# Patient Record
Sex: FEMALE | Race: WHITE | Employment: UNEMPLOYED | URBAN - METROPOLITAN AREA
[De-identification: names, ages, dates, MRNs, and addresses within clinical notes are randomized per-mention and may not be internally consistent; named-entity substitution may affect disease eponyms.]

---

## 2017-01-17 ENCOUNTER — TELEPHONE (OUTPATIENT)
Dept: INTERNAL MEDICINE CLINIC | Age: 57
End: 2017-01-17

## 2017-01-17 DIAGNOSIS — R73.01 IMPAIRED FASTING GLUCOSE: ICD-10-CM

## 2017-01-17 DIAGNOSIS — I10 ESSENTIAL HYPERTENSION: Primary | ICD-10-CM

## 2017-01-17 DIAGNOSIS — Z11.59 NEED FOR HEPATITIS C SCREENING TEST: ICD-10-CM

## 2017-01-17 DIAGNOSIS — E78.00 PURE HYPERCHOLESTEROLEMIA: ICD-10-CM

## 2017-01-17 DIAGNOSIS — E55.9 VITAMIN D DEFICIENCY: ICD-10-CM

## 2017-02-10 RX ORDER — CITALOPRAM 40 MG/1
TABLET, FILM COATED ORAL
Qty: 90 TAB | Refills: 3 | Status: SHIPPED | OUTPATIENT
Start: 2017-02-10 | End: 2017-02-23 | Stop reason: SDUPTHER

## 2017-02-10 RX ORDER — ATORVASTATIN CALCIUM 10 MG/1
TABLET, FILM COATED ORAL
Qty: 30 TAB | Refills: 11 | Status: SHIPPED | OUTPATIENT
Start: 2017-02-10 | End: 2017-02-23 | Stop reason: SDUPTHER

## 2017-02-23 ENCOUNTER — OFFICE VISIT (OUTPATIENT)
Dept: INTERNAL MEDICINE CLINIC | Age: 57
End: 2017-02-23

## 2017-02-23 VITALS
BODY MASS INDEX: 28.63 KG/M2 | SYSTOLIC BLOOD PRESSURE: 114 MMHG | RESPIRATION RATE: 17 BRPM | OXYGEN SATURATION: 96 % | TEMPERATURE: 98.4 F | WEIGHT: 182.4 LBS | HEART RATE: 64 BPM | DIASTOLIC BLOOD PRESSURE: 77 MMHG | HEIGHT: 67 IN

## 2017-02-23 DIAGNOSIS — J41.0 SIMPLE CHRONIC BRONCHITIS (HCC): ICD-10-CM

## 2017-02-23 DIAGNOSIS — Z23 ENCOUNTER FOR IMMUNIZATION: ICD-10-CM

## 2017-02-23 DIAGNOSIS — E78.00 PURE HYPERCHOLESTEROLEMIA: ICD-10-CM

## 2017-02-23 DIAGNOSIS — H00.015 HORDEOLUM EXTERNUM OF LEFT LOWER EYELID: ICD-10-CM

## 2017-02-23 DIAGNOSIS — J45.40 MODERATE PERSISTENT ASTHMA WITHOUT COMPLICATION: ICD-10-CM

## 2017-02-23 DIAGNOSIS — Z12.39 SCREENING FOR BREAST CANCER: ICD-10-CM

## 2017-02-23 DIAGNOSIS — E55.9 VITAMIN D DEFICIENCY: ICD-10-CM

## 2017-02-23 DIAGNOSIS — R73.01 IMPAIRED FASTING GLUCOSE: ICD-10-CM

## 2017-02-23 DIAGNOSIS — I10 ESSENTIAL HYPERTENSION: Primary | ICD-10-CM

## 2017-02-23 RX ORDER — ATENOLOL 100 MG/1
TABLET ORAL
Qty: 135 TAB | Refills: 3 | Status: SHIPPED | OUTPATIENT
Start: 2017-02-23 | End: 2017-06-15 | Stop reason: SDUPTHER

## 2017-02-23 RX ORDER — ATORVASTATIN CALCIUM 10 MG/1
TABLET, FILM COATED ORAL
Qty: 90 TAB | Refills: 3 | Status: SHIPPED | OUTPATIENT
Start: 2017-02-23 | End: 2018-03-23 | Stop reason: SDUPTHER

## 2017-02-23 RX ORDER — VARENICLINE TARTRATE 1 MG/1
TABLET, FILM COATED ORAL
Qty: 60 TAB | Refills: 1 | Status: SHIPPED | OUTPATIENT
Start: 2017-02-23 | End: 2017-05-24

## 2017-02-23 RX ORDER — VARENICLINE TARTRATE 25 MG
KIT ORAL
Qty: 1 DOSE PACK | Refills: 0 | Status: SHIPPED | OUTPATIENT
Start: 2017-02-23 | End: 2018-04-02 | Stop reason: SDUPTHER

## 2017-02-23 RX ORDER — BUDESONIDE AND FORMOTEROL FUMARATE DIHYDRATE 80; 4.5 UG/1; UG/1
AEROSOL RESPIRATORY (INHALATION)
Qty: 1 INHALER | Refills: 11 | Status: SHIPPED | OUTPATIENT
Start: 2017-02-23 | End: 2018-03-09 | Stop reason: SDUPTHER

## 2017-02-23 RX ORDER — LISINOPRIL AND HYDROCHLOROTHIAZIDE 12.5; 2 MG/1; MG/1
TABLET ORAL
Qty: 90 TAB | Refills: 3 | Status: SHIPPED | OUTPATIENT
Start: 2017-02-23 | End: 2017-07-14 | Stop reason: SDUPTHER

## 2017-02-23 RX ORDER — CITALOPRAM 40 MG/1
TABLET, FILM COATED ORAL
Qty: 90 TAB | Refills: 3 | Status: SHIPPED | OUTPATIENT
Start: 2017-02-23 | End: 2017-10-13 | Stop reason: SDUPTHER

## 2017-02-23 RX ORDER — ALBUTEROL SULFATE 90 UG/1
AEROSOL, METERED RESPIRATORY (INHALATION)
Qty: 1 INHALER | Refills: 11 | Status: SHIPPED | OUTPATIENT
Start: 2017-02-23 | End: 2018-03-09 | Stop reason: SDUPTHER

## 2017-02-23 NOTE — MR AVS SNAPSHOT
Visit Information Date & Time Provider Department Dept. Phone Encounter #  
 2/23/2017 10:00 AM Félix Jimenes MD [facility] [phone] [id number] Upcoming Health Maintenance Date Due Hepatitis C Screening 1960 DTaP/Tdap/Td series (1 - Tdap) 3/12/1980 BREAST CANCER SCRN MAMMOGRAM 2/18/2016 PAP AKA CERVICAL CYTOLOGY 6/11/2017 COLONOSCOPY 3/20/2018 Allergies as of 2/23/2017  Review Complete On: 2/23/2017 By: Félix Jimenes MD  
  
 Severity Noted Reaction Type Reactions Sporanox [Itraconazole]  04/18/2012    Hives Current Immunizations  Reviewed on 2/8/2016 Name Date Influenza Vaccine 10/15/2016, 10/15/2015, 11/1/2014, 10/21/2013 Influenza Vaccine Split 10/19/2012 Pneumococcal Polysaccharide (PPSV-23) 11/6/2014 Pneumococcal Vaccine (Unspecified Type) 10/29/2009 Tdap  Incomplete Not reviewed this visit You Were Diagnosed With   
  
 Codes Comments Essential hypertension    -  Primary ICD-10-CM: I10 
ICD-9-CM: 401.9 Encounter for immunization     ICD-10-CM: P29 ICD-9-CM: V03.89 Screening for breast cancer     ICD-10-CM: Z12.39 
ICD-9-CM: V76.10 Simple chronic bronchitis (HCC)     ICD-10-CM: J41.0 ICD-9-CM: 491.0 Pure hypercholesterolemia     ICD-10-CM: E78.00 ICD-9-CM: 272.0 Moderate persistent asthma without complication     E-32-FK: J45.40 ICD-9-CM: 493.90 Impaired fasting glucose     ICD-10-CM: R73.01 
ICD-9-CM: 790.21 Vitamin D deficiency     ICD-10-CM: E55.9 ICD-9-CM: 268.9 Hordeolum externum of left lower eyelid     ICD-10-CM: H00.015 
ICD-9-CM: 373.11 Vitals BP  
  
  
  
  
  
 114/77 (BP 1 Location: Right arm, BP Patient Position: Sitting) BMI and BSA Data Body Mass Index Body Surface Area 28.57 kg/m 2 1.98 m 2 Preferred Pharmacy Pharmacy Name Phone [address] [place] [address] [phone] Your Updated Medication List  
  
   
This list is accurate as of: 2/23/17 11:02 AM.  Always use your most recent med list.  
  
  
  
  
 albuterol 90 mcg/actuation inhaler Commonly known as:  PROAIR HFA INHALE 2 PUFFS BY MOUTH EVERY 4 HOURS AS NEEDED FOR WHEEZING  
  
 atenolol 100 mg tablet Commonly known as:  TENORMIN  
TAKE ONE AND ONE-HALF TABLET BY MOUTH EVERY DAY  
  
 atorvastatin 10 mg tablet Commonly known as:  LIPITOR  
TAKE ONE TABLET BY MOUTH EVERY DAY  
  
 budesonide-formoterol 80-4.5 mcg/actuation Hfaa inhaler Commonly known as:  SYMBICORT  
INHALE 2 PUFFS BY MOUTH TWO TIMES A DAY  
  
 citalopram 40 mg tablet Commonly known as:  CELEXA  
TAKE ONE TABLET BY MOUTH EVERY DAY  
  
 lisinopril-hydroCHLOROthiazide 20-12.5 mg per tablet Commonly known as:  PRINZIDE, ZESTORETIC  
TAKE ONE TABLET BY MOUTH EVERY DAY  
  
 loperamide 2 mg capsule Commonly known as:  IMODIUM  
  
 PROBIOTIC 4X 10-15 mg Tbec Generic drug:  B.infantis-B.ani-B.long-B.bifi Take  by mouth. * varenicline 1 mg tablet Commonly known as:  [address] Take as directed * varenicline 0.5 mg (11)- 1 mg (42) Dspk Commonly known as:  [address] Take as directed  
  
 zinc 50 mg Tab tablet Take  by mouth. * Notice: This list has 2 medication(s) that are the same as other medications prescribed for you. Read the directions carefully, and ask your doctor or other care provider to review them with you. Prescriptions Sent to Pharmacy Refills  
 atorvastatin (LIPITOR) 10 mg tablet 3 Sig: TAKE ONE TABLET BY MOUTH EVERY DAY Class: Normal  
 Pharmacy: [address] [place] [address] #: [phone]  
 citalopram (CELEXA) 40 mg tablet 3 Sig: TAKE ONE TABLET BY MOUTH EVERY DAY  Class: Normal  
 Pharmacy: [address] [place] [address] Ph #: [phone]  
 albuterol [facility] HFA) 90 mcg/actuation inhaler 11 Sig: INHALE 2 PUFFS BY MOUTH EVERY 4 HOURS AS NEEDED FOR WHEEZING Class: Normal  
 Pharmacy: [address] [place] [address] Ph #: [phone]  
 budesonide-formoterol (SYMBICORT) 80-4.5 mcg/actuation HFAA inhaler 11 Sig: INHALE 2 PUFFS BY MOUTH TWO TIMES A DAY Class: Normal  
 Pharmacy: [address] [place] [address] Ph #: [phone]  
 atenolol (TENORMIN) 100 mg tablet 3 Sig: TAKE ONE AND ONE-HALF TABLET BY MOUTH EVERY DAY Class: Normal  
 Pharmacy: [address] [place] [address] Ph #: [phone]  
 lisinopril-hydroCHLOROthiazide (PRINZIDE, ZESTORETIC) 20-12.5 mg per tablet 3 Sig: TAKE ONE TABLET BY MOUTH EVERY DAY Class: Normal  
 Pharmacy: [address] [place] [address] Ph #: [phone]  
 varenicline (CHANTIX CONTINUING MONTH BOX) 1 mg tablet 1 Sig: Take as directed Class: Normal  
 Pharmacy: [address] [place] [address] Ph #: [phone]  
 varenicline (CHANTIX STARTING MONTH BOX) 0.5 mg (11)- 1 mg (42) DsPk 0 Sig: Take as directed Class: Normal  
 Pharmacy: [address] [place] [address] Ph #: [phone] We Performed the Following [place] IMMUNIZ ADMIN,1 SINGLE/COMB VAC/TOXOID [id number] CPT(R)] REFERRAL TO OPHTHALMOLOGY [REF57 Custom] TETANUS, DIPHTHERIA TOXOIDS AND ACELLULAR PERTUSSIS VACCINE (TDAP), IN INDIVIDS. >=7, IM [id number] CPT(R)] To-Do List   
 03/02/2017 Imaging:  [facility]ACIA MAMMO BI SCREENING INCL CAD Referral Information Referral ID Referred By Referred To  
  
 [id number] ZHEN Peck MD   
   [address] [place], [address] Phone: [phone] Fax: [phone] Visits Status Start Date End Date 1 New Request 2/23/17 2/23/18 If your referral has a status of pending review or denied, additional information will be sent to support the outcome of this decision. Introducing [facility]! Dear Jignesh Lane: 
Thank you for requesting a Epiclist account. Our records indicate that you already have an active Epiclist account. You can access your account anytime at [email or web address] Did you know that you can access your hospital and ER discharge instructions at any time in Epiclist? You can also review all of your test results from your hospital stay or ER visit. Additional Information If you have questions, please visit the Frequently Asked Questions section of the Epiclist website at [email or web address] Remember, Epiclist is NOT to be used for urgent needs. For medical emergencies, dial 911. Now available from your iPhone and Android! Please provide this summary of care documentation to your next provider. Your primary care clinician is listed as ZHEN WAGGONER. If you have any questions after today's visit, please call [phone].

## 2017-02-23 NOTE — PROGRESS NOTES
Subjective:     Anny Arreola is a 61 y.o. female who presents for follow up of hypertension and hyperlipidemia and elevated blood sugar. Diet and Lifestyle: generally follows a low fat low cholesterol diet, generally follows a low sodium diet, sedentary  Home BP Monitoring: is not measured at home    Cardiovascular ROS: taking medications as instructed, no medication side effects noted, no TIA's, no chest pain on exertion, no dyspnea on exertion, no swelling of ankles, no orthostatic dizziness or lightheadedness, no palpitations. New concerns:   Increased bloating, nausea and gas x 4 days. No diarrhea, no fevers or chills. Tried Rolaids which helped Monday and Tuesday but not yesterday. Continues to eat regular diet - has been eating a lot of leafy vegetables. Normal appetite. No pain or cramping. Left lower lid with sty x 4 weeks. Mild discharge. Tried warm compresses without improvement. Had lacrimal gland abscess 25 years ago which needed to be removed. Dali Shane COPD/asthma - ready to attempt quitting smoking. Has tried Nicotine patch. Tried Chantix. Chronic cough is unchanged - productive of clear mucous. No wheezing. Some sob. Elevated bs - denies increased thirst or urination, numbness in feet or vision changes. Current Outpatient Prescriptions   Medication Sig Dispense Refill    B.infantis-B.ani-B.long-B.bifi (PROBIOTIC 4X) 10-15 mg TbEC Take  by mouth.       atorvastatin (LIPITOR) 10 mg tablet TAKE ONE TABLET BY MOUTH EVERY DAY 90 Tab 3    citalopram (CELEXA) 40 mg tablet TAKE ONE TABLET BY MOUTH EVERY DAY 90 Tab 3    albuterol (PROAIR HFA) 90 mcg/actuation inhaler INHALE 2 PUFFS BY MOUTH EVERY 4 HOURS AS NEEDED FOR WHEEZING 1 Inhaler 11    budesonide-formoterol (SYMBICORT) 80-4.5 mcg/actuation HFAA inhaler INHALE 2 PUFFS BY MOUTH TWO TIMES A DAY 1 Inhaler 11    atenolol (TENORMIN) 100 mg tablet TAKE ONE AND ONE-HALF TABLET BY MOUTH EVERY  Tab 3    lisinopril-hydroCHLOROthiazide (PRINZIDE, ZESTORETIC) 20-12.5 mg per tablet TAKE ONE TABLET BY MOUTH EVERY DAY 90 Tab 3    varenicline (CHANTIX CONTINUING MONTH BOX) 1 mg tablet Take as directed 60 Tab 1    varenicline (CHANTIX STARTING MONTH BOX) 0.5 mg (11)- 1 mg (42) DsPk Take as directed 1 Dose Pack 0    loperamide (IMODIUM) 2 mg capsule       zinc 50 mg tab Take  by mouth. Lab Results   Component Value Date/Time    Hemoglobin A1c 6.9 06/11/2014 11:26 AM    Hemoglobin A1c 6.3 01/28/2010 10:35 AM    Hemoglobin A1c 6.4 04/30/2009 11:50 AM    Glucose 145 06/11/2014 11:26 AM    LDL, calculated 155 06/11/2014 11:26 AM    Creatinine 0.83 06/11/2014 11:26 AM      Lab Results   Component Value Date/Time    Cholesterol, total 260 06/11/2014 11:26 AM    HDL Cholesterol 69 06/11/2014 11:26 AM    LDL, calculated 155 06/11/2014 11:26 AM    Triglyceride 178 06/11/2014 11:26 AM    CHOL/HDL Ratio 3.6 01/28/2010 10:35 AM       Lab Results   Component Value Date/Time    ALT (SGPT) 21 06/11/2014 11:26 AM    AST (SGOT) 19 06/11/2014 11:26 AM    Alk. phosphatase 79 06/11/2014 11:26 AM    Bilirubin, total 0.6 06/11/2014 11:26 AM       Lab Results   Component Value Date/Time    GFR est AA 92 06/11/2014 11:26 AM    GFR est non-AA 80 06/11/2014 11:26 AM    Creatinine 0.83 06/11/2014 11:26 AM    BUN 24 06/11/2014 11:26 AM    Sodium 136 06/11/2014 11:26 AM    Potassium 5.2 06/11/2014 11:26 AM    Chloride 95 06/11/2014 11:26 AM    CO2 25 06/11/2014 11:26 AM         Review of Systems, additional:  Pertinent items are noted in HPI. Objective:     Visit Vitals    /77 (BP 1 Location: Right arm, BP Patient Position: Sitting)    Pulse 64    Temp 98.4 °F (36.9 °C) (Oral)    Resp 17    Ht 5' 7\" (1.702 m)    Wt 182 lb 6.4 oz (82.7 kg)    SpO2 96%    BMI 28.57 kg/m2     Appearance: alert, well appearing, and in no distress and oriented to person, place, and time.   General exam:   Left lateral lower lid with sty and swelling, 1.5cm  NECK: supple, no lad, no bruit, no tm  LUNGS: cta bilat  CV rrr, no m/g/r  ABD: soft, nt, nd, nabs  EXT: no c/c/e. Assessment/Plan:     hypertension well controlled. current treatment plan is effective, no change in therapy. Hyperlipidemia- controlled in past  Continue same medications    Elevated blood sugar - A1C had been creeping upward  Check labs, cont same    COPD/asthma - stable, cont same  Encouraged to stop smoking. Will retry chantix. Sty left lower eyelid - referred to [facility]     - mammogram, tdap given today    Orders Placed This Encounter    ACACIA MAMMO BI SCREENING INCL CAD    TETANUS, DIPHTHERIA TOXOIDS AND ACELLULAR PERTUSSIS VACCINE (TDAP), IN INDIVIDS. >=7, IM    REFERRAL TO OPHTHALMOLOGY    B.infantis-B.ani-B.long-B.bifi (PROBIOTIC 4X) 10-15 mg TbEC    atorvastatin (LIPITOR) 10 mg tablet    citalopram (CELEXA) 40 mg tablet    albuterol (PROAIR HFA) 90 mcg/actuation inhaler    budesonide-formoterol (SYMBICORT) 80-4.5 mcg/actuation HFAA inhaler    atenolol (TENORMIN) 100 mg tablet    lisinopril-hydroCHLOROthiazide (PRINZIDE, ZESTORETIC) 20-12.5 mg per tablet    varenicline (CHANTIX CONTINUING MONTH BOX) 1 mg tablet    varenicline (CHANTIX STARTING MONTH BOX) 0.5 mg (11)- 1 mg (42) DsPk     Follow-up Disposition:  Return in about 6 months (around 8/23/2017) for hyperlipidemia, htn, elevated blood sugar.

## 2017-02-24 LAB
25(OH)D3+25(OH)D2 SERPL-MCNC: 9.8 NG/ML (ref 30–100)
ALBUMIN SERPL-MCNC: 4.3 G/DL (ref 3.5–5.5)
ALBUMIN/GLOB SERPL: 1.7 {RATIO} (ref 1.1–2.5)
ALP SERPL-CCNC: 94 IU/L (ref 39–117)
ALT SERPL-CCNC: 14 IU/L (ref 0–32)
AST SERPL-CCNC: 17 IU/L (ref 0–40)
BILIRUB SERPL-MCNC: 0.6 MG/DL (ref 0–1.2)
BUN SERPL-MCNC: 32 MG/DL (ref 6–24)
BUN/CREAT SERPL: 37 (ref 9–23)
CALCIUM SERPL-MCNC: 9.4 MG/DL (ref 8.7–10.2)
CHLORIDE SERPL-SCNC: 100 MMOL/L (ref 96–106)
CHOLEST SERPL-MCNC: 173 MG/DL (ref 100–199)
CO2 SERPL-SCNC: 23 MMOL/L (ref 18–29)
CREAT SERPL-MCNC: 0.86 MG/DL (ref 0.57–1)
EST. AVERAGE GLUCOSE BLD GHB EST-MCNC: 151 MG/DL
GLOBULIN SER CALC-MCNC: 2.5 G/DL (ref 1.5–4.5)
GLUCOSE SERPL-MCNC: 149 MG/DL (ref 65–99)
HBA1C MFR BLD: 6.9 % (ref 4.8–5.6)
HCV AB S/CO SERPL IA: <0.1 S/CO RATIO (ref 0–0.9)
HDLC SERPL-MCNC: 65 MG/DL
INTERPRETATION, 910389: NORMAL
LDLC SERPL CALC-MCNC: 42 MG/DL (ref 0–99)
POTASSIUM SERPL-SCNC: 4.9 MMOL/L (ref 3.5–5.2)
PROT SERPL-MCNC: 6.8 G/DL (ref 6–8.5)
SODIUM SERPL-SCNC: 140 MMOL/L (ref 134–144)
TRIGL SERPL-MCNC: 328 MG/DL (ref 0–149)
VLDLC SERPL CALC-MCNC: 66 MG/DL (ref 5–40)

## 2017-02-27 ENCOUNTER — TELEPHONE (OUTPATIENT)
Dept: INTERNAL MEDICINE CLINIC | Age: 57
End: 2017-02-27

## 2017-03-08 ENCOUNTER — TELEPHONE (OUTPATIENT)
Dept: INTERNAL MEDICINE CLINIC | Age: 57
End: 2017-03-08

## 2017-03-08 RX ORDER — ERGOCALCIFEROL 1.25 MG/1
50000 CAPSULE ORAL 2 TIMES WEEKLY
Qty: 8 CAP | Refills: 2 | Status: SHIPPED | OUTPATIENT
Start: 2017-03-10 | End: 2018-05-10 | Stop reason: ALTCHOICE

## 2017-06-15 RX ORDER — ATENOLOL 100 MG/1
TABLET ORAL
Qty: 135 TAB | Refills: 3 | Status: SHIPPED | OUTPATIENT
Start: 2017-06-15 | End: 2017-07-14 | Stop reason: SDUPTHER

## 2017-07-14 RX ORDER — LISINOPRIL AND HYDROCHLOROTHIAZIDE 12.5; 2 MG/1; MG/1
TABLET ORAL
Qty: 90 TAB | Refills: 3 | Status: SHIPPED | OUTPATIENT
Start: 2017-07-14 | End: 2018-05-29 | Stop reason: SDUPTHER

## 2017-07-14 RX ORDER — ATENOLOL 100 MG/1
TABLET ORAL
Qty: 135 TAB | Refills: 3 | Status: SHIPPED | OUTPATIENT
Start: 2017-07-14 | End: 2017-10-12 | Stop reason: SDUPTHER

## 2017-07-26 NOTE — TELEPHONE ENCOUNTER
Done on 2/23. Put in [address] [place] [address] but is at [facility]. Will that do?
Information placed up front for referral to be obtained.
Patient states that she needs a referral to to eye doctor.  Seeing doctor 3/1/17 at [facility](forgot name of doctor) at 9:20 am. Fax referral to [phone]
IV

## 2017-10-12 RX ORDER — ATENOLOL 100 MG/1
TABLET ORAL
Qty: 135 TAB | Refills: 3 | Status: SHIPPED | OUTPATIENT
Start: 2017-10-12 | End: 2019-02-15

## 2018-04-02 ENCOUNTER — TELEPHONE (OUTPATIENT)
Dept: INTERNAL MEDICINE CLINIC | Age: 58
End: 2018-04-02

## 2018-04-02 RX ORDER — VARENICLINE TARTRATE 1 MG/1
1 TABLET, FILM COATED ORAL 2 TIMES DAILY
Qty: 60 TAB | Refills: 1 | Status: SHIPPED | OUTPATIENT
Start: 2018-04-02 | End: 2018-05-10 | Stop reason: ALTCHOICE

## 2018-04-02 RX ORDER — VARENICLINE TARTRATE 25 MG
KIT ORAL
Qty: 1 DOSE PACK | Refills: 0 | Status: SHIPPED | OUTPATIENT
Start: 2018-04-02 | End: 2018-05-10 | Stop reason: ALTCHOICE

## 2018-04-02 NOTE — TELEPHONE ENCOUNTER
----- Message from Falguni Fonseca LPN sent at 4/5/8533  8:20 AM EDT -----  Regarding: FW: Prescription Question  Contact: [phone]      ----- Message -----     From: Florentino Conrad     Sent: 4/1/2018   4:15 PM       To: [facility]  Subject: Prescription Question                            My appointment isn't scheduled until May 10th but I REALLY want to quit smoking now! Is there any way to get a prescription for chantix now? Thank you.

## 2018-04-23 ENCOUNTER — TELEPHONE (OUTPATIENT)
Dept: INTERNAL MEDICINE CLINIC | Age: 58
End: 2018-04-23

## 2018-04-23 RX ORDER — ATORVASTATIN CALCIUM 10 MG/1
TABLET, FILM COATED ORAL
Qty: 30 TAB | Refills: 0 | Status: SHIPPED | OUTPATIENT
Start: 2018-04-23 | End: 2018-05-25 | Stop reason: SDUPTHER

## 2018-05-10 ENCOUNTER — OFFICE VISIT (OUTPATIENT)
Dept: INTERNAL MEDICINE CLINIC | Age: 58
End: 2018-05-10

## 2018-05-10 VITALS
TEMPERATURE: 98.5 F | HEIGHT: 67 IN | WEIGHT: 185.4 LBS | HEART RATE: 66 BPM | DIASTOLIC BLOOD PRESSURE: 72 MMHG | OXYGEN SATURATION: 94 % | RESPIRATION RATE: 16 BRPM | BODY MASS INDEX: 29.1 KG/M2 | SYSTOLIC BLOOD PRESSURE: 115 MMHG

## 2018-05-10 DIAGNOSIS — K63.5 POLYP OF COLON, UNSPECIFIED PART OF COLON, UNSPECIFIED TYPE: ICD-10-CM

## 2018-05-10 DIAGNOSIS — R73.01 IMPAIRED FASTING GLUCOSE: ICD-10-CM

## 2018-05-10 DIAGNOSIS — Z12.11 SCREEN FOR COLON CANCER: ICD-10-CM

## 2018-05-10 DIAGNOSIS — J41.0 SIMPLE CHRONIC BRONCHITIS (HCC): ICD-10-CM

## 2018-05-10 DIAGNOSIS — Z12.39 SCREENING FOR BREAST CANCER: ICD-10-CM

## 2018-05-10 DIAGNOSIS — E78.00 PURE HYPERCHOLESTEROLEMIA: ICD-10-CM

## 2018-05-10 DIAGNOSIS — I10 ESSENTIAL HYPERTENSION: Primary | ICD-10-CM

## 2018-05-10 NOTE — MR AVS SNAPSHOT
[address] [place] [address] [address] [place] [address] 
[phone] Patient: Dolores Wilson MRN: [id number] IOT:[id number] Visit Information Date & Time Provider Department Dept. Phone Encounter #  
 5/10/2018 12:20 PM [address], MD [facility] [phone] [id number] Follow-up Instructions Return in about 4 weeks (around 6/7/2018) for copd and PAP. Upcoming Health Maintenance Date Due  
 BREAST CANCER SCRN MAMMOGRAM 2/18/2016 PAP AKA CERVICAL CYTOLOGY 6/11/2017 COLONOSCOPY 3/20/2018 Influenza Age 5 to Adult 8/1/2018 DTaP/Tdap/Td series (2 - Td) 2/23/2027 Allergies as of 5/10/2018  Review Complete On: 5/10/2018 By: [address], [place]  
  
 Severity Noted Reaction Type Reactions Sporanox [Itraconazole]  04/18/2012    Hives Current Immunizations  Reviewed on 2/23/2017 Name Date Influenza Vaccine 10/15/2016, 10/15/2015, 11/1/2014, 10/21/2013 Influenza Vaccine Split 10/19/2012 Pneumococcal Polysaccharide (PPSV-23) 11/6/2014 Tdap 2/23/2017 ZZZ-RETIRED (DO NOT USE) Pneumococcal Vaccine (Unspecified Type) 10/29/2009 Not reviewed this visit You Were Diagnosed With   
  
 Codes Comments Essential hypertension    -  Primary ICD-10-CM: I10 
ICD-9-CM: 401.9 Screen for colon cancer     ICD-10-CM: Z12.11 ICD-9-CM: V76.51 Polyp of colon, unspecified part of colon, unspecified type     ICD-10-CM: K63.5 ICD-9-CM: 211.3 Screening for breast cancer     ICD-10-CM: Z12.31 
ICD-9-CM: V76.10 Simple chronic bronchitis (HCC)     ICD-10-CM: J41.0 ICD-9-CM: 491.0 Pure hypercholesterolemia     ICD-10-CM: E78.00 ICD-9-CM: 272.0 Impaired fasting glucose     ICD-10-CM: R73.01 
ICD-9-CM: 790.21 Vitals BP Pulse Temp Resp Height(growth percentile) Weight(growth percentile)  115/72 (BP 1 Location: Left arm, BP Patient Position: Sitting) 66 98.5 °F (36.9 °C) (Oral) 16 5' 7\" (1.702 m) 185 lb 6.4 oz (84.1 kg) SpO2 BMI OB Status Smoking Status 94% 29.04 kg/m2 Postmenopausal Current Every Day Smoker Vitals History BMI and BSA Data Body Mass Index Body Surface Area 29.04 kg/m 2 1.99 m 2 Preferred Pharmacy Pharmacy Name Phone [facility] [facility], [address] [place] [address] [phone] Your Updated Medication List  
  
   
This list is accurate as of 5/10/18 12:57 PM.  Always use your most recent med list.  
  
  
  
  
 atenolol 100 mg tablet Commonly known as:  TENORMIN  
TAKE ONE AND ONE-HALF TABLETS BY MOUTH EVERY DAY  
  
 atorvastatin 10 mg tablet Commonly known as:  LIPITOR  
TAKE ONE TABLET BY MOUTH EVERY DAY  
  
 lisinopril-hydroCHLOROthiazide 20-12.5 mg per tablet Commonly known as:  PRINZIDE, ZESTORETIC  
TAKE ONE TABLET BY MOUTH EVERY DAY  
  
 PROAIR HFA 90 mcg/actuation inhaler Generic drug:  albuterol INHALE 2 PUFFS BY MOUTH EVERY 4 HOURS AS NEEDED FOR WHEEZING  
  
 PROBIOTIC 4X 10-15 mg Tbec Generic drug:  B.infantis-B.ani-B.long-B.bifi Take  by mouth. SYMBICORT 80-4.5 mcg/actuation Hfaa Generic drug:  budesonide-formoterol INHALE 2 PUFFS BY MOUTH TWO TIMES A DAY  
  
 zinc 50 mg Tab tablet Take  by mouth. Only in the winter We Performed the Following HEMOGLOBIN A1C WITH EAG [37654 CPT(R)] LIPID PANEL [76518 CPT(R)] METABOLIC PANEL, COMPREHENSIVE [33146 CPT(R)] MICROALBUMIN, UR, RAND W/ MICROALB/CREAT RATIO [id number] CPT(R)] REFERRAL TO GASTROENTEROLOGY [YSB56 Custom] URINALYSIS W/ RFLX MICROSCOPIC [76834 CPT(R)] Follow-up Instructions Return in about 4 weeks (around 6/7/2018) for copd and PAP. To-Do List   
 05/17/2018 Imaging:  [facility] MAMMO BI SCREENING INCL CAD Referral Information  Referral ID Referred By Referred To  
  
 [id number] INGRID NAIRZHEN SAPP [facility]   
 [address] [facility] [address] [place], [address] Visits Status Start Date End Date 1 New Request 5/10/18 5/10/19 If your referral has a status of pending review or denied, additional information will be sent to support the outcome of this decision. Introducing [facility]! Dear Charbel Jean: 
Thank you for requesting a [employer] account. Our records indicate that you already have an active [employer] account. You can access your account anytime at [email or web address] Did you know that you can access your hospital and ER discharge instructions at any time in [employer]? You can also review all of your test results from your hospital stay or ER visit. Additional Information If you have questions, please visit the Frequently Asked Questions section of the [employer] website at [email or web address] Remember, [employer] is NOT to be used for urgent needs. For medical emergencies, dial 911. Now available from your iPhone and Android! Please provide this summary of care documentation to your next provider. Your primary care clinician is listed as ZHEN WAGGONER. If you have any questions after today's visit, please call [phone].

## 2018-05-10 NOTE — PROGRESS NOTES
Subjective:     Rocio Mcbride is a 60 y.o. female who presents for follow up of hypertension, hyperlipidemia and elevated blood sugars. .    Diet and Lifestyle: generally follows a low sodium diet, sedentary secondary to COPD  Home BP Monitoring: is not measured at home    Cardiovascular ROS: taking medications as instructed, no medication side effects noted, no TIA's, no chest pain on exertion, notes stable dyspnea on exertion, no change, no swelling of ankles, no orthostatic dizziness or lightheadedness, no palpitations. New concerns:   Increasing sob. No wheezing or coughing. Continues to smoke 1.5-2 ppd. Tried chantix which worked - cut back but only took 1 pack. Can't afford to restart. .  Using her Symbicort once a day and Albuterol once a day. Had PFT's completed 2012 showing severe obstructive dz at that time. Current Outpatient Prescriptions   Medication Sig Dispense Refill    atorvastatin (LIPITOR) 10 mg tablet TAKE ONE TABLET BY MOUTH EVERY DAY 30 Tab 0    SYMBICORT 80-4.5 mcg/actuation HFAA INHALE 2 PUFFS BY MOUTH TWO TIMES A DAY 1 Inhaler 0    PROAIR HFA 90 mcg/actuation inhaler INHALE 2 PUFFS BY MOUTH EVERY 4 HOURS AS NEEDED FOR WHEEZING 1 Inhaler 0    atenolol (TENORMIN) 100 mg tablet TAKE ONE AND ONE-HALF TABLETS BY MOUTH EVERY  Tab 3    lisinopril-hydroCHLOROthiazide (PRINZIDE, ZESTORETIC) 20-12.5 mg per tablet TAKE ONE TABLET BY MOUTH EVERY DAY 90 Tab 3    B.infantis-B.ani-B.long-B.bifi (PROBIOTIC 4X) 10-15 mg TbEC Take  by mouth.  zinc 50 mg tab Take  by mouth.  Only in the winter          Lab Results  Component Value Date/Time   Hemoglobin A1c 6.9 (H) 02/23/2017 11:14 AM   Hemoglobin A1c 6.9 (H) 06/11/2014 11:26 AM   Hemoglobin A1c 6.3 (H) 01/28/2010 10:35 AM   Glucose 149 (H) 02/23/2017 11:14 AM   LDL, calculated 42 02/23/2017 11:14 AM   Creatinine 0.86 02/23/2017 11:14 AM      Lab Results  Component Value Date/Time   Cholesterol, total 173 02/23/2017 11:14 AM   HDL Cholesterol 65 02/23/2017 11:14 AM   LDL, calculated 42 02/23/2017 11:14 AM   Triglyceride 328 (H) 02/23/2017 11:14 AM   CHOL/HDL Ratio 3.6 01/28/2010 10:35 AM     Lab Results  Component Value Date/Time   ALT (SGPT) 14 02/23/2017 11:14 AM   AST (SGOT) 17 02/23/2017 11:14 AM   Alk. phosphatase 94 02/23/2017 11:14 AM   Bilirubin, total 0.6 02/23/2017 11:14 AM   Albumin 4.3 02/23/2017 11:14 AM   Protein, total 6.8 02/23/2017 11:14 AM       Lab Results  Component Value Date/Time   GFR est non-AA 75 02/23/2017 11:14 AM   GFR est AA 87 02/23/2017 11:14 AM   Creatinine 0.86 02/23/2017 11:14 AM   BUN 32 (H) 02/23/2017 11:14 AM   Sodium 140 02/23/2017 11:14 AM   Potassium 4.9 02/23/2017 11:14 AM   Chloride 100 02/23/2017 11:14 AM   CO2 23 02/23/2017 11:14 AM        Review of Systems, additional:  Pertinent items are noted in HPI. Objective:     Visit Vitals    /72 (BP 1 Location: Left arm, BP Patient Position: Sitting)    Pulse 66    Temp 98.5 °F (36.9 °C) (Oral)    Resp 16    Ht 5' 7\" (1.702 m)    Wt 185 lb 6.4 oz (84.1 kg)    SpO2 94%    BMI 29.04 kg/m2     Appearance: alert, well appearing, and in no distress and oriented to person, place, and time. General exam:   . NECK: supple, no lad, no bruit, no tm  LUNGS: mild wheezing throughout all lung fields. CV rrr, no m/g/r  ABD: soft, nt, nd, nabs  EXT: no c/c/e    Assessment/Plan:     diabetes well controlled. Last A1C 1 year ago, 6.9  current treatment plan is effective, no change in therapy. Check labs  Continue diet and exericse  Foot exam with next visit    htn - controlled, cont same  Check labs    Hyperlipidemia -controlled, cont same  Check labs    COPD - not controlled  Will have her start taking her Symbicort 2 puffs bid not 1 puff every day as she has been   Continue albuterol prn  Encouraged to stop smoking    HM - mammogram, colonoscopy.   rtc for pap    Orders Placed This Encounter    ACACIA MAMMO BI SCREENING INCL CAD    METABOLIC PANEL, COMPREHENSIVE    LIPID PANEL    HEMOGLOBIN A1C WITH EAG    URINALYSIS W/ RFLX MICROSCOPIC    MICROALBUMIN, UR, RAND W/ MICROALB/CREAT RATIO    [facility] [place]     Follow-up Disposition:  Return in about 4 weeks (around 6/7/2018) for copd and PAP.

## 2018-05-10 NOTE — PROGRESS NOTES
1. Have you been to the ER, urgent care clinic since your last visit? Hospitalized since your last visit? No    2. Have you seen or consulted any other health care providers outside of the [address] [place] [address] since your last visit? Include any pap smears or colon screening. No     Chief Complaint   Patient presents with    Cholesterol Problem     follow up    Hypertension     follow up    Blood sugar problem     follow up     Not fasting    Mammogram needs to be scheduled. Pap smear needs to be scheduled. Colonoscopy needs to be clarified with GI doctor.

## 2018-05-29 RX ORDER — LISINOPRIL AND HYDROCHLOROTHIAZIDE 12.5; 2 MG/1; MG/1
TABLET ORAL
Qty: 90 TAB | Refills: 3 | Status: SHIPPED | OUTPATIENT
Start: 2018-05-29 | End: 2019-08-01 | Stop reason: SDUPTHER

## 2018-06-01 LAB
ALBUMIN SERPL-MCNC: 4.4 G/DL (ref 3.5–5.5)
ALBUMIN/CREAT UR: 37.9 MG/G CREAT (ref 0–30)
ALBUMIN/GLOB SERPL: 2 {RATIO} (ref 1.2–2.2)
ALP SERPL-CCNC: 67 IU/L (ref 39–117)
ALT SERPL-CCNC: 12 IU/L (ref 0–32)
APPEARANCE UR: CLEAR
AST SERPL-CCNC: 14 IU/L (ref 0–40)
BACTERIA #/AREA URNS HPF: NORMAL /[HPF]
BILIRUB SERPL-MCNC: 0.4 MG/DL (ref 0–1.2)
BILIRUB UR QL STRIP: NEGATIVE
BUN SERPL-MCNC: 23 MG/DL (ref 6–24)
BUN/CREAT SERPL: 32 (ref 9–23)
CALCIUM SERPL-MCNC: 9.7 MG/DL (ref 8.7–10.2)
CASTS URNS QL MICRO: NORMAL /LPF
CHLORIDE SERPL-SCNC: 104 MMOL/L (ref 96–106)
CHOLEST SERPL-MCNC: 158 MG/DL (ref 100–199)
CO2 SERPL-SCNC: 17 MMOL/L (ref 18–29)
COLOR UR: YELLOW
CREAT SERPL-MCNC: 0.71 MG/DL (ref 0.57–1)
CREAT UR-MCNC: 48.3 MG/DL
EPI CELLS #/AREA URNS HPF: NORMAL /HPF
EST. AVERAGE GLUCOSE BLD GHB EST-MCNC: 160 MG/DL
GFR SERPLBLD CREATININE-BSD FMLA CKD-EPI: 108 ML/MIN/1.73
GFR SERPLBLD CREATININE-BSD FMLA CKD-EPI: 94 ML/MIN/1.73
GLOBULIN SER CALC-MCNC: 2.2 G/DL (ref 1.5–4.5)
GLUCOSE SERPL-MCNC: 206 MG/DL (ref 65–99)
GLUCOSE UR QL: NEGATIVE
HBA1C MFR BLD: 7.2 % (ref 4.8–5.6)
HDLC SERPL-MCNC: 69 MG/DL
HGB UR QL STRIP: NEGATIVE
INTERPRETATION, 910389: NORMAL
KETONES UR QL STRIP: NEGATIVE
LDLC SERPL CALC-MCNC: 47 MG/DL (ref 0–99)
LEUKOCYTE ESTERASE UR QL STRIP: NEGATIVE
Lab: NORMAL
MICRO URNS: ABNORMAL
MICROALBUMIN UR-MCNC: 18.3 UG/ML
MUCOUS THREADS URNS QL MICRO: PRESENT
NITRITE UR QL STRIP: POSITIVE
PH UR STRIP: 7 [PH] (ref 5–7.5)
POTASSIUM SERPL-SCNC: 4.2 MMOL/L (ref 3.5–5.2)
PROT SERPL-MCNC: 6.6 G/DL (ref 6–8.5)
PROT UR QL STRIP: ABNORMAL
RBC #/AREA URNS HPF: NORMAL /HPF
SODIUM SERPL-SCNC: 137 MMOL/L (ref 134–144)
SP GR UR: 1.01 (ref 1–1.03)
TRIGL SERPL-MCNC: 211 MG/DL (ref 0–149)
UROBILINOGEN UR STRIP-MCNC: 0.2 MG/DL (ref 0.2–1)
VLDLC SERPL CALC-MCNC: 42 MG/DL (ref 5–40)
WBC #/AREA URNS HPF: NORMAL /HPF

## 2018-06-07 ENCOUNTER — HOSPITAL ENCOUNTER (OUTPATIENT)
Dept: MAMMOGRAPHY | Age: 58
Discharge: HOME | End: 2018-06-07
Attending: INTERNAL MEDICINE
Payer: COMMERCIAL

## 2018-06-07 DIAGNOSIS — Z12.31 VISIT FOR SCREENING MAMMOGRAM: ICD-10-CM

## 2018-06-07 PROCEDURE — 77063 BREAST TOMOSYNTHESIS BI: CPT

## 2018-06-08 ENCOUNTER — TELEPHONE (OUTPATIENT)
Dept: INTERNAL MEDICINE CLINIC | Age: 58
End: 2018-06-08

## 2018-06-11 ENCOUNTER — TELEPHONE (OUTPATIENT)
Dept: INTERNAL MEDICINE CLINIC | Age: 58
End: 2018-06-11

## 2018-06-11 DIAGNOSIS — E11.9 CONTROLLED TYPE 2 DIABETES MELLITUS WITHOUT COMPLICATION, WITHOUT LONG-TERM CURRENT USE OF INSULIN (HCC): Primary | ICD-10-CM

## 2018-06-11 NOTE — TELEPHONE ENCOUNTER
Pt is returning call following up on results of A1C testing.      Best contact # [phone]      From Nancy Lagunas

## 2018-06-12 PROBLEM — E11.9 CONTROLLED TYPE 2 DIABETES MELLITUS WITHOUT COMPLICATION, WITHOUT LONG-TERM CURRENT USE OF INSULIN (HCC): Status: ACTIVE | Noted: 2018-06-12

## 2018-06-12 NOTE — TELEPHONE ENCOUNTER
Discussed increased in A1C. Has never been on medication. Would like to work on diet and exercise first.  Discussed DM diet. Repeat labs in 3 months prior to next visit.

## 2018-06-14 ENCOUNTER — OFFICE VISIT (OUTPATIENT)
Dept: INTERNAL MEDICINE CLINIC | Age: 58
End: 2018-06-14

## 2018-06-14 VITALS
RESPIRATION RATE: 18 BRPM | SYSTOLIC BLOOD PRESSURE: 102 MMHG | BODY MASS INDEX: 29.26 KG/M2 | DIASTOLIC BLOOD PRESSURE: 87 MMHG | HEIGHT: 67 IN | TEMPERATURE: 98.7 F | WEIGHT: 186.4 LBS | OXYGEN SATURATION: 90 % | HEART RATE: 65 BPM

## 2018-06-14 DIAGNOSIS — Z12.4 SCREENING FOR CERVICAL CANCER: Primary | ICD-10-CM

## 2018-06-14 NOTE — PROGRESS NOTES
Subjective:   60 y.o. female for Well Woman Check. No LMP recorded. Patient is postmenopausal.      Pertinent past medical hstory: hypertension, diabetes    Current Outpatient Prescriptions   Medication Sig Dispense Refill    lisinopril-hydroCHLOROthiazide (PRINZIDE, ZESTORETIC) 20-12.5 mg per tablet TAKE 1 TABLET BY MOUTH  EVERY DAY 90 Tab 3    atorvastatin (LIPITOR) 10 mg tablet TAKE ONE TABLET BY MOUTH EVERY DAY 30 Tab 11    SYMBICORT 80-4.5 mcg/actuation HFAA INHALE 2 PUFFS BY MOUTH TWO TIMES A DAY 1 Inhaler 11    VENTOLIN HFA 90 mcg/actuation inhaler INHALE 2 PUFFS BY MOUTH EVERY 4 HOURS AS NEEDED FOR WHEEZING 1 Inhaler 11    atenolol (TENORMIN) 100 mg tablet TAKE ONE AND ONE-HALF TABLETS BY MOUTH EVERY  Tab 3    B.infantis-B.ani-B.long-B.bifi (PROBIOTIC 4X) 10-15 mg TbEC Take  by mouth.  zinc 50 mg tab Take  by mouth. Only in the winter          ROS: At night feeling urgency to urinate. Gets up and only a dribble. Up 6-7 times a months. Patrica Dodge GYN ROS: no abnormal bleeding, pelvic pain or discharge, no breast pain or new or enlarging lumps on self exam.     COPD - has been using Symbicort correctly and breathing has improved. Hasn't needed to use Albuterol since. Objective:     Visit Vitals    /87 (BP 1 Location: Right arm, BP Patient Position: Sitting)    Pulse 65    Temp 98.7 °F (37.1 °C) (Oral)    Resp 18    Ht 5' 7\" (1.702 m)    Wt 186 lb 6.4 oz (84.6 kg)    SpO2 90%    BMI 29.19 kg/m2     The patient appears well, alert, oriented x 3, in no distress. ENT normal.  Neck supple. No adenopathy or thyromegaly. QUEENIE. Lungs are clear, good air entry, no wheezes, rhonchi or rales. S1 and S2 normal, no murmurs, regular rate and rhythm. Abdomen soft without tenderness, guarding, mass or organomegaly. Extremities show no edema, normal peripheral pulses. Neurological is normal, no focal findings.     BREAST EXAM: breasts appear normal, no suspicious masses, no skin or nipple changes or axillary nodes    PELVIC EXAM:   VULVA: normal appearing vulva with no masses, tenderness or lesions,   VAGINA: normal appearing vagina with normal color and discharge, no lesions,   CERVIX: normal appearing cervix without discharge or lesions,   UTERUS: uterus is normal size, shape, consistency and nontender,   ADNEXA: normal adnexa in size, nontender and no masses,    Assessment/Plan:   well woman  pap smear  Colonoscopy    Copd  - improved. Continue Symbicort. Orders Placed This Encounter    PAP IG, RFX APTIMA HPV ASCUS ([id number]))     Follow-up Disposition:  Return in about 3 months (around 9/14/2018) for diabetes. Arleen Conner

## 2018-06-14 NOTE — PROGRESS NOTES
1. Have you been to the ER, urgent care clinic since your last visit? Hospitalized since your last visit? No    2. Have you seen or consulted any other health care providers outside of the [facility] since your last visit? Include any pap smears or colon screening. No       Chief Complaint   Patient presents with    Gyn Exam    COPD     Fasting    Eye exam done about 1 year ago with Milagros Chawla.    Colonoscopy- needs to have scheduled.

## 2018-06-15 LAB
CYTOLOGIST CVX/VAG CYTO: NORMAL
CYTOLOGY CVX/VAG DOC THIN PREP: NORMAL
DX ICD CODE: NORMAL
LABCORP, 190119: NORMAL
Lab: NORMAL
OTHER STN SPEC: NORMAL
PATH REPORT.FINAL DX SPEC: NORMAL
STAT OF ADQ CVX/VAG CYTO-IMP: NORMAL

## 2018-08-27 RX ORDER — ATENOLOL 100 MG/1
TABLET ORAL
Qty: 135 TAB | Refills: 3 | Status: SHIPPED | OUTPATIENT
Start: 2018-08-27 | End: 2018-10-25 | Stop reason: SDUPTHER

## 2018-10-17 LAB
ALBUMIN SERPL-MCNC: 4.2 G/DL (ref 3.5–5.5)
ALBUMIN/GLOB SERPL: 1.9 {RATIO} (ref 1.2–2.2)
ALP SERPL-CCNC: 78 IU/L (ref 39–117)
ALT SERPL-CCNC: 15 IU/L (ref 0–32)
AST SERPL-CCNC: 14 IU/L (ref 0–40)
BILIRUB SERPL-MCNC: 0.2 MG/DL (ref 0–1.2)
BUN SERPL-MCNC: 31 MG/DL (ref 6–24)
BUN/CREAT SERPL: 40 (ref 9–23)
CALCIUM SERPL-MCNC: 9.2 MG/DL (ref 8.7–10.2)
CHLORIDE SERPL-SCNC: 103 MMOL/L (ref 96–106)
CO2 SERPL-SCNC: 21 MMOL/L (ref 20–29)
CREAT SERPL-MCNC: 0.77 MG/DL (ref 0.57–1)
EST. AVERAGE GLUCOSE BLD GHB EST-MCNC: 154 MG/DL
GLOBULIN SER CALC-MCNC: 2.2 G/DL (ref 1.5–4.5)
GLUCOSE SERPL-MCNC: 196 MG/DL (ref 65–99)
HBA1C MFR BLD: 7 % (ref 4.8–5.6)
POTASSIUM SERPL-SCNC: 4.3 MMOL/L (ref 3.5–5.2)
PROT SERPL-MCNC: 6.4 G/DL (ref 6–8.5)
SODIUM SERPL-SCNC: 138 MMOL/L (ref 134–144)

## 2018-10-17 NOTE — TELEPHONE ENCOUNTER
Please call her and schedule an appointment to discuss her lab work. Overdue for her diabetes followup.

## 2018-10-25 ENCOUNTER — OFFICE VISIT (OUTPATIENT)
Dept: INTERNAL MEDICINE CLINIC | Age: 58
End: 2018-10-25

## 2018-10-25 VITALS
SYSTOLIC BLOOD PRESSURE: 114 MMHG | DIASTOLIC BLOOD PRESSURE: 73 MMHG | HEIGHT: 68 IN | WEIGHT: 181 LBS | BODY MASS INDEX: 27.43 KG/M2 | OXYGEN SATURATION: 92 % | HEART RATE: 64 BPM | RESPIRATION RATE: 18 BRPM | TEMPERATURE: 99.2 F

## 2018-10-25 DIAGNOSIS — J41.0 SIMPLE CHRONIC BRONCHITIS (HCC): ICD-10-CM

## 2018-10-25 DIAGNOSIS — E66.3 OVERWEIGHT: ICD-10-CM

## 2018-10-25 DIAGNOSIS — E11.9 CONTROLLED TYPE 2 DIABETES MELLITUS WITHOUT COMPLICATION, WITHOUT LONG-TERM CURRENT USE OF INSULIN (HCC): Primary | ICD-10-CM

## 2018-10-25 DIAGNOSIS — E78.00 PURE HYPERCHOLESTEROLEMIA: ICD-10-CM

## 2018-10-25 DIAGNOSIS — I10 ESSENTIAL HYPERTENSION: ICD-10-CM

## 2018-10-25 DIAGNOSIS — F17.200 CURRENT SMOKER: ICD-10-CM

## 2018-10-25 NOTE — PROGRESS NOTES
All health maintenance and other pertinent information has been reviewed in preparation for today's office visit. Patient presents in the office today for: Chief Complaint Patient presents with  Results DMII 1. Have you been to the ER, urgent care clinic since your last visit? Hospitalized since your last visit? No 
 
2. Have you seen or consulted any other health care providers outside of the [address] [place] [address] since your last visit? Include any pap smears or colon screening.  No

## 2018-10-25 NOTE — PROGRESS NOTES
Subjective:  
 
Todd Cullen is a 60 y.o. female seen for follow up of diabetes. She also has hypertension, hyperlipidemia and COPD. Diabetic Review of Systems - medication compliance: compliant all of the time, diabetic diet compliance: compliant most of the time, home glucose monitoring: is not performed, further diabetic ROS: no polyuria or polydipsia, no chest pain, dyspnea or TIA's, no numbness, tingling or pain in extremities, no unusual visual symptoms, last eye exam approximately 1 year ago. She has lost 5 lbs since last visit. Other symptoms and concerns:  
Increased stress. 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.  Kavita Sherie to mild depression but does NOT want to restart antidepressants. COPD is doing well. Taking Symbicort as prescribed. Rare use of Ventolin. Went to [place] after last visit. Very relaxed. Had to stop both of her blood pressure meds seconddary to low BP. Came back home, BP went up and restarted meds. Current Outpatient Medications Medication Sig Dispense Refill  varicella-zoster recombinant, PF, (SHINGRIX, PF,) 50 mcg/0.5 mL susr injection 0.5 mL by IntraMUSCular route once for 1 dose. Repeat x 1 in 2-6 months 0.5 mL 1  
 lisinopril-hydroCHLOROthiazide (PRINZIDE, ZESTORETIC) 20-12.5 mg per tablet TAKE 1 TABLET BY MOUTH  EVERY DAY 90 Tab 3  
 atorvastatin (LIPITOR) 10 mg tablet TAKE ONE TABLET BY MOUTH EVERY DAY 30 Tab 11  
 SYMBICORT 80-4.5 mcg/actuation HFAA INHALE 2 PUFFS BY MOUTH TWO TIMES A DAY 1 Inhaler 11  
 VENTOLIN HFA 90 mcg/actuation inhaler INHALE 2 PUFFS BY MOUTH EVERY 4 HOURS AS NEEDED FOR WHEEZING 1 Inhaler 11  
 atenolol (TENORMIN) 100 mg tablet TAKE ONE AND ONE-HALF TABLETS BY MOUTH EVERY  Tab 3  
 B.infantis-B.ani-B.long-B.bifi (PROBIOTIC 4X) 10-15 mg TbEC Take  by mouth.  zinc 50 mg tab Take  by mouth. Only in the winter Lab Results Component Value Date/Time Hemoglobin A1c 7.0 (H) 10/16/2018 09:07 AM  
 Hemoglobin A1c 7.2 (H) 05/31/2018 08:22 AM  
 Hemoglobin A1c 6.9 (H) 02/23/2017 11:14 AM  
 Glucose 196 (H) 10/16/2018 09:07 AM  
 Microalb/Creat ratio (ug/mg creat.) 37.9 (H) 05/31/2018 08:22 AM  
 LDL, calculated 47 05/31/2018 08:22 AM  
 Creatinine 0.77 10/16/2018 09:07 AM  
  
Lab Results Component Value Date/Time Cholesterol, total 158 05/31/2018 08:22 AM  
 HDL Cholesterol 69 05/31/2018 08:22 AM  
 LDL, calculated 47 05/31/2018 08:22 AM  
 Triglyceride 211 (H) 05/31/2018 08:22 AM  
 CHOL/HDL Ratio 3.6 01/28/2010 10:35 AM  
 
Lab Results Component Value Date/Time ALT (SGPT) 15 10/16/2018 09:07 AM  
 AST (SGOT) 14 10/16/2018 09:07 AM  
 Alk. phosphatase 78 10/16/2018 09:07 AM  
 Bilirubin, total 0.2 10/16/2018 09:07 AM  
 Albumin 4.2 10/16/2018 09:07 AM  
 Protein, total 6.4 10/16/2018 09:07 AM  
 
Lab Results Component Value Date/Time GFR est non-AA 85 10/16/2018 09:07 AM  
 GFR est AA 98 10/16/2018 09:07 AM  
 Creatinine 0.77 10/16/2018 09:07 AM  
 BUN 31 (H) 10/16/2018 09:07 AM  
 Sodium 138 10/16/2018 09:07 AM  
 Potassium 4.3 10/16/2018 09:07 AM  
 Chloride 103 10/16/2018 09:07 AM  
 CO2 21 10/16/2018 09:07 AM  
  
 
Review of Systems Pertinent items are noted in HPI. Objective:  
 
Visit Vitals /73 (BP 1 Location: Left arm, BP Patient Position: Sitting) Pulse 64 Temp 99.2 °F (37.3 °C) (Oral) Resp 18 Ht 5' 7.75\" (1.721 m) Wt 181 lb (82.1 kg) SpO2 92% BMI 27.72 kg/m² Appearance: alert, well appearing, and in no distress and oriented to person, place, and time. Exam: NECK: supple, no lad, no bruit, no tm LUNGS: cta bilat CV rrr, no m/g/r ABD: soft, nt, nd, nabs EXT: no c/c/e Feet - 2+ dp and pt pulses. nml sensation to light touch or filament testing. nml appearance - no callusing, ulcerations or lesions. Assessment/Plan:  
 
diabetes well controlled. Diabetic issues reviewed with her: continue off meds for now. Continue to work on diet and exercise Foot exam today 
 
htn - controlled, cont same Hyperlipidemia- controlled, cont same COPD - controlled - baseline cough and sob. Continue same meds Discussed stopping smoking. Pt to try nicotene replacement patches. Depression - triggered by pending loss of job. Discussed walking/exercise Overweight - has lost 5 lbs through diet and exercise. Continue same Sherlean Narrow Orders Placed This Encounter  HEMOGLOBIN A1C WITH EAG  
 METABOLIC PANEL, COMPREHENSIVE  LIPID PANEL  
 MICROALBUMIN, UR, RAND W/ MICROALB/CREAT RATIO  URINALYSIS W/ RFLX MICROSCOPIC  varicella-zoster recombinant, PF, (SHINGRIX, PF,) 50 mcg/0.5 mL susr injection Follow-up Disposition: 
Return in about 6 months (around 4/25/2019) for diabetes, hyperlipidemia, htn.

## 2019-02-10 ENCOUNTER — APPOINTMENT (OUTPATIENT)
Dept: GENERAL RADIOLOGY | Age: 59
DRG: 193 | End: 2019-02-10
Attending: EMERGENCY MEDICINE
Payer: COMMERCIAL

## 2019-02-10 ENCOUNTER — APPOINTMENT (OUTPATIENT)
Dept: CT IMAGING | Age: 59
DRG: 193 | End: 2019-02-10
Attending: INTERNAL MEDICINE
Payer: COMMERCIAL

## 2019-02-10 ENCOUNTER — HOSPITAL ENCOUNTER (INPATIENT)
Age: 59
LOS: 5 days | Discharge: HOME HEALTH | DRG: 193 | End: 2019-02-15
Attending: EMERGENCY MEDICINE | Admitting: INTERNAL MEDICINE
Payer: COMMERCIAL

## 2019-02-10 DIAGNOSIS — J96.01 ACUTE RESPIRATORY FAILURE WITH HYPOXIA (HCC): ICD-10-CM

## 2019-02-10 DIAGNOSIS — J18.9 PNEUMONIA DUE TO INFECTIOUS ORGANISM, UNSPECIFIED LATERALITY, UNSPECIFIED PART OF LUNG: Primary | ICD-10-CM

## 2019-02-10 PROBLEM — J44.1 COPD WITH ACUTE EXACERBATION (HCC): Status: ACTIVE | Noted: 2019-02-10

## 2019-02-10 LAB
ALBUMIN SERPL-MCNC: 3.5 G/DL (ref 3.5–5)
ALBUMIN/GLOB SERPL: 0.9 {RATIO} (ref 1.1–2.2)
ALP SERPL-CCNC: 94 U/L (ref 45–117)
ALT SERPL-CCNC: 19 U/L (ref 12–78)
ANION GAP SERPL CALC-SCNC: 10 MMOL/L (ref 5–15)
AST SERPL-CCNC: 22 U/L (ref 15–37)
ATRIAL RATE: 84 BPM
BASOPHILS # BLD: 0 K/UL (ref 0–0.1)
BASOPHILS NFR BLD: 0 % (ref 0–1)
BILIRUB SERPL-MCNC: 0.4 MG/DL (ref 0.2–1)
BNP SERPL-MCNC: 2759 PG/ML (ref 0–125)
BUN SERPL-MCNC: 18 MG/DL (ref 6–20)
BUN/CREAT SERPL: 24 (ref 12–20)
CALCIUM SERPL-MCNC: 8.5 MG/DL (ref 8.5–10.1)
CALCULATED P AXIS, ECG09: 17 DEGREES
CALCULATED R AXIS, ECG10: 89 DEGREES
CALCULATED T AXIS, ECG11: 72 DEGREES
CHLORIDE SERPL-SCNC: 97 MMOL/L (ref 97–108)
CK MB CFR SERPL CALC: 1.7 % (ref 0–2.5)
CK MB CFR SERPL CALC: NORMAL % (ref 0–2.5)
CK MB SERPL-MCNC: 1.3 NG/ML (ref 5–25)
CK MB SERPL-MCNC: <1 NG/ML (ref 5–25)
CK SERPL-CCNC: 60 U/L (ref 26–192)
CK SERPL-CCNC: 78 U/L (ref 26–192)
CO2 SERPL-SCNC: 23 MMOL/L (ref 21–32)
COMMENT, HOLDF: NORMAL
CREAT SERPL-MCNC: 0.76 MG/DL (ref 0.55–1.02)
DIAGNOSIS, 93000: NORMAL
DIFFERENTIAL METHOD BLD: ABNORMAL
EOSINOPHIL # BLD: 0 K/UL (ref 0–0.4)
EOSINOPHIL NFR BLD: 0 % (ref 0–7)
ERYTHROCYTE [DISTWIDTH] IN BLOOD BY AUTOMATED COUNT: 14 % (ref 11.5–14.5)
FLUAV AG NPH QL IA: NEGATIVE
FLUBV AG NOSE QL IA: NEGATIVE
GLOBULIN SER CALC-MCNC: 4 G/DL (ref 2–4)
GLUCOSE BLD STRIP.AUTO-MCNC: 167 MG/DL (ref 65–100)
GLUCOSE BLD STRIP.AUTO-MCNC: 190 MG/DL (ref 65–100)
GLUCOSE BLD STRIP.AUTO-MCNC: 274 MG/DL (ref 65–100)
GLUCOSE BLD STRIP.AUTO-MCNC: 368 MG/DL (ref 65–100)
GLUCOSE BLD STRIP.AUTO-MCNC: 369 MG/DL (ref 65–100)
GLUCOSE SERPL-MCNC: 199 MG/DL (ref 65–100)
HCT VFR BLD AUTO: 45.2 % (ref 35–47)
HGB BLD-MCNC: 14.7 G/DL (ref 11.5–16)
IMM GRANULOCYTES # BLD AUTO: 0.1 K/UL (ref 0–0.04)
IMM GRANULOCYTES NFR BLD AUTO: 1 % (ref 0–0.5)
LACTATE BLD-SCNC: 1.26 MMOL/L (ref 0.4–2)
LACTATE SERPL-SCNC: 1.6 MMOL/L (ref 0.4–2)
LYMPHOCYTES # BLD: 0.8 K/UL (ref 0.8–3.5)
LYMPHOCYTES NFR BLD: 8 % (ref 12–49)
MAGNESIUM SERPL-MCNC: 1.7 MG/DL (ref 1.6–2.4)
MCH RBC QN AUTO: 32.2 PG (ref 26–34)
MCHC RBC AUTO-ENTMCNC: 32.5 G/DL (ref 30–36.5)
MCV RBC AUTO: 99.1 FL (ref 80–99)
MONOCYTES # BLD: 0.9 K/UL (ref 0–1)
MONOCYTES NFR BLD: 8 % (ref 5–13)
NEUTS SEG # BLD: 8.6 K/UL (ref 1.8–8)
NEUTS SEG NFR BLD: 83 % (ref 32–75)
NRBC # BLD: 0 K/UL (ref 0–0.01)
NRBC BLD-RTO: 0 PER 100 WBC
P-R INTERVAL, ECG05: 184 MS
PHOSPHATE SERPL-MCNC: 2.7 MG/DL (ref 2.6–4.7)
PLATELET # BLD AUTO: 125 K/UL (ref 150–400)
PMV BLD AUTO: 10.7 FL (ref 8.9–12.9)
POTASSIUM SERPL-SCNC: 3.2 MMOL/L (ref 3.5–5.1)
PROT SERPL-MCNC: 7.5 G/DL (ref 6.4–8.2)
Q-T INTERVAL, ECG07: 364 MS
QRS DURATION, ECG06: 94 MS
QTC CALCULATION (BEZET), ECG08: 430 MS
RBC # BLD AUTO: 4.56 M/UL (ref 3.8–5.2)
SAMPLES BEING HELD,HOLD: NORMAL
SERVICE CMNT-IMP: ABNORMAL
SODIUM SERPL-SCNC: 130 MMOL/L (ref 136–145)
TROPONIN I SERPL-MCNC: <0.05 NG/ML
TSH SERPL DL<=0.05 MIU/L-ACNC: 0.98 UIU/ML (ref 0.36–3.74)
UR CULT HOLD, URHOLD: NORMAL
VENTRICULAR RATE, ECG03: 84 BPM
WBC # BLD AUTO: 10.4 K/UL (ref 3.6–11)

## 2019-02-10 PROCEDURE — 65660000000 HC RM CCU STEPDOWN

## 2019-02-10 PROCEDURE — 74011250636 HC RX REV CODE- 250/636: Performed by: EMERGENCY MEDICINE

## 2019-02-10 PROCEDURE — 71046 X-RAY EXAM CHEST 2 VIEWS: CPT

## 2019-02-10 PROCEDURE — 74011000250 HC RX REV CODE- 250: Performed by: INTERNAL MEDICINE

## 2019-02-10 PROCEDURE — 87040 BLOOD CULTURE FOR BACTERIA: CPT

## 2019-02-10 PROCEDURE — 99285 EMERGENCY DEPT VISIT HI MDM: CPT

## 2019-02-10 PROCEDURE — 77030029684 HC NEB SM VOL KT MONA -A

## 2019-02-10 PROCEDURE — 82550 ASSAY OF CK (CPK): CPT

## 2019-02-10 PROCEDURE — 74011250637 HC RX REV CODE- 250/637: Performed by: INTERNAL MEDICINE

## 2019-02-10 PROCEDURE — 74011000258 HC RX REV CODE- 258: Performed by: RADIOLOGY

## 2019-02-10 PROCEDURE — 94640 AIRWAY INHALATION TREATMENT: CPT

## 2019-02-10 PROCEDURE — 87449 NOS EACH ORGANISM AG IA: CPT

## 2019-02-10 PROCEDURE — 86738 MYCOPLASMA ANTIBODY: CPT

## 2019-02-10 PROCEDURE — 74011000250 HC RX REV CODE- 250: Performed by: EMERGENCY MEDICINE

## 2019-02-10 PROCEDURE — 74011250636 HC RX REV CODE- 250/636: Performed by: INTERNAL MEDICINE

## 2019-02-10 PROCEDURE — 77010033678 HC OXYGEN DAILY

## 2019-02-10 PROCEDURE — 74011636320 HC RX REV CODE- 636/320: Performed by: RADIOLOGY

## 2019-02-10 PROCEDURE — 85025 COMPLETE CBC W/AUTO DIFF WBC: CPT

## 2019-02-10 PROCEDURE — 74011000258 HC RX REV CODE- 258: Performed by: EMERGENCY MEDICINE

## 2019-02-10 PROCEDURE — 94760 N-INVAS EAR/PLS OXIMETRY 1: CPT

## 2019-02-10 PROCEDURE — 96365 THER/PROPH/DIAG IV INF INIT: CPT

## 2019-02-10 PROCEDURE — 74011636637 HC RX REV CODE- 636/637: Performed by: INTERNAL MEDICINE

## 2019-02-10 PROCEDURE — 94761 N-INVAS EAR/PLS OXIMETRY MLT: CPT

## 2019-02-10 PROCEDURE — 36415 COLL VENOUS BLD VENIPUNCTURE: CPT

## 2019-02-10 PROCEDURE — 77030038269 HC DRN EXT URIN PURWCK BARD -A

## 2019-02-10 PROCEDURE — 87804 INFLUENZA ASSAY W/OPTIC: CPT

## 2019-02-10 PROCEDURE — 83880 ASSAY OF NATRIURETIC PEPTIDE: CPT

## 2019-02-10 PROCEDURE — 80053 COMPREHEN METABOLIC PANEL: CPT

## 2019-02-10 PROCEDURE — 83605 ASSAY OF LACTIC ACID: CPT

## 2019-02-10 PROCEDURE — 83735 ASSAY OF MAGNESIUM: CPT

## 2019-02-10 PROCEDURE — 93005 ELECTROCARDIOGRAM TRACING: CPT

## 2019-02-10 PROCEDURE — 71275 CT ANGIOGRAPHY CHEST: CPT

## 2019-02-10 PROCEDURE — 84484 ASSAY OF TROPONIN QUANT: CPT

## 2019-02-10 PROCEDURE — 82962 GLUCOSE BLOOD TEST: CPT

## 2019-02-10 PROCEDURE — 87899 AGENT NOS ASSAY W/OPTIC: CPT

## 2019-02-10 PROCEDURE — 84443 ASSAY THYROID STIM HORMONE: CPT

## 2019-02-10 PROCEDURE — 84100 ASSAY OF PHOSPHORUS: CPT

## 2019-02-10 RX ORDER — LEVOFLOXACIN 5 MG/ML
750 INJECTION, SOLUTION INTRAVENOUS EVERY 24 HOURS
Status: DISCONTINUED | OUTPATIENT
Start: 2019-02-11 | End: 2019-02-14

## 2019-02-10 RX ORDER — SODIUM CHLORIDE 0.9 % (FLUSH) 0.9 %
10 SYRINGE (ML) INJECTION
Status: COMPLETED | OUTPATIENT
Start: 2019-02-10 | End: 2019-02-10

## 2019-02-10 RX ORDER — BUDESONIDE 0.5 MG/2ML
500 INHALANT ORAL
Status: DISCONTINUED | OUTPATIENT
Start: 2019-02-10 | End: 2019-02-15 | Stop reason: HOSPADM

## 2019-02-10 RX ORDER — IPRATROPIUM BROMIDE AND ALBUTEROL SULFATE 2.5; .5 MG/3ML; MG/3ML
3 SOLUTION RESPIRATORY (INHALATION)
Status: DISCONTINUED | OUTPATIENT
Start: 2019-02-10 | End: 2019-02-11

## 2019-02-10 RX ORDER — IPRATROPIUM BROMIDE AND ALBUTEROL SULFATE 2.5; .5 MG/3ML; MG/3ML
3 SOLUTION RESPIRATORY (INHALATION)
Status: DISCONTINUED | OUTPATIENT
Start: 2019-02-10 | End: 2019-02-15 | Stop reason: HOSPADM

## 2019-02-10 RX ORDER — PREDNISONE 20 MG/1
40 TABLET ORAL
Status: DISCONTINUED | OUTPATIENT
Start: 2019-02-10 | End: 2019-02-10

## 2019-02-10 RX ORDER — LEVOFLOXACIN 5 MG/ML
750 INJECTION, SOLUTION INTRAVENOUS ONCE
Status: COMPLETED | OUTPATIENT
Start: 2019-02-10 | End: 2019-02-10

## 2019-02-10 RX ORDER — IBUPROFEN 200 MG
1 TABLET ORAL EVERY 24 HOURS
Status: DISCONTINUED | OUTPATIENT
Start: 2019-02-10 | End: 2019-02-15 | Stop reason: HOSPADM

## 2019-02-10 RX ORDER — POTASSIUM CHLORIDE 750 MG/1
40 TABLET, FILM COATED, EXTENDED RELEASE ORAL
Status: COMPLETED | OUTPATIENT
Start: 2019-02-10 | End: 2019-02-10

## 2019-02-10 RX ORDER — ATORVASTATIN CALCIUM 10 MG/1
10 TABLET, FILM COATED ORAL
Status: DISCONTINUED | OUTPATIENT
Start: 2019-02-10 | End: 2019-02-15 | Stop reason: HOSPADM

## 2019-02-10 RX ORDER — SODIUM CHLORIDE 0.9 % (FLUSH) 0.9 %
5-40 SYRINGE (ML) INJECTION EVERY 8 HOURS
Status: DISCONTINUED | OUTPATIENT
Start: 2019-02-10 | End: 2019-02-15 | Stop reason: HOSPADM

## 2019-02-10 RX ORDER — ATENOLOL 25 MG/1
100 TABLET ORAL DAILY
Status: DISCONTINUED | OUTPATIENT
Start: 2019-02-10 | End: 2019-02-10

## 2019-02-10 RX ORDER — BISACODYL 5 MG
5 TABLET, DELAYED RELEASE (ENTERIC COATED) ORAL DAILY PRN
Status: DISCONTINUED | OUTPATIENT
Start: 2019-02-10 | End: 2019-02-15 | Stop reason: HOSPADM

## 2019-02-10 RX ORDER — LISINOPRIL 10 MG/1
20 TABLET ORAL DAILY
Status: DISCONTINUED | OUTPATIENT
Start: 2019-02-10 | End: 2019-02-10

## 2019-02-10 RX ORDER — INSULIN LISPRO 100 [IU]/ML
15 INJECTION, SOLUTION INTRAVENOUS; SUBCUTANEOUS ONCE
Status: COMPLETED | OUTPATIENT
Start: 2019-02-10 | End: 2019-02-10

## 2019-02-10 RX ORDER — ONDANSETRON 2 MG/ML
4 INJECTION INTRAMUSCULAR; INTRAVENOUS
Status: DISCONTINUED | OUTPATIENT
Start: 2019-02-10 | End: 2019-02-15 | Stop reason: HOSPADM

## 2019-02-10 RX ORDER — ARFORMOTEROL TARTRATE 15 UG/2ML
15 SOLUTION RESPIRATORY (INHALATION)
Status: DISCONTINUED | OUTPATIENT
Start: 2019-02-10 | End: 2019-02-15 | Stop reason: HOSPADM

## 2019-02-10 RX ORDER — HEPARIN SODIUM 5000 [USP'U]/ML
5000 INJECTION, SOLUTION INTRAVENOUS; SUBCUTANEOUS EVERY 8 HOURS
Status: DISCONTINUED | OUTPATIENT
Start: 2019-02-10 | End: 2019-02-13

## 2019-02-10 RX ORDER — ACETAMINOPHEN 325 MG/1
650 TABLET ORAL
Status: DISCONTINUED | OUTPATIENT
Start: 2019-02-10 | End: 2019-02-15 | Stop reason: HOSPADM

## 2019-02-10 RX ORDER — MAGNESIUM SULFATE 100 %
4 CRYSTALS MISCELLANEOUS AS NEEDED
Status: DISCONTINUED | OUTPATIENT
Start: 2019-02-10 | End: 2019-02-15 | Stop reason: HOSPADM

## 2019-02-10 RX ORDER — SODIUM CHLORIDE 0.9 % (FLUSH) 0.9 %
5-40 SYRINGE (ML) INJECTION AS NEEDED
Status: DISCONTINUED | OUTPATIENT
Start: 2019-02-10 | End: 2019-02-15 | Stop reason: HOSPADM

## 2019-02-10 RX ORDER — INSULIN LISPRO 100 [IU]/ML
INJECTION, SOLUTION INTRAVENOUS; SUBCUTANEOUS
Status: DISCONTINUED | OUTPATIENT
Start: 2019-02-10 | End: 2019-02-15 | Stop reason: HOSPADM

## 2019-02-10 RX ORDER — DEXTROSE 50 % IN WATER (D50W) INTRAVENOUS SYRINGE
12.5-25 AS NEEDED
Status: DISCONTINUED | OUTPATIENT
Start: 2019-02-10 | End: 2019-02-15 | Stop reason: HOSPADM

## 2019-02-10 RX ADMIN — SODIUM CHLORIDE 100 ML: 900 INJECTION, SOLUTION INTRAVENOUS at 05:13

## 2019-02-10 RX ADMIN — LEVOFLOXACIN 750 MG: 5 INJECTION, SOLUTION INTRAVENOUS at 04:48

## 2019-02-10 RX ADMIN — Medication 1 CAPSULE: at 09:17

## 2019-02-10 RX ADMIN — IPRATROPIUM BROMIDE AND ALBUTEROL SULFATE 3 ML: .5; 3 SOLUTION RESPIRATORY (INHALATION) at 08:19

## 2019-02-10 RX ADMIN — SODIUM CHLORIDE 1000 ML: 900 INJECTION, SOLUTION INTRAVENOUS at 07:25

## 2019-02-10 RX ADMIN — INSULIN LISPRO 15 UNITS: 100 INJECTION, SOLUTION INTRAVENOUS; SUBCUTANEOUS at 17:39

## 2019-02-10 RX ADMIN — HEPARIN SODIUM 5000 UNITS: 5000 INJECTION INTRAVENOUS; SUBCUTANEOUS at 14:12

## 2019-02-10 RX ADMIN — Medication 10 ML: at 06:00

## 2019-02-10 RX ADMIN — ATORVASTATIN CALCIUM 10 MG: 20 TABLET, FILM COATED ORAL at 21:19

## 2019-02-10 RX ADMIN — ACETAMINOPHEN 650 MG: 325 TABLET ORAL at 03:13

## 2019-02-10 RX ADMIN — INSULIN LISPRO 4 UNITS: 100 INJECTION, SOLUTION INTRAVENOUS; SUBCUTANEOUS at 22:51

## 2019-02-10 RX ADMIN — BUDESONIDE 500 MCG: 0.5 INHALANT RESPIRATORY (INHALATION) at 08:19

## 2019-02-10 RX ADMIN — INSULIN LISPRO 5 UNITS: 100 INJECTION, SOLUTION INTRAVENOUS; SUBCUTANEOUS at 14:09

## 2019-02-10 RX ADMIN — METHYLPREDNISOLONE SODIUM SUCCINATE 40 MG: 40 INJECTION, POWDER, FOR SOLUTION INTRAMUSCULAR; INTRAVENOUS at 09:17

## 2019-02-10 RX ADMIN — IPRATROPIUM BROMIDE AND ALBUTEROL SULFATE 3 ML: .5; 3 SOLUTION RESPIRATORY (INHALATION) at 19:31

## 2019-02-10 RX ADMIN — IPRATROPIUM BROMIDE AND ALBUTEROL SULFATE 3 ML: .5; 3 SOLUTION RESPIRATORY (INHALATION) at 14:32

## 2019-02-10 RX ADMIN — HEPARIN SODIUM 5000 UNITS: 5000 INJECTION INTRAVENOUS; SUBCUTANEOUS at 04:50

## 2019-02-10 RX ADMIN — IOPAMIDOL 75 ML: 755 INJECTION, SOLUTION INTRAVENOUS at 05:14

## 2019-02-10 RX ADMIN — Medication 10 ML: at 21:19

## 2019-02-10 RX ADMIN — Medication 10 ML: at 14:16

## 2019-02-10 RX ADMIN — ALBUTEROL SULFATE 1 DOSE: 2.5 SOLUTION RESPIRATORY (INHALATION) at 01:07

## 2019-02-10 RX ADMIN — BUDESONIDE 500 MCG: 0.5 INHALANT RESPIRATORY (INHALATION) at 19:31

## 2019-02-10 RX ADMIN — HEPARIN SODIUM 5000 UNITS: 5000 INJECTION INTRAVENOUS; SUBCUTANEOUS at 21:18

## 2019-02-10 RX ADMIN — ATORVASTATIN CALCIUM 10 MG: 20 TABLET, FILM COATED ORAL at 04:55

## 2019-02-10 RX ADMIN — POTASSIUM CHLORIDE 40 MEQ: 750 TABLET, EXTENDED RELEASE ORAL at 09:46

## 2019-02-10 RX ADMIN — METHYLPREDNISOLONE SODIUM SUCCINATE 40 MG: 40 INJECTION, POWDER, FOR SOLUTION INTRAMUSCULAR; INTRAVENOUS at 21:19

## 2019-02-10 RX ADMIN — INSULIN LISPRO 1 UNITS: 100 INJECTION, SOLUTION INTRAVENOUS; SUBCUTANEOUS at 09:15

## 2019-02-10 RX ADMIN — CEFTRIAXONE SODIUM 2 G: 2 INJECTION, POWDER, FOR SOLUTION INTRAMUSCULAR; INTRAVENOUS at 03:09

## 2019-02-10 RX ADMIN — METHYLPREDNISOLONE SODIUM SUCCINATE 40 MG: 40 INJECTION, POWDER, FOR SOLUTION INTRAMUSCULAR; INTRAVENOUS at 14:12

## 2019-02-10 RX ADMIN — POTASSIUM CHLORIDE 40 MEQ: 750 TABLET, EXTENDED RELEASE ORAL at 04:54

## 2019-02-10 RX ADMIN — Medication 10 ML: at 05:13

## 2019-02-10 RX ADMIN — AZITHROMYCIN MONOHYDRATE 500 MG: 500 INJECTION, POWDER, LYOPHILIZED, FOR SOLUTION INTRAVENOUS at 01:49

## 2019-02-10 NOTE — ED NOTES
0200 Bedside shift change report given to Trista Monk RN (oncoming nurse) by Elizabet Virk RN (offgoing nurse). Report included the following information SBAR and ED Summary. 0300 Dr. Berkley Salamanca @ bedside 0315 pt voided ~200ml clear yellow urine on bedpan; madie care performed; PRN tylenol given for mild fever, no complaints at this time 0355 purewick placed per pt request 
 
0715 BPs soft~80s/40s upon waking pt up for AM vitals; asymptomatic; spoke with Dr. Janki Miles, new orders for 1L NS bolus; purewick leaked in bed, madie care performed, pt cleaned, linens changed, pt voided ~200ml clear, yellow, urine on bedpan; no complaints at this time
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
## 2019-02-10 NOTE — ROUTINE PROCESS
TRANSFER - OUT REPORT: 
 
Verbal report given to Abdias Wolf RN (name) on Maryuri Valencia  being transferred to [facility] (unit) for routine progression of care Report consisted of patients Situation, Background, Assessment and  
Recommendations(SBAR). Information from the following report(s) SBAR, ED Summary and MAR was reviewed with the receiving nurse. Lines:  
Peripheral IV 02/10/19 Left Arm (Active) Site Assessment Clean, dry, & intact 2/10/2019  5:00 AM  
Phlebitis Assessment 0 2/10/2019  5:00 AM  
Infiltration Assessment 0 2/10/2019  5:00 AM  
Dressing Status Clean, dry, & intact 2/10/2019  5:00 AM  
   
Peripheral IV 02/10/19 Right Arm (Active) Opportunity for questions and clarification was provided. Patient transported with: 
 [id number]
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
## 2019-02-10 NOTE — PROGRESS NOTES
Pt blood sugar was 369mg/dl paged Dr Carrie Dumont order was received to give 15 unit of humalog to the pt

## 2019-02-10 NOTE — ED TRIAGE NOTES
Patient come in via EMS. States x 2 days with shortness of breath. Reports low grade fever and chills. EMS reports patient was 86% on RA.

## 2019-02-10 NOTE — PROGRESS NOTES
Hospitalist Progress Note Bernard Dacosta MD 
Answering service: [phone] OR [phone] from in house phone Date of Service:  2/10/2019 NAME:  Christiana Pabon :  1960 MRN:  [id number] PCP: Aletha Nance MD 
 
Chief Complaint:  
Chief Complaint Patient presents with  Shortness of Breath Admission Summary:  
 
Christiana Pabon is a 60 y.o. female who presented with SOB Interval history / Subjective:  
Patient seen for Follow up of COPD exacerbation First encounter Patient seen and examined by the bedside Labs, images and notes reviewed Patient is comfortable. Feels much better than presentation but still hypoxemic and has audible wheezing wo steth. Cough is improving, no sputum production Febrile with Tmax of 110.6 Was hypotensive this am, received IVF bolus. Denies any chest pain, ABD pain, dizziness. No nausea or vomiting Endorses recent sick contacts Discussed with nursing staff, no acute issues overnight, orders reviewed. Assessment & Plan:  
 
- acute Hypoxemic resp failure sec to COPD exacerbation and CAP, bacterial, POA 
O2 support Bronchodilators RTC and PRN 
IV steroids IV levaquin Flu swab neg F/U BC x 2, NGTD Sputum cx pending Check urine for legionella, strep Check viral resp PCR panel, mycoplasma CTA chest: no PE, shows PNA in RLL and lingula - Diet controlled DM2 SSI 
accu-checks Diabetic diet 
 
- HTN 
currently hypotensive 1 L fluid bolus Hold home antihypertensives - Elevated BNP Due to acute resp distress No signs of CHF 
 
-HLP Cont home meds - Thrombocytopenia Etiology unclear Cont to monitor labs  
 
 
- active smoker Cessation advised Nicotine patch 
 
- Hypokalemia Replace Repeat labs in am 
 
 
Code status: Full Code DVT prophylaxis: SCDs Care Plan discussed with: Patient/Family and Nurse Disposition: TBD Hospital Problems  Date Reviewed: 2/10/2019 Codes Class Noted POA * (Principal) [facility]ation [facility] ICD-10-CM: J44.1 ICD-9-CM: 491.21  2/10/2019 Yes Review of Systems: A comprehensive review of systems was negative except for that written in the HPI. Physical Examination:  
 
  General appearance: fatigued, no distress, appears stated age Head: Normocephalic, without obvious abnormality, atraumatic Throat: normal findings: buccal mucosa normal and palate normal 
Lungs: diffuse wheezing, use of accessory muscles of respiration, no crackles noted Heart: regular rate and rhythm Abdomen: soft, non-tender. Bowel sounds normal. No masses,  no organomegaly Extremities: extremities normal, atraumatic, no cyanosis or edema Pulses: 2+ and symmetric Neurologic: Grossly normal 
  
 
Vital Signs:  
 Last 24hrs VS reviewed since prior progress note. Most recent are: 
 
Visit Vitals /57 Pulse 77 Temp 99 °F (37.2 °C) Resp 28 SpO2 95% Intake/Output Summary (Last 24 hours) at 2/10/2019 9177 Last data filed at 2/10/2019 0500 Gross per 24 hour Intake 200 ml Output 0 ml Net 200 ml Tmax:  Temp (24hrs), Av.5 °F (37.5 °C), Min:99 °F (37.2 °C), Max:100.6 °F (38.1 °C) Data Review: Xr Chest Pa Lat Result Date: 2/10/2019 INDICATION:  sob. EXAM: 2 VIEW CHEST RADIOGRAPH. COMPARISON: 10/11/2012. FINDINGS: Frontal and lateral views of the chest show faint patchy parenchymal density in the right middle lobe, although the breast shadows project over these regions bilaterally making this determination difficult. . . The heart, mediastinum and pulmonary vasculature are stable. The bony thorax is unremarkable for age. .. IMPRESSION:  Early or mild right middle lobe infiltrate is suspected. There is no CHF pattern. .  . Cta Chest W Or W Wo Cont Result Date: 2/10/2019 EXAM: CT ANGIOGRAPHY CHEST INDICATION:  [Shortness of breath, decreased O2 saturation, concerning for PE. COMPARISON: 11/7/2012. CONTRAST: 75 mL of Isovue-370. TECHNIQUE: Precontrast  images were obtained to localize the volume for acquisition. Multislice helical CT arteriography was performed from the diaphragm to the thoracic inlet during uneventful rapid bolus intravenous contrast administration. Lung and soft tissue windows were generated. Coronal and sagittal  reformatted images were also generated. 3D post processing consisting of coronal maximum intensity projection images was performed. CT dose reduction was achieved through use of a standardized protocol tailored for this examination and automatic exposure control for dose modulation. FINDINGS: Patchy atelectasis or infiltrate in the lingula and in the right upper lobe medially. Mild emphysematous change. .. The pulmonary arteries are well enhanced and no pulmonary emboli are identified. Main pulmonary outflow tract measures 4.2 cm, compared to a similar measurement of 3.5 cm on the prior study. There is no mediastinal or hilar adenopathy or mass. The aorta enhances normally without evidence of aneurysm or dissection. The visualized portions of the upper abdominal organs are normal.  
 
IMPRESSION: 1. No CT evidence for central pulmonary embolus at this time . 2. Increased caliber of main pulmonary outflow tract, correlate for pulmonary hypertension. 3. Patchy atelectasis or infiltrate in the lingula and in the right upper lobe medially. 4. Mild emphysematous change. No results found for: SDES No results found for: CULT All Micro Results Procedure Component Value Units Date/Time CULTURE, BLOOD, PAIRED [[id number]] Collected:  02/10/19 0054 Order Status:  Completed Specimen:  Blood Updated:  02/10/19 0584 INFLUENZA A & B AG (RAPID TEST) [[id number]] Collected:  02/10/19 0141 Order Status:  Completed Specimen:  Nasopharyngeal from Nasal washing Updated:  02/10/19 0202   Influenza A Antigen NEGATIVE      
 Influenza B Antigen NEGATIVE CULTURE, BLOOD, PAIRED [[id number]] Order Status:  Canceled Specimen:  Blood Labs:  
 
Recent Labs  
  02/10/19 
0054 WBC 10.4 HGB 14.7 HCT 45.2 * Recent Labs  
  02/10/19 
0446 02/10/19 
0054 NA  --  130* K  --  3.2*  
CL  --  97  
CO2  --  23 BUN  --  18  
CREA  --  0.76 GLU  --  199* CA  --  8.5 MG 1.7  --   
PHOS 2.7  --   
 
Recent Labs  
  02/10/19 
0054 SGOT 22 ALT 19 AP 94 TBILI 0.4 TP 7.5 ALB 3.5 GLOB 4.0 No results for input(s): INR, PTP, APTT in the last 72 hours. No lab exists for component: INREXT No results for input(s): FE, TIBC, PSAT, FERR in the last 72 hours. No results found for: FOL, RBCF No results for input(s): PH, PCO2, PO2 in the last 72 hours. Recent Labs  
  02/10/19 
0446 02/10/19 
0054 CPK 60  --   
CKNDX Cannot be calculated  --   
TROIQ <0.05 <0.05 Lab Results Component Value Date/Time Cholesterol, total 158 05/31/2018 08:22 AM  
 HDL Cholesterol 69 05/31/2018 08:22 AM  
 LDL, calculated 47 05/31/2018 08:22 AM  
 Triglyceride 211 (H) 05/31/2018 08:22 AM  
 CHOL/HDL Ratio 3.6 01/28/2010 10:35 AM  
 
Lab Results Component Value Date/Time Glucose (POC) 167 (H) 02/10/2019 07:07 AM  
 
Lab Results Component Value Date/Time Color Yellow 05/31/2018 08:22 AM  
 Appearance Clear 05/31/2018 08:22 AM  
 Specific gravity 1.020 04/30/2009 11:50 AM  
 pH (UA) 7.0 05/31/2018 08:22 AM  
 Protein NEGATIVE  04/30/2009 11:50 AM  
 Glucose NEGATIVE  04/30/2009 11:50 AM  
 Ketone Negative 05/31/2018 08:22 AM  
 Bilirubin Negative 05/31/2018 08:22 AM  
 Urobilinogen 0.2 04/30/2009 11:50 AM  
 Nitrites Positive (A) 05/31/2018 08:22 AM  
 Leukocyte Esterase Negative 05/31/2018 08:22 AM  
 Bacteria Few 05/31/2018 08:22 AM  
 WBC 0-5 05/31/2018 08:22 AM  
 RBC 0-2 05/31/2018 08:22 AM  
 
 
 
Medications Reviewed:  
 
Current Facility-Administered Medications Medication Dose Route Frequency  acetaminophen (TYLENOL) tablet 650 mg  650 mg Oral Q4H PRN  
 albuterol-ipratropium (DUO-NEB) 2.5 MG-0.5 MG/3 ML  3 mL Nebulization Q4H PRN  
 atorvastatin (LIPITOR) tablet 10 mg  10 mg Oral QHS  sodium chloride (NS) flush 5-40 mL  5-40 mL IntraVENous Q8H  
 sodium chloride (NS) flush 5-40 mL  5-40 mL IntraVENous PRN  
 ondansetron (ZOFRAN) injection 4 mg  4 mg IntraVENous Q4H PRN  
 bisacodyl (DULCOLAX) tablet 5 mg  5 mg Oral DAILY PRN  
 heparin (porcine) injection 5,000 Units  5,000 Units SubCUTAneous Q8H  
 lactobac ac& pc-s.therm-b.anim (CORRIE Q/RISAQUAD)  1 Cap Oral DAILY  [START ON 2/11/2019] levoFLOXacin (LEVAQUIN) 750 mg in D5W IVPB  750 mg IntraVENous Q24H  
 insulin lispro (HUMALOG) injection   SubCUTAneous AC&HS  
 glucose chewable tablet 16 g  4 Tab Oral PRN  
 dextrose (D50W) injection syrg 12.5-25 g  12.5-25 g IntraVENous PRN  
 glucagon (GLUCAGEN) injection 1 mg  1 mg IntraMUSCular PRN  
 nicotine (NICODERM CQ) 21 mg/24 hr patch 1 Patch  1 Patch TransDERmal Q24H  
 arformoterol (BROVANA) neb solution 15 mcg  15 mcg Nebulization BID RT And  
 budesonide (PULMICORT) 500 mcg/2 ml nebulizer suspension  500 mcg Nebulization BID RT  
 methylPREDNISolone (PF) (SOLU-MEDROL) injection 40 mg  40 mg IntraVENous Q6H  
 albuterol-ipratropium (DUO-NEB) 2.5 MG-0.5 MG/3 ML  3 mL Nebulization Q4H RT  
 
Current Outpatient Medications Medication Sig  
 SYMBICORT 80-4.5 mcg/actuation HFAA INHALE TWO PUFFS BY MOUTH TWO TIMES A DAY  lisinopril-hydroCHLOROthiazide (PRINZIDE, ZESTORETIC) 20-12.5 mg per tablet TAKE 1 TABLET BY MOUTH  EVERY DAY  atorvastatin (LIPITOR) 10 mg tablet TAKE ONE TABLET BY MOUTH EVERY DAY  VENTOLIN HFA 90 mcg/actuation inhaler INHALE 2 PUFFS BY MOUTH EVERY 4 HOURS AS NEEDED FOR WHEEZING  
 atenolol (TENORMIN) 100 mg tablet TAKE ONE AND ONE-HALF TABLETS BY MOUTH EVERY DAY  
  B.infantis-B.ani-B.long-B.bifi (PROBIOTIC 4X) 10-15 mg TbEC Take  by mouth.  zinc 50 mg tab Take  by mouth. Only in the winter  
 
______________________________________________________________________ EXPECTED LENGTH OF STAY: - - - 
ACTUAL LENGTH OF STAY:          0 Lauren Lambert MD

## 2019-02-11 LAB
ALBUMIN SERPL-MCNC: 2.9 G/DL (ref 3.5–5)
ALBUMIN/GLOB SERPL: 0.7 {RATIO} (ref 1.1–2.2)
ALP SERPL-CCNC: 73 U/L (ref 45–117)
ALT SERPL-CCNC: 14 U/L (ref 12–78)
ANION GAP SERPL CALC-SCNC: 9 MMOL/L (ref 5–15)
AST SERPL-CCNC: 11 U/L (ref 15–37)
BASOPHILS # BLD: 0 K/UL (ref 0–0.1)
BASOPHILS NFR BLD: 0 % (ref 0–1)
BILIRUB SERPL-MCNC: 0.2 MG/DL (ref 0.2–1)
BUN SERPL-MCNC: 16 MG/DL (ref 6–20)
BUN/CREAT SERPL: 20 (ref 12–20)
CALCIUM SERPL-MCNC: 8.6 MG/DL (ref 8.5–10.1)
CHLORIDE SERPL-SCNC: 106 MMOL/L (ref 97–108)
CHOLEST SERPL-MCNC: 125 MG/DL
CO2 SERPL-SCNC: 20 MMOL/L (ref 21–32)
CREAT SERPL-MCNC: 0.81 MG/DL (ref 0.55–1.02)
DIFFERENTIAL METHOD BLD: ABNORMAL
EOSINOPHIL # BLD: 0 K/UL (ref 0–0.4)
EOSINOPHIL NFR BLD: 0 % (ref 0–7)
ERYTHROCYTE [DISTWIDTH] IN BLOOD BY AUTOMATED COUNT: 13.8 % (ref 11.5–14.5)
EST. AVERAGE GLUCOSE BLD GHB EST-MCNC: 166 MG/DL
GLOBULIN SER CALC-MCNC: 3.9 G/DL (ref 2–4)
GLUCOSE BLD STRIP.AUTO-MCNC: 305 MG/DL (ref 65–100)
GLUCOSE BLD STRIP.AUTO-MCNC: 324 MG/DL (ref 65–100)
GLUCOSE BLD STRIP.AUTO-MCNC: 330 MG/DL (ref 65–100)
GLUCOSE BLD STRIP.AUTO-MCNC: 330 MG/DL (ref 65–100)
GLUCOSE BLD STRIP.AUTO-MCNC: 354 MG/DL (ref 65–100)
GLUCOSE SERPL-MCNC: 281 MG/DL (ref 65–100)
HBA1C MFR BLD: 7.4 % (ref 4.2–6.3)
HCT VFR BLD AUTO: 40.2 % (ref 35–47)
HDLC SERPL-MCNC: 74 MG/DL
HDLC SERPL: 1.7 {RATIO} (ref 0–5)
HGB BLD-MCNC: 12.8 G/DL (ref 11.5–16)
IMM GRANULOCYTES # BLD AUTO: 0 K/UL
IMM GRANULOCYTES NFR BLD AUTO: 0 %
LDLC SERPL CALC-MCNC: 37.4 MG/DL (ref 0–100)
LIPID PROFILE,FLP: NORMAL
LYMPHOCYTES # BLD: 0.2 K/UL (ref 0.8–3.5)
LYMPHOCYTES NFR BLD: 2 % (ref 12–49)
MCH RBC QN AUTO: 32.4 PG (ref 26–34)
MCHC RBC AUTO-ENTMCNC: 31.8 G/DL (ref 30–36.5)
MCV RBC AUTO: 101.8 FL (ref 80–99)
MONOCYTES # BLD: 0.2 K/UL (ref 0–1)
MONOCYTES NFR BLD: 2 % (ref 5–13)
NEUTS BAND NFR BLD MANUAL: 3 % (ref 0–6)
NEUTS SEG # BLD: 9.2 K/UL (ref 1.8–8)
NEUTS SEG NFR BLD: 93 % (ref 32–75)
NRBC # BLD: 0 K/UL (ref 0–0.01)
NRBC BLD-RTO: 0 PER 100 WBC
PLATELET # BLD AUTO: 141 K/UL (ref 150–400)
PMV BLD AUTO: 11.1 FL (ref 8.9–12.9)
POTASSIUM SERPL-SCNC: 4.2 MMOL/L (ref 3.5–5.1)
PROT SERPL-MCNC: 6.8 G/DL (ref 6.4–8.2)
RBC # BLD AUTO: 3.95 M/UL (ref 3.8–5.2)
RBC MORPH BLD: ABNORMAL
RBC MORPH BLD: ABNORMAL
SERVICE CMNT-IMP: ABNORMAL
SODIUM SERPL-SCNC: 135 MMOL/L (ref 136–145)
TRIGL SERPL-MCNC: 68 MG/DL (ref ?–150)
VLDLC SERPL CALC-MCNC: 13.6 MG/DL
WBC # BLD AUTO: 9.6 K/UL (ref 3.6–11)

## 2019-02-11 PROCEDURE — 36415 COLL VENOUS BLD VENIPUNCTURE: CPT

## 2019-02-11 PROCEDURE — 65660000000 HC RM CCU STEPDOWN

## 2019-02-11 PROCEDURE — 87633 RESP VIRUS 12-25 TARGETS: CPT

## 2019-02-11 PROCEDURE — 94760 N-INVAS EAR/PLS OXIMETRY 1: CPT

## 2019-02-11 PROCEDURE — 94640 AIRWAY INHALATION TREATMENT: CPT

## 2019-02-11 PROCEDURE — 74011636637 HC RX REV CODE- 636/637: Performed by: INTERNAL MEDICINE

## 2019-02-11 PROCEDURE — 83036 HEMOGLOBIN GLYCOSYLATED A1C: CPT

## 2019-02-11 PROCEDURE — 82962 GLUCOSE BLOOD TEST: CPT

## 2019-02-11 PROCEDURE — 74011000250 HC RX REV CODE- 250: Performed by: INTERNAL MEDICINE

## 2019-02-11 PROCEDURE — 74011250637 HC RX REV CODE- 250/637: Performed by: INTERNAL MEDICINE

## 2019-02-11 PROCEDURE — 80053 COMPREHEN METABOLIC PANEL: CPT

## 2019-02-11 PROCEDURE — 74011250636 HC RX REV CODE- 250/636: Performed by: INTERNAL MEDICINE

## 2019-02-11 PROCEDURE — 85025 COMPLETE CBC W/AUTO DIFF WBC: CPT

## 2019-02-11 PROCEDURE — 80061 LIPID PANEL: CPT

## 2019-02-11 PROCEDURE — 77010033678 HC OXYGEN DAILY

## 2019-02-11 RX ORDER — IPRATROPIUM BROMIDE AND ALBUTEROL SULFATE 2.5; .5 MG/3ML; MG/3ML
3 SOLUTION RESPIRATORY (INHALATION)
Status: DISCONTINUED | OUTPATIENT
Start: 2019-02-11 | End: 2019-02-14

## 2019-02-11 RX ADMIN — BUDESONIDE 500 MCG: 0.5 INHALANT RESPIRATORY (INHALATION) at 08:16

## 2019-02-11 RX ADMIN — HEPARIN SODIUM 5000 UNITS: 5000 INJECTION INTRAVENOUS; SUBCUTANEOUS at 20:28

## 2019-02-11 RX ADMIN — METHYLPREDNISOLONE SODIUM SUCCINATE 40 MG: 40 INJECTION, POWDER, FOR SOLUTION INTRAMUSCULAR; INTRAVENOUS at 14:08

## 2019-02-11 RX ADMIN — INSULIN LISPRO 7 UNITS: 100 INJECTION, SOLUTION INTRAVENOUS; SUBCUTANEOUS at 17:32

## 2019-02-11 RX ADMIN — INSULIN LISPRO 1 UNITS: 100 INJECTION, SOLUTION INTRAVENOUS; SUBCUTANEOUS at 07:23

## 2019-02-11 RX ADMIN — LEVOFLOXACIN 750 MG: 5 INJECTION, SOLUTION INTRAVENOUS at 04:57

## 2019-02-11 RX ADMIN — METHYLPREDNISOLONE SODIUM SUCCINATE 40 MG: 40 INJECTION, POWDER, FOR SOLUTION INTRAMUSCULAR; INTRAVENOUS at 01:08

## 2019-02-11 RX ADMIN — INSULIN LISPRO 4 UNITS: 100 INJECTION, SOLUTION INTRAVENOUS; SUBCUTANEOUS at 22:46

## 2019-02-11 RX ADMIN — IPRATROPIUM BROMIDE AND ALBUTEROL SULFATE 3 ML: .5; 3 SOLUTION RESPIRATORY (INHALATION) at 13:46

## 2019-02-11 RX ADMIN — Medication 10 ML: at 05:29

## 2019-02-11 RX ADMIN — METHYLPREDNISOLONE SODIUM SUCCINATE 40 MG: 40 INJECTION, POWDER, FOR SOLUTION INTRAMUSCULAR; INTRAVENOUS at 20:27

## 2019-02-11 RX ADMIN — INSULIN LISPRO 7 UNITS: 100 INJECTION, SOLUTION INTRAVENOUS; SUBCUTANEOUS at 12:14

## 2019-02-11 RX ADMIN — BUDESONIDE 500 MCG: 0.5 INHALANT RESPIRATORY (INHALATION) at 22:00

## 2019-02-11 RX ADMIN — METHYLPREDNISOLONE SODIUM SUCCINATE 40 MG: 40 INJECTION, POWDER, FOR SOLUTION INTRAMUSCULAR; INTRAVENOUS at 07:22

## 2019-02-11 RX ADMIN — IPRATROPIUM BROMIDE AND ALBUTEROL SULFATE 3 ML: .5; 3 SOLUTION RESPIRATORY (INHALATION) at 00:54

## 2019-02-11 RX ADMIN — ATORVASTATIN CALCIUM 10 MG: 20 TABLET, FILM COATED ORAL at 22:48

## 2019-02-11 RX ADMIN — Medication 10 ML: at 14:08

## 2019-02-11 RX ADMIN — IPRATROPIUM BROMIDE AND ALBUTEROL SULFATE 3 ML: .5; 3 SOLUTION RESPIRATORY (INHALATION) at 22:00

## 2019-02-11 RX ADMIN — HEPARIN SODIUM 5000 UNITS: 5000 INJECTION INTRAVENOUS; SUBCUTANEOUS at 12:15

## 2019-02-11 RX ADMIN — IPRATROPIUM BROMIDE AND ALBUTEROL SULFATE 3 ML: .5; 3 SOLUTION RESPIRATORY (INHALATION) at 08:16

## 2019-02-11 RX ADMIN — Medication 10 ML: at 22:49

## 2019-02-11 RX ADMIN — Medication 1 CAPSULE: at 08:44

## 2019-02-11 RX ADMIN — HEPARIN SODIUM 5000 UNITS: 5000 INJECTION INTRAVENOUS; SUBCUTANEOUS at 04:56

## 2019-02-11 RX ADMIN — ARFORMOTEROL TARTRATE 15 MCG: 15 SOLUTION RESPIRATORY (INHALATION) at 22:00

## 2019-02-11 NOTE — DIABETES MGMT
DTC Progress Note Recommendations/ Comments: Chart reviewed for hyperglycemia, likely due to steroids. Currently on Methylprednisolone 40 mg every 6 hours. If appropriate, please consider: 
Addition of NPH 12 units every 12 hours, while on current dose of steroids. Current hospital DM medication: Humalog for correction, normal sensitivity scale Chart reviewed on Claudetta Pacas. Patient is a 60 y.o. female with hx Type 2 Diabetes diet controlled at home. A1c:  
Lab Results Component Value Date/Time Hemoglobin A1c 7.4 (H) 02/11/2019 05:03 AM  
 Hemoglobin A1c 7.0 (H) 10/16/2018 09:07 AM  
 
 
Recent Glucose Results:  
Lab Results Component Value Date/Time  (H) 02/11/2019 05:03 AM  
 GLUCPOC 305 (H) 02/11/2019 07:09 AM  
 GLUCPOC 368 (H) 02/10/2019 10:41 PM  
 GLUCPOC 369 (H) 02/10/2019 03:45 PM  
  
 
Lab Results Component Value Date/Time Creatinine 0.81 02/11/2019 05:03 AM  
 
Estimated Creatinine Clearance: 82 mL/min (based on SCr of 0.81 mg/dL). Active Orders Diet DIET DIABETIC CONSISTENT CARB Regular; 2 GM NA (House Low NA) PO intake:  
Patient Vitals for the past 72 hrs: 
 % Diet Eaten 02/10/19 1601 90 % 02/10/19 1239 80 % Will continue to follow as needed. Thank you Valente Lan RD, CDE [facility] Pager: [phone] Time spent: 6 min

## 2019-02-11 NOTE — PROGRESS NOTES
Bedside and Verbal shift change report given to [facility] (oncoming nurse) by Radha Salas (offgoing nurse). Report included the following information SBAR and Cardiac Rhythm Sinus Rhythm.

## 2019-02-11 NOTE — PROGRESS NOTES
Problem: Falls - Risk of 
Goal: *Absence of Falls Document [facility] Fall Risk and appropriate interventions in the flowsheet. Outcome: Progressing Towards Goal 
Fall Risk Interventions: 
  
 
  
 
Medication Interventions: Teach patient to arise slowly Elimination Interventions: Call light in reach

## 2019-02-11 NOTE — PROGRESS NOTES
Hospitalist Progress Note Bernard Dacosta MD 
Answering service: [phone] OR [phone] from in house phone Date of Service:  2019 NAME:  Christiana Pabon :  1960 MRN:  [id number] PCP: Aletha Nance MD 
 
Chief Complaint:  
Chief Complaint Patient presents with  Shortness of Breath Admission Summary:  
 
Christiana Pabon is a 60 y.o. female who presented with SOB Interval history / Subjective:  
Patient seen for Follow up of COPD exacerbation Patient seen and examined by the bedside Labs, images and notes reviewed Pt feels much better, states that she had the best sleep last night in years. SOB is improving, still has cough with thick green sputum production. Afebrile, O2 requirements are improving, wants to go home today but cont to be hypoxemic on RA Blood pressures are improving No NVD Discussed with nursing staff, no acute issues overnight, orders reviewed. Assessment & Plan:  
 
- acute Hypoxemic resp failure sec to COPD exacerbation and CAP, bacterial, POA 
O2 support Bronchodilators RTC and PRN 
IV steroids IV levaquin Flu swab neg F/U BC x 2, NGTD Sputum cx pending 
pending urine for legionella, strep 
pending viral resp PCR panel, mycoplasma CTA chest: no PE, shows PNA in RLL and lingula - Diet controlled DM2 with hyperglycemia SSI 
accu-checks Diabetic diet HbA1c 7.4; DM is likely not diet controlled any more, may need metformin on DC 
 
- HTN 
S/p IVF bolus Stable 
monitor Hold home antihypertensives - Elevated BNP Due to acute resp distress No signs of CHF 
 
-HLP Cont home meds - Thrombocytopenia Etiology unclear Cont to monitor labs  
 
 
- active smoker Cessation advised Nicotine patch 
 
- Hypokalemia Replaced Repeat labs in am 
 
 
Code status: Full Code DVT prophylaxis: SCDs Care Plan discussed with: Patient/Family and Nurse Disposition: TBD 
 
 Hospital Problems  Date Reviewed: 2/10/2019 Codes Class Noted POA * (Principal) [facility] ICD-10-CM: J44.1 ICD-9-CM: 491.21  2/10/2019 Yes Review of Systems: A comprehensive review of systems was negative except for that written in the HPI. Physical Examination:  
 
  General appearance: alert, no distress, appears stated age Head: Normocephalic, without obvious abnormality, atraumatic Throat: normal findings: buccal mucosa normal and palate normal 
Lungs: wheezing has improved significantly No crackles Heart: regular rate and rhythm Abdomen: soft, non-tender. Bowel sounds normal. No masses,  no organomegaly Extremities: extremities normal, atraumatic, no cyanosis or edema Pulses: 2+ and symmetric Neurologic: Grossly normal 
  
 
Vital Signs:  
 Last 24hrs VS reviewed since prior progress note. Most recent are: 
 
Visit Vitals /74 (BP Patient Position: At rest) Pulse 81 Temp 97.7 °F (36.5 °C) Resp 20 Ht 5' 8\" (1.727 m) Wt 77.9 kg (171 lb 12.8 oz) SpO2 92% BMI 26.12 kg/m² Intake/Output Summary (Last 24 hours) at 2019 2762 Last data filed at 2/10/2019 1601 Gross per 24 hour Intake 440 ml Output  Net 440 ml Tmax:  Temp (24hrs), Av.1 °F (36.7 °C), Min:97.7 °F (36.5 °C), Max:98.3 °F (36.8 °C) Data Review: Xr Chest Pa Lat Result Date: 2/10/2019 INDICATION:  sob. EXAM: 2 VIEW CHEST RADIOGRAPH. COMPARISON: 10/11/2012. FINDINGS: Frontal and lateral views of the chest show faint patchy parenchymal density in the right middle lobe, although the breast shadows project over these regions bilaterally making this determination difficult. . . The heart, mediastinum and pulmonary vasculature are stable. The bony thorax is unremarkable for age. .. IMPRESSION:  Early or mild right middle lobe infiltrate is suspected. There is no CHF pattern. .  . Cta Chest W Or W Wo Cont Result Date: 2/10/2019 EXAM: CT ANGIOGRAPHY CHEST INDICATION:  [Shortness of breath, decreased O2 saturation, concerning for PE. COMPARISON: 11/7/2012. CONTRAST: 75 mL of Isovue-370. TECHNIQUE: Precontrast  images were obtained to localize the volume for acquisition. Multislice helical CT arteriography was performed from the diaphragm to the thoracic inlet during uneventful rapid bolus intravenous contrast administration. Lung and soft tissue windows were generated. Coronal and sagittal  reformatted images were also generated. 3D post processing consisting of coronal maximum intensity projection images was performed. CT dose reduction was achieved through use of a standardized protocol tailored for this examination and automatic exposure control for dose modulation. FINDINGS: Patchy atelectasis or infiltrate in the lingula and in the right upper lobe medially. Mild emphysematous change. .. The pulmonary arteries are well enhanced and no pulmonary emboli are identified. Main pulmonary outflow tract measures 4.2 cm, compared to a similar measurement of 3.5 cm on the prior study. There is no mediastinal or hilar adenopathy or mass. The aorta enhances normally without evidence of aneurysm or dissection. The visualized portions of the upper abdominal organs are normal.  
 
IMPRESSION: 1. No CT evidence for central pulmonary embolus at this time . 2. Increased caliber of main pulmonary outflow tract, correlate for pulmonary hypertension. 3. Patchy atelectasis or infiltrate in the lingula and in the right upper lobe medially. 4. Mild emphysematous change. No results found for: SDES Lab Results Component Value Date/Time Culture result: NO GROWTH AFTER 22 HOURS 02/10/2019 12:54 AM  
 
All Micro Results Procedure Component Value Units Date/Time RESPIRATORY PANEL,PCR,NASOPHARYNGEAL [[id number]] Order Status:  Sent Specimen:  NASOPHARYNGEAL SWAB CULTURE, BLOOD, PAIRED [[id number]] Collected:  02/10/19 0054 Order Status:  Completed Specimen:  Blood Updated:  02/11/19 5230 Special Requests: NO SPECIAL REQUESTS Culture result: NO GROWTH AFTER 22 HOURS S. Sheritatye Giang, UR/CSF [[id number]] Collected:  02/10/19 1545 Order Status:  Completed Specimen:  Other Updated:  02/10/19 0676 URINE CULTURE HOLD SAMPLE [[id number]] Collected:  02/10/19 1545 Order Status:  Completed Specimen:  Urine Updated:  02/10/19 0434 Urine culture hold URINE ON HOLD IN MICROBIOLOGY DEPT FOR 3 DAYS. IF UNPRESERVED URINE IS SUBMITTED, IT CANNOT BE USED FOR ADDITIONAL TESTING AFTER 24 HRS, RECOLLECTION WILL BE REQUIRED. LEGIONELLA PNEUMOPHILA AG, URINE [[id number]] Collected:  02/10/19 1545 Order Status:  Completed Specimen:  Urine Updated:  02/10/19 6137 MYCOPLASMA AB, IGG/IGM [[id number]] Collected:  02/10/19 1548 Order Status:  Completed Specimen:  Serum Updated:  02/10/19 1600 RESPIRATORY PANEL,PCR,NASOPHARYNGEAL [[id number]] Order Status:  Canceled Specimen:  NASOPHARYNGEAL SWAB INFLUENZA A & B AG (RAPID TEST) [[id number]] Collected:  02/10/19 0141 Order Status:  Completed Specimen:  Nasopharyngeal from Nasal washing Updated:  02/10/19 0202 Influenza A Antigen NEGATIVE Influenza B Antigen NEGATIVE CULTURE, BLOOD, PAIRED [[id number]] Order Status:  Canceled Specimen:  Blood Labs:  
 
Recent Labs  
  02/11/19 
0503 02/10/19 
0355 WBC 9.6 10.4 HGB 12.8 14.7 HCT 40.2 45.2 * 125* Recent Labs  
  02/11/19 
0503 02/10/19 
0446 02/10/19 
7022 *  --  130*  
K 4.2  --  3.2*  
  --  97  
CO2 20*  --  23 BUN 16  --  18  
CREA 0.81  --  0.76 *  --  199* CA 8.6  --  8.5 MG  --  1.7  --   
PHOS  --  2.7  --   
 
Recent Labs  
  02/11/19 
0503 02/10/19 
0054 SGOT 11* 22 ALT 14 19 AP 73 94 TBILI 0.2 0.4 TP 6.8 7.5 ALB 2.9* 3.5 GLOB 3.9 4.0  
 
 No results for input(s): INR, PTP, APTT in the last 72 hours. No lab exists for component: INREXT, INREXT No results for input(s): FE, TIBC, PSAT, FERR in the last 72 hours. No results found for: FOL, RBCF No results for input(s): PH, PCO2, PO2 in the last 72 hours. Recent Labs  
  02/10/19 
1548 02/10/19 
0446 02/10/19 
3596 CPK 78 60  --   
CKNDX 1.7 Cannot be calculated  --   
TROIQ <0.05 <0.05 <0.05 Lab Results Component Value Date/Time Cholesterol, total 125 02/11/2019 05:03 AM  
 HDL Cholesterol 74 02/11/2019 05:03 AM  
 LDL, calculated 37.4 02/11/2019 05:03 AM  
 Triglyceride 68 02/11/2019 05:03 AM  
 CHOL/HDL Ratio 1.7 02/11/2019 05:03 AM  
 
Lab Results Component Value Date/Time Glucose (POC) 305 (H) 02/11/2019 07:09 AM  
 Glucose (POC) 368 (H) 02/10/2019 10:41 PM  
 Glucose (POC) 369 (H) 02/10/2019 03:45 PM  
 Glucose (POC) 274 (H) 02/10/2019 12:56 PM  
 Glucose (POC) 190 (H) 02/10/2019 09:10 AM  
 
Lab Results Component Value Date/Time Color Yellow 05/31/2018 08:22 AM  
 Appearance Clear 05/31/2018 08:22 AM  
 Specific gravity 1.020 04/30/2009 11:50 AM  
 pH (UA) 7.0 05/31/2018 08:22 AM  
 Protein NEGATIVE  04/30/2009 11:50 AM  
 Glucose NEGATIVE  04/30/2009 11:50 AM  
 Ketone Negative 05/31/2018 08:22 AM  
 Bilirubin Negative 05/31/2018 08:22 AM  
 Urobilinogen 0.2 04/30/2009 11:50 AM  
 Nitrites Positive (A) 05/31/2018 08:22 AM  
 Leukocyte Esterase Negative 05/31/2018 08:22 AM  
 Bacteria Few 05/31/2018 08:22 AM  
 WBC 0-5 05/31/2018 08:22 AM  
 RBC 0-2 05/31/2018 08:22 AM  
 
 
 
Medications Reviewed:  
 
Current Facility-Administered Medications Medication Dose Route Frequency  acetaminophen (TYLENOL) tablet 650 mg  650 mg Oral Q4H PRN  
 albuterol-ipratropium (DUO-NEB) 2.5 MG-0.5 MG/3 ML  3 mL Nebulization Q4H PRN  
 atorvastatin (LIPITOR) tablet 10 mg  10 mg Oral QHS  sodium chloride (NS) flush 5-40 mL  5-40 mL IntraVENous Q8H  
  sodium chloride (NS) flush 5-40 mL  5-40 mL IntraVENous PRN  
 ondansetron (ZOFRAN) injection 4 mg  4 mg IntraVENous Q4H PRN  
 bisacodyl (DULCOLAX) tablet 5 mg  5 mg Oral DAILY PRN  
 heparin (porcine) injection 5,000 Units  5,000 Units SubCUTAneous Q8H  
 lactobac ac& pc-s.therm-b.anim (CORRIE Q/RISAQUAD)  1 Cap Oral DAILY  levoFLOXacin (LEVAQUIN) 750 mg in D5W IVPB  750 mg IntraVENous Q24H  
 insulin lispro (HUMALOG) injection   SubCUTAneous AC&HS  
 glucose chewable tablet 16 g  4 Tab Oral PRN  
 dextrose (D50W) injection syrg 12.5-25 g  12.5-25 g IntraVENous PRN  
 glucagon (GLUCAGEN) injection 1 mg  1 mg IntraMUSCular PRN  
 nicotine (NICODERM CQ) 21 mg/24 hr patch 1 Patch  1 Patch TransDERmal Q24H  
 arformoterol (BROVANA) neb solution 15 mcg  15 mcg Nebulization BID RT And  
 budesonide (PULMICORT) 500 mcg/2 ml nebulizer suspension  500 mcg Nebulization BID RT  
 methylPREDNISolone (PF) (SOLU-MEDROL) injection 40 mg  40 mg IntraVENous Q6H  
 albuterol-ipratropium (DUO-NEB) 2.5 MG-0.5 MG/3 ML  3 mL Nebulization Q4H RT  
 
______________________________________________________________________ EXPECTED LENGTH OF STAY: - - - 
ACTUAL LENGTH OF STAY:          1 Ming Schroeder MD

## 2019-02-12 LAB
ANION GAP SERPL CALC-SCNC: 6 MMOL/L (ref 5–15)
B PERT DNA SPEC QL NAA+PROBE: NOT DETECTED
BASOPHILS # BLD: 0 K/UL (ref 0–0.1)
BASOPHILS NFR BLD: 0 % (ref 0–1)
BUN SERPL-MCNC: 22 MG/DL (ref 6–20)
BUN/CREAT SERPL: 24 (ref 12–20)
C PNEUM DNA SPEC QL NAA+PROBE: NOT DETECTED
CALCIUM SERPL-MCNC: 9 MG/DL (ref 8.5–10.1)
CHLORIDE SERPL-SCNC: 106 MMOL/L (ref 97–108)
CO2 SERPL-SCNC: 23 MMOL/L (ref 21–32)
CREAT SERPL-MCNC: 0.91 MG/DL (ref 0.55–1.02)
DIFFERENTIAL METHOD BLD: ABNORMAL
EOSINOPHIL # BLD: 0 K/UL (ref 0–0.4)
EOSINOPHIL NFR BLD: 0 % (ref 0–7)
ERYTHROCYTE [DISTWIDTH] IN BLOOD BY AUTOMATED COUNT: 14.2 % (ref 11.5–14.5)
FLUAV H1 2009 PAND RNA SPEC QL NAA+PROBE: NOT DETECTED
FLUAV H1 RNA SPEC QL NAA+PROBE: DETECTED
FLUAV H3 RNA SPEC QL NAA+PROBE: NOT DETECTED
FLUAV SUBTYP SPEC NAA+PROBE: NOT DETECTED
FLUBV RNA SPEC QL NAA+PROBE: NOT DETECTED
FLUID CULTURE, SPNG2: ABNORMAL
GLUCOSE BLD STRIP.AUTO-MCNC: 188 MG/DL (ref 65–100)
GLUCOSE BLD STRIP.AUTO-MCNC: 265 MG/DL (ref 65–100)
GLUCOSE BLD STRIP.AUTO-MCNC: 368 MG/DL (ref 65–100)
GLUCOSE BLD STRIP.AUTO-MCNC: 394 MG/DL (ref 65–100)
GLUCOSE SERPL-MCNC: 283 MG/DL (ref 65–100)
HADV DNA SPEC QL NAA+PROBE: NOT DETECTED
HCOV 229E RNA SPEC QL NAA+PROBE: NOT DETECTED
HCOV HKU1 RNA SPEC QL NAA+PROBE: NOT DETECTED
HCOV NL63 RNA SPEC QL NAA+PROBE: NOT DETECTED
HCOV OC43 RNA SPEC QL NAA+PROBE: NOT DETECTED
HCT VFR BLD AUTO: 42.3 % (ref 35–47)
HGB BLD-MCNC: 13.4 G/DL (ref 11.5–16)
HMPV RNA SPEC QL NAA+PROBE: NOT DETECTED
HPIV1 RNA SPEC QL NAA+PROBE: NOT DETECTED
HPIV2 RNA SPEC QL NAA+PROBE: NOT DETECTED
HPIV3 RNA SPEC QL NAA+PROBE: NOT DETECTED
HPIV4 RNA SPEC QL NAA+PROBE: NOT DETECTED
IMM GRANULOCYTES # BLD AUTO: 0 K/UL
IMM GRANULOCYTES NFR BLD AUTO: 0 %
L PNEUMO1 AG UR QL IA: NEGATIVE
LYMPHOCYTES # BLD: 0.5 K/UL (ref 0.8–3.5)
LYMPHOCYTES NFR BLD: 4 % (ref 12–49)
M PNEUMO DNA SPEC QL NAA+PROBE: NOT DETECTED
MCH RBC QN AUTO: 31.8 PG (ref 26–34)
MCHC RBC AUTO-ENTMCNC: 31.7 G/DL (ref 30–36.5)
MCV RBC AUTO: 100.5 FL (ref 80–99)
MONOCYTES # BLD: 0.9 K/UL (ref 0–1)
MONOCYTES NFR BLD: 7 % (ref 5–13)
NEUTS BAND NFR BLD MANUAL: 2 % (ref 0–6)
NEUTS SEG # BLD: 11.9 K/UL (ref 1.8–8)
NEUTS SEG NFR BLD: 87 % (ref 32–75)
NRBC # BLD: 0 K/UL (ref 0–0.01)
NRBC BLD-RTO: 0 PER 100 WBC
ORGANISM ID, SPNG3: ABNORMAL
PLATELET # BLD AUTO: 155 K/UL (ref 150–400)
PLEASE NOTE, SPNG4: ABNORMAL
PMV BLD AUTO: 11.3 FL (ref 8.9–12.9)
POTASSIUM SERPL-SCNC: 4.9 MMOL/L (ref 3.5–5.1)
RBC # BLD AUTO: 4.21 M/UL (ref 3.8–5.2)
RBC MORPH BLD: ABNORMAL
RBC MORPH BLD: ABNORMAL
RSV RNA SPEC QL NAA+PROBE: NOT DETECTED
RV+EV RNA SPEC QL NAA+PROBE: NOT DETECTED
S PNEUM AG SPEC QL LA: POSITIVE
SERVICE CMNT-IMP: ABNORMAL
SODIUM SERPL-SCNC: 135 MMOL/L (ref 136–145)
SPECIMEN SOURCE: ABNORMAL
SPECIMEN SOURCE: NORMAL
SPECIMEN, SPNG1: ABNORMAL
WBC # BLD AUTO: 13.3 K/UL (ref 3.6–11)

## 2019-02-12 PROCEDURE — 74011250636 HC RX REV CODE- 250/636: Performed by: INTERNAL MEDICINE

## 2019-02-12 PROCEDURE — 94640 AIRWAY INHALATION TREATMENT: CPT

## 2019-02-12 PROCEDURE — 74011000250 HC RX REV CODE- 250: Performed by: INTERNAL MEDICINE

## 2019-02-12 PROCEDURE — 82962 GLUCOSE BLOOD TEST: CPT

## 2019-02-12 PROCEDURE — 74011636637 HC RX REV CODE- 636/637: Performed by: INTERNAL MEDICINE

## 2019-02-12 PROCEDURE — 36415 COLL VENOUS BLD VENIPUNCTURE: CPT

## 2019-02-12 PROCEDURE — 65660000000 HC RM CCU STEPDOWN

## 2019-02-12 PROCEDURE — 74011250637 HC RX REV CODE- 250/637: Performed by: INTERNAL MEDICINE

## 2019-02-12 PROCEDURE — 74011000258 HC RX REV CODE- 258: Performed by: INTERNAL MEDICINE

## 2019-02-12 PROCEDURE — 80048 BASIC METABOLIC PNL TOTAL CA: CPT

## 2019-02-12 PROCEDURE — 85025 COMPLETE CBC W/AUTO DIFF WBC: CPT

## 2019-02-12 PROCEDURE — 93005 ELECTROCARDIOGRAM TRACING: CPT

## 2019-02-12 PROCEDURE — 94760 N-INVAS EAR/PLS OXIMETRY 1: CPT

## 2019-02-12 RX ORDER — DILTIAZEM HYDROCHLORIDE 5 MG/ML
5 INJECTION INTRAVENOUS
Status: DISCONTINUED | OUTPATIENT
Start: 2019-02-12 | End: 2019-02-15 | Stop reason: HOSPADM

## 2019-02-12 RX ORDER — INSULIN GLARGINE 100 [IU]/ML
10 INJECTION, SOLUTION SUBCUTANEOUS DAILY
Status: DISCONTINUED | OUTPATIENT
Start: 2019-02-12 | End: 2019-02-13

## 2019-02-12 RX ADMIN — INSULIN GLARGINE 10 UNITS: 100 INJECTION, SOLUTION SUBCUTANEOUS at 18:49

## 2019-02-12 RX ADMIN — INSULIN LISPRO 2 UNITS: 100 INJECTION, SOLUTION INTRAVENOUS; SUBCUTANEOUS at 21:42

## 2019-02-12 RX ADMIN — IPRATROPIUM BROMIDE AND ALBUTEROL SULFATE 3 ML: .5; 3 SOLUTION RESPIRATORY (INHALATION) at 12:54

## 2019-02-12 RX ADMIN — IPRATROPIUM BROMIDE AND ALBUTEROL SULFATE 3 ML: .5; 3 SOLUTION RESPIRATORY (INHALATION) at 19:21

## 2019-02-12 RX ADMIN — ATORVASTATIN CALCIUM 10 MG: 20 TABLET, FILM COATED ORAL at 21:42

## 2019-02-12 RX ADMIN — Medication 10 ML: at 14:02

## 2019-02-12 RX ADMIN — Medication 10 ML: at 05:33

## 2019-02-12 RX ADMIN — HEPARIN SODIUM 5000 UNITS: 5000 INJECTION INTRAVENOUS; SUBCUTANEOUS at 05:32

## 2019-02-12 RX ADMIN — INSULIN LISPRO 5 UNITS: 100 INJECTION, SOLUTION INTRAVENOUS; SUBCUTANEOUS at 07:47

## 2019-02-12 RX ADMIN — INSULIN LISPRO 10 UNITS: 100 INJECTION, SOLUTION INTRAVENOUS; SUBCUTANEOUS at 16:30

## 2019-02-12 RX ADMIN — DILTIAZEM HYDROCHLORIDE 10 MG/HR: 5 INJECTION, SOLUTION INTRAVENOUS at 21:42

## 2019-02-12 RX ADMIN — Medication 10 ML: at 21:45

## 2019-02-12 RX ADMIN — DILTIAZEM HYDROCHLORIDE 5 MG: 5 INJECTION INTRAVENOUS at 20:06

## 2019-02-12 RX ADMIN — BUDESONIDE 500 MCG: 0.5 INHALANT RESPIRATORY (INHALATION) at 19:21

## 2019-02-12 RX ADMIN — METHYLPREDNISOLONE SODIUM SUCCINATE 40 MG: 40 INJECTION, POWDER, FOR SOLUTION INTRAMUSCULAR; INTRAVENOUS at 07:50

## 2019-02-12 RX ADMIN — INSULIN LISPRO 10 UNITS: 100 INJECTION, SOLUTION INTRAVENOUS; SUBCUTANEOUS at 14:00

## 2019-02-12 RX ADMIN — METHYLPREDNISOLONE SODIUM SUCCINATE 40 MG: 40 INJECTION, POWDER, FOR SOLUTION INTRAMUSCULAR; INTRAVENOUS at 02:36

## 2019-02-12 RX ADMIN — Medication 1 CAPSULE: at 09:05

## 2019-02-12 RX ADMIN — HEPARIN SODIUM 5000 UNITS: 5000 INJECTION INTRAVENOUS; SUBCUTANEOUS at 14:04

## 2019-02-12 RX ADMIN — LEVOFLOXACIN 750 MG: 5 INJECTION, SOLUTION INTRAVENOUS at 05:33

## 2019-02-12 RX ADMIN — HEPARIN SODIUM 5000 UNITS: 5000 INJECTION INTRAVENOUS; SUBCUTANEOUS at 21:42

## 2019-02-12 NOTE — PROGRESS NOTES
Reason for Admission:   SOB COPD Exacerbation RRAT Score:     9 Plan for utilizing home health:   [place] Likelihood of Readmission:  low Transition of Care Plan:     Home with PCP follow-up Patient declined [facility] - if patient needs home O2 - will need qualifying sats (at rest on RA, at rest on O2, with ambulation off O2 and with ambulation) CM will also need home O2 orders written by MD and Medicare required documentation (CM left required documentation on patient's bedside chart for MD if needed). CM will await qualifying sats (on RA 88 or below), MD orders for home O2 and required documentaiton at this time if patient needs home O2. TODD Haskins 
 
 
2/14/19 addeundum - home O2 qualifying sats and orders/docuemntation needs to be done within 48 hours of discharge. Will follow. TODD Haskins Care Management Interventions PCP Verified by CM: Yes Transition of Care Consult (CM Consult): (declined [facility] COPD) MyChart Signup: No 
Discharge Durable Medical Equipment: No(will await sat reading and orders/docuementation for home O2 if needed) Physical Therapy Consult: No 
Occupational Therapy Consult: Yes Speech Therapy Consult: No 
Current Support Network: Own Home, Lives Alone Confirm Follow Up Transport: Friends Plan discussed with Pt/Family/Caregiver: Yes Discharge Location Discharge Placement: Home

## 2019-02-12 NOTE — PROGRESS NOTES
2000. 
Assessment of patient. Vitals within normal parameters. 2200 Blood glucose 130. Insulin given (see MAR). 23:30 After breathing treatment patient's HR is fluctuating 110 -120, Will continue to monitor. Bedside and Verbal shift change report given to 0780 Nichelle Pope (oncoming nurse) by 1013 [facility] RN (offgoing nurse). Report included the following information SBAR, Kardex, Procedure Summary, Intake/Output, MAR, Recent Results, Med Rec Status and Cardiac Rhythm sinus tach.

## 2019-02-12 NOTE — PROGRESS NOTES
0900 - Pt up to BR on room air with some noted dyspnea, O2 sat dropped to 86, O2 applied at 1L/m via NC and pt returned to 91-92.

## 2019-02-12 NOTE — PROGRESS NOTES
Hospitalist Progress Note Lux Murphy MD 
Answering service: [phone] OR [phone] from in house phone Date of Service:  2019 NAME:  Rocio Mcbride :  1960 MRN:  [id number] PCP: Rachel Jacobsen MD 
 
Chief Complaint:  
Chief Complaint Patient presents with  Shortness of Breath Admission Summary:  
 
Rocio Mcbride is a 60 y.o. female who presented with SOB Interval history / Subjective:  
Patient seen for Follow up of COPD exacerbation Patient was seen and examined this morning. She was still on 2L NC oxygen and was not possible to wean her down. Discussed with her for home oxygen evaluation. Assessment & Plan: # acute Hypoxemic resp failure sec to COPD exacerbation and CAP, bacterial, and Influenza POA 
- O2 support - Bronchodilators RTC and PRN 
- switch to oral steroid and IV levaquin - F/U BC x 2, NGTD 
- Sputum cx pending - pending urine for legionella, strep - Viral respiratory panel positive for Influenza A 
- CTA chest: no PE, shows PNA in RLL and lingula # Diet controlled DM2 with hyperglycemia 
- SSI 
- accu-checks 
- Diabetic diet - HbA1c 7.4; DM is likely not diet controlled any more, may need metformin on DC # HTN 
- S/p IVF bolus 
- Stable 
- monitor 
- Hold home antihypertensives # Elevated BNP 
- Due to acute resp distress - No signs of CHF # HLP 
- Cont home meds # Thrombocytopenia 
- Etiology unclear - Cont to monitor labs # active smoker Cessation advised Nicotine patch #  Hypokalemia Replaced Repeat labs in am 
 
 
Code status: Full Code DVT prophylaxis: SCDs Care Plan discussed with: Patient Disposition: Possible discharge tomorrow with home oxygen Hospital Problems  Date Reviewed: 2/10/2019 Codes Class Noted POA * (Principal) COPD with acute exacerbation ([facility] [address] ICD-10-CM: J44.1 ICD-9-CM: 491.21  2/10/2019 Yes Review of Systems: Pertinent positives mentioned in interval hx. Other systems reviewed and negative. Physical Examination:  
General appearance: alert, no distress, appears stated age on NC on 2L oxygen Head: Normocephalic, without obvious abnormality, atraumatic Throat: normal findings: buccal mucosa normal and palate normal 
Lungs: wheezing has improved significantly No crackles Heart: regular rate and rhythm Abdomen: soft, non-tender. Bowel sounds normal. No masses,  no organomegaly Extremities: extremities normal, atraumatic, no cyanosis or edema Pulses: 2+ and symmetric Neurologic: Grossly normal 
  
 
Vital Signs:  
 Last 24hrs VS reviewed since prior progress note. Most recent are: 
 
Visit Vitals /69 (BP 1 Location: Right arm, BP Patient Position: At rest) Pulse (!) 105 Temp 98.1 °F (36.7 °C) Resp 16 Ht 5' 8\" (1.727 m) Wt 74.8 kg (164 lb 12.8 oz) SpO2 (!) 89% BMI 25.06 kg/m² Intake/Output Summary (Last 24 hours) at 2019 1734 Last data filed at 2019 Gross per 24 hour Intake  Output 300 ml Net -300 ml Tmax:  Temp (24hrs), Av.2 °F (36.8 °C), Min:98.1 °F (36.7 °C), Max:98.5 °F (36.9 °C) Data Review: Xr Chest Pa Lat Result Date: 2/10/2019 INDICATION:  sob. EXAM: 2 VIEW CHEST RADIOGRAPH. COMPARISON: 10/11/2012. FINDINGS: Frontal and lateral views of the chest show faint patchy parenchymal density in the right middle lobe, although the breast shadows project over these regions bilaterally making this determination difficult. . . The heart, mediastinum and pulmonary vasculature are stable. The bony thorax is unremarkable for age. .. IMPRESSION:  Early or mild right middle lobe infiltrate is suspected. There is no CHF pattern. .  . Cta Chest W Or W Wo Cont Result Date: 2/10/2019 EXAM: CT ANGIOGRAPHY CHEST INDICATION:  [Shortness of breath, decreased O2 saturation, concerning for PE. COMPARISON: 2012.  CONTRAST: 75 mL of Isovue-370. TECHNIQUE: Precontrast  images were obtained to localize the volume for acquisition. Multislice helical CT arteriography was performed from the diaphragm to the thoracic inlet during uneventful rapid bolus intravenous contrast administration. Lung and soft tissue windows were generated. Coronal and sagittal  reformatted images were also generated. 3D post processing consisting of coronal maximum intensity projection images was performed. CT dose reduction was achieved through use of a standardized protocol tailored for this examination and automatic exposure control for dose modulation. FINDINGS: Patchy atelectasis or infiltrate in the lingula and in the right upper lobe medially. Mild emphysematous change. .. The pulmonary arteries are well enhanced and no pulmonary emboli are identified. Main pulmonary outflow tract measures 4.2 cm, compared to a similar measurement of 3.5 cm on the prior study. There is no mediastinal or hilar adenopathy or mass. The aorta enhances normally without evidence of aneurysm or dissection. The visualized portions of the upper abdominal organs are normal.  
 
IMPRESSION: 1. No CT evidence for central pulmonary embolus at this time . 2. Increased caliber of main pulmonary outflow tract, correlate for pulmonary hypertension. 3. Patchy atelectasis or infiltrate in the lingula and in the right upper lobe medially. 4. Mild emphysematous change. No results found for: SDES Lab Results Component Value Date/Time Culture result: NO GROWTH 2 DAYS 02/10/2019 12:54 AM  
 
All Micro Results Procedure Component Value Units Date/Time S. PNEUMO AG, UR/CSF [[id number]]  (Abnormal) Collected:  02/10/19 1542 Order Status:  Completed Specimen:  Other from Miscellaneous sample Updated:  02/12/19 1336 Source URINE Specimen Urine Streptococcus pneumoniae Ag Positive Comment: (NOTE) Reported pos strep pneum ur ag to Carmen Melissa at 1:04pm on 
 02-12-19. No fax requested. SP Performed At: [address] [id number] Rose Hernandez MD [id number] Fluid culture Not Indicated Organism ID Not indicated. Please note Comment Comment: (NOTE) [employer] standards require a culture to be 
performed on CSF specimens submitted for bacterial antigen testing. (CAP [id number]) Urine specimens will not be cultured. Performed At: [address] [id number] Rose Hernandez MD [id number] LEGIONELLA PNEUMOPHILA AG, URINE [[id number]] Collected:  02/10/19 1545 Order Status:  Completed Specimen:  Urine Updated:  02/12/19 1137 Source URINE     
  L pneumophila S1 Ag, urine NEGATIVE Comment: (NOTE) Presumptive negative for L. pneumophila serogroup 1 antigen in urine, 
suggesting no recent or current infection. Legionnaires' disease 
cannot be ruled out since other serogroups and species may also 
cause disease. Performed At: [address] [id number] Rose Hernandez MD [id number] CULTURE, BLOOD, PAIRED [[id number]] Collected:  02/10/19 0054 Order Status:  Completed Specimen:  Blood Updated:  02/12/19 9361 Special Requests: NO SPECIAL REQUESTS Culture result: NO GROWTH 2 DAYS     
 RESPIRATORY PANEL,PCR,NASOPHARYNGEAL [[id number]]  (Abnormal) Collected:  02/11/19 1739 Order Status:  Completed Specimen:  Nasopharyngeal Updated:  02/12/19 0045 Adenovirus NOT DETECTED Coronavirus 229E NOT DETECTED Coronavirus HKU1 NOT DETECTED Coronavirus CVNL63 NOT DETECTED Coronavirus OC43 NOT DETECTED Metapneumovirus NOT DETECTED Rhinovirus and Enterovirus NOT DETECTED Influenza A NOT DETECTED Influenza A, subtype H1 DETECTED Influenza A, subtype H3 NOT DETECTED   INFLUENZA A H1N1 PCR NOT DETECTED     
 Influenza B NOT DETECTED Parainfluenza 1 NOT DETECTED Parainfluenza 2 NOT DETECTED Parainfluenza 3 NOT DETECTED Parainfluenza virus 4 NOT DETECTED     
  RSV by PCR NOT DETECTED Bordetella pertussis - PCR NOT DETECTED Chlamydophila pneumoniae DNA, QL, PCR NOT DETECTED Mycoplasma pneumoniae DNA, QL, PCR NOT DETECTED     
 RESPIRATORY PANEL,PCR,NASOPHARYNGEAL [[id number]] Order Status:  Canceled Specimen:  NASOPHARYNGEAL SWAB URINE CULTURE HOLD SAMPLE [[id number]] Collected:  02/10/19 1545 Order Status:  Completed Specimen:  Urine Updated:  02/10/19 0083 Urine culture hold URINE ON HOLD IN MICROBIOLOGY DEPT FOR 3 DAYS. IF UNPRESERVED URINE IS SUBMITTED, IT CANNOT BE USED FOR ADDITIONAL TESTING AFTER 24 HRS, RECOLLECTION WILL BE REQUIRED. MYCOPLASMA AB, IGG/IGM [[id number]] Collected:  02/10/19 1548 Order Status:  Completed Specimen:  Serum Updated:  02/10/19 1600 RESPIRATORY PANEL,PCR,NASOPHARYNGEAL [[id number]] Order Status:  Canceled Specimen:  NASOPHARYNGEAL SWAB INFLUENZA A & B AG (RAPID TEST) [[id number]] Collected:  02/10/19 0141 Order Status:  Completed Specimen:  Nasopharyngeal from Nasal washing Updated:  02/10/19 0202 Influenza A Antigen NEGATIVE Influenza B Antigen NEGATIVE CULTURE, BLOOD, PAIRED [[id number]] Order Status:  Canceled Specimen:  Blood Labs:  
 
Recent Labs  
  02/12/19 
0543 02/11/19 
0503 WBC 13.3* 9.6 HGB 13.4 12.8 HCT 42.3 40.2  141* Recent Labs  
  02/12/19 
0543 02/11/19 
0503 02/10/19 
3701 02/10/19 
7164 * 135*  --  130*  
K 4.9 4.2  --  3.2*  
 106  --  97  
CO2 23 20*  --  23 BUN 22* 16  --  18  
CREA 0.91 0.81  --  0.76 * 281*  --  199* CA 9.0 8.6  --  8.5 MG  --   --  1.7  --   
PHOS  --   --  2.7  --   
 
Recent Labs  
  02/11/19 
0503 02/10/19 
0054 SGOT 11* 22 ALT 14 19 AP 73 94 TBILI 0.2 0.4 TP 6.8 7.5 ALB 2.9* 3.5 GLOB 3.9 4.0 No results for input(s): INR, PTP, APTT in the last 72 hours. No lab exists for component: INREXT, INREXT No results for input(s): FE, TIBC, PSAT, FERR in the last 72 hours. No results found for: FOL, RBCF No results for input(s): PH, PCO2, PO2 in the last 72 hours. Recent Labs  
  02/10/19 
1548 02/10/19 
0446 02/10/19 
8253 CPK 78 60  --   
CKNDX 1.7 Cannot be calculated  --   
TROIQ <0.05 <0.05 <0.05 Lab Results Component Value Date/Time Cholesterol, total 125 02/11/2019 05:03 AM  
 HDL Cholesterol 74 02/11/2019 05:03 AM  
 LDL, calculated 37.4 02/11/2019 05:03 AM  
 Triglyceride 68 02/11/2019 05:03 AM  
 CHOL/HDL Ratio 1.7 02/11/2019 05:03 AM  
 
Lab Results Component Value Date/Time Glucose (POC) 368 (H) 02/12/2019 04:47 PM  
 Glucose (POC) 394 (H) 02/12/2019 12:32 PM  
 Glucose (POC) 265 (H) 02/12/2019 07:18 AM  
 Glucose (POC) 330 (H) 02/11/2019 10:36 PM  
 Glucose (POC) 354 (H) 02/11/2019 10:28 PM  
 
Lab Results Component Value Date/Time Color Yellow 05/31/2018 08:22 AM  
 Appearance Clear 05/31/2018 08:22 AM  
 Specific gravity 1.020 04/30/2009 11:50 AM  
 pH (UA) 7.0 05/31/2018 08:22 AM  
 Protein NEGATIVE  04/30/2009 11:50 AM  
 Glucose NEGATIVE  04/30/2009 11:50 AM  
 Ketone Negative 05/31/2018 08:22 AM  
 Bilirubin Negative 05/31/2018 08:22 AM  
 Urobilinogen 0.2 04/30/2009 11:50 AM  
 Nitrites Positive (A) 05/31/2018 08:22 AM  
 Leukocyte Esterase Negative 05/31/2018 08:22 AM  
 Bacteria Few 05/31/2018 08:22 AM  
 WBC 0-5 05/31/2018 08:22 AM  
 RBC 0-2 05/31/2018 08:22 AM  
 
 
 
Medications Reviewed:  
 
Current Facility-Administered Medications Medication Dose Route Frequency  [START ON 2/13/2019] predniSONE (DELTASONE) tablet 49 mg  49 mg Oral DAILY WITH BREAKFAST  insulin glargine (LANTUS) injection 10 Units  10 Units SubCUTAneous DAILY  albuterol-ipratropium (DUO-NEB) 2.5 MG-0.5 MG/3 ML  3 mL Nebulization Q6H RT  
 acetaminophen (TYLENOL) tablet 650 mg  650 mg Oral Q4H PRN  
 albuterol-ipratropium (DUO-NEB) 2.5 MG-0.5 MG/3 ML  3 mL Nebulization Q4H PRN  
 atorvastatin (LIPITOR) tablet 10 mg  10 mg Oral QHS  sodium chloride (NS) flush 5-40 mL  5-40 mL IntraVENous Q8H  
 sodium chloride (NS) flush 5-40 mL  5-40 mL IntraVENous PRN  
 ondansetron (ZOFRAN) injection 4 mg  4 mg IntraVENous Q4H PRN  
 bisacodyl (DULCOLAX) tablet 5 mg  5 mg Oral DAILY PRN  
 heparin (porcine) injection 5,000 Units  5,000 Units SubCUTAneous Q8H  
 lactobac ac& pc-s.therm-b.anim (CORRIE Q/RISAQUAD)  1 Cap Oral DAILY  levoFLOXacin (LEVAQUIN) 750 mg in D5W IVPB  750 mg IntraVENous Q24H  
 insulin lispro (HUMALOG) injection   SubCUTAneous AC&HS  
 glucose chewable tablet 16 g  4 Tab Oral PRN  
 dextrose (D50W) injection syrg 12.5-25 g  12.5-25 g IntraVENous PRN  
 glucagon (GLUCAGEN) injection 1 mg  1 mg IntraMUSCular PRN  
 nicotine (NICODERM CQ) 21 mg/24 hr patch 1 Patch  1 Patch TransDERmal Q24H  
 arformoterol (BROVANA) neb solution 15 mcg  15 mcg Nebulization BID RT And  
 budesonide (PULMICORT) 500 mcg/2 ml nebulizer suspension  500 mcg Nebulization BID RT  
 
______________________________________________________________________ EXPECTED LENGTH OF STAY: 4d 4h 
ACTUAL LENGTH OF STAY:          2 Cristina Tse MD

## 2019-02-13 ENCOUNTER — APPOINTMENT (OUTPATIENT)
Dept: NON INVASIVE DIAGNOSTICS | Age: 59
DRG: 193 | End: 2019-02-13
Attending: INTERNAL MEDICINE
Payer: COMMERCIAL

## 2019-02-13 LAB
ANION GAP SERPL CALC-SCNC: 6 MMOL/L (ref 5–15)
BASOPHILS # BLD: 0 K/UL (ref 0–0.1)
BASOPHILS NFR BLD: 0 % (ref 0–1)
BUN SERPL-MCNC: 25 MG/DL (ref 6–20)
BUN/CREAT SERPL: 29 (ref 12–20)
CALCIUM SERPL-MCNC: 9 MG/DL (ref 8.5–10.1)
CHLORIDE SERPL-SCNC: 108 MMOL/L (ref 97–108)
CO2 SERPL-SCNC: 23 MMOL/L (ref 21–32)
CREAT SERPL-MCNC: 0.87 MG/DL (ref 0.55–1.02)
DIFFERENTIAL METHOD BLD: ABNORMAL
ECHO AO ROOT DIAM: 3.12 CM
ECHO AV CUSP MM: 1.93 CM
ECHO AV PEAK GRADIENT: 8.4 MMHG
ECHO AV PEAK VELOCITY: 145.01 CM/S
ECHO LA AREA 4C: 16.5 CM2
ECHO LA MAJOR AXIS: 4.19 CM
ECHO LA TO AORTIC ROOT RATIO: 1.35
ECHO LA VOL 2C: 31.11 ML (ref 22–52)
ECHO LA VOL 4C: 42.11 ML (ref 22–52)
ECHO LA VOL BP: 39.39 ML (ref 22–52)
ECHO LA VOL/BSA BIPLANE: 20.97 ML/M2 (ref 16–28)
ECHO LA VOLUME INDEX A2C: 16.56 ML/M2 (ref 16–28)
ECHO LA VOLUME INDEX A4C: 22.42 ML/M2 (ref 16–28)
ECHO LV E' LATERAL VELOCITY: 16.41 CM/S
ECHO LV E' SEPTAL VELOCITY: 14.25 CM/S
ECHO LV INTERNAL DIMENSION DIASTOLIC: 3.86 CM (ref 3.9–5.3)
ECHO LV INTERNAL DIMENSION SYSTOLIC: 2.59 CM
ECHO LV IVSD: 1 CM (ref 0.6–0.9)
ECHO LV MASS 2D: 131.8 G (ref 67–162)
ECHO LV MASS INDEX 2D: 70.2 G/M2 (ref 43–95)
ECHO LV POSTERIOR WALL DIASTOLIC: 0.96 CM (ref 0.6–0.9)
ECHO LVOT PEAK GRADIENT: 6.7 MMHG
ECHO LVOT PEAK VELOCITY: 129.52 CM/S
ECHO MV E VELOCITY: 0.97 CM/S
ECHO MV E/E' LATERAL: 0.06
ECHO MV E/E' RATIO (AVERAGED): 0.06
ECHO MV E/E' SEPTAL: 0.07
ECHO PV MAX VELOCITY: 178.1 CM/S
ECHO PV PEAK GRADIENT: 12.7 MMHG
ECHO RV INTERNAL DIMENSION: 4.72 CM
ECHO RV TAPSE: 1.83 CM (ref 1.5–2)
EOSINOPHIL # BLD: 0 K/UL (ref 0–0.4)
EOSINOPHIL NFR BLD: 0 % (ref 0–7)
ERYTHROCYTE [DISTWIDTH] IN BLOOD BY AUTOMATED COUNT: 13.8 % (ref 11.5–14.5)
GLUCOSE BLD STRIP.AUTO-MCNC: 142 MG/DL (ref 65–100)
GLUCOSE BLD STRIP.AUTO-MCNC: 213 MG/DL (ref 65–100)
GLUCOSE BLD STRIP.AUTO-MCNC: 314 MG/DL (ref 65–100)
GLUCOSE BLD STRIP.AUTO-MCNC: 474 MG/DL (ref 65–100)
GLUCOSE SERPL-MCNC: 149 MG/DL (ref 65–100)
HCT VFR BLD AUTO: 43.3 % (ref 35–47)
HGB BLD-MCNC: 13.8 G/DL (ref 11.5–16)
IMM GRANULOCYTES # BLD AUTO: 0.1 K/UL (ref 0–0.04)
IMM GRANULOCYTES NFR BLD AUTO: 1 % (ref 0–0.5)
LYMPHOCYTES # BLD: 1.3 K/UL (ref 0.8–3.5)
LYMPHOCYTES NFR BLD: 12 % (ref 12–49)
M PNEUMO IGG SER IA-ACNC: 924 U/ML (ref 0–99)
M PNEUMO IGM SER IA-ACNC: <770 U/ML (ref 0–769)
MCH RBC QN AUTO: 32.2 PG (ref 26–34)
MCHC RBC AUTO-ENTMCNC: 31.9 G/DL (ref 30–36.5)
MCV RBC AUTO: 100.9 FL (ref 80–99)
MONOCYTES # BLD: 1 K/UL (ref 0–1)
MONOCYTES NFR BLD: 10 % (ref 5–13)
NEUTS SEG # BLD: 8.6 K/UL (ref 1.8–8)
NEUTS SEG NFR BLD: 78 % (ref 32–75)
NRBC # BLD: 0 K/UL (ref 0–0.01)
NRBC BLD-RTO: 0 PER 100 WBC
PLATELET # BLD AUTO: 150 K/UL (ref 150–400)
PMV BLD AUTO: 10.8 FL (ref 8.9–12.9)
POTASSIUM SERPL-SCNC: 3.9 MMOL/L (ref 3.5–5.1)
RBC # BLD AUTO: 4.29 M/UL (ref 3.8–5.2)
SERVICE CMNT-IMP: ABNORMAL
SODIUM SERPL-SCNC: 137 MMOL/L (ref 136–145)
WBC # BLD AUTO: 11 K/UL (ref 3.6–11)

## 2019-02-13 PROCEDURE — 74011000250 HC RX REV CODE- 250: Performed by: INTERNAL MEDICINE

## 2019-02-13 PROCEDURE — 65660000000 HC RM CCU STEPDOWN

## 2019-02-13 PROCEDURE — 74011636637 HC RX REV CODE- 636/637: Performed by: INTERNAL MEDICINE

## 2019-02-13 PROCEDURE — 74011250637 HC RX REV CODE- 250/637: Performed by: PHYSICIAN ASSISTANT

## 2019-02-13 PROCEDURE — 74011250637 HC RX REV CODE- 250/637: Performed by: INTERNAL MEDICINE

## 2019-02-13 PROCEDURE — 85025 COMPLETE CBC W/AUTO DIFF WBC: CPT

## 2019-02-13 PROCEDURE — 97530 THERAPEUTIC ACTIVITIES: CPT

## 2019-02-13 PROCEDURE — 94640 AIRWAY INHALATION TREATMENT: CPT

## 2019-02-13 PROCEDURE — 97165 OT EVAL LOW COMPLEX 30 MIN: CPT

## 2019-02-13 PROCEDURE — 80048 BASIC METABOLIC PNL TOTAL CA: CPT

## 2019-02-13 PROCEDURE — 93306 TTE W/DOPPLER COMPLETE: CPT

## 2019-02-13 PROCEDURE — 82962 GLUCOSE BLOOD TEST: CPT

## 2019-02-13 PROCEDURE — 36415 COLL VENOUS BLD VENIPUNCTURE: CPT

## 2019-02-13 PROCEDURE — 77010033678 HC OXYGEN DAILY

## 2019-02-13 PROCEDURE — 74011000258 HC RX REV CODE- 258: Performed by: INTERNAL MEDICINE

## 2019-02-13 PROCEDURE — 74011250636 HC RX REV CODE- 250/636: Performed by: PHYSICIAN ASSISTANT

## 2019-02-13 PROCEDURE — 74011250636 HC RX REV CODE- 250/636: Performed by: INTERNAL MEDICINE

## 2019-02-13 RX ORDER — DIGOXIN 0.25 MG/ML
250 INJECTION INTRAMUSCULAR; INTRAVENOUS EVERY 6 HOURS
Status: COMPLETED | OUTPATIENT
Start: 2019-02-13 | End: 2019-02-13

## 2019-02-13 RX ORDER — DIGOXIN 0.25 MG/ML
250 INJECTION INTRAMUSCULAR; INTRAVENOUS EVERY 6 HOURS
Status: DISCONTINUED | OUTPATIENT
Start: 2019-02-13 | End: 2019-02-13

## 2019-02-13 RX ORDER — INSULIN GLARGINE 100 [IU]/ML
20 INJECTION, SOLUTION SUBCUTANEOUS DAILY
Status: DISCONTINUED | OUTPATIENT
Start: 2019-02-14 | End: 2019-02-15

## 2019-02-13 RX ADMIN — INSULIN LISPRO 10 UNITS: 100 INJECTION, SOLUTION INTRAVENOUS; SUBCUTANEOUS at 18:03

## 2019-02-13 RX ADMIN — ATORVASTATIN CALCIUM 10 MG: 20 TABLET, FILM COATED ORAL at 21:32

## 2019-02-13 RX ADMIN — APIXABAN 5 MG: 5 TABLET, FILM COATED ORAL at 18:03

## 2019-02-13 RX ADMIN — DILTIAZEM HYDROCHLORIDE 10 MG/HR: 5 INJECTION, SOLUTION INTRAVENOUS at 23:53

## 2019-02-13 RX ADMIN — LEVOFLOXACIN 750 MG: 5 INJECTION, SOLUTION INTRAVENOUS at 04:57

## 2019-02-13 RX ADMIN — APIXABAN 5 MG: 5 TABLET, FILM COATED ORAL at 12:47

## 2019-02-13 RX ADMIN — INSULIN LISPRO 3 UNITS: 100 INJECTION, SOLUTION INTRAVENOUS; SUBCUTANEOUS at 12:46

## 2019-02-13 RX ADMIN — IPRATROPIUM BROMIDE AND ALBUTEROL SULFATE 3 ML: .5; 3 SOLUTION RESPIRATORY (INHALATION) at 11:23

## 2019-02-13 RX ADMIN — BUDESONIDE 500 MCG: 0.5 INHALANT RESPIRATORY (INHALATION) at 11:22

## 2019-02-13 RX ADMIN — HEPARIN SODIUM 5000 UNITS: 5000 INJECTION INTRAVENOUS; SUBCUTANEOUS at 04:57

## 2019-02-13 RX ADMIN — INSULIN LISPRO 2 UNITS: 100 INJECTION, SOLUTION INTRAVENOUS; SUBCUTANEOUS at 07:27

## 2019-02-13 RX ADMIN — PREDNISONE 49 MG: 5 TABLET ORAL at 09:58

## 2019-02-13 RX ADMIN — Medication 1 CAPSULE: at 09:58

## 2019-02-13 RX ADMIN — INSULIN LISPRO 7 UNITS: 100 INJECTION, SOLUTION INTRAVENOUS; SUBCUTANEOUS at 21:46

## 2019-02-13 RX ADMIN — DILTIAZEM HYDROCHLORIDE 10 MG/HR: 5 INJECTION, SOLUTION INTRAVENOUS at 11:53

## 2019-02-13 RX ADMIN — Medication 10 ML: at 07:27

## 2019-02-13 RX ADMIN — DIGOXIN 250 MCG: 0.25 INJECTION INTRAMUSCULAR; INTRAVENOUS at 18:03

## 2019-02-13 RX ADMIN — DIGOXIN 250 MCG: 0.25 INJECTION INTRAMUSCULAR; INTRAVENOUS at 23:53

## 2019-02-13 RX ADMIN — IPRATROPIUM BROMIDE AND ALBUTEROL SULFATE 3 ML: .5; 3 SOLUTION RESPIRATORY (INHALATION) at 01:41

## 2019-02-13 RX ADMIN — Medication 10 ML: at 21:46

## 2019-02-13 RX ADMIN — IPRATROPIUM BROMIDE AND ALBUTEROL SULFATE 3 ML: .5; 3 SOLUTION RESPIRATORY (INHALATION) at 17:32

## 2019-02-13 RX ADMIN — INSULIN GLARGINE 10 UNITS: 100 INJECTION, SOLUTION SUBCUTANEOUS at 09:58

## 2019-02-13 NOTE — PROGRESS NOTES
Hospitalist Progress Note Remy Azevedo MD 
Answering service: [phone] OR [phone] from in house phone Date of Service:  2019 NAME:  Jo Ann Hayes :  1960 MRN:  [id number] PCP: Dilan Horton MD 
 
Chief Complaint:  
Chief Complaint Patient presents with  Shortness of Breath Admission Summary:  
 
Jo Ann Hayes is a 60 y.o. female who presented with SOB Interval history / Subjective:  
Patient is seen and examined this afternoon. Patient still on 2L oxygen and was not possible to wean her off. She also developed new onset A-flutter/fib last night that required Cardizem drip. Assessment & Plan: # New onset A-flutter: paroxysmal  
- [id number] - 3 
- on Cardizem drip, will titrate - Dig  mcg q6 Hours x 4 doses 
- on Eliquis  
- Echo pending 
- TSH normal 
- Cardiology on board appreciate input # acute Hypoxemic resp failure sec to COPD exacerbation and CAP, bacterial, and Influenza POA 
- O2 support - Bronchodilators RTC and PRN 
- switch to oral steroid and IV levaquin - F/U BC x 2, NGTD 
- Sputum cx pending - pending urine for legionella, strep - Viral respiratory panel positive for Influenza A 
- CTA chest: no PE, shows PNA in RLL and lingula # Diet controlled DM2 with hyperglycemia 
- SSI 
- accu-checks 
- Diabetic diet - HbA1c 7.4; DM is likely not diet controlled any more, may need metformin on DC # HTN 
- S/p IVF bolus 
- Stable 
- monitor 
- Hold home antihypertensives # Elevated BNP 
- 2d echo pending # HLP 
- Cont home meds # Thrombocytopenia 
- Etiology unclear - Cont to monitor labs # active smoker Cessation advised Nicotine patch #  Hypokalemia Replaced Repeat labs in am 
 
 
Code status: Full Code DVT prophylaxis: SCDs Care Plan discussed with: Patient Disposition: home once stable Hospital Problems  Date Reviewed: 2/10/2019 Codes Class Noted POA * (Principal) [facility]ation [facility] ICD-10-CM: J44.1 ICD-9-CM: 491.21  2/10/2019 Yes Review of Systems:  
Pertinent positives mentioned in interval hx. Other systems reviewed and negative. Physical Examination:  
General appearance: alert, no distress, appears stated age on NC on 2L oxygen Head: Normocephalic, without obvious abnormality, atraumatic Throat: normal findings: buccal mucosa normal and palate normal 
Lungs: wheezing has improved significantly No crackles Heart: regular rate and rhythm Abdomen: soft, non-tender. Bowel sounds normal. No masses,  no organomegaly Extremities: extremities normal, atraumatic, no cyanosis or edema Pulses: 2+ and symmetric Neurologic: Grossly normal 
  
 
Vital Signs:  
 Last 24hrs VS reviewed since prior progress note. Most recent are: 
 
Visit Vitals /65 Pulse 90 Temp 98 °F (36.7 °C) Resp 20 Ht 5' 8\" (1.727 m) Wt 74.4 kg (164 lb) SpO2 90% BMI 24.94 kg/m² Intake/Output Summary (Last 24 hours) at 2019 1622 Last data filed at 2019 5737 Gross per 24 hour Intake 506.5 ml Output  Net 506.5 ml Tmax:  Temp (24hrs), Av.9 °F (36.6 °C), Min:97.2 °F (36.2 °C), Max:98.7 °F (37.1 °C) Data Review: Xr Chest Pa Lat Result Date: 2/10/2019 INDICATION:  sob. EXAM: 2 VIEW CHEST RADIOGRAPH. COMPARISON: 10/11/2012. FINDINGS: Frontal and lateral views of the chest show faint patchy parenchymal density in the right middle lobe, although the breast shadows project over these regions bilaterally making this determination difficult. . . The heart, mediastinum and pulmonary vasculature are stable. The bony thorax is unremarkable for age. .. IMPRESSION:  Early or mild right middle lobe infiltrate is suspected. There is no CHF pattern. .  . Cta Chest W Or W Wo Cont Result Date: 2/10/2019 EXAM: CT ANGIOGRAPHY CHEST INDICATION:  [Shortness of breath, decreased O2 saturation, concerning for PE. COMPARISON: 11/7/2012. CONTRAST: 75 mL of Isovue-370. TECHNIQUE: Precontrast  images were obtained to localize the volume for acquisition. Multislice helical CT arteriography was performed from the diaphragm to the thoracic inlet during uneventful rapid bolus intravenous contrast administration. Lung and soft tissue windows were generated. Coronal and sagittal  reformatted images were also generated. 3D post processing consisting of coronal maximum intensity projection images was performed. CT dose reduction was achieved through use of a standardized protocol tailored for this examination and automatic exposure control for dose modulation. FINDINGS: Patchy atelectasis or infiltrate in the lingula and in the right upper lobe medially. Mild emphysematous change. .. The pulmonary arteries are well enhanced and no pulmonary emboli are identified. Main pulmonary outflow tract measures 4.2 cm, compared to a similar measurement of 3.5 cm on the prior study. There is no mediastinal or hilar adenopathy or mass. The aorta enhances normally without evidence of aneurysm or dissection. The visualized portions of the upper abdominal organs are normal.  
 
IMPRESSION: 1. No CT evidence for central pulmonary embolus at this time . 2. Increased caliber of main pulmonary outflow tract, correlate for pulmonary hypertension. 3. Patchy atelectasis or infiltrate in the lingula and in the right upper lobe medially. 4. Mild emphysematous change. No results found for: SDES Lab Results Component Value Date/Time Culture result: NO GROWTH 3 DAYS 02/10/2019 12:54 AM  
 
All Micro Results Procedure Component Value Units Date/Time CULTURE, BLOOD, PAIRED [[id number]] Collected:  02/10/19 0054 Order Status:  Completed Specimen:  Blood Updated:  02/13/19 0505 Special Requests: NO SPECIAL REQUESTS Culture result: NO GROWTH 3 DAYS MYCOPLASMA AB, IGG/IGM [[id number]]  (Abnormal) Collected:  02/10/19 1548 Order Status:  Completed Specimen:  Serum Updated:  02/13/19 0135 M. pneumoniae Ab, IgG 924 U/mL Comment: (NOTE) Negative:           <100 Indeterminate: 100 - 320 Positive:           >320 The reference interval established is intended as a 
baseline only. Values >100 may indicate a recent 
infection with Mycoplasma pneumoniae and need to be 
confirmed either by a positive IgM result and/or an 
additional specimen drawn 2-4 weeks later showing a 
significant increase in antibody levels. M. pneumoniae Ab, IgM <770 U/mL Comment: (NOTE) Negative            <770 Clinically significant amount of M. pneumoniae antibody 
not detected. Low Positive   770 - 950 M. pneumoniae specific IgM presumptively detected. It 
is recommended that another sample be collected 1-2 
weeks later to assure reactivity. Positive            >950 Highly significant amount of M. pneumoniae specific IgM antibody detected. Performed At: [address] [id number] Rafael Marquez MD :[id number] S. PNEUMO AG, UR/CSF [[id number]]  (Abnormal) Collected:  02/10/19 1545 Order Status:  Completed Specimen:  Other from Miscellaneous sample Updated:  02/12/19 1336 Source URINE Specimen Urine Streptococcus pneumoniae Ag Positive Comment: (NOTE) Reported pos strep pneum ur ag to Carmen Melissa at 1:04pm on 
02-12-19. No fax requested. SP Performed At: [address] [id number] Rafael Marquez MD QF:[id number] Fluid culture Not Indicated Organism ID Not indicated. Please note Comment Comment: (NOTE) College of [employer]ologists standards require a culture to be 
performed on CSF specimens submitted for bacterial antigen testing. (CAP [id number]) Urine specimens will not be cultured. Performed At: [address] [id number] Raffi Raman MD XP:[id number] LEGIONELLA PNEUMOPHILA AG, URINE [[id number]] Collected:  02/10/19 1545 Order Status:  Completed Specimen:  Urine Updated:  02/12/19 1137 Source URINE     
  L pneumophila S1 Ag, urine NEGATIVE Comment: (NOTE) Presumptive negative for L. pneumophila serogroup 1 antigen in urine, 
suggesting no recent or current infection. Legionnaires' disease 
cannot be ruled out since other serogroups and species may also 
cause disease. Performed At: [address] [id number] Raffi Raman MD PD:[id number] RESPIRATORY PANEL,PCR,NASOPHARYNGEAL [[id number]]  (Abnormal) Collected:  02/11/19 1739 Order Status:  Completed Specimen:  Nasopharyngeal Updated:  02/12/19 0045 Adenovirus NOT DETECTED Coronavirus 229E NOT DETECTED Coronavirus HKU1 NOT DETECTED Coronavirus CVNL63 NOT DETECTED Coronavirus OC43 NOT DETECTED Metapneumovirus NOT DETECTED Rhinovirus and Enterovirus NOT DETECTED Influenza A NOT DETECTED Influenza A, subtype H1 DETECTED Influenza A, subtype H3 NOT DETECTED INFLUENZA A H1N1 PCR NOT DETECTED Influenza B NOT DETECTED Parainfluenza 1 NOT DETECTED Parainfluenza 2 NOT DETECTED Parainfluenza 3 NOT DETECTED Parainfluenza virus 4 NOT DETECTED     
  RSV by PCR NOT DETECTED Bordetella pertussis - PCR NOT DETECTED Chlamydophila pneumoniae DNA, QL, PCR NOT DETECTED Mycoplasma pneumoniae DNA, QL, PCR NOT DETECTED     
 RESPIRATORY PANEL,PCR,NASOPHARYNGEAL [[id number]] Order Status:  Canceled Specimen:  NASOPHARYNGEAL SWAB URINE CULTURE HOLD SAMPLE [[id number]] Collected:  02/10/19 1545 Order Status:  Completed Specimen:  Urine Updated:  02/10/19 9740 Urine culture hold URINE ON HOLD IN MICROBIOLOGY DEPT FOR 3 DAYS. IF UNPRESERVED URINE IS SUBMITTED, IT CANNOT BE USED FOR ADDITIONAL TESTING AFTER 24 HRS, RECOLLECTION WILL BE REQUIRED. RESPIRATORY PANEL,PCR,NASOPHARYNGEAL [[id number]] Order Status:  Canceled Specimen:  NASOPHARYNGEAL SWAB INFLUENZA A & B AG (RAPID TEST) [[id number]] Collected:  02/10/19 0141 Order Status:  Completed Specimen:  Nasopharyngeal from Nasal washing Updated:  02/10/19 0202 Influenza A Antigen NEGATIVE Influenza B Antigen NEGATIVE CULTURE, BLOOD, PAIRED [[id number]] Order Status:  Canceled Specimen:  Blood Labs:  
 
Recent Labs  
  02/13/19 
0523 02/12/19 
0543 WBC 11.0 13.3* HGB 13.8 13.4 HCT 43.3 42.3  155 Recent Labs  
  02/13/19 
0523 02/12/19 
0543 02/11/19 
0503  135* 135* K 3.9 4.9 4.2  106 106 CO2 23 23 20* BUN 25* 22* 16  
CREA 0.87 0.91 0.81 * 283* 281* CA 9.0 9.0 8.6 Recent Labs  
  02/11/19 
0503 SGOT 11* ALT 14  
AP 73 TBILI 0.2 TP 6.8 ALB 2.9*  
GLOB 3.9 No results for input(s): INR, PTP, APTT in the last 72 hours. No lab exists for component: INREXT, INREXT No results for input(s): FE, TIBC, PSAT, FERR in the last 72 hours. No results found for: FOL, RBCF No results for input(s): PH, PCO2, PO2 in the last 72 hours. No results for input(s): CPK, CKNDX, TROIQ in the last 72 hours. No lab exists for component: CPKMB Lab Results Component Value Date/Time Cholesterol, total 125 02/11/2019 05:03 AM  
 HDL Cholesterol 74 02/11/2019 05:03 AM  
 LDL, calculated 37.4 02/11/2019 05:03 AM  
 Triglyceride 68 02/11/2019 05:03 AM  
 CHOL/HDL Ratio 1.7 02/11/2019 05:03 AM  
 
Lab Results Component Value Date/Time  Glucose (POC) 213 (H) 02/13/2019 11:59 AM  
 Glucose (POC) 142 (H) 02/13/2019 07:20 AM  
 Glucose (POC) 188 (H) 02/12/2019 09:14 PM  
 Glucose (POC) 368 (H) 02/12/2019 04:47 PM  
 Glucose (POC) 394 (H) 02/12/2019 12:32 PM  
 
Lab Results Component Value Date/Time Color Yellow 05/31/2018 08:22 AM  
 Appearance Clear 05/31/2018 08:22 AM  
 Specific gravity 1.020 04/30/2009 11:50 AM  
 pH (UA) 7.0 05/31/2018 08:22 AM  
 Protein NEGATIVE  04/30/2009 11:50 AM  
 Glucose NEGATIVE  04/30/2009 11:50 AM  
 Ketone Negative 05/31/2018 08:22 AM  
 Bilirubin Negative 05/31/2018 08:22 AM  
 Urobilinogen 0.2 04/30/2009 11:50 AM  
 Nitrites Positive (A) 05/31/2018 08:22 AM  
 Leukocyte Esterase Negative 05/31/2018 08:22 AM  
 Bacteria Few 05/31/2018 08:22 AM  
 WBC 0-5 05/31/2018 08:22 AM  
 RBC 0-2 05/31/2018 08:22 AM  
 
 
 
Medications Reviewed:  
 
Current Facility-Administered Medications Medication Dose Route Frequency  digoxin (LANOXIN) injection 250 mcg  250 mcg IntraVENous Q6H  
 apixaban (ELIQUIS) tablet 5 mg  5 mg Oral BID  predniSONE (DELTASONE) tablet 49 mg  49 mg Oral DAILY WITH BREAKFAST  insulin glargine (LANTUS) injection 10 Units  10 Units SubCUTAneous DAILY  dilTIAZem (CARDIZEM) injection 5 mg  5 mg IntraVENous Q6H PRN  
 dilTIAZem (CARDIZEM) 125 mg in dextrose 5% 125 mL infusion  10 mg/hr IntraVENous CONTINUOUS  
 albuterol-ipratropium (DUO-NEB) 2.5 MG-0.5 MG/3 ML  3 mL Nebulization Q6H RT  
 acetaminophen (TYLENOL) tablet 650 mg  650 mg Oral Q4H PRN  
 albuterol-ipratropium (DUO-NEB) 2.5 MG-0.5 MG/3 ML  3 mL Nebulization Q4H PRN  
 atorvastatin (LIPITOR) tablet 10 mg  10 mg Oral QHS  sodium chloride (NS) flush 5-40 mL  5-40 mL IntraVENous Q8H  
 sodium chloride (NS) flush 5-40 mL  5-40 mL IntraVENous PRN  
 ondansetron (ZOFRAN) injection 4 mg  4 mg IntraVENous Q4H PRN  
 bisacodyl (DULCOLAX) tablet 5 mg  5 mg Oral DAILY PRN  
 lactobac ac& pc-s.therm-b.anim (CORRIE Q/RISAQUAD)  1 Cap Oral DAILY  levoFLOXacin (LEVAQUIN) 750 mg in D5W IVPB  750 mg IntraVENous Q24H  
 insulin lispro (HUMALOG) injection   SubCUTAneous AC&HS  
 glucose chewable tablet 16 g  4 Tab Oral PRN  
 dextrose (D50W) injection syrg 12.5-25 g  12.5-25 g IntraVENous PRN  
 glucagon (GLUCAGEN) injection 1 mg  1 mg IntraMUSCular PRN  
 nicotine (NICODERM CQ) 21 mg/24 hr patch 1 Patch  1 Patch TransDERmal Q24H  
 arformoterol (BROVANA) neb solution 15 mcg  15 mcg Nebulization BID RT And  
 budesonide (PULMICORT) 500 mcg/2 ml nebulizer suspension  500 mcg Nebulization BID RT  
 
______________________________________________________________________ EXPECTED LENGTH OF STAY: 4d 4h 
ACTUAL LENGTH OF STAY:          3 Gus Manriquez MD

## 2019-02-13 NOTE — PROGRESS NOTES
1832: hospitalist notified of patients rhythm change. EKG done. Patient in rapid aflutter. Dr. Chyna Dietz stated she wanted to come down and look at EKG. 1930: MD has not come to floor to assess patient or look at EKG. Patient asymptomatic. Continue to monitor.    
 
2000:
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
## 2019-02-13 NOTE — PROGRESS NOTES
Patient in Aflutter 150s, gave 5mg Cardizem with day shift nurse Ping Jackman, she stated she spoke with Dr. Ila Styles. 2028: Patient still in [id number] [facility] down to 120-130s, patient in bed, she seems a little anxious worried about new rhythm. I followed up with Dr. Ila Styles, stated she will come to assess patient and look at EKG that was already done at [address] [place] [address]. Will ctm patient. 2035: Dr. Ila Styles at bedside, awaiting further orders.

## 2019-02-13 NOTE — DIABETES MGMT
DTC Progress Note Recommendations/ Comments: Chart reviewed for hyperglycemia, likely due to steroids. Currently on Prednisone 49mg at breakfast. If appropriate, please consider increasing Lantus to 15 units while pt is on steroids. Current hospital DM medication: Humalog for correction, normal sensitivity scale and Lantus 10 units Chart reviewed on Maryuri Valencia. Patient is a 60 y.o. female with hx Type 2 Diabetes diet controlled at home. A1c:  
Lab Results Component Value Date/Time Hemoglobin A1c 7.4 (H) 02/11/2019 05:03 AM  
 Hemoglobin A1c 7.0 (H) 10/16/2018 09:07 AM  
 
 
Recent Glucose Results:  
Lab Results Component Value Date/Time  (H) 02/13/2019 05:23 AM  
 GLUCPOC 142 (H) 02/13/2019 07:20 AM  
 GLUCPOC 188 (H) 02/12/2019 09:14 PM  
 GLUCPOC 368 (H) 02/12/2019 04:47 PM  
  
 
Lab Results Component Value Date/Time Creatinine 0.87 02/13/2019 05:23 AM  
 
Estimated Creatinine Clearance: 76.4 mL/min (based on SCr of 0.87 mg/dL). Active Orders Diet DIET DIABETIC CONSISTENT CARB Regular; 2 GM NA (House Low NA) PO intake:  
Patient Vitals for the past 72 hrs: 
 % Diet Eaten 02/12/19 1914 100 % 02/10/19 1601 90 % 02/10/19 1239 80 % Will continue to follow as needed. Thank you Dg Miller RD, CDE Time spent: 4 min

## 2019-02-13 NOTE — PROGRESS NOTES
Patient resting in bed, appears comfortable, Cardizem at 10 ml/hr 
  
Bedside and Verbal shift change report given to Rose (oncoming nurse) by Chasity Gutierrez (offgoing nurse). Report included the following information SBAR, Kardex, MAR, Med Rec Status and Cardiac Rhythm Aflutter

## 2019-02-13 NOTE — PROGRESS NOTES
Problem: Falls - Risk of 
Goal: *Absence of Falls Document Kenia Negron Fall Risk and appropriate interventions in the flowsheet. Outcome: Progressing Towards Goal 
Fall Risk Interventions: 
  
 
  
 
Medication Interventions: Teach patient to arise slowly Elimination Interventions: Call light in reach

## 2019-02-13 NOTE — PROGRESS NOTES
Patient resting in bed, appears comfortable, Cardizem gtt infusing current HR . Up @ kamron, but due to IV pole using commode now, cont pulse ox, 2LO2. Will ctm Bedside and Verbal shift change report given to [address] [place] [address] (oncoming nurse) by Toni Montero (offgoing nurse). Report included the following information SBAR, Kardex, MAR, Med Rec Status and Cardiac Rhythm Aflutter.

## 2019-02-13 NOTE — PROGRESS NOTES
Called to evaluate patient for elevated Heart rate of 130s -150s/min. On arrival at bedside, patient in no acute distress. Denied any chest pains or worsening SOB. 12-lead EKG reviewed with findings of Atrial flutter at 160/min. Initiated Cardizem drip, ordered 2D ECHO and Cardiology consult. D/W with patient and RN.
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
## 2019-02-14 LAB
ATRIAL RATE: 304 BPM
CALCULATED P AXIS, ECG09: 69 DEGREES
CALCULATED R AXIS, ECG10: 67 DEGREES
CALCULATED T AXIS, ECG11: 59 DEGREES
DIAGNOSIS, 93000: NORMAL
GLUCOSE BLD STRIP.AUTO-MCNC: 122 MG/DL (ref 65–100)
GLUCOSE BLD STRIP.AUTO-MCNC: 181 MG/DL (ref 65–100)
GLUCOSE BLD STRIP.AUTO-MCNC: 333 MG/DL (ref 65–100)
GLUCOSE BLD STRIP.AUTO-MCNC: 354 MG/DL (ref 65–100)
GLUCOSE BLD STRIP.AUTO-MCNC: 376 MG/DL (ref 65–100)
Q-T INTERVAL, ECG07: 422 MS
QRS DURATION, ECG06: 160 MS
QTC CALCULATION (BEZET), ECG08: 474 MS
SERVICE CMNT-IMP: ABNORMAL
VENTRICULAR RATE, ECG03: 76 BPM

## 2019-02-14 PROCEDURE — 74011636637 HC RX REV CODE- 636/637: Performed by: INTERNAL MEDICINE

## 2019-02-14 PROCEDURE — 74011250637 HC RX REV CODE- 250/637: Performed by: PHYSICIAN ASSISTANT

## 2019-02-14 PROCEDURE — 77010033678 HC OXYGEN DAILY

## 2019-02-14 PROCEDURE — 65660000000 HC RM CCU STEPDOWN

## 2019-02-14 PROCEDURE — 74011250637 HC RX REV CODE- 250/637: Performed by: INTERNAL MEDICINE

## 2019-02-14 PROCEDURE — 74011000250 HC RX REV CODE- 250: Performed by: INTERNAL MEDICINE

## 2019-02-14 PROCEDURE — 94640 AIRWAY INHALATION TREATMENT: CPT

## 2019-02-14 PROCEDURE — 74011250636 HC RX REV CODE- 250/636: Performed by: INTERNAL MEDICINE

## 2019-02-14 PROCEDURE — 82962 GLUCOSE BLOOD TEST: CPT

## 2019-02-14 PROCEDURE — 94760 N-INVAS EAR/PLS OXIMETRY 1: CPT

## 2019-02-14 RX ORDER — METOPROLOL SUCCINATE 25 MG/1
25 TABLET, EXTENDED RELEASE ORAL DAILY
Status: DISCONTINUED | OUTPATIENT
Start: 2019-02-14 | End: 2019-02-15 | Stop reason: HOSPADM

## 2019-02-14 RX ORDER — PREDNISONE 20 MG/1
40 TABLET ORAL
Status: DISCONTINUED | OUTPATIENT
Start: 2019-02-15 | End: 2019-02-15 | Stop reason: HOSPADM

## 2019-02-14 RX ORDER — LEVOFLOXACIN 750 MG/1
750 TABLET ORAL EVERY 24 HOURS
Status: DISCONTINUED | OUTPATIENT
Start: 2019-02-14 | End: 2019-02-15 | Stop reason: HOSPADM

## 2019-02-14 RX ORDER — DILTIAZEM HYDROCHLORIDE 180 MG/1
180 CAPSULE, COATED, EXTENDED RELEASE ORAL DAILY
Status: DISCONTINUED | OUTPATIENT
Start: 2019-02-14 | End: 2019-02-15 | Stop reason: HOSPADM

## 2019-02-14 RX ORDER — IPRATROPIUM BROMIDE AND ALBUTEROL SULFATE 2.5; .5 MG/3ML; MG/3ML
3 SOLUTION RESPIRATORY (INHALATION)
Status: DISCONTINUED | OUTPATIENT
Start: 2019-02-14 | End: 2019-02-15 | Stop reason: HOSPADM

## 2019-02-14 RX ADMIN — INSULIN LISPRO 4 UNITS: 100 INJECTION, SOLUTION INTRAVENOUS; SUBCUTANEOUS at 23:03

## 2019-02-14 RX ADMIN — INSULIN LISPRO 2 UNITS: 100 INJECTION, SOLUTION INTRAVENOUS; SUBCUTANEOUS at 11:36

## 2019-02-14 RX ADMIN — BUDESONIDE 500 MCG: 0.5 INHALANT RESPIRATORY (INHALATION) at 08:28

## 2019-02-14 RX ADMIN — APIXABAN 5 MG: 5 TABLET, FILM COATED ORAL at 09:00

## 2019-02-14 RX ADMIN — INSULIN LISPRO 10 UNITS: 100 INJECTION, SOLUTION INTRAVENOUS; SUBCUTANEOUS at 17:20

## 2019-02-14 RX ADMIN — IPRATROPIUM BROMIDE AND ALBUTEROL SULFATE 3 ML: .5; 3 SOLUTION RESPIRATORY (INHALATION) at 08:28

## 2019-02-14 RX ADMIN — Medication 1 CAPSULE: at 09:00

## 2019-02-14 RX ADMIN — INSULIN GLARGINE 20 UNITS: 100 INJECTION, SOLUTION SUBCUTANEOUS at 09:01

## 2019-02-14 RX ADMIN — Medication 10 ML: at 22:00

## 2019-02-14 RX ADMIN — PREDNISONE 49 MG: 5 TABLET ORAL at 11:31

## 2019-02-14 RX ADMIN — ATORVASTATIN CALCIUM 10 MG: 20 TABLET, FILM COATED ORAL at 23:03

## 2019-02-14 RX ADMIN — BUDESONIDE 500 MCG: 0.5 INHALANT RESPIRATORY (INHALATION) at 20:27

## 2019-02-14 RX ADMIN — APIXABAN 5 MG: 5 TABLET, FILM COATED ORAL at 17:20

## 2019-02-14 RX ADMIN — METOPROLOL SUCCINATE 25 MG: 25 TABLET, EXTENDED RELEASE ORAL at 12:41

## 2019-02-14 RX ADMIN — DILTIAZEM HYDROCHLORIDE 180 MG: 180 CAPSULE, EXTENDED RELEASE ORAL at 09:00

## 2019-02-14 RX ADMIN — LEVOFLOXACIN 750 MG: 750 TABLET, FILM COATED ORAL at 09:00

## 2019-02-14 RX ADMIN — LEVOFLOXACIN 750 MG: 5 INJECTION, SOLUTION INTRAVENOUS at 04:52

## 2019-02-14 RX ADMIN — IPRATROPIUM BROMIDE AND ALBUTEROL SULFATE 3 ML: .5; 3 SOLUTION RESPIRATORY (INHALATION) at 20:26

## 2019-02-14 NOTE — PROGRESS NOTES
Patient resting in bed, up@ kamron, Cardizem gtt infusing at 10cc/hr, Iv Digoxin, still in aflutter controlled rate 70-90s. 2LO2 cont pulse ox. Blood sugar 314, 7units of insulin given. Patient has no c/o pain. Will ctm

## 2019-02-14 NOTE — PROGRESS NOTES
Cardiology Progress Note 2/14/2019 8:41 AM 
 
Admit Date: 2/10/2019 Admit Diagnosis: COPD with acute exacerbation ([facility] [J44.1] Subjective:  
 
Martha Standing feeling better. Less short of breath. Intermittent atrial flutter on telemetry; HR controlled in 80-90s bpm range. Visit Vitals /79 (BP 1 Location: Left arm, BP Patient Position: At rest) Pulse 79 Temp 98 °F (36.7 °C) Resp 16 Ht 5' 8\" (1.727 m) Wt 165 lb 3.2 oz (74.9 kg) SpO2 95% BMI 25.12 kg/m² Current Facility-Administered Medications Medication Dose Route Frequency  levoFLOXacin (LEVAQUIN) tablet 750 mg  750 mg Oral Q24H  
 albuterol-ipratropium (DUO-NEB) 2.5 MG-0.5 MG/3 ML  3 mL Nebulization BID RT  
 dilTIAZem CD (CARDIZEM CD) capsule 180 mg  180 mg Oral DAILY  apixaban (ELIQUIS) tablet 5 mg  5 mg Oral BID  insulin glargine (LANTUS) injection 20 Units  20 Units SubCUTAneous DAILY  predniSONE (DELTASONE) tablet 49 mg  49 mg Oral DAILY WITH BREAKFAST  dilTIAZem (CARDIZEM) injection 5 mg  5 mg IntraVENous Q6H PRN  
 acetaminophen (TYLENOL) tablet 650 mg  650 mg Oral Q4H PRN  
 albuterol-ipratropium (DUO-NEB) 2.5 MG-0.5 MG/3 ML  3 mL Nebulization Q4H PRN  
 atorvastatin (LIPITOR) tablet 10 mg  10 mg Oral QHS  sodium chloride (NS) flush 5-40 mL  5-40 mL IntraVENous Q8H  
 sodium chloride (NS) flush 5-40 mL  5-40 mL IntraVENous PRN  
 ondansetron (ZOFRAN) injection 4 mg  4 mg IntraVENous Q4H PRN  
 bisacodyl (DULCOLAX) tablet 5 mg  5 mg Oral DAILY PRN  
 lactobac ac& pc-s.therm-b.anim (CORRIE Q/RISAQUAD)  1 Cap Oral DAILY  insulin lispro (HUMALOG) injection   SubCUTAneous AC&HS  
 glucose chewable tablet 16 g  4 Tab Oral PRN  
 dextrose (D50W) injection syrg 12.5-25 g  12.5-25 g IntraVENous PRN  
 glucagon (GLUCAGEN) injection 1 mg  1 mg IntraMUSCular PRN  
 nicotine (NICODERM CQ) 21 mg/24 hr patch 1 Patch  1 Patch TransDERmal Q24H  arformoterol (BROVANA) neb solution 15 mcg  15 mcg Nebulization BID RT And  
 budesonide (PULMICORT) 500 mcg/2 ml nebulizer suspension  500 mcg Nebulization BID RT  
   
 
Objective:  
  
Physical Exam: 
Visit Vitals /79 (BP 1 Location: Left arm, BP Patient Position: At rest) Pulse 79 Temp 98 °F (36.7 °C) Resp 16 Ht 5' 8\" (1.727 m) Wt 165 lb 3.2 oz (74.9 kg) SpO2 95% BMI 25.12 kg/m² General Appearance:  Alert, appropriate, no acute distress. Ears/Nose/Mouth/Throat:   Hearing grossly normal. 
  
    Neck: Supple. No JVD Chest:   Lungs clear to auscultation bilaterally. Cardiovascular:  Irregularly regular, S1, S2 normal, no murmur. Abdomen:   Soft, non-tender, bowel sounds are active. Extremities: No edema bilaterally. Skin: Warm and dry. Data Review:  
Labs:   
Recent Results (from the past 24 hour(s)) GLUCOSE, POC Collection Time: 02/13/19 11:59 AM  
Result Value Ref Range Glucose (POC) 213 (H) 65 - 100 mg/dL Performed by Deisi Foley ECHO ADULT COMPLETE Collection Time: 02/13/19 12:53 PM  
Result Value Ref Range LA Volume 39.39 22 - 52 mL  
 LV E' Lateral Velocity 16.41 cm/s LV E' Septal Velocity 14.25 cm/s Tapse 1.83 1.5 - 2.0 cm Ao Root D 3.12 cm  
 LA Vol Index 20.97 16 - 28 ml/m2 Aortic Valve Systolic Peak Velocity 316.30 cm/s AoV PG 8.4 mmHg LVIDd 3.86 (A) 3.9 - 5.3 cm  
 LVPWd 0.96 (A) 0.6 - 0.9 cm LVIDs 2.59 cm IVSd 1.00 (A) 0.6 - 0.9 cm  
 LVOT Peak Velocity 129.52 cm/s LVOT Peak Gradient 6.7 mmHg MV E Dexter 0.97 cm/s Left Atrium to Aortic Root Ratio 1.35   
 RVIDd 4.72 cm  
 LA Vol 4C 42.11 22 - 52 mL  
 LA Vol 2C 31.11 22 - 52 mL  
 LA Area 4C 16.5 cm2 LV Mass .8 67 - 162 g  
 LV Mass AL Index 70.2 43 - 95 g/m2 E/E' lateral 0.06   
 E/E' septal 0.07   
 E/E' ratio (averaged) 0.06 Left Atrium Major Axis 4.19 cm Pulmonic Valve Max Velocity 178.10 cm/s LA Vol Index 16.56 16 - 28 ml/m2 LA Vol Index 22.42 16 - 28 ml/m2 AV Cusp 1.93 cm  
 PV peak gradient 12.7 mmHg GLUCOSE, POC Collection Time: 02/13/19  5:26 PM  
Result Value Ref Range Glucose (POC) 474 (H) 65 - 100 mg/dL Performed by Carmelita Garcia GLUCOSE, POC Collection Time: 02/13/19  9:40 PM  
Result Value Ref Range Glucose (POC) 314 (H) 65 - 100 mg/dL Performed by Brandon Lopez GLUCOSE, POC Collection Time: 02/14/19  6:58 AM  
Result Value Ref Range Glucose (POC) 122 (H) 65 - 100 mg/dL Performed by Brandon Lopez Telemetry: normal sinus rhythm intermittent flutter Assessment:  
 
Principal Problem: COPD with acute exacerbation ([facility] (2/10/2019) Plan: 1. Paroxysmal atrial flutter. HR controlled on dilt gtt; to po today. Will add toprol. If HR ok later today on po, ok cardiac wise for d/c.  Eliquis for [address] [place] [address]. Echo with preserved LV function. Some dilation of RV likely due to COPD/cor pulmonale. No additional cardiac evaluation indicated at this time. 2. COPD with exacerbation - per Primary team 
3. HTN 4. DM 
            - SSI. DTC making recs 5. HLD 6. Tobacco abuse 
            - continues to smoke 2 ppd. Discussed the need to quit

## 2019-02-14 NOTE — PROGRESS NOTES
Hospitalist Progress Note Haseeb Tamayo MD 
Answering service: [phone] OR [phone] from in house phone Date of Service:  2019 NAME:  Nathanael Jorge :  1960 MRN:  [id number] PCP: Summer Cunningham MD 
 
Chief Complaint:  
Chief Complaint Patient presents with  Shortness of Breath Admission Summary:  
 
Nathanael Jorge is a 60 y.o. female who presented with SOB Interval history / Subjective:  
Patient is seen and examined this morning. She is off the drip and now on diltiazem and metoprolol. Her rate is fairly controlled with occasional above 100. Patient will require home oxygen. Order placed. Patient was very emotional and crying during my visit today. She stated is more stressed in the hospital and wants to go. She also cried about losing her job in 2-4 weeks time. She was already being told about that. Alternatives were discussed. Assessment & Plan: # New onset A-flutter: paroxysmal  
- [id number] - 3 
- off Cardizem drip  
- received Dig  mcg q6 Hours x 4 doses 
- on Eliquis, oral dilt and metoprolol - Echo EF 55-60% with Right ventricular cavity size is moderately dilated. Right ventricular global systolic function which is mildly reduced. - TSH normal 
- Cardiology on board appreciate input # acute Hypoxemic resp failure sec to COPD exacerbation and CAP due to Strept pneumoniae, and Influenza (POA) 
- O2 support - Bronchodilators RTC and PRN 
- switch to oral steroid and oral levaquin - F/U BC x 2, NGTD 
- pending urine for  +ve for Strept pneumoniae Ag - Viral respiratory panel positive for Influenza A 
- CTA chest: no PE, shows PNA in RLL and lingula # Diet controlled DM2 with hyperglycemia 
- SSI 
- accu-checks 
- Diabetic diet - HbA1c 7.4; DM is likely not diet controlled any more, may need metformin on DC # HTN 
- S/p IVF bolus 
- Stable 
- monitor 
- Hold home antihypertensives # Elevated BNP 
 - 2d echo as above, no sign of heart failure # HLP 
- Cont home meds # Thrombocytopenia 
- Etiology unclear - Cont to monitor labs # active smoker - Cessation advised 
- Nicotine patch #  Hypokalemia - Replaced - Repeat labs in am 
 
 
Code status: Full Code DVT prophylaxis: SCDs Care Plan discussed with: Patient Disposition: home once stable Hospital Problems  Date Reviewed: 2/10/2019 Codes Class Noted POA * (Principal) COPD with acute exacerbation ([facility] [address] ICD-10-CM: J44.1 ICD-9-CM: 491.21  2/10/2019 Yes Review of Systems:  
Pertinent positives mentioned in interval hx. Other systems reviewed and negative. Physical Examination:  
General appearance: alert, no distress, appears stated age on NC on 2L oxygen Head: Normocephalic, without obvious abnormality, atraumatic Throat: normal findings: buccal mucosa normal and palate normal 
Lungs: wheezing has improved significantly No crackles Heart: regular rate and rhythm Abdomen: soft, non-tender. Bowel sounds normal. No masses,  no organomegaly Extremities: extremities normal, atraumatic, no cyanosis or edema Pulses: 2+ and symmetric Neurologic: Grossly normal 
  
 
Vital Signs:  
 Last 24hrs VS reviewed since prior progress note. Most recent are: 
 
Visit Vitals BP 93/63 (BP 1 Location: Left arm, BP Patient Position: At rest) Pulse (!) 139 Temp 97.9 °F (36.6 °C) Resp 18 Ht 5' 8\" (1.727 m) Wt 74.9 kg (165 lb 3.2 oz) SpO2 95% BMI 25.12 kg/m² Intake/Output Summary (Last 24 hours) at 2019 1531 Last data filed at 2019 0288 Gross per 24 hour Intake 242.16 ml Output 2100 ml Net -1857.84 ml Tmax:  Temp (24hrs), Av.3 °F (36.8 °C), Min:97.9 °F (36.6 °C), Max:98.8 °F (37.1 °C) Data Review: Xr Chest Pa Lat Result Date: 2/10/2019 INDICATION:  sob. EXAM: 2 VIEW CHEST RADIOGRAPH. COMPARISON: 10/11/2012. FINDINGS: Frontal and lateral views of the chest show faint patchy parenchymal density in the right middle lobe, although the breast shadows project over these regions bilaterally making this determination difficult. . . The heart, mediastinum and pulmonary vasculature are stable. The bony thorax is unremarkable for age. .. IMPRESSION:  Early or mild right middle lobe infiltrate is suspected. There is no CHF pattern. .  . Cta Chest W Or W Wo Cont Result Date: 2/10/2019 EXAM: CT ANGIOGRAPHY CHEST INDICATION:  [Shortness of breath, decreased O2 saturation, concerning for PE. COMPARISON: 11/7/2012. CONTRAST: 75 mL of Isovue-370. TECHNIQUE: Precontrast  images were obtained to localize the volume for acquisition. Multislice helical CT arteriography was performed from the diaphragm to the thoracic inlet during uneventful rapid bolus intravenous contrast administration. Lung and soft tissue windows were generated. Coronal and sagittal  reformatted images were also generated. 3D post processing consisting of coronal maximum intensity projection images was performed. CT dose reduction was achieved through use of a standardized protocol tailored for this examination and automatic exposure control for dose modulation. FINDINGS: Patchy atelectasis or infiltrate in the lingula and in the right upper lobe medially. Mild emphysematous change. .. The pulmonary arteries are well enhanced and no pulmonary emboli are identified. Main pulmonary outflow tract measures 4.2 cm, compared to a similar measurement of 3.5 cm on the prior study. There is no mediastinal or hilar adenopathy or mass. The aorta enhances normally without evidence of aneurysm or dissection. The visualized portions of the upper abdominal organs are normal.  
 
IMPRESSION: 1. No CT evidence for central pulmonary embolus at this time . 2.  Increased caliber of main pulmonary outflow tract, correlate for pulmonary hypertension. 3. Patchy atelectasis or infiltrate in the lingula and in the right upper lobe medially. 4. Mild emphysematous change. No results found for: SDES Lab Results Component Value Date/Time Culture result: NO GROWTH 4 DAYS 02/10/2019 12:54 AM  
 
All Micro Results Procedure Component Value Units Date/Time CULTURE, BLOOD, PAIRED [[id number]] Collected:  02/10/19 0054 Order Status:  Completed Specimen:  Blood Updated:  02/14/19 4505 Special Requests: NO SPECIAL REQUESTS Culture result: NO GROWTH 4 DAYS     
 MYCOPLASMA AB, IGG/IGM [[id number]]  (Abnormal) Collected:  02/10/19 1548 Order Status:  Completed Specimen:  Serum Updated:  02/13/19 0135 M. pneumoniae Ab, IgG 924 U/mL Comment: (NOTE) Negative:           <100 Indeterminate: 100 - 320 Positive:           >320 The reference interval established is intended as a 
baseline only. Values >100 may indicate a recent 
infection with Mycoplasma pneumoniae and need to be 
confirmed either by a positive IgM result and/or an 
additional specimen drawn 2-4 weeks later showing a 
significant increase in antibody levels. M. pneumoniae Ab, IgM <770 U/mL Comment: (NOTE) Negative            <770 Clinically significant amount of M. pneumoniae antibody 
not detected. Low Positive   770 - 950 M. pneumoniae specific IgM presumptively detected. It 
is recommended that another sample be collected 1-2 
weeks later to assure reactivity. Positive            >950 Highly significant amount of M. pneumoniae specific IgM antibody detected. Performed At: [address] [id number] Ronald Antonio MD UA:[id number] S. PNEUMO AG, UR/CSF [[id number]]  (Abnormal) Collected:  02/10/19 1549 Order Status:  Completed Specimen:  Other from Miscellaneous sample Updated:  02/12/19 1336 Source URINE Specimen Urine Streptococcus pneumoniae Ag Positive Comment: (NOTE) Reported pos strep pneum ur ag to Jorge Gutierrez at 1:04pm on 
02-12-19. No fax requested. SP Performed At: [address] [id number] Clary Boeck MD [id number] Fluid culture Not Indicated Organism ID Not indicated. Please note Comment Comment: (NOTE) College [employer] standards require a culture to be 
performed on CSF specimens submitted for bacterial antigen testing. (CAP [id number]) Urine specimens will not be cultured. Performed At: [address] [id number] Clary Boeck MD [id number] LEGIONELLA PNEUMOPHILA AG, URINE [[id number]] Collected:  02/10/19 1545 Order Status:  Completed Specimen:  Urine Updated:  02/12/19 1137 Source URINE     
  L pneumophila S1 Ag, urine NEGATIVE Comment: (NOTE) Presumptive negative for L. pneumophila serogroup 1 antigen in urine, 
suggesting no recent or current infection. Legionnaires' disease 
cannot be ruled out since other serogroups and species may also 
cause disease. Performed At: [address] [id number] Clary Boeck MD ZK:[id number] RESPIRATORY PANEL,PCR,NASOPHARYNGEAL [[id number]]  (Abnormal) Collected:  02/11/19 1739 Order Status:  Completed Specimen:  Nasopharyngeal Updated:  02/12/19 0045 Adenovirus NOT DETECTED Coronavirus 229E NOT DETECTED Coronavirus HKU1 NOT DETECTED Coronavirus CVNL63 NOT DETECTED Coronavirus OC43 NOT DETECTED Metapneumovirus NOT DETECTED Rhinovirus and Enterovirus NOT DETECTED Influenza A NOT DETECTED Influenza A, subtype H1 DETECTED   Influenza A, subtype H3 NOT DETECTED     
 INFLUENZA A H1N1 PCR NOT DETECTED Influenza B NOT DETECTED Parainfluenza 1 NOT DETECTED Parainfluenza 2 NOT DETECTED Parainfluenza 3 NOT DETECTED Parainfluenza virus 4 NOT DETECTED     
  RSV by PCR NOT DETECTED Bordetella pertussis - PCR NOT DETECTED Chlamydophila pneumoniae DNA, QL, PCR NOT DETECTED Mycoplasma pneumoniae DNA, QL, PCR NOT DETECTED     
 RESPIRATORY PANEL,PCR,NASOPHARYNGEAL [[id number]] Order Status:  Canceled Specimen:  NASOPHARYNGEAL SWAB URINE CULTURE HOLD SAMPLE [[id number]] Collected:  02/10/19 1545 Order Status:  Completed Specimen:  Urine Updated:  02/10/19 5147 Urine culture hold URINE ON HOLD IN MICROBIOLOGY DEPT FOR 3 DAYS. IF UNPRESERVED URINE IS SUBMITTED, IT CANNOT BE USED FOR ADDITIONAL TESTING AFTER 24 HRS, RECOLLECTION WILL BE REQUIRED. RESPIRATORY PANEL,PCR,NASOPHARYNGEAL [[id number]] Order Status:  Canceled Specimen:  NASOPHARYNGEAL SWAB INFLUENZA A & B AG (RAPID TEST) [[id number]] Collected:  02/10/19 0141 Order Status:  Completed Specimen:  Nasopharyngeal from Nasal washing Updated:  02/10/19 0202 Influenza A Antigen NEGATIVE Influenza B Antigen NEGATIVE CULTURE, BLOOD, PAIRED [[id number]] Order Status:  Canceled Specimen:  Blood Labs:  
 
Recent Labs  
  02/13/19 
0523 02/12/19 
0543 WBC 11.0 13.3* HGB 13.8 13.4 HCT 43.3 42.3  155 Recent Labs  
  02/13/19 
0523 02/12/19 
0543  135* K 3.9 4.9  
 106 CO2 23 23 BUN 25* 22* CREA 0.87 0.91  
* 283* CA 9.0 9.0 No results for input(s): SGOT, GPT, ALT, AP, TBIL, TBILI, TP, ALB, GLOB, GGT, AML, LPSE in the last 72 hours. No lab exists for component: AMYP, HLPSE No results for input(s): INR, PTP, APTT in the last 72 hours. No lab exists for component: INREXT, INREXT No results for input(s): FE, TIBC, PSAT, FERR in the last 72 hours. No results found for: FOL, RBCF No results for input(s): PH, PCO2, PO2 in the last 72 hours. No results for input(s): CPK, CKNDX, TROIQ in the last 72 hours. No lab exists for component: CPKMB Lab Results Component Value Date/Time Cholesterol, total 125 02/11/2019 05:03 AM  
 HDL Cholesterol 74 02/11/2019 05:03 AM  
 LDL, calculated 37.4 02/11/2019 05:03 AM  
 Triglyceride 68 02/11/2019 05:03 AM  
 CHOL/HDL Ratio 1.7 02/11/2019 05:03 AM  
 
Lab Results Component Value Date/Time Glucose (POC) 181 (H) 02/14/2019 11:29 AM  
 Glucose (POC) 122 (H) 02/14/2019 06:58 AM  
 Glucose (POC) 314 (H) 02/13/2019 09:40 PM  
 Glucose (POC) 474 (H) 02/13/2019 05:26 PM  
 Glucose (POC) 213 (H) 02/13/2019 11:59 AM  
 
Lab Results Component Value Date/Time Color Yellow 05/31/2018 08:22 AM  
 Appearance Clear 05/31/2018 08:22 AM  
 Specific gravity 1.020 04/30/2009 11:50 AM  
 pH (UA) 7.0 05/31/2018 08:22 AM  
 Protein NEGATIVE  04/30/2009 11:50 AM  
 Glucose NEGATIVE  04/30/2009 11:50 AM  
 Ketone Negative 05/31/2018 08:22 AM  
 Bilirubin Negative 05/31/2018 08:22 AM  
 Urobilinogen 0.2 04/30/2009 11:50 AM  
 Nitrites Positive (A) 05/31/2018 08:22 AM  
 Leukocyte Esterase Negative 05/31/2018 08:22 AM  
 Bacteria Few 05/31/2018 08:22 AM  
 WBC 0-5 05/31/2018 08:22 AM  
 RBC 0-2 05/31/2018 08:22 AM  
 
 
 
Medications Reviewed:  
 
Current Facility-Administered Medications Medication Dose Route Frequency  levoFLOXacin (LEVAQUIN) tablet 750 mg  750 mg Oral Q24H  
 albuterol-ipratropium (DUO-NEB) 2.5 MG-0.5 MG/3 ML  3 mL Nebulization BID RT  
 dilTIAZem CD (CARDIZEM CD) capsule 180 mg  180 mg Oral DAILY  metoprolol succinate (TOPROL-XL) XL tablet 25 mg  25 mg Oral DAILY  [START ON 2/15/2019] predniSONE (DELTASONE) tablet 40 mg  40 mg Oral DAILY WITH BREAKFAST  apixaban (ELIQUIS) tablet 5 mg  5 mg Oral BID  insulin glargine (LANTUS) injection 20 Units  20 Units SubCUTAneous DAILY  dilTIAZem (CARDIZEM) injection 5 mg  5 mg IntraVENous Q6H PRN  
 acetaminophen (TYLENOL) tablet 650 mg  650 mg Oral Q4H PRN  
 albuterol-ipratropium (DUO-NEB) 2.5 MG-0.5 MG/3 ML  3 mL Nebulization Q4H PRN  
 atorvastatin (LIPITOR) tablet 10 mg  10 mg Oral QHS  sodium chloride (NS) flush 5-40 mL  5-40 mL IntraVENous Q8H  
 sodium chloride (NS) flush 5-40 mL  5-40 mL IntraVENous PRN  
 ondansetron (ZOFRAN) injection 4 mg  4 mg IntraVENous Q4H PRN  
 bisacodyl (DULCOLAX) tablet 5 mg  5 mg Oral DAILY PRN  
 lactobac ac& pc-s.therm-b.anim (CORRIE Q/RISAQUAD)  1 Cap Oral DAILY  insulin lispro (HUMALOG) injection   SubCUTAneous AC&HS  
 glucose chewable tablet 16 g  4 Tab Oral PRN  
 dextrose (D50W) injection syrg 12.5-25 g  12.5-25 g IntraVENous PRN  
 glucagon (GLUCAGEN) injection 1 mg  1 mg IntraMUSCular PRN  
 nicotine (NICODERM CQ) 21 mg/24 hr patch 1 Patch  1 Patch TransDERmal Q24H  
 arformoterol (BROVANA) neb solution 15 mcg  15 mcg Nebulization BID RT And  
 budesonide (PULMICORT) 500 mcg/2 ml nebulizer suspension  500 mcg Nebulization BID RT  
 
______________________________________________________________________ EXPECTED LENGTH OF STAY: 4d 4h 
ACTUAL LENGTH OF STAY:          4 Lux Murphy MD

## 2019-02-14 NOTE — PROGRESS NOTES
Problem: Falls - Risk of 
Goal: *Absence of Falls Document Orvel Buffy Fall Risk and appropriate interventions in the flowsheet. Outcome: Progressing Towards Goal 
Fall Risk Interventions: 
  
 
  
 
Medication Interventions: Teach patient to arise slowly Elimination Interventions: Toilet paper/wipes in reach

## 2019-02-14 NOTE — PROGRESS NOTES
Problem: Falls - Risk of 
Goal: *Absence of Falls Document Jocy Macias Fall Risk and appropriate interventions in the flowsheet. Outcome: Progressing Towards Goal 
Fall Risk Interventions: 
  
 
  
 
Medication Interventions: Teach patient to arise slowly Elimination Interventions: Toilet paper/wipes in reach

## 2019-02-14 NOTE — PROGRESS NOTES
2/14/19; 15:45 -  
CM met with patient regarding Home O2 recommendation. Patient is in agreement to the plan. CM submitted referral to [facility] via [employer]. CM received a call from [facility] that they will deliver equipment to bedside tomorrow. Patient expressed questions regarding disability. CM submitted referral request to [facility] via email. CRM: Teena Luz, MPH, [address] [place] [address]; Z: [phone]

## 2019-02-14 NOTE — PROGRESS NOTES
Bedside and Verbal shift change report given to Nunu Simmons (oncoming nurse) by Farhad Tse (offgoing nurse). Report included the following information SBAR, Kardex, MAR, Med Rec Status and Cardiac Rhythm Aflutter 80-90s.

## 2019-02-14 NOTE — PROGRESS NOTES
Attempted to wean pt off oxygen. o2 on RA down to 85% with pt siting at edge of bed. Placed pt back on 2L of o2, o2 sat increased to 95 %.

## 2019-02-15 ENCOUNTER — HOME HEALTH ADMISSION (OUTPATIENT)
Dept: HOME HEALTH SERVICES | Facility: HOME HEALTH | Age: 59
End: 2019-02-15

## 2019-02-15 VITALS
DIASTOLIC BLOOD PRESSURE: 65 MMHG | WEIGHT: 169.4 LBS | SYSTOLIC BLOOD PRESSURE: 104 MMHG | TEMPERATURE: 97.9 F | OXYGEN SATURATION: 92 % | HEART RATE: 87 BPM | BODY MASS INDEX: 25.67 KG/M2 | RESPIRATION RATE: 18 BRPM | HEIGHT: 68 IN

## 2019-02-15 LAB
BACTERIA SPEC CULT: NORMAL
GLUCOSE BLD STRIP.AUTO-MCNC: 115 MG/DL (ref 65–100)
GLUCOSE BLD STRIP.AUTO-MCNC: 198 MG/DL (ref 65–100)
SERVICE CMNT-IMP: ABNORMAL
SERVICE CMNT-IMP: ABNORMAL
SERVICE CMNT-IMP: NORMAL

## 2019-02-15 PROCEDURE — 74011250637 HC RX REV CODE- 250/637: Performed by: INTERNAL MEDICINE

## 2019-02-15 PROCEDURE — 74011636637 HC RX REV CODE- 636/637: Performed by: INTERNAL MEDICINE

## 2019-02-15 PROCEDURE — 82962 GLUCOSE BLOOD TEST: CPT

## 2019-02-15 PROCEDURE — 74011000250 HC RX REV CODE- 250: Performed by: INTERNAL MEDICINE

## 2019-02-15 PROCEDURE — 97116 GAIT TRAINING THERAPY: CPT

## 2019-02-15 PROCEDURE — 74011250637 HC RX REV CODE- 250/637: Performed by: PHYSICIAN ASSISTANT

## 2019-02-15 PROCEDURE — 94640 AIRWAY INHALATION TREATMENT: CPT

## 2019-02-15 PROCEDURE — 97161 PT EVAL LOW COMPLEX 20 MIN: CPT

## 2019-02-15 PROCEDURE — 77010033678 HC OXYGEN DAILY

## 2019-02-15 PROCEDURE — 94760 N-INVAS EAR/PLS OXIMETRY 1: CPT

## 2019-02-15 RX ORDER — BUDESONIDE AND FORMOTEROL FUMARATE DIHYDRATE 80; 4.5 UG/1; UG/1
2 AEROSOL RESPIRATORY (INHALATION) 2 TIMES DAILY
Qty: 3 INHALER | Refills: 3 | Status: SHIPPED | OUTPATIENT
Start: 2019-02-15 | End: 2019-06-16 | Stop reason: SDUPTHER

## 2019-02-15 RX ORDER — METOPROLOL SUCCINATE 25 MG/1
25 TABLET, EXTENDED RELEASE ORAL DAILY
Qty: 30 TAB | Refills: 2 | Status: SHIPPED | OUTPATIENT
Start: 2019-02-16 | End: 2019-05-16 | Stop reason: SDUPTHER

## 2019-02-15 RX ORDER — INSULIN GLARGINE 100 [IU]/ML
15 INJECTION, SOLUTION SUBCUTANEOUS DAILY
Status: DISCONTINUED | OUTPATIENT
Start: 2019-02-16 | End: 2019-02-15 | Stop reason: HOSPADM

## 2019-02-15 RX ORDER — METFORMIN HYDROCHLORIDE 500 MG/1
500 TABLET ORAL 2 TIMES DAILY WITH MEALS
Qty: 30 TAB | Refills: 1 | Status: SHIPPED | OUTPATIENT
Start: 2019-02-15 | End: 2019-02-15

## 2019-02-15 RX ORDER — METFORMIN HYDROCHLORIDE 500 MG/1
500 TABLET, FILM COATED, EXTENDED RELEASE ORAL DAILY
Qty: 60 TAB | Refills: 1 | Status: SHIPPED | OUTPATIENT
Start: 2019-02-15 | End: 2019-02-18 | Stop reason: ALTCHOICE

## 2019-02-15 RX ORDER — PREDNISONE 20 MG/1
40 TABLET ORAL
Qty: 2 TAB | Refills: 0 | Status: SHIPPED | OUTPATIENT
Start: 2019-02-16 | End: 2019-02-17

## 2019-02-15 RX ORDER — LEVOFLOXACIN 750 MG/1
750 TABLET ORAL EVERY 24 HOURS
Qty: 5 TAB | Refills: 0 | Status: SHIPPED | OUTPATIENT
Start: 2019-02-16 | End: 2019-02-26 | Stop reason: ALTCHOICE

## 2019-02-15 RX ORDER — DILTIAZEM HYDROCHLORIDE 180 MG/1
180 CAPSULE, COATED, EXTENDED RELEASE ORAL DAILY
Qty: 30 CAP | Refills: 1 | Status: SHIPPED | OUTPATIENT
Start: 2019-02-16 | End: 2019-04-18 | Stop reason: SDUPTHER

## 2019-02-15 RX ORDER — LANCETS
EACH MISCELLANEOUS
Qty: 100 EACH | Refills: 1 | Status: SHIPPED | OUTPATIENT
Start: 2019-02-15 | End: 2019-05-15 | Stop reason: ALTCHOICE

## 2019-02-15 RX ADMIN — DILTIAZEM HYDROCHLORIDE 180 MG: 180 CAPSULE, EXTENDED RELEASE ORAL at 08:16

## 2019-02-15 RX ADMIN — METOPROLOL SUCCINATE 25 MG: 25 TABLET, EXTENDED RELEASE ORAL at 08:17

## 2019-02-15 RX ADMIN — IPRATROPIUM BROMIDE AND ALBUTEROL SULFATE 3 ML: .5; 3 SOLUTION RESPIRATORY (INHALATION) at 08:25

## 2019-02-15 RX ADMIN — INSULIN GLARGINE 20 UNITS: 100 INJECTION, SOLUTION SUBCUTANEOUS at 08:17

## 2019-02-15 RX ADMIN — INSULIN LISPRO 2 UNITS: 100 INJECTION, SOLUTION INTRAVENOUS; SUBCUTANEOUS at 11:59

## 2019-02-15 RX ADMIN — Medication 10 ML: at 07:10

## 2019-02-15 RX ADMIN — LEVOFLOXACIN 750 MG: 750 TABLET, FILM COATED ORAL at 08:17

## 2019-02-15 RX ADMIN — APIXABAN 5 MG: 5 TABLET, FILM COATED ORAL at 08:17

## 2019-02-15 RX ADMIN — BUDESONIDE 500 MCG: 0.5 INHALANT RESPIRATORY (INHALATION) at 08:25

## 2019-02-15 RX ADMIN — PREDNISONE 40 MG: 20 TABLET ORAL at 08:17

## 2019-02-15 RX ADMIN — Medication 1 CAPSULE: at 08:17

## 2019-02-15 NOTE — DISCHARGE INSTRUCTIONS
Patient Education     DISCHARGE SUMMARY from Nurse    PATIENT INSTRUCTIONS:    After general anesthesia or intravenous sedation, for 24 hours or while taking prescription Narcotics:  · Limit your activities  · Do not drive and operate hazardous machinery  · Do not make important personal or business decisions  · Do  not drink alcoholic beverages  · If you have not urinated within 8 hours after discharge, please contact your surgeon on call. Report the following to your surgeon:  · Excessive pain, swelling, redness or odor of or around the surgical area  · Temperature over 100.5  · Nausea and vomiting lasting longer than 4 hours or if unable to take medications  · Any signs of decreased circulation or nerve impairment to extremity: change in color, persistent  numbness, tingling, coldness or increase pain  · Any questions    What to do at Home:  Recommended activity: Activity as tolerated and Bedrest,     If you experience any of the following symptoms , please follow up with . *  Please give a list of your current medications to your Primary Care Provider. *  Please update this list whenever your medications are discontinued, doses are      changed, or new medications (including over-the-counter products) are added. *  Please carry medication information at all times in case of emergency situations. These are general instructions for a healthy lifestyle:    No smoking/ No tobacco products/ Avoid exposure to second hand smoke  Surgeon General's Warning:  Quitting smoking now greatly reduces serious risk to your health.     Obesity, smoking, and sedentary lifestyle greatly increases your risk for illness    A healthy diet, regular physical exercise & weight monitoring are important for maintaining a healthy lifestyle    You may be retaining fluid if you have a history of heart failure or if you experience any of the following symptoms:  Weight gain of 3 pounds or more overnight or 5 pounds in a week, increased swelling in our hands or feet or shortness of breath while lying flat in bed. Please call your doctor as soon as you notice any of these symptoms; do not wait until your next office visit. Recognize signs and symptoms of STROKE:    F-face looks uneven    A-arms unable to move or move unevenly    S-speech slurred or non-existent    T-time-call 911 as soon as signs and symptoms begin-DO NOT go       Back to bed or wait to see if you get better-TIME IS BRAIN. Warning Signs of HEART ATTACK     Call 911 if you have these symptoms:   Chest discomfort. Most heart attacks involve discomfort in the center of the chest that lasts more than a few minutes, or that goes away and comes back. It can feel like uncomfortable pressure, squeezing, fullness, or pain.  Discomfort in other areas of the upper body. Symptoms can include pain or discomfort in one or both arms, the back, neck, jaw, or stomach.  Shortness of breath with or without chest discomfort.  Other signs may include breaking out in a cold sweat, nausea, or lightheadedness. Don't wait more than five minutes to call 911 - MINUTES MATTER! Fast action can save your life. Calling 911 is almost always the fastest way to get lifesaving treatment. Emergency Medical Services staff can begin treatment when they arrive -- up to an hour sooner than if someone gets to the hospital by car. The discharge information has been reviewed with the patient. The patient verbalized understanding. Discharge medications reviewed with the patient and appropriate educational materials and side effects teaching were provided.   ___________________________________________________________________________________________________________________________________     Chronic Obstructive Pulmonary Disease (COPD): Care Instructions  Your Care Instructions    Chronic obstructive pulmonary disease (COPD) is a general term for a group of lung diseases, including emphysema and chronic bronchitis. People with COPD have decreased airflow in and out of the lungs, which makes it hard to breathe. The airways also can get clogged with thick mucus. Cigarette smoking is a major cause of COPD. Although there is no cure for COPD, you can slow its progress. Following your treatment plan and taking care of yourself can help you feel better and live longer. Follow-up care is a key part of your treatment and safety. Be sure to make and go to all appointments, and call your doctor if you are having problems. It's also a good idea to know your test results and keep a list of the medicines you take. How can you care for yourself at home?   Staying healthy    · Do not smoke. This is the most important step you can take to prevent more damage to your lungs. If you need help quitting, talk to your doctor about stop-smoking programs and medicines. These can increase your chances of quitting for good.     · Avoid colds and flu. Get a pneumococcal vaccine shot. If you have had one before, ask your doctor whether you need a second dose. Get the flu vaccine every fall. If you must be around people with colds or the flu, wash your hands often.     · Avoid secondhand smoke, air pollution, and high altitudes. Also avoid cold, dry air and hot, humid air. Stay at home with your windows closed when air pollution is bad.    Medicines and oxygen therapy    · Take your medicines exactly as prescribed. Call your doctor if you think you are having a problem with your medicine.     · You may be taking medicines such as:  ? Bronchodilators. These help open your airways and make breathing easier. Bronchodilators are either short-acting (work for 6 to 9 hours) or long-acting (work for 24 hours). You inhale most bronchodilators, so they start to act quickly. Always carry your quick-relief inhaler with you in case you need it while you are away from home. ? Corticosteroids (prednisone, budesonide).  These reduce airway inflammation. They come in pill or inhaled form. You must take these medicines every day for them to work well.     · A spacer may help you get more inhaled medicine to your lungs. Ask your doctor or pharmacist if a spacer is right for you. If it is, ask how to use it properly.     · Do not take any vitamins, over-the-counter medicine, or herbal products without talking to your doctor first.     · If your doctor prescribed antibiotics, take them as directed. Do not stop taking them just because you feel better. You need to take the full course of antibiotics.     · Oxygen therapy boosts the amount of oxygen in your blood and helps you breathe easier. Use the flow rate your doctor has recommended, and do not change it without talking to your doctor first.   Activity    · Get regular exercise. Walking is an easy way to get exercise. Start out slowly, and walk a little more each day.     · Pay attention to your breathing. You are exercising too hard if you cannot talk while you are exercising.     · Take short rest breaks when doing household chores and other activities.     · Learn breathing methods--such as breathing through pursed lips--to help you become less short of breath.     · If your doctor has not set you up with a pulmonary rehabilitation program, talk to him or her about whether rehab is right for you. Rehab includes exercise programs, education about your disease and how to manage it, help with diet and other changes, and emotional support. Diet    · Eat regular, healthy meals. Use bronchodilators about 1 hour before you eat to make it easier to eat. Eat several small meals instead of three large ones.  Drink beverages at the end of the meal. Avoid foods that are hard to chew.     · Eat foods that contain protein so that you do not lose muscle mass.     · Talk with your doctor if you gain too much weight or if you lose weight without trying.    Mental health    · Talk to your family, friends, or a therapist about your feelings. It is normal to feel frightened, angry, hopeless, helpless, and even guilty. Talking openly about bad feelings can help you cope. If these feelings last, talk to your doctor. When should you call for help? Call 911 anytime you think you may need emergency care. For example, call if:    · You have severe trouble breathing.    Call your doctor now or seek immediate medical care if:    · You have new or worse trouble breathing.     · You cough up blood.     · You have a fever.    Watch closely for changes in your health, and be sure to contact your doctor if:    · You cough more deeply or more often, especially if you notice more mucus or a change in the color of your mucus.     · You have new or worse swelling in your legs or belly.     · You are not getting better as expected. Where can you learn more? Go to http://yesenia-jayy.info/. Ginny Gone in the search box to learn more about \"Chronic Obstructive Pulmonary Disease (COPD): Care Instructions. \"  Current as of: September 5, 2018  Content Version: 11.9  © 0450-7404 [employer]. Care instructions adapted under license by [employer] (which disclaims liability or warranty for this information). If you have questions about a medical condition or this instruction, always ask your healthcare professional. [address] any warranty or liability for your use of this information.

## 2019-02-15 NOTE — DISCHARGE SUMMARY
Discharge Summary PATIENT ID: Caity Tesfaye MRN: [id number] YOB: 1960 DATE OF ADMISSION: 2/10/2019 12:38 AM   
DATE OF DISCHARGE: 02/15/19 PRIMARY CARE PROVIDER: Nicola Martinez MD  
 
 
DISCHARGING PROVIDER: Bradley Salinas MD   
To contact this individual call [phone] and ask the  to page. If unavailable ask to be transferred the Adult Hospitalist Department. CONSULTATIONS: IP CONSULT TO CARDIOLOGY PROCEDURES/SURGERIES: * No surgery found * IMAGING Xr Chest Pa Lat Result Date: 2/10/2019 IMPRESSION:  Early or mild right middle lobe infiltrate is suspected. There is no CHF pattern. .  . Cta Chest W Or W Wo Cont Result Date: 2/10/2019 IMPRESSION: 1. No CT evidence for central pulmonary embolus at this time . 2. Increased caliber of main pulmonary outflow tract, correlate for pulmonary hypertension. 3. Patchy atelectasis or infiltrate in the lingula and in the right upper lobe medially. 4. Mild emphysematous change. [id number] [facility] COURSE: # New onset A-flutter: paroxysmal  
- [id number] - 3 
- off Cardizem drip  
- received Dig  mcg q6 Hours x 4 doses 
- on Eliquis, oral dilt and metoprolol - Echo EF 55-60% with Right ventricular cavity size is moderately dilated. Right ventricular global systolic function which is mildly reduced. - TSH normal 
- followed by Cardiology 
  
  
# Acute Hypoxemic resp failure with beginning of chronic hypoxia sec to COPD exacerbation caused by CAP due to Strept pneumoniae, and Influenza (POA) 
- O2 support - Bronchodilators RTC and PRN 
- switch to oral steroid and oral levaquin - F/U BC x 2, NGTD 
- pending urine for  +ve for Strept pneumoniae Ag - Viral respiratory panel positive for Influenza A 
- CTA chest: no PE, shows PNA in RLL and lingula - Patient remained requiring continuous 2L NC oxygen despite multiple trial to wean her down. - discharged on inhalers and O2 
  
# Diet controlled DM2 with hyperglycemia 
- in the hospital pat was on Lantus, SSI, accu-checks, Diabetic diet - HbA1c 7.4;  
- On discharge she was sent home with Metformin and NPH  
  
# HTN 
- S/p IVF bolus 
- Stable 
- monitor 
- Hold home antihypertensives 
  
# Elevated BNP 
- 2d echo as above, no sign of heart failure  
  
# HLP 
- Cont home meds 
  
# Thrombocytopenia 
- Etiology unclear 
  
# active smoker - Cessation advised 
- Nicotine patch 
  
#  Hypokalemia - Replaced DISCHARGE DIAGNOSES / PLAN:   
# New onset A-flutter: paroxysmal  
# Acute Hypoxemic resp failure with beginning of chronic hypoxia sec to COPD exacerbation caused by CAP due to Strept pneumoniae, and Influenza (POA) # Diet controlled DM2 with hyperglycemia # HTN # Elevated BNP # HLP # Thrombocytopenia # active smoker #  Hypokalemia Discharge planning - Patient discharged on home oxygen, home HH 
- advised on smoking cessation  
- diabetic education and supplements 
  
 
 
Patient Active Problem List  
Diagnosis Code  
 HTN (hypertension) I10  
 Hyperlipidemia E78.5  Depressed F32.9  Asthma J45.909  Allergic rhinitis J30.9  Impaired fasting glucose R73.01  
 Vitamin D deficiency E55.9  COPD (chronic obstructive pulmonary disease) (HCC) J44.9  Polyp of colon K63.5  Controlled type 2 diabetes mellitus without complication, without long-term current use of insulin (HCC) E11.9  
 COPD with acute exacerbation (HCC) J44.1 PENDING TEST RESULTS:  
At the time of discharge the following test results are still pending:None FOLLOW UP APPOINTMENTS:   
Follow-up Information Follow up With Specialties Details Why Contact Info  Aletha Nance MD Internal Medicine Schedule an appointment as soon as possible for a visit on 2/20/2019 [facility] f/u PCP appointment Wednesday, 2/20/19 @ 12:40 a.m.  [address] [address] 
[phone] Rochelle Sanchez MD Cardiology In 1 week  [address] [facility] 57 
[phone] 100 [facility] Oxygen Provider [address] [address] 
[phone] [address] In 1 day [address] [facility] that will see you at home [address] [address] 
[phone] Luís Eng MD Internal Medicine On 3/1/2019 [facility] f/u PCP 10-14 day appointment Friday, 3/1/19 @ 12:20 p.m.  [address] [facility] 57 
[phone] ADDITIONAL CARE RECOMMENDATIONS: smoking cessation DIET: diabetic ACTIVITY: as tolerated WOUND CARE: None EQUIPMENT needed: Oxygen DISCHARGE MEDICATIONS: 
Current Discharge Medication List  
  
START taking these medications Details  
apixaban (ELIQUIS) 5 mg tablet Take 1 Tab by mouth two (2) times a day. Qty: 60 Tab, Refills: 1  
  
levoFLOXacin (LEVAQUIN) 750 mg tablet Take 1 Tab by mouth every twenty-four (24) hours. Qty: 5 Tab, Refills: 0  
  
dilTIAZem CD (CARDIZEM CD) 180 mg ER capsule Take 1 Cap by mouth daily. Qty: 30 Cap, Refills: 1  
  
metoprolol succinate (TOPROL-XL) 25 mg XL tablet Take 1 Tab by mouth daily. Qty: 30 Tab, Refills: 2  
  
predniSONE (DELTASONE) 20 mg tablet Take 40 mg by mouth daily (with breakfast) for 1 day. Qty: 2 Tab, Refills: 0 Comments: Patient needs total of 5 days and received 3 days in the hospital  
  
insulin NPH (NOVOLIN N NPH U-100 INSULIN) 100 unit/mL injection 10 Units by SubCUTAneous route two (2) times a day. Qty: 1 Vial, Refills: 0 Insulin Syringe-Needle U-100 0.3 mL 28 syrg 1 Each by Does Not Apply route two (2) times a day. Qty: 50 Each, Refills: 0 metFORMIN (GLUMETZA ER) 500 mg TG24 24 hour tablet Take 500 mg by mouth daily. Qty: 60 Tab, Refills: 1  
  
glucose blood VI test strips (ACCU-CHEK LALITO PLUS TEST STRP) strip Use 2 times a day 
Qty: 100 Strip, Refills: 1  
  
lancets (ACCU-CHEK MULTICLIX LANCET) misc Use 2 times a day 
Qty: 100 Each, Refills: 1 CONTINUE these medications which have CHANGED Details  
budesonide-formoterol (SYMBICORT) 80-4.5 mcg/actuation HFAA Take 2 Puffs by inhalation two (2) times a day. Qty: 3 Inhaler, Refills: 3 CONTINUE these medications which have NOT CHANGED Details VENTOLIN HFA 90 mcg/actuation inhaler INHALE 2 PUFFS BY MOUTH EVERY 4 HOURS AS NEEDED FOR WHEEZING Qty: 1 Inhaler, Refills: 11  
  
zinc 50 mg tab Take  by mouth. Only in the winter  
  
lisinopril-hydroCHLOROthiazide (PRINZIDE, ZESTORETIC) 20-12.5 mg per tablet TAKE 1 TABLET BY MOUTH  EVERY DAY Qty: 90 Tab, Refills: 3  
  
atorvastatin (LIPITOR) 10 mg tablet TAKE ONE TABLET BY MOUTH EVERY DAY Qty: 30 Tab, Refills: 11 B.infantis-B.ani-B.long-B.bifi (PROBIOTIC 4X) 10-15 mg TbEC Take  by mouth. STOP taking these medications  
  
 atenolol (TENORMIN) 100 mg tablet Comments:  
Reason for Stopping:   
   
  
 
 
All Micro Results Procedure Component Value Units Date/Time CULTURE, BLOOD, PAIRED [[id number]] Collected:  02/10/19 0054 Order Status:  Completed Specimen:  Blood Updated:  02/15/19 3795 Special Requests: NO SPECIAL REQUESTS Culture result: NO GROWTH 5 DAYS     
 MYCOPLASMA AB, IGG/IGM [[id number]]  (Abnormal) Collected:  02/10/19 1548 Order Status:  Completed Specimen:  Serum Updated:  02/13/19 0135 M. pneumoniae Ab, IgG 924 U/mL Comment: (NOTE) Negative:           <100 Indeterminate: 100 - 320 Positive:           >320 The reference interval established is intended as a 
 baseline only. Values >100 may indicate a recent 
infection with Mycoplasma pneumoniae and need to be 
confirmed either by a positive IgM result and/or an 
additional specimen drawn 2-4 weeks later showing a 
significant increase in antibody levels. M. pneumoniae Ab, IgM <770 U/mL Comment: (NOTE) Negative            <770 Clinically significant amount of M. pneumoniae antibody 
not detected. Low Positive   770 - 950 M. pneumoniae specific IgM presumptively detected. It 
is recommended that another sample be collected 1-2 
weeks later to assure reactivity. Positive            >950 Highly significant amount of M. pneumoniae specific IgM antibody detected. Performed At: [address] [id number] Rose Hernandez MD [id number] S. PNEUMO AG, UR/CSF [[id number]]  (Abnormal) Collected:  02/10/19 1545 Order Status:  Completed Specimen:  Other from Miscellaneous sample Updated:  02/12/19 1336 Source URINE Specimen Urine Streptococcus pneumoniae Ag Positive Comment: (NOTE) Reported pos strep pneum ur ag to Ned Vazquez at 1:04pm on 
02-12-19. No fax requested. SP Performed At: [address] [id number] Rose Hernandez MD [id number] Fluid culture Not Indicated Organism ID Not indicated. Please note Comment Comment: (NOTE) College of American Pathologists standards require a culture to be 
performed on CSF specimens submitted for bacterial antigen testing. (CAP [id number]) Urine specimens will not be cultured. Performed At: [address] [id number] Rose Hernandez MD [id number] LEGIONELLA PNEUMOPHILA AG, URINE [[id number]] Collected:  02/10/19 1545 Order Status:  Completed Specimen:  Urine Updated:  02/12/19 1134 Source URINE     
  L pneumophila S1 Ag, urine NEGATIVE Comment: (NOTE) Presumptive negative for L. pneumophila serogroup 1 antigen in urine, 
suggesting no recent or current infection. Legionnaires' disease 
cannot be ruled out since other serogroups and species may also 
cause disease. Performed At: [address] [id number] Quang Loomis MD [id number] RESPIRATORY PANEL,PCR,NASOPHARYNGEAL [[id number]]  (Abnormal) Collected:  02/11/19 1739 Order Status:  Completed Specimen:  Nasopharyngeal Updated:  02/12/19 0045 Adenovirus NOT DETECTED Coronavirus 229E NOT DETECTED Coronavirus HKU1 NOT DETECTED Coronavirus CVNL63 NOT DETECTED Coronavirus OC43 NOT DETECTED Metapneumovirus NOT DETECTED Rhinovirus and Enterovirus NOT DETECTED Influenza A NOT DETECTED Influenza A, subtype H1 DETECTED Influenza A, subtype H3 NOT DETECTED INFLUENZA A H1N1 PCR NOT DETECTED Influenza B NOT DETECTED Parainfluenza 1 NOT DETECTED Parainfluenza 2 NOT DETECTED Parainfluenza 3 NOT DETECTED Parainfluenza virus 4 NOT DETECTED     
  RSV by PCR NOT DETECTED Bordetella pertussis - PCR NOT DETECTED Chlamydophila pneumoniae DNA, QL, PCR NOT DETECTED Mycoplasma pneumoniae DNA, QL, PCR NOT DETECTED     
 RESPIRATORY PANEL,PCR,NASOPHARYNGEAL [[id number]] Order Status:  Canceled Specimen:  NASOPHARYNGEAL SWAB URINE CULTURE HOLD SAMPLE [[id number]] Collected:  02/10/19 1545 Order Status:  Completed Specimen:  Urine Updated:  02/10/19 8233 Urine culture hold URINE ON HOLD IN MICROBIOLOGY DEPT FOR 3 DAYS. IF UNPRESERVED URINE IS SUBMITTED, IT CANNOT BE USED FOR ADDITIONAL TESTING AFTER 24 HRS, RECOLLECTION WILL BE REQUIRED. RESPIRATORY PANEL,PCR,NASOPHARYNGEAL [[id number]] Order Status:  Canceled Specimen:  NASOPHARYNGEAL SWAB INFLUENZA A & B AG (RAPID TEST) [[id number]] Collected:  02/10/19 0141 Order Status:  Completed Specimen:  Nasopharyngeal from Nasal washing Updated:  02/10/19 0202 Influenza A Antigen NEGATIVE Influenza B Antigen NEGATIVE CULTURE, BLOOD, PAIRED [[id number]] Order Status:  Canceled Specimen:  Blood Recent Results (from the past 24 hour(s)) GLUCOSE, POC Collection Time: 02/14/19  4:49 PM  
Result Value Ref Range Glucose (POC) 354 (H) 65 - 100 mg/dL Performed by Cristiana Ogren GLUCOSE, POC Collection Time: 02/14/19  4:54 PM  
Result Value Ref Range Glucose (POC) 376 (H) 65 - 100 mg/dL Performed by Cristiana Ogren GLUCOSE, POC Collection Time: 02/14/19 10:57 PM  
Result Value Ref Range Glucose (POC) 333 (H) 65 - 100 mg/dL Performed by Ivory Lock GLUCOSE, POC Collection Time: 02/15/19  7:09 AM  
Result Value Ref Range Glucose (POC) 115 (H) 65 - 100 mg/dL Performed by Ivory Lock GLUCOSE, POC Collection Time: 02/15/19 11:43 AM  
Result Value Ref Range Glucose (POC) 198 (H) 65 - 100 mg/dL Performed by Emi Krause NOTIFY YOUR PHYSICIAN FOR ANY OF THE FOLLOWING:  
Fever over 101 degrees for 24 hours. Chest pain, shortness of breath, fever, chills, nausea, vomiting, diarrhea, change in mentation, falling, weakness, bleeding. Severe pain or pain not relieved by medications. Or, any other signs or symptoms that you may have questions about. DISPOSITION: 
x  Home With: 
 OT  PT x HH  RN  
  
 Long term SNF/Inpatient Rehab Independent/assisted living Hospice Other:  
 
 
PATIENT CONDITION AT DISCHARGE:  
 
Functional status Poor Deconditioned   
x Independent Cognition Arabella Cowden Forgetful Dementia Catheters/lines (plus indication) Mims PICC   
 PEG   
x None Code status  
 x Full code  DNR   
 
PHYSICAL EXAMINATION AT DISCHARGE: 
 General appearance: alert, no distress, appears stated age on NC on 2L oxygen Head: Normocephalic, without obvious abnormality, atraumatic Throat: normal findings: buccal mucosa normal and palate normal 
Lungs: wheezing has improved significantly No crackles Heart: regular rate and rhythm Abdomen: soft, non-tender. Bowel sounds normal. No masses,  no organomegaly Extremities: extremities normal, atraumatic, no cyanosis or edema Pulses: 2+ and symmetric Neurologic: Grossly normal 
 
 
 
 
CHRONIC MEDICAL DIAGNOSES: 
Problem List as of 2/15/2019 Date Reviewed: 2/10/2019 Codes Class Noted - Resolved * (Principal) COPD with acute exacerbation ([facility].) ICD-10-CM: J44.1 ICD-9-CM: 491.21  2/10/2019 - Present Controlled type 2 diabetes mellitus without complication, without long-term current use of insulin ([facility] 75.) ICD-10-CM: E11.9 ICD-9-CM: 250.00  6/12/2018 - Present Polyp of colon ICD-10-CM: K63.5 ICD-9-CM: 211.3  5/10/2018 - Present COPD (chronic obstructive pulmonary disease) (HCC) ICD-10-CM: J44.9 ICD-9-CM: 905  11/28/2012 - Present Impaired fasting glucose ICD-10-CM: R73.01 
ICD-9-CM: 790.21  4/25/2012 - Present Vitamin D deficiency ICD-10-CM: E55.9 ICD-9-CM: 268.9  4/25/2012 - Present HTN (hypertension) ICD-10-CM: I10 
ICD-9-CM: 401.9  4/19/2012 - Present Hyperlipidemia ICD-10-CM: E78.5 ICD-9-CM: 272.4  4/19/2012 - Present Depressed ICD-10-CM: F32.9 ICD-9-CM: 808  4/19/2012 - Present Asthma ICD-10-CM: Z76.184 ICD-9-CM: 493.90  4/19/2012 - Present Allergic rhinitis ICD-10-CM: J30.9 ICD-9-CM: 477.9  4/19/2012 - Present Discussed with patient and family. Explained the importance of following up, Compliance with medications and recommendations on discharge,Side effect profile of medications were explained. Safety precautions at home and while taking pain medications also explained.  All questions answered to the satisfaction of the patient/family. Discussed with consultant(s) who are agreeable to the discharge. Verbal and written instructions on discharge given. Explained about Discharge medications and side effect profile. Advised patient/family to followup with their pcp for medication refills and preauthorization of medications, Home health orders. checkups,screenign programs as appropriate for age.  
  
 
Thank you Argelia Ledbetter MD for taking care of your patient, Please call with any questions. Greater than 35 minutes were spent with the patient on counseling and coordination of care Signed:  
Juhi Kenyon MD 
2/15/2019 
12:18 PM

## 2019-02-15 NOTE — DIABETES MGMT
DTC Consult Note Recommendations/ Comments:  
 
If appropriate, consider the following for discharge: 
Metformin  mg with dinner NPH insulin 15 units linked and timed with Prednisone 40 mg 
Provide pt with a prescription for test strips for Accu Chek Guide test strips and fastclix lancets Recommendations discussed with Dr. Ambrocio Arevalo Current hospital DM medication: Lantus 20 units daily and Humalog for correction, normal sensitivity scale Consult received for:  [x]             Assessment of home management Chart reviewed and initial evaluation complete on Any Perez. Patient is a 60 y.o. female with hx Type 2 Diabetes for > 20 years diet controlled at home. Pt does not have a glucometer at home, she was provided with an Accu chek guide meter and encouraged to check twice a day (fasting and 2 hours after one meals a day). Pt reports to eat 3 meals a day, avoids concentrated sweets. She follow up with her PCP at least every 3 months. Assessed and instructed patient on the following:  
·  interpretation of lab results, blood sugar goals, A1c target, hypoglycemia prevention and treatment, SMBG skills, nutrition, use of vial/syringe and self-injection of insulin Encouraged the following:  
· dietary modifications: avoid concentrated sweets, regular blood sugar monitorin times daily Provided patient with the following: [x]             Survival skills education materials [x]             Insulin education materials []             CHO counting education materials []             Outpatient DTC contact number 
             [x]             Glucometer- Accu check guide Discussed with patient and/or family need for follow up appointment for diabetes management after discharge. A1c:  
Lab Results Component Value Date/Time Hemoglobin A1c 7.4 (H) 2019 05:03 AM  
 
 
Recent Glucose Results: Lab Results Component Value Date/Time GLUCPOC 198 (H) 02/15/2019 11:43 AM  
 GLUCPOC 115 (H) 02/15/2019 07:09 AM  
 GLUCPOC 333 (H) 02/14/2019 10:57 PM  
  
 
Lab Results Component Value Date/Time Creatinine 0.87 02/13/2019 05:23 AM  
 
Estimated Creatinine Clearance: 76 mL/min (based on SCr of 0.87 mg/dL). Active Orders Diet DIET DIABETIC CONSISTENT CARB Regular; 2 GM NA (House Low NA) PO intake:  
Patient Vitals for the past 72 hrs: 
 % Diet Eaten 02/15/19 0811 80 % 02/12/19 1914 100 % Will continue to follow as needed. Thank you. Alonzo Ryan RD, CDE [facility] Pager: [phone] Time spent: 20 minutes
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
## 2019-02-15 NOTE — DIABETES MGMT
DTC Progress Note Recommendations/ Comments: Chart reviewed for hyperglycemia, likely due to steroids (Prednisone 40 mg with breakfast). Has received 16 units of correction insulin over the last 24 hours. If appropriate, please consider: 
Decreasing Lantus to 12 units Adding NPH insulin, consider 15 units linked and timed with Prednisone Current hospital DM medication: Humalog for correction, normal sensitivity scale and Lantus 20 units Chart reviewed on Sarahi Nguyen. Patient is a 60 y.o. female with hx Type 2 Diabetes diet controlled at home. A1c:  
Lab Results Component Value Date/Time Hemoglobin A1c 7.4 (H) 02/11/2019 05:03 AM  
 Hemoglobin A1c 7.0 (H) 10/16/2018 09:07 AM  
 
 
Recent Glucose Results:  
Lab Results Component Value Date/Time GLUCPOC 115 (H) 02/15/2019 07:09 AM  
 GLUCPOC 333 (H) 02/14/2019 10:57 PM  
 GLUCPOC 376 (H) 02/14/2019 04:54 PM  
  
 
Lab Results Component Value Date/Time Creatinine 0.87 02/13/2019 05:23 AM  
 
Estimated Creatinine Clearance: 76 mL/min (based on SCr of 0.87 mg/dL). Active Orders Diet DIET DIABETIC CONSISTENT CARB Regular; 2 GM NA (House Low NA) PO intake:  
Patient Vitals for the past 72 hrs: 
 % Diet Eaten 02/12/19 1914 100 % Will continue to follow as needed. Thank you Chelsea Guillermo RD, CDE [facility] Pager: [phone] Time spent: 4 min

## 2019-02-15 NOTE — PROGRESS NOTES
2/15/19; 10:30 -  
CM rounded on patient with attending physician. The patient has [facility] orders for PT and medication management. Patient agreed to [facility] visits with attending. CM submitted referral to [facility] via [address] [place] [address]. Patient's Home O2 has not been delivered to bedside at this time. CM contacted [facility] [place] to check on status. Normal informed CM that due to patient's acute illnesses (PNA and Influenza) causing a flair of her chronic COPD, the patient's O2 needs cannot accurately be determined due to active acute disease processes. Per Normal, patient's acute processes would need to be cleared before being able to determine new chronic baseline. CM relayed message to attending, who requested to conference with Normal for clarification. CM connected attending with Normal for plan development. Attending to provide clarifying documentation, and CM will submit appropriately to Normal. Normal will plan to be on site within an hour. CRM: Elmer Lantigua, MPH, CHES; Z: [phone] 
 
11:30 -  
CM received notice from [facility] that they have to decline request due to restricted [facility] dates. CM submitted referral to [facility] via [employer]. CRM: Elmer Lantigua, MPH, CHES; Z: [phone] 
 
12:15 - Patient is cleared for [facility]. Information placed on patient's AVS.   [facility] delivered patient's portable O2 tank to bedside. CM placed information on patient's AVS. Patient is ready for discharge with no additional CM needs noted.

## 2019-02-15 NOTE — PROGRESS NOTES
Hospitalist Progress Note Lux Murphy MD 
Answering service: [phone] OR [phone] from in house phone Date of Service:  2/15/2019 NAME:  Rocio Mcbride :  1960 MRN:  [id number] PCP: Rachel Jacobsen MD 
 
Chief Complaint:  
Chief Complaint Patient presents with  Shortness of Breath Admission Summary:  
 
Rocio Mcbride is a 60 y.o. female who presented with SOB Interval history / Subjective:  
Patient is seen and examined this morning. Multiple attempts were made to wean her off oxygen for the last three days. Patient remained desaturating despite treatment of her acute condition. Assessment & Plan: # New onset A-flutter: paroxysmal  
- [id number] - 3 
- off Cardizem drip  
- received Dig  mcg q6 Hours x 4 doses 
- on Eliquis, oral dilt and metoprolol - Echo EF 55-60% with Right ventricular cavity size is moderately dilated. Right ventricular global systolic function which is mildly reduced. - TSH normal 
- Cardiology on board appreciate input # Acute Hypoxemic resp failure with beginning of chronic hypoxia sec to COPD exacerbation caused by CAP due to Strept pneumoniae, and Influenza (POA) 
- O2 support - Bronchodilators RTC and PRN 
- switch to oral steroid and oral levaquin - F/U BC x 2, NGTD 
- pending urine for  +ve for Strept pneumoniae Ag - Viral respiratory panel positive for Influenza A 
- CTA chest: no PE, shows PNA in RLL and lingula - Patient remained desaturating despite treatment of her acute condition requiring continuous 2L NC oxygen. She will benefit from home oxygen - Will discharge on inhalers as well # Diet controlled DM2 with hyperglycemia 
- SSI 
- accu-checks 
- Diabetic diet - HbA1c 7.4; DM is likely not diet controlled any more, may need metformin on DC # HTN 
- S/p IVF bolus 
- Stable 
- monitor 
- Hold home antihypertensives # Elevated BNP 
- 2d echo as above, no sign of heart failure # HLP 
- Cont home meds # Thrombocytopenia 
- Etiology unclear - Cont to monitor labs # active smoker - Cessation advised 
- Nicotine patch #  Hypokalemia - Replaced - Repeat labs in am 
 
 
Code status: Full Code DVT prophylaxis: SCDs Care Plan discussed with: Patient Disposition: home once stable Hospital Problems  Date Reviewed: 2/10/2019 Codes Class Noted POA * (Principal) COPD with acute exacerbation ([facility] [address] ICD-10-CM: J44.1 ICD-9-CM: 491.21  2/10/2019 Yes Review of Systems:  
Pertinent positives mentioned in interval hx. Other systems reviewed and negative. Physical Examination:  
General appearance: alert, no distress, appears stated age on NC on 2L oxygen Head: Normocephalic, without obvious abnormality, atraumatic Throat: normal findings: buccal mucosa normal and palate normal 
Lungs: wheezing has improved significantly No crackles Heart: regular rate and rhythm Abdomen: soft, non-tender. Bowel sounds normal. No masses,  no organomegaly Extremities: extremities normal, atraumatic, no cyanosis or edema Pulses: 2+ and symmetric Neurologic: Grossly normal 
  
 
Vital Signs:  
 Last 24hrs VS reviewed since prior progress note. Most recent are: 
 
Visit Vitals /62 (BP 1 Location: Left arm, BP Patient Position: At rest;Sitting) Pulse 76 Temp 98.6 °F (37 °C) Resp 18 Ht 5' 8\" (1.727 m) Wt 76.8 kg (169 lb 6.4 oz) SpO2 95% BMI 25.76 kg/m² Intake/Output Summary (Last 24 hours) at 2/15/2019 1043 Last data filed at 2/15/2019 0020 Gross per 24 hour Intake 240 ml Output  Net 240 ml Tmax:  Temp (24hrs), Av.2 °F (36.8 °C), Min:97.8 °F (36.6 °C), Max:98.6 °F (37 °C) Data Review: Xr Chest Pa Lat Result Date: 2/10/2019 INDICATION:  sob. EXAM: 2 VIEW CHEST RADIOGRAPH. COMPARISON: 10/11/2012.  FINDINGS: Frontal and lateral views of the chest show faint patchy parenchymal density in the right middle lobe, although the breast shadows project over these regions bilaterally making this determination difficult. . . The heart, mediastinum and pulmonary vasculature are stable. The bony thorax is unremarkable for age. .. IMPRESSION:  Early or mild right middle lobe infiltrate is suspected. There is no CHF pattern. .  . Cta Chest W Or W Wo Cont Result Date: 2/10/2019 EXAM: CT ANGIOGRAPHY CHEST INDICATION:  [Shortness of breath, decreased O2 saturation, concerning for PE. COMPARISON: 11/7/2012. CONTRAST: 75 mL of Isovue-370. TECHNIQUE: Precontrast  images were obtained to localize the volume for acquisition. Multislice helical CT arteriography was performed from the diaphragm to the thoracic inlet during uneventful rapid bolus intravenous contrast administration. Lung and soft tissue windows were generated. Coronal and sagittal  reformatted images were also generated. 3D post processing consisting of coronal maximum intensity projection images was performed. CT dose reduction was achieved through use of a standardized protocol tailored for this examination and automatic exposure control for dose modulation. FINDINGS: Patchy atelectasis or infiltrate in the lingula and in the right upper lobe medially. Mild emphysematous change. .. The pulmonary arteries are well enhanced and no pulmonary emboli are identified. Main pulmonary outflow tract measures 4.2 cm, compared to a similar measurement of 3.5 cm on the prior study. There is no mediastinal or hilar adenopathy or mass. The aorta enhances normally without evidence of aneurysm or dissection. The visualized portions of the upper abdominal organs are normal.  
 
IMPRESSION: 1. No CT evidence for central pulmonary embolus at this time . 2. Increased caliber of main pulmonary outflow tract, correlate for pulmonary hypertension.  3. Patchy atelectasis or infiltrate in the lingula and in the right upper lobe medially. 4. Mild emphysematous change. No results found for: SDES Lab Results Component Value Date/Time Culture result: NO GROWTH 5 DAYS 02/10/2019 12:54 AM  
 
All Micro Results Procedure Component Value Units Date/Time CULTURE, BLOOD, PAIRED [[id number]] Collected:  02/10/19 0054 Order Status:  Completed Specimen:  Blood Updated:  02/15/19 0928 Special Requests: NO SPECIAL REQUESTS Culture result: NO GROWTH 5 DAYS     
 MYCOPLASMA AB, IGG/IGM [[id number]]  (Abnormal) Collected:  02/10/19 1548 Order Status:  Completed Specimen:  Serum Updated:  02/13/19 0135 M. pneumoniae Ab, IgG 924 U/mL Comment: (NOTE) Negative:           <100 Indeterminate: 100 - 320 Positive:           >320 The reference interval established is intended as a 
baseline only. Values >100 may indicate a recent 
infection with Mycoplasma pneumoniae and need to be 
confirmed either by a positive IgM result and/or an 
additional specimen drawn 2-4 weeks later showing a 
significant increase in antibody levels. M. pneumoniae Ab, IgM <770 U/mL Comment: (NOTE) Negative            <770 Clinically significant amount of M. pneumoniae antibody 
not detected. Low Positive   770 - 950 M. pneumoniae specific IgM presumptively detected. It 
is recommended that another sample be collected 1-2 
weeks later to assure reactivity. Positive            >950 Highly significant amount of M. pneumoniae specific IgM antibody detected. Performed At: [address] [id number] Sukumar Duque MD UO:[id number] S. PNEUMO AG, UR/CSF [[id number]]  (Abnormal) Collected:  02/10/19 1545  Order Status:  Completed Specimen:  Other from Miscellaneous sample Updated:  02/12/19 1336 Source URINE Specimen Urine Streptococcus pneumoniae Ag Positive Comment: (NOTE) Reported pos strep pneum ur ag to [facility] at 1:04pm on 
02-12-19. No fax requested. SP Performed At: [address] [id number] Leonie Ricardo MD [id number] Fluid culture Not Indicated Organism ID Not indicated. Please note Comment Comment: (NOTE) College [employer] standards require a culture to be 
performed on CSF specimens submitted for bacterial antigen testing. (CAP [id number]) Urine specimens will not be cultured. Performed At: [address] [id number] Leonie Ricardo MD QC:[id number] LEGIONELLA PNEUMOPHILA AG, URINE [[id number]] Collected:  02/10/19 1545 Order Status:  Completed Specimen:  Urine Updated:  02/12/19 1137 Source URINE     
  L pneumophila S1 Ag, urine NEGATIVE Comment: (NOTE) Presumptive negative for L. pneumophila serogroup 1 antigen in urine, 
suggesting no recent or current infection. Legionnaires' disease 
cannot be ruled out since other serogroups and species may also 
cause disease. Performed At: [address] [id number] Leonie Ricardo MD GJ:[id number] RESPIRATORY PANEL,PCR,NASOPHARYNGEAL [[id number]]  (Abnormal) Collected:  02/11/19 1739 Order Status:  Completed Specimen:  Nasopharyngeal Updated:  02/12/19 0045 Adenovirus NOT DETECTED Coronavirus 229E NOT DETECTED Coronavirus HKU1 NOT DETECTED Coronavirus CVNL63 NOT DETECTED Coronavirus OC43 NOT DETECTED Metapneumovirus NOT DETECTED Rhinovirus and Enterovirus NOT DETECTED Influenza A NOT DETECTED Influenza A, subtype H1 DETECTED Influenza A, subtype H3 NOT DETECTED INFLUENZA A H1N1 PCR NOT DETECTED   Influenza B NOT DETECTED     
 Parainfluenza 1 NOT DETECTED Parainfluenza 2 NOT DETECTED Parainfluenza 3 NOT DETECTED Parainfluenza virus 4 NOT DETECTED     
  RSV by PCR NOT DETECTED Bordetella pertussis - PCR NOT DETECTED Chlamydophila pneumoniae DNA, QL, PCR NOT DETECTED Mycoplasma pneumoniae DNA, QL, PCR NOT DETECTED     
 RESPIRATORY PANEL,PCR,NASOPHARYNGEAL [[id number]] Order Status:  Canceled Specimen:  NASOPHARYNGEAL SWAB URINE CULTURE HOLD SAMPLE [[id number]] Collected:  02/10/19 1545 Order Status:  Completed Specimen:  Urine Updated:  02/10/19 3797 Urine culture hold URINE ON HOLD IN MICROBIOLOGY DEPT FOR 3 DAYS. IF UNPRESERVED URINE IS SUBMITTED, IT CANNOT BE USED FOR ADDITIONAL TESTING AFTER 24 HRS, RECOLLECTION WILL BE REQUIRED. RESPIRATORY PANEL,PCR,NASOPHARYNGEAL [[id number]] Order Status:  Canceled Specimen:  NASOPHARYNGEAL SWAB INFLUENZA A & B AG (RAPID TEST) [[id number]] Collected:  02/10/19 0141 Order Status:  Completed Specimen:  Nasopharyngeal from Nasal washing Updated:  02/10/19 0202 Influenza A Antigen NEGATIVE Influenza B Antigen NEGATIVE CULTURE, BLOOD, PAIRED [[id number]] Order Status:  Canceled Specimen:  Blood Labs:  
 
Recent Labs  
  02/13/19 
5782 WBC 11.0 HGB 13.8 HCT 43.3  Recent Labs  
  02/13/19 
8837   
K 3.9  CO2 23 BUN 25* CREA 0.87 * CA 9.0 No results for input(s): SGOT, GPT, ALT, AP, TBIL, TBILI, TP, ALB, GLOB, GGT, AML, LPSE in the last 72 hours. No lab exists for component: AMYP, HLPSE No results for input(s): INR, PTP, APTT in the last 72 hours. No lab exists for component: INREXT, INREXT No results for input(s): FE, TIBC, PSAT, FERR in the last 72 hours. No results found for: FOL, RBCF No results for input(s): PH, PCO2, PO2 in the last 72 hours. No results for input(s): CPK, CKNDX, TROIQ in the last 72 hours. No lab exists for component: CPKMB Lab Results Component Value Date/Time Cholesterol, total 125 02/11/2019 05:03 AM  
 HDL Cholesterol 74 02/11/2019 05:03 AM  
 LDL, calculated 37.4 02/11/2019 05:03 AM  
 Triglyceride 68 02/11/2019 05:03 AM  
 CHOL/HDL Ratio 1.7 02/11/2019 05:03 AM  
 
Lab Results Component Value Date/Time Glucose (POC) 115 (H) 02/15/2019 07:09 AM  
 Glucose (POC) 333 (H) 02/14/2019 10:57 PM  
 Glucose (POC) 376 (H) 02/14/2019 04:54 PM  
 Glucose (POC) 354 (H) 02/14/2019 04:49 PM  
 Glucose (POC) 181 (H) 02/14/2019 11:29 AM  
 
Lab Results Component Value Date/Time Color Yellow 05/31/2018 08:22 AM  
 Appearance Clear 05/31/2018 08:22 AM  
 Specific gravity 1.020 04/30/2009 11:50 AM  
 pH (UA) 7.0 05/31/2018 08:22 AM  
 Protein NEGATIVE  04/30/2009 11:50 AM  
 Glucose NEGATIVE  04/30/2009 11:50 AM  
 Ketone Negative 05/31/2018 08:22 AM  
 Bilirubin Negative 05/31/2018 08:22 AM  
 Urobilinogen 0.2 04/30/2009 11:50 AM  
 Nitrites Positive (A) 05/31/2018 08:22 AM  
 Leukocyte Esterase Negative 05/31/2018 08:22 AM  
 Bacteria Few 05/31/2018 08:22 AM  
 WBC 0-5 05/31/2018 08:22 AM  
 RBC 0-2 05/31/2018 08:22 AM  
 
 
 
Medications Reviewed:  
 
Current Facility-Administered Medications Medication Dose Route Frequency  levoFLOXacin (LEVAQUIN) tablet 750 mg  750 mg Oral Q24H  
 albuterol-ipratropium (DUO-NEB) 2.5 MG-0.5 MG/3 ML  3 mL Nebulization BID RT  
 dilTIAZem CD (CARDIZEM CD) capsule 180 mg  180 mg Oral DAILY  metoprolol succinate (TOPROL-XL) XL tablet 25 mg  25 mg Oral DAILY  predniSONE (DELTASONE) tablet 40 mg  40 mg Oral DAILY WITH BREAKFAST  apixaban (ELIQUIS) tablet 5 mg  5 mg Oral BID  insulin glargine (LANTUS) injection 20 Units  20 Units SubCUTAneous DAILY  dilTIAZem (CARDIZEM) injection 5 mg  5 mg IntraVENous Q6H PRN  
 acetaminophen (TYLENOL) tablet 650 mg  650 mg Oral Q4H PRN  
 albuterol-ipratropium (DUO-NEB) 2.5 MG-0.5 MG/3 ML  3 mL Nebulization Q4H PRN  
 atorvastatin (LIPITOR) tablet 10 mg  10 mg Oral QHS  sodium chloride (NS) flush 5-40 mL  5-40 mL IntraVENous Q8H  
 sodium chloride (NS) flush 5-40 mL  5-40 mL IntraVENous PRN  
 ondansetron (ZOFRAN) injection 4 mg  4 mg IntraVENous Q4H PRN  
 bisacodyl (DULCOLAX) tablet 5 mg  5 mg Oral DAILY PRN  
 lactobac ac& pc-s.therm-b.anim (CORRIE Q/RISAQUAD)  1 Cap Oral DAILY  insulin lispro (HUMALOG) injection   SubCUTAneous AC&HS  
 glucose chewable tablet 16 g  4 Tab Oral PRN  
 dextrose (D50W) injection syrg 12.5-25 g  12.5-25 g IntraVENous PRN  
 glucagon (GLUCAGEN) injection 1 mg  1 mg IntraMUSCular PRN  
 nicotine (NICODERM CQ) 21 mg/24 hr patch 1 Patch  1 Patch TransDERmal Q24H  
 arformoterol (BROVANA) neb solution 15 mcg  15 mcg Nebulization BID RT And  
 budesonide (PULMICORT) 500 mcg/2 ml nebulizer suspension  500 mcg Nebulization BID RT  
 
______________________________________________________________________ EXPECTED LENGTH OF STAY: 4d 4h 
ACTUAL LENGTH OF STAY:          5 Mallory Cogan, MD

## 2019-02-25 ENCOUNTER — OFFICE VISIT (OUTPATIENT)
Dept: CARDIOLOGY CLINIC | Age: 59
End: 2019-02-25

## 2019-02-25 VITALS
SYSTOLIC BLOOD PRESSURE: 100 MMHG | WEIGHT: 170.4 LBS | HEART RATE: 66 BPM | RESPIRATION RATE: 16 BRPM | DIASTOLIC BLOOD PRESSURE: 70 MMHG | BODY MASS INDEX: 25.82 KG/M2 | HEIGHT: 68 IN

## 2019-02-25 DIAGNOSIS — J44.9 CHRONIC OBSTRUCTIVE PULMONARY DISEASE, UNSPECIFIED COPD TYPE (HCC): ICD-10-CM

## 2019-02-25 DIAGNOSIS — Z72.0 TOBACCO USE: ICD-10-CM

## 2019-02-25 DIAGNOSIS — E78.2 MIXED HYPERLIPIDEMIA: ICD-10-CM

## 2019-02-25 DIAGNOSIS — I48.92 PAROXYSMAL ATRIAL FLUTTER (HCC): Primary | ICD-10-CM

## 2019-02-25 DIAGNOSIS — I10 BENIGN ESSENTIAL HTN: ICD-10-CM

## 2019-02-25 NOTE — PROGRESS NOTES
[place]: Kelly Blanco  -[phone]    History of Present Illness:   Ms. Meka Gooden is a 59 yo F with a recent hospitalization for COPD exacerbation, positive tobacco use, hypertension, diabetes, found to have paroxysmal atrial flutter. She was started on Diltiazem and Toprol for rate control and Eliquis for oral anticoagulation. Her echocardiogram demonstrated preserved LV function, but some dilation of her RV consistent with COPD/cor pulmonale. She says since she left the hospital, this is the best she has felt for a long time. Her breathing has been stable. She denies any chest pain. No significant palpitations. She has been compliant with her medications. She initially had some dizziness, but adjusted some of her medications in the morning and some in the evening and this has resolved. Assessment and Plan:   1. Paroxysmal atrial flutter. Stable on Diltiazem and Toprol for rate control. No bleeding issues on Eliquis. Echocardiogram results as noted above. No additional adjustment or evaluation needed at this time. Will have her follow back in six months. 2. COPD. Followed by her primary care physician. 3. Tobacco use. Encouraged cessation. 4. Type 2 diabetes. 5. Essential hypertension. She  has a past medical history of Colon polyps, COPD (chronic obstructive pulmonary disease) ([facility] [address] (11/28/2012), Diabetes ([facility] [address] GERD (gastroesophageal reflux disease), OTHER MEDICAL, Hypercholesterolemia, Hypertension, Menopause, and Pilonidal cyst.      All other systems negative except as above. PE  Vitals:    02/25/19 1018   BP: 100/70   Pulse: 66   Resp: 16   Weight: 170 lb 6.4 oz (77.3 kg)   Height: 5' 8\" (1.727 m)    Body mass index is 25.91 kg/m².    General appearance - alert, well appearing, and in no distress  Mental status - affect appropriate to mood  Eyes - sclera anicteric, moist mucous membranes  Neck - supple, no JVD  Chest - clear to auscultation, no wheezes, rales or rhonchi  Heart - normal rate, regular rhythm, normal S1, S2, no murmurs, rubs, clicks or gallops  Abdomen - soft, nontender, nondistended, no masses or organomegaly  Neurological - no focal deficit  Extremities - peripheral pulses normal, no pedal edema      Recent Labs:  Lab Results   Component Value Date/Time    Cholesterol, total 125 02/11/2019 05:03 AM    HDL Cholesterol 74 02/11/2019 05:03 AM    LDL, calculated 37.4 02/11/2019 05:03 AM    Triglyceride 68 02/11/2019 05:03 AM    CHOL/HDL Ratio 1.7 02/11/2019 05:03 AM     Lab Results   Component Value Date/Time    Creatinine 0.87 02/13/2019 05:23 AM     Lab Results   Component Value Date/Time    BUN 25 (H) 02/13/2019 05:23 AM     Lab Results   Component Value Date/Time    Potassium 3.9 02/13/2019 05:23 AM     Lab Results   Component Value Date/Time    Hemoglobin A1c 7.4 (H) 02/11/2019 05:03 AM     Lab Results   Component Value Date/Time    HGB 13.8 02/13/2019 05:23 AM     Lab Results   Component Value Date/Time    PLATELET 478 28/32/0675 05:23 AM       Reviewed:  Past Medical History:   Diagnosis Date    Colon polyps     COPD (chronic obstructive pulmonary disease) ([facility] [address] 11/28/2012    Diabetes ([facility]     GERD (gastroesophageal reflux disease)     HX OTHER MEDICAL     left rib fracture w/punctured kidney    Hypercholesterolemia     hypertriglyceridemia    Hypertension     Menopause     Pilonidal cyst      Social History     Tobacco Use   Smoking Status Former Smoker    Packs/day: 2.00    Types: Cigarettes   Smokeless Tobacco Never Used   Tobacco Comment    quit a few weeks ago     Social History     Substance and Sexual Activity   Alcohol Use Yes    Alcohol/week: 6.6 - 7.2 oz    Types: 4 Shots of liquor, 7 - 8 Standard drinks or equivalent per week    Frequency: Monthly or less    Drinks per session: 1 or 2    Binge frequency: Never     Allergies   Allergen Reactions    Sporanox [Itraconazole] Hives       Current Outpatient Medications   Medication Sig    metFORMIN ER (GLUCOPHAGE XR) 500 mg tablet Take 1 Tab by mouth daily (with dinner).  budesonide-formoterol (SYMBICORT) 80-4.5 mcg/actuation HFAA Take 2 Puffs by inhalation two (2) times a day.  apixaban (ELIQUIS) 5 mg tablet Take 1 Tab by mouth two (2) times a day.  dilTIAZem CD (CARDIZEM CD) 180 mg ER capsule Take 1 Cap by mouth daily.  metoprolol succinate (TOPROL-XL) 25 mg XL tablet Take 1 Tab by mouth daily.  insulin NPH (NOVOLIN N NPH U-100 INSULIN) 100 unit/mL injection 10 Units by SubCUTAneous route two (2) times a day.  Insulin Syringe-Needle U-100 0.3 mL 28 syrg 1 Each by Does Not Apply route two (2) times a day.  glucose blood VI test strips (ACCU-CHEK LALITO PLUS TEST STRP) strip Use 2 times a day    lancets (ACCU-CHEK MULTICLIX LANCET) misc Use 2 times a day    lisinopril-hydroCHLOROthiazide (PRINZIDE, ZESTORETIC) 20-12.5 mg per tablet TAKE 1 TABLET BY MOUTH  EVERY DAY    atorvastatin (LIPITOR) 10 mg tablet TAKE ONE TABLET BY MOUTH EVERY DAY    VENTOLIN HFA 90 mcg/actuation inhaler INHALE 2 PUFFS BY MOUTH EVERY 4 HOURS AS NEEDED FOR WHEEZING    B.infantis-B.ani-B.long-B.bifi (PROBIOTIC 4X) 10-15 mg TbEC Take  by mouth.  zinc 50 mg tab Take  by mouth. Only in the winter    levoFLOXacin (LEVAQUIN) 750 mg tablet Take 1 Tab by mouth every twenty-four (24) hours. No current facility-administered medications for this visit.         Marty Feliz MD  [facility] [place]  [address] [facility] [address]  [address]

## 2019-02-26 ENCOUNTER — OFFICE VISIT (OUTPATIENT)
Dept: INTERNAL MEDICINE CLINIC | Age: 59
End: 2019-02-26

## 2019-02-26 VITALS
WEIGHT: 176.6 LBS | OXYGEN SATURATION: 93 % | HEART RATE: 77 BPM | DIASTOLIC BLOOD PRESSURE: 82 MMHG | TEMPERATURE: 98.3 F | BODY MASS INDEX: 26.76 KG/M2 | HEIGHT: 68 IN | SYSTOLIC BLOOD PRESSURE: 126 MMHG

## 2019-02-26 DIAGNOSIS — E11.9 TYPE 2 DIABETES MELLITUS WITHOUT COMPLICATION, WITHOUT LONG-TERM CURRENT USE OF INSULIN (HCC): ICD-10-CM

## 2019-02-26 DIAGNOSIS — I10 ESSENTIAL HYPERTENSION: ICD-10-CM

## 2019-02-26 DIAGNOSIS — E11.9 CONTROLLED TYPE 2 DIABETES MELLITUS WITHOUT COMPLICATION, WITHOUT LONG-TERM CURRENT USE OF INSULIN (HCC): Primary | ICD-10-CM

## 2019-02-26 DIAGNOSIS — J42 CHRONIC BRONCHITIS, UNSPECIFIED CHRONIC BRONCHITIS TYPE (HCC): ICD-10-CM

## 2019-02-26 NOTE — PROGRESS NOTES
HISTORY OF PRESENT ILLNESS  Sylvia Roy is a 60 y.o. female. Admitted to 2/10-2/15 for pneumonia and new onset afib. She has home health care coming out once a week. PT saw, evaluated and felt she no longer needed PT. Awoke am of admission with severe sob, mild cough and fevers. Called EMS. Found to have pneumonia secondary to strep pneumoniae and influenza which triggered a COPD exacerbation. Treated with abx and steroid. Discharged on oral steroids and oral Levaquin. Also sent home on home O2. Hasn't smoked since hospitalization. Currently cannot remember when she has felt so good. Off O2. Using her Simicort Inhaler. Hasn't had to use her rescue inhaler. No wheezing, sob or coughing. New onset paroxysmal aflutter. Initally on IV dig and cardiazem drip. Discharged home on Eliquis. Stopped Atenolol and changed to Metoprolol. Added Diltiazem. She was getting dizzy so splitting the dose between am and pm.  BP can run low at times (90s/60s). Saw Dr. Parth Chicas yesterday, no changes were made. Denies chest pain or tightness. Feels the aflutter was going on intermittently for a long time. Since discharge she has had 2 brief episodes. Feels like \"air popping off\". Hx of diet controlled DM but in hospital BS levels were elevated due to steroids. A1C was 7.4%. Sent home on [facility] but was too expensive, we changed to the other generic Metformin XR 500mg once a day. Trying to check BS but didn't have the right test strips. Has an Accucheck meter but filled for Accucheck Quita strips. Also sent hojme with bottle of NPH because she was still on steroids. Used 10 units day of discharge and day after but none since. Current Outpatient Medications:     glucose blood VI test strips (BLOOD GLUCOSE TEST) strip, For Accu-chek meter. Check blood sugars bid. E11.9, Disp: 200 Strip, Rfl: 3    metFORMIN ER (GLUCOPHAGE XR) 500 mg tablet, Take 1 Tab by mouth daily (with dinner). , Disp: 30 Tab, Rfl: 5    budesonide-formoterol (SYMBICORT) 80-4.5 mcg/actuation HFAA, Take 2 Puffs by inhalation two (2) times a day., Disp: 3 Inhaler, Rfl: 3    apixaban (ELIQUIS) 5 mg tablet, Take 1 Tab by mouth two (2) times a day., Disp: 60 Tab, Rfl: 1    dilTIAZem CD (CARDIZEM CD) 180 mg ER capsule, Take 1 Cap by mouth daily. , Disp: 30 Cap, Rfl: 1    metoprolol succinate (TOPROL-XL) 25 mg XL tablet, Take 1 Tab by mouth daily. , Disp: 30 Tab, Rfl: 2    Insulin Syringe-Needle U-100 0.3 mL 28 syrg, 1 Each by Does Not Apply route two (2) times a day., Disp: 50 Each, Rfl: 0    lancets (ACCU-CHEK MULTICLIX LANCET) misc, Use 2 times a day, Disp: 100 Each, Rfl: 1    lisinopril-hydroCHLOROthiazide (PRINZIDE, ZESTORETIC) 20-12.5 mg per tablet, TAKE 1 TABLET BY MOUTH  EVERY DAY, Disp: 90 Tab, Rfl: 3    atorvastatin (LIPITOR) 10 mg tablet, TAKE ONE TABLET BY MOUTH EVERY DAY, Disp: 30 Tab, Rfl: 11    VENTOLIN HFA 90 mcg/actuation inhaler, INHALE 2 PUFFS BY MOUTH EVERY 4 HOURS AS NEEDED FOR WHEEZING, Disp: 1 Inhaler, Rfl: 11    B.infantis-B.ani-B.long-B.bifi (PROBIOTIC 4X) 10-15 mg TbEC, Take  by mouth., Disp: , Rfl:     levoFLOXacin (LEVAQUIN) 750 mg tablet, Take 1 Tab by mouth every twenty-four (24) hours. , Disp: 5 Tab, Rfl: 0    insulin NPH (NOVOLIN N NPH U-100 INSULIN) 100 unit/mL injection, 10 Units by SubCUTAneous route two (2) times a day., Disp: 1 Vial, Rfl: 0    zinc 50 mg tab, Take  by mouth. Only in the winter, Disp: , Rfl:     /82   Pulse 77   Temp 98.3 °F (36.8 °C) (Oral)   Ht 5' 8\" (1.727 m)   Wt 176 lb 9.6 oz (80.1 kg)   SpO2 93%   BMI 26.85 kg/m²             ROS  See above  Physical Exam   Constitutional: She appears well-developed and well-nourished. HENT:   Head: Normocephalic and atraumatic. Neck: Neck supple. No thyromegaly present. Cardiovascular: Normal rate, regular rhythm and normal heart sounds. Exam reveals no gallop and no friction rub. No murmur heard.   Pulmonary/Chest: Effort normal and breath sounds normal.   Musculoskeletal: She exhibits no edema. Lymphadenopathy:     She has no cervical adenopathy. Vitals reviewed. ASSESSMENT and PLAN  Pneumonia - resolved. COPD - improved post steroids and stopping smoking. conitnue inhalers. paroxismal aflutter - currently in NSR by ausculation. Continue current meds, f/u with Dr. Yue Iyer  htn - controlled. She has been having some lower BPs. If these continue she will contact us. At that point I would stop her hctz. DM - currently on metformin XR 500mg bid. Will start checking BS at home again - rx given for correct test strips. Continue DM diet. Orders Placed This Encounter    glucose blood VI test strips (BLOOD GLUCOSE TEST) strip     Follow-up Disposition:  Return in about 3 months (around 5/26/2019) for diabetes, hyperlipidemia, aflutter, htn , copd.

## 2019-03-21 ENCOUNTER — TELEPHONE (OUTPATIENT)
Dept: INTERNAL MEDICINE CLINIC | Age: 59
End: 2019-03-21

## 2019-03-21 NOTE — TELEPHONE ENCOUNTER
Pt would like [facility] to return her call concerning an issue she was having. She said she spoke with [facility] the other day and she was going to get back with her but she hasn't heard from her. She can be reached at [phone]. Thank you.

## 2019-03-21 NOTE — TELEPHONE ENCOUNTER
Informed patient per Dr. Natarajan [place] maximum Tylenol is 1000mg (2 extra strength Tylenol) 4 times daily. Can also use Ice and heat. At this point she should see an orthopedist.  I recommend Patricia Mejia with [facility].

## 2019-04-25 DIAGNOSIS — E78.00 PURE HYPERCHOLESTEROLEMIA: ICD-10-CM

## 2019-04-25 DIAGNOSIS — I10 ESSENTIAL HYPERTENSION: ICD-10-CM

## 2019-04-25 DIAGNOSIS — E11.9 CONTROLLED TYPE 2 DIABETES MELLITUS WITHOUT COMPLICATION, WITHOUT LONG-TERM CURRENT USE OF INSULIN (HCC): ICD-10-CM

## 2019-05-15 ENCOUNTER — TELEPHONE (OUTPATIENT)
Dept: INTERNAL MEDICINE CLINIC | Age: 59
End: 2019-05-15

## 2019-05-15 RX ORDER — LANCETS
EACH MISCELLANEOUS
Qty: 100 EACH | Refills: 3 | Status: SHIPPED | OUTPATIENT
Start: 2019-05-15 | End: 2019-12-10 | Stop reason: SDUPTHER

## 2019-05-15 NOTE — TELEPHONE ENCOUNTER
Regarding: FW: Prescription Question  Contact: [phone]      ----- Message -----  From: Melly Daniels  Sent: 5/15/2019  10:40 AM  To: Barbie Mckeon  Subject: Prescription Question                            ----- Message from [address] [place] [address] [address], Generic sent at 5/15/2019 10:40 AM EDT -----    I need a prescription for the Accu-Chek FastClix Lancets. I was originally prescribed the wrong ones and have been paying for them since. Thank you.   [facility] # [phone]

## 2019-05-30 ENCOUNTER — OFFICE VISIT (OUTPATIENT)
Dept: INTERNAL MEDICINE CLINIC | Age: 59
End: 2019-05-30

## 2019-05-30 VITALS
DIASTOLIC BLOOD PRESSURE: 77 MMHG | HEART RATE: 97 BPM | SYSTOLIC BLOOD PRESSURE: 121 MMHG | TEMPERATURE: 98.5 F | OXYGEN SATURATION: 99 % | WEIGHT: 183 LBS | HEIGHT: 68 IN | BODY MASS INDEX: 27.74 KG/M2

## 2019-05-30 DIAGNOSIS — I10 ESSENTIAL HYPERTENSION: ICD-10-CM

## 2019-05-30 DIAGNOSIS — F51.01 PRIMARY INSOMNIA: ICD-10-CM

## 2019-05-30 DIAGNOSIS — E11.9 CONTROLLED TYPE 2 DIABETES MELLITUS WITHOUT COMPLICATION, WITHOUT LONG-TERM CURRENT USE OF INSULIN (HCC): Primary | ICD-10-CM

## 2019-05-30 DIAGNOSIS — E78.00 PURE HYPERCHOLESTEROLEMIA: ICD-10-CM

## 2019-05-30 DIAGNOSIS — J42 CHRONIC BRONCHITIS, UNSPECIFIED CHRONIC BRONCHITIS TYPE (HCC): ICD-10-CM

## 2019-05-30 DIAGNOSIS — S80.12XA TRAUMATIC HEMATOMA OF LEFT LOWER LEG, INITIAL ENCOUNTER: ICD-10-CM

## 2019-05-30 RX ORDER — TRAZODONE HYDROCHLORIDE 50 MG/1
50 TABLET ORAL
Qty: 30 TAB | Refills: 5 | Status: SHIPPED | OUTPATIENT
Start: 2019-05-30 | End: 2019-12-23 | Stop reason: ALTCHOICE

## 2019-05-30 NOTE — PROGRESS NOTES
Subjective:     Sarita Alexander is a 60 y.o. female seen for follow up of diabetes. She also has hypertension and hyperlipidemia. Diabetic Review of Systems - medication compliance: compliant all of the time, diabetic diet compliance: compliant most of the time, home glucose monitoring: is performed regularly- running 100-150s, am numbers have been creeping up to the 130-150, lower values later in the day, further diabetic ROS: no polyuria or polydipsia, no chest pain, dyspnea or TIA's, no unusual visual symptoms, mild numbness in feet, last eye exam approximately 14 months ago. Other symptoms and concerns:   Getting in tub and bath mat slipped 2 weeks ago. Hit left shin on tub. Still has a fairly large hematoma. Had some increased dizziness and prexyncopal feelings. Changed BP med dosing to evening with improvement in symptoms. BP running /60-70s. Right foot feels restless at times but leg and left foot not affected. Wants to see a podiatrist.  Also with ingrown toe-nail. Breathing is doing well. No recent use of albuterol hfa  Issues sleeping at night. Can't fall asleep and may be awake most of the night. In the past used lorazepam.  Taking melatonin 4mg - used to help but no longer working. While hospitalized in February developed a sore on her buttocks. Resoled after 2 weeks from discharge. Area then returned with some blistering in vaginal area - nontender. Current Outpatient Medications   Medication Sig Dispense Refill    metoprolol succinate (TOPROL-XL) 25 mg XL tablet Take 1 Tab by mouth daily. 30 Tab 11    lancets misc ACCU-CHEK FASTCLIX LANCETS.  E11.9 100 Each 3    ELIQUIS 5 mg tablet TAKE 1 TABLET TWICE A  Tab 3    dilTIAZem CD (CARDIZEM CD) 180 mg ER capsule Take 1 Cap by mouth daily. 30 Cap 11    glucose blood VI test strips (BLOOD GLUCOSE TEST) strip For Accu-chek meter. Check blood sugars bid. E11.9 200 Strip 3    metFORMIN ER (GLUCOPHAGE XR) 500 mg tablet Take 1 Tab by mouth daily (with dinner). 30 Tab 5    budesonide-formoterol (SYMBICORT) 80-4.5 mcg/actuation HFAA Take 2 Puffs by inhalation two (2) times a day. 3 Inhaler 3    lisinopril-hydroCHLOROthiazide (PRINZIDE, ZESTORETIC) 20-12.5 mg per tablet TAKE 1 TABLET BY MOUTH  EVERY DAY 90 Tab 3    atorvastatin (LIPITOR) 10 mg tablet TAKE ONE TABLET BY MOUTH EVERY DAY 30 Tab 11    VENTOLIN HFA 90 mcg/actuation inhaler INHALE 2 PUFFS BY MOUTH EVERY 4 HOURS AS NEEDED FOR WHEEZING 1 Inhaler 11    B.infantis-B.ani-B.long-B.bifi (PROBIOTIC 4X) 10-15 mg TbEC Take  by mouth. Lab Results   Component Value Date/Time    Hemoglobin A1c 7.4 (H) 02/11/2019 05:03 AM    Hemoglobin A1c 7.0 (H) 10/16/2018 09:07 AM    Hemoglobin A1c 7.2 (H) 05/31/2018 08:22 AM    Glucose 149 (H) 02/13/2019 05:23 AM    Glucose (POC) 198 (H) 02/15/2019 11:43 AM    Microalb/Creat ratio (ug/mg creat.) 37.9 (H) 05/31/2018 08:22 AM    LDL, calculated 37.4 02/11/2019 05:03 AM    Creatinine 0.87 02/13/2019 05:23 AM      Lab Results   Component Value Date/Time    Cholesterol, total 125 02/11/2019 05:03 AM    HDL Cholesterol 74 02/11/2019 05:03 AM    LDL, calculated 37.4 02/11/2019 05:03 AM    Triglyceride 68 02/11/2019 05:03 AM    CHOL/HDL Ratio 1.7 02/11/2019 05:03 AM     Lab Results   Component Value Date/Time    ALT (SGPT) 14 02/11/2019 05:03 AM    AST (SGOT) 11 (L) 02/11/2019 05:03 AM    Alk.  phosphatase 73 02/11/2019 05:03 AM    Bilirubin, total 0.2 02/11/2019 05:03 AM    Albumin 2.9 (L) 02/11/2019 05:03 AM    Protein, total 6.8 02/11/2019 05:03 AM    PLATELET 551 18/94/2334 05:23 AM     Lab Results   Component Value Date/Time    GFR est non-AA >60 02/13/2019 05:23 AM    GFR est AA >60 02/13/2019 05:23 AM    Creatinine 0.87 02/13/2019 05:23 AM    BUN 25 (H) 02/13/2019 05:23 AM    Sodium 137 02/13/2019 05:23 AM    Potassium 3.9 02/13/2019 05:23 AM    Chloride 108 02/13/2019 05:23 AM    CO2 23 02/13/2019 05:23 AM    Magnesium 1.7 02/10/2019 04:46 AM    Phosphorus 2.7 02/10/2019 04:46 AM        Review of Systems  Pertinent items are noted in HPI. Objective:     Visit Vitals  /77   Pulse 97   Temp 98.5 °F (36.9 °C) (Oral)   Ht 5' 8\" (1.727 m)   Wt 183 lb (83 kg)   SpO2 99%   BMI 27.83 kg/m²     Appearance: alert, well appearing, and in no distress and oriented to person, place, and time. Exam:  NECK: supple, no lad, no bruit, no tm  LUNGS: cta bilat  CV rrr, no m/g/r  ABD: soft, nt, nd, nabs  EXT: no c/c/e. Left shin with half dollar sized    feet: warm, good capillary refill, no trophic changes or ulcerative lesions, normal DP and PT pulses, normal monofilament exam and normal sensory exam    Assessment/Plan:     diabetes mild increase in am numbers. Diabetic issues reviewed with her: discussed protein rich snack at bedtime. Check labs  Continue same medications  Foot exam compelted.      htn - controlled, cont same    Hyperlipidemia- controlled, cont same    Right foot numbness vs RLS - referred to podiatry. nml sensation on foot exam.     COPD _ controlled. Continue same. Encouraged to stop smoking. Hematoma left leg - no sign of infection. Moist heat, gentle massage    Insomnia - trial trazodone    Sore on buttocks and genitalia - has since resolved. Will rtc if returns so culture can be obtained. Orders Placed This Encounter    METABOLIC PANEL, COMPREHENSIVE    LIPID PANEL    MICROALBUMIN, UR, RAND W/ MICROALB/CREAT RATIO    HEMOGLOBIN A1C WITH EAG    REFERRAL TO PODIATRY    traZODone (DESYREL) 50 mg tablet     Follow-up and Dispositions    · Return in about 3 months (around 8/30/2019) for diabetes, hyperlipidemia, htn.

## 2019-05-30 NOTE — PROGRESS NOTES
1. Have you been to the ER, urgent care clinic since your last visit? Hospitalized since your last visit? Yes, Urgent care 2. Have you seen or consulted any other health care providers outside of the [address] [place] [address] since your last visit? Include any pap smears or colon screening.  no

## 2019-05-31 LAB
ALBUMIN SERPL-MCNC: 4.3 G/DL (ref 3.6–4.8)
ALBUMIN/CREAT UR: 3.7 MG/G CREAT (ref 0–30)
ALBUMIN/GLOB SERPL: 1.9 {RATIO} (ref 1.2–2.2)
ALP SERPL-CCNC: 84 IU/L (ref 39–117)
ALT SERPL-CCNC: 11 IU/L (ref 0–32)
AST SERPL-CCNC: 12 IU/L (ref 0–40)
BILIRUB SERPL-MCNC: 0.4 MG/DL (ref 0–1.2)
BUN SERPL-MCNC: 18 MG/DL (ref 8–27)
BUN/CREAT SERPL: 23 (ref 12–28)
CALCIUM SERPL-MCNC: 9.3 MG/DL (ref 8.7–10.3)
CHLORIDE SERPL-SCNC: 102 MMOL/L (ref 96–106)
CHOLEST SERPL-MCNC: 174 MG/DL (ref 100–199)
CO2 SERPL-SCNC: 18 MMOL/L (ref 20–29)
CREAT SERPL-MCNC: 0.78 MG/DL (ref 0.57–1)
CREAT UR-MCNC: 116.1 MG/DL
EST. AVERAGE GLUCOSE BLD GHB EST-MCNC: 128 MG/DL
GLOBULIN SER CALC-MCNC: 2.3 G/DL (ref 1.5–4.5)
GLUCOSE SERPL-MCNC: 172 MG/DL (ref 65–99)
HBA1C MFR BLD: 6.1 % (ref 4.8–5.6)
HDLC SERPL-MCNC: 76 MG/DL
INTERPRETATION, 910389: NORMAL
LDLC SERPL CALC-MCNC: 69 MG/DL (ref 0–99)
Lab: NORMAL
MICROALBUMIN UR-MCNC: 4.3 UG/ML
POTASSIUM SERPL-SCNC: 4.7 MMOL/L (ref 3.5–5.2)
PROT SERPL-MCNC: 6.6 G/DL (ref 6–8.5)
SODIUM SERPL-SCNC: 136 MMOL/L (ref 134–144)
TRIGL SERPL-MCNC: 144 MG/DL (ref 0–149)
VLDLC SERPL CALC-MCNC: 29 MG/DL (ref 5–40)

## 2019-06-16 RX ORDER — BUDESONIDE AND FORMOTEROL FUMARATE DIHYDRATE 80; 4.5 UG/1; UG/1
AEROSOL RESPIRATORY (INHALATION)
Qty: 10.2 INHALER | Refills: 1 | Status: SHIPPED | OUTPATIENT
Start: 2019-06-16 | End: 2019-07-27 | Stop reason: SDUPTHER

## 2019-08-28 RX ORDER — BUDESONIDE AND FORMOTEROL FUMARATE DIHYDRATE 80; 4.5 UG/1; UG/1
AEROSOL RESPIRATORY (INHALATION)
Qty: 10.2 INHALER | Refills: 11 | Status: SHIPPED | OUTPATIENT
Start: 2019-08-28 | End: 2019-10-01 | Stop reason: SDUPTHER

## 2019-10-01 RX ORDER — BUDESONIDE AND FORMOTEROL FUMARATE DIHYDRATE 80; 4.5 UG/1; UG/1
AEROSOL RESPIRATORY (INHALATION)
Qty: 10.2 INHALER | Refills: 11 | Status: SHIPPED | OUTPATIENT
Start: 2019-10-01 | End: 2019-10-31 | Stop reason: SDUPTHER

## 2019-10-31 RX ORDER — BUDESONIDE AND FORMOTEROL FUMARATE DIHYDRATE 80; 4.5 UG/1; UG/1
AEROSOL RESPIRATORY (INHALATION)
Qty: 3 INHALER | Refills: 3 | Status: SHIPPED | OUTPATIENT
Start: 2019-10-31

## 2019-11-07 ENCOUNTER — DOCUMENTATION ONLY (OUTPATIENT)
Dept: INTERNAL MEDICINE CLINIC | Age: 59
End: 2019-11-07

## 2019-11-07 NOTE — PROGRESS NOTES
Will mail completed form and prescriptions to [facility] [email or web address] address:      [address] [place], [facility] [place]let Musa

## 2019-11-27 PROBLEM — I48.92 ATRIAL FLUTTER, UNSPECIFIED TYPE (HCC): Status: ACTIVE | Noted: 2019-11-27

## 2019-12-10 RX ORDER — LANCETS
EACH MISCELLANEOUS
Qty: 100 EACH | Refills: 3 | Status: SHIPPED | OUTPATIENT
Start: 2019-12-10 | End: 2021-01-01

## 2019-12-23 ENCOUNTER — OFFICE VISIT (OUTPATIENT)
Dept: INTERNAL MEDICINE CLINIC | Age: 59
End: 2019-12-23

## 2019-12-23 ENCOUNTER — HOSPITAL ENCOUNTER (OUTPATIENT)
Dept: LAB | Age: 59
Discharge: HOME | End: 2019-12-23

## 2019-12-23 VITALS
HEIGHT: 68 IN | HEART RATE: 94 BPM | OXYGEN SATURATION: 90 % | RESPIRATION RATE: 20 BRPM | TEMPERATURE: 98.7 F | DIASTOLIC BLOOD PRESSURE: 78 MMHG | WEIGHT: 170 LBS | BODY MASS INDEX: 25.76 KG/M2 | SYSTOLIC BLOOD PRESSURE: 134 MMHG

## 2019-12-23 DIAGNOSIS — I10 ESSENTIAL HYPERTENSION: ICD-10-CM

## 2019-12-23 DIAGNOSIS — J44.1 COPD WITH ACUTE EXACERBATION (HCC): ICD-10-CM

## 2019-12-23 DIAGNOSIS — E11.9 CONTROLLED TYPE 2 DIABETES MELLITUS WITHOUT COMPLICATION, WITHOUT LONG-TERM CURRENT USE OF INSULIN (HCC): ICD-10-CM

## 2019-12-23 DIAGNOSIS — E11.9 CONTROLLED TYPE 2 DIABETES MELLITUS WITHOUT COMPLICATION, WITHOUT LONG-TERM CURRENT USE OF INSULIN (HCC): Primary | ICD-10-CM

## 2019-12-23 DIAGNOSIS — E78.00 PURE HYPERCHOLESTEROLEMIA: ICD-10-CM

## 2019-12-23 LAB
ALBUMIN SERPL-MCNC: 3.9 G/DL (ref 3.5–5)
ALBUMIN/GLOB SERPL: 1.3 {RATIO} (ref 1.1–2.2)
ALP SERPL-CCNC: 89 U/L (ref 45–117)
ALT SERPL-CCNC: 20 U/L (ref 12–78)
ANION GAP SERPL CALC-SCNC: 6 MMOL/L (ref 5–15)
AST SERPL-CCNC: 16 U/L (ref 15–37)
BILIRUB SERPL-MCNC: 0.9 MG/DL (ref 0.2–1)
BUN SERPL-MCNC: 25 MG/DL (ref 6–20)
BUN/CREAT SERPL: 29 (ref 12–20)
CALCIUM SERPL-MCNC: 8.9 MG/DL (ref 8.5–10.1)
CHLORIDE SERPL-SCNC: 99 MMOL/L (ref 97–108)
CHOLEST SERPL-MCNC: 185 MG/DL
CO2 SERPL-SCNC: 27 MMOL/L (ref 21–32)
CREAT SERPL-MCNC: 0.86 MG/DL (ref 0.55–1.02)
EST. AVERAGE GLUCOSE BLD GHB EST-MCNC: 126 MG/DL
GLOBULIN SER CALC-MCNC: 3 G/DL (ref 2–4)
GLUCOSE SERPL-MCNC: 152 MG/DL (ref 65–100)
HBA1C MFR BLD: 6 % (ref 4–5.6)
HDLC SERPL-MCNC: 114 MG/DL
HDLC SERPL: 1.6 {RATIO} (ref 0–5)
LDLC SERPL CALC-MCNC: 52.2 MG/DL (ref 0–100)
LIPID PROFILE,FLP: NORMAL
POTASSIUM SERPL-SCNC: 4.2 MMOL/L (ref 3.5–5.1)
PROT SERPL-MCNC: 6.9 G/DL (ref 6.4–8.2)
SODIUM SERPL-SCNC: 132 MMOL/L (ref 136–145)
TRIGL SERPL-MCNC: 94 MG/DL (ref ?–150)
VLDLC SERPL CALC-MCNC: 18.8 MG/DL

## 2019-12-23 RX ORDER — METHYLPREDNISOLONE 4 MG/1
TABLET ORAL
Qty: 1 DOSE PACK | Refills: 0 | Status: SHIPPED | OUTPATIENT
Start: 2019-12-23 | End: 2020-07-16 | Stop reason: ALTCHOICE

## 2019-12-23 NOTE — PROGRESS NOTES
Subjective:     Markie Gibson is a 60 y.o. female seen for follow up of diabetes. She also has hypertension and hyperlipidemia. Diabetic Review of Systems - medication compliance: compliant all of the time, diabetic diet compliance: compliant most of the time, home glucose monitoring: is performed regularly, fasting values range , further diabetic ROS: no polyuria or polydipsia, no chest pain, dyspnea or TIA's, no numbness, tingling or pain in extremities, no unusual visual symptoms, last eye exam approximately > 1 year ago. Other symptoms and concerns:   Asthma/COPD - couldn't afford her Symbicort. She has been approved for [facility] and should get the prescription in January. Using her Albuterol HFA 2-3 times a day. Coughing, wheezing, some mild sob. Stopped Trazadone secondary to diarrhea. Current Outpatient Medications   Medication Sig Dispense Refill    ACCU-CHEK FASTCLIX LANCET DRUM misc USE AS DIRECTED TWO TIMES A  Each 3    apixaban (ELIQUIS) 5 mg tablet TAKE 1 TABLET TWICE A  Tab 3    metFORMIN ER (GLUCOPHAGE XR) 500 mg tablet TAKE ONE TABLET BY MOUTH DAILY WITH DINNER 30 Tab 5    lisinopril-hydroCHLOROthiazide (PRINZIDE, ZESTORETIC) 20-12.5 mg per tablet TAKE ONE TABLET BY MOUTH EVERY DAY 30 Tab 11    atorvastatin (LIPITOR) 10 mg tablet TAKE ONE TABLET BY MOUTH EVERY DAY 30 Tab 11    metoprolol succinate (TOPROL-XL) 25 mg XL tablet Take 1 Tab by mouth daily. 30 Tab 11    dilTIAZem CD (CARDIZEM CD) 180 mg ER capsule Take 1 Cap by mouth daily. 30 Cap 11    glucose blood VI test strips (BLOOD GLUCOSE TEST) strip For Accu-chek meter. Check blood sugars bid. E11.9 200 Strip 3    VENTOLIN HFA 90 mcg/actuation inhaler INHALE 2 PUFFS BY MOUTH EVERY 4 HOURS AS NEEDED FOR WHEEZING 1 Inhaler 11    B.infantis-B.ani-B.long-B.bifi (PROBIOTIC 4X) 10-15 mg TbEC Take  by mouth.       budesonide-formoterol (SYMBICORT) 80-4.5 mcg/actuation HFAA USE 2 PUFFS BY MOUTH TWICE A DAY 3 Inhaler 3        Lab Results   Component Value Date/Time    Hemoglobin A1c 6.1 (H) 05/30/2019 11:06 AM    Hemoglobin A1c 7.4 (H) 02/11/2019 05:03 AM    Hemoglobin A1c 7.0 (H) 10/16/2018 09:07 AM    Glucose 172 (H) 05/30/2019 11:06 AM    Glucose (POC) 198 (H) 02/15/2019 11:43 AM    Microalb/Creat ratio (ug/mg creat.) 3.7 05/30/2019 11:06 AM    LDL, calculated 69 05/30/2019 11:06 AM    Creatinine 0.78 05/30/2019 11:06 AM      Lab Results   Component Value Date/Time    Cholesterol, total 174 05/30/2019 11:06 AM    HDL Cholesterol 76 05/30/2019 11:06 AM    LDL, calculated 69 05/30/2019 11:06 AM    Triglyceride 144 05/30/2019 11:06 AM    CHOL/HDL Ratio 1.7 02/11/2019 05:03 AM     Lab Results   Component Value Date/Time    ALT (SGPT) 11 05/30/2019 11:06 AM    AST (SGOT) 12 05/30/2019 11:06 AM    Alk. phosphatase 84 05/30/2019 11:06 AM    Bilirubin, total 0.4 05/30/2019 11:06 AM    Albumin 4.3 05/30/2019 11:06 AM    Protein, total 6.6 05/30/2019 11:06 AM    PLATELET 732 92/94/4337 05:23 AM     Lab Results   Component Value Date/Time    GFR est non-AA 83 05/30/2019 11:06 AM    GFR est AA 96 05/30/2019 11:06 AM    Creatinine 0.78 05/30/2019 11:06 AM    BUN 18 05/30/2019 11:06 AM    Sodium 136 05/30/2019 11:06 AM    Potassium 4.7 05/30/2019 11:06 AM    Chloride 102 05/30/2019 11:06 AM    CO2 18 (L) 05/30/2019 11:06 AM    Magnesium 1.7 02/10/2019 04:46 AM    Phosphorus 2.7 02/10/2019 04:46 AM        Review of Systems  Pertinent items are noted in HPI. Objective:     Visit Vitals  /78   Pulse 94   Temp 98.7 °F (37.1 °C) (Oral)   Resp 20   Ht 5' 8\" (1.727 m)   Wt 170 lb (77.1 kg)   SpO2 90%   BMI 25.85 kg/m²     Appearance: alert, well appearing, and in no distress and oriented to person, place, and time. Exam:  NECK: supple, no lad, no bruit, no tm  LUNGS: cta bilat excempt mild scattered wheezing.     CV rrr, no m/g/r  ABD: soft, nt, nd, nabs  EXT: no c/c/e    Assessment/Plan:     diabetes well controlled. Diabetic issues reviewed with her: discussed DM diet and weight loss for improved BS control  Check labs  Continue same meds    htn  - controlled, cont same    Hyperlipidemia - controlled in past  Continue same meds  Check labs    COPD - .not controlled. Can't afford inhaler at this time. Medrol dose pack. Restart  Symbicort in January    Hm - rx for Shingrix given    Orders Placed This Encounter    METABOLIC PANEL, COMPREHENSIVE    LIPID PANEL    HEMOGLOBIN A1C WITH EAG    varicella-zoster recombinant, PF, (SHINGRIX, PF,) 50 mcg/0.5 mL susr injection    methylPREDNISolone (MEDROL, ANTHONY,) 4 mg tablet     Follow-up and Dispositions    · Return in about 3 months (around 3/23/2020) for diabetes, htn, hyperlipidemia.

## 2020-01-01 ENCOUNTER — PATIENT MESSAGE (OUTPATIENT)
Dept: INTERNAL MEDICINE CLINIC | Age: 60
End: 2020-01-01

## 2020-02-20 ENCOUNTER — DOCUMENTATION ONLY (OUTPATIENT)
Dept: INTERNAL MEDICINE CLINIC | Age: 60
End: 2020-02-20

## 2020-02-20 NOTE — PROGRESS NOTES
Patient informed her Eliquis samples have arrived in the office. She may come and . Patient understood.

## 2020-03-09 RX ORDER — DILTIAZEM HYDROCHLORIDE 180 MG/1
180 CAPSULE, COATED, EXTENDED RELEASE ORAL DAILY
Qty: 30 CAP | Refills: 11 | Status: SHIPPED | OUTPATIENT
Start: 2020-03-09 | End: 2020-03-11 | Stop reason: SDUPTHER

## 2020-03-09 RX ORDER — METFORMIN HYDROCHLORIDE 500 MG/1
TABLET, EXTENDED RELEASE ORAL
Qty: 90 TAB | Refills: 1 | Status: SHIPPED | OUTPATIENT
Start: 2020-03-09 | End: 2020-01-01

## 2020-03-09 RX ORDER — METOPROLOL SUCCINATE 25 MG/1
25 TABLET, EXTENDED RELEASE ORAL DAILY
Qty: 30 TAB | Refills: 11 | Status: SHIPPED | OUTPATIENT
Start: 2020-03-09 | End: 2020-05-22

## 2020-03-10 ENCOUNTER — TELEPHONE (OUTPATIENT)
Dept: INTERNAL MEDICINE CLINIC | Age: 60
End: 2020-03-10

## 2020-03-10 RX ORDER — METFORMIN HYDROCHLORIDE 500 MG/1
TABLET, EXTENDED RELEASE ORAL
Qty: 90 TAB | Refills: 3 | Status: SHIPPED | OUTPATIENT
Start: 2020-03-10 | End: 2020-07-16 | Stop reason: ALTCHOICE

## 2020-03-10 NOTE — TELEPHONE ENCOUNTER
Phone Number: [phone]             Caller (if not patient): N/a   Best contact number(s): [phone]   Name of medication and dosage if known: Metformin 500 mg. Is patient out of this medication (yes/no): Yes. Pharmacy name: [facility]. Pharmacy listed in chart? (yes/no):  Yes. Pharmacy phone number:  [phone]   Date of last visit: Monday, December 23, 2019   Details to clarify the request: Pt would like this refill sent to [facility] like to make sure that her next refill will be sent through [address] [place] [address]. Patient is requesting  Medication be sent also to [address] [place] [address] because mailorder will not be there in time diltiazem CD (CARDIZEM CD) 180 mg ER capsule.  Please contact patient

## 2020-03-10 NOTE — TELEPHONE ENCOUNTER
Sent Metformin RX on 3/9 to [facility] pharmacy.   Will send 90 day supply with [facility]

## 2020-03-11 RX ORDER — DILTIAZEM HYDROCHLORIDE 180 MG/1
180 CAPSULE, COATED, EXTENDED RELEASE ORAL DAILY
Qty: 30 CAP | Refills: 0 | Status: SHIPPED | OUTPATIENT
Start: 2020-03-11 | End: 2020-04-14

## 2020-03-11 NOTE — TELEPHONE ENCOUNTER
Patient will follow up with [facility] about the 30 day supply of Metformin. [facility] sent on 3/9/20 for 90 day supply. Patient request for 30 day supply of Diltiazem to be sent to [facility] pharmacy.

## 2020-03-12 RX ORDER — ATORVASTATIN CALCIUM 10 MG/1
TABLET, FILM COATED ORAL
Qty: 30 TAB | Refills: 11 | Status: SHIPPED | OUTPATIENT
Start: 2020-03-12 | End: 2020-03-12 | Stop reason: SDUPTHER

## 2020-03-12 RX ORDER — ATORVASTATIN CALCIUM 10 MG/1
TABLET, FILM COATED ORAL
Qty: 90 TAB | Refills: 3 | Status: SHIPPED | OUTPATIENT
Start: 2020-03-12 | End: 2020-06-22

## 2020-06-01 ENCOUNTER — DOCUMENTATION ONLY (OUTPATIENT)
Dept: INTERNAL MEDICINE CLINIC | Age: 60
End: 2020-06-01

## 2020-06-01 NOTE — PROGRESS NOTES
Patient informed Eliquis samples were received in our office. Patient states either she will come by the office or [facility] will come to  medication.

## 2020-07-16 ENCOUNTER — OFFICE VISIT (OUTPATIENT)
Dept: INTERNAL MEDICINE CLINIC | Age: 60
End: 2020-07-16

## 2020-07-16 VITALS
SYSTOLIC BLOOD PRESSURE: 117 MMHG | BODY MASS INDEX: 25.09 KG/M2 | DIASTOLIC BLOOD PRESSURE: 75 MMHG | HEART RATE: 78 BPM | WEIGHT: 165 LBS

## 2020-07-16 DIAGNOSIS — J42 CHRONIC BRONCHITIS, UNSPECIFIED CHRONIC BRONCHITIS TYPE (HCC): ICD-10-CM

## 2020-07-16 DIAGNOSIS — E78.00 PURE HYPERCHOLESTEROLEMIA: ICD-10-CM

## 2020-07-16 DIAGNOSIS — E11.9 CONTROLLED TYPE 2 DIABETES MELLITUS WITHOUT COMPLICATION, WITHOUT LONG-TERM CURRENT USE OF INSULIN (HCC): Primary | ICD-10-CM

## 2020-07-16 DIAGNOSIS — I10 ESSENTIAL HYPERTENSION: ICD-10-CM

## 2020-07-16 NOTE — PROGRESS NOTES
1. Have you been to the ER, urgent care clinic since your last visit? Hospitalized since your last visit? No    2. Have you seen or consulted any other health care providers outside of the [address] [place] [address] since your last visit? Include any pap smears or colon screening.  No   Chief Complaint   Patient presents with    Medication Refill

## 2020-07-16 NOTE — PROGRESS NOTES
Darylene Brewster, who was evaluated through a synchronous (real-time) audio-video encounter, and/or her healthcare decision maker, is aware that it is a billable service, with coverage as determined by her insurance carrier. She provided verbal consent to proceed: Yes, and patient identification was verified. It was conducted pursuant to the emergency declaration under the [address] [place] [address], [address] [place] [address] and the [employer] Act. A caregiver was present when appropriate. Ability to conduct physical exam was limited. I was at home. The patient was at home. Darylene Brewster is a 63 y.o. female being evaluated by a Virtual Visit (video visit) encounter to address concerns as mentioned above. A caregiver was present when appropriate. Due to this being a TeleHealth encounter (During BYBPY-23 public health emergency), evaluation of the following organ systems was limited: Vitals/Constitutional/EENT/Resp/CV/GI//MS/Neuro/Skin/Heme-Lymph-Imm. Pursuant to the emergency declaration under the [address] [place] [address], [address] [place] [address] and the [employer] Act, this Virtual Visit was conducted with patient's (and/or legal guardian's) consent, to reduce the risk of exposure to COVID-19 and provide necessary medical care. Services were provided through a video synchronous discussion virtually to substitute for in-person encounter. --Alek Simon MD on 7/16/2020 at 12:30 PM    An electronic signature was used to authenticate this note. Subjective:     Darylene Brewster is a 63 y.o. female seen for follow up of diabetes. She also has hypertension, hyperlipidemia and asthma/copd.   Diabetic Review of Systems - medication compliance: compliant all of the time, diabetic diet compliance: compliant most of the time, not exercising much, home glucose monitorin day average was 100, and the 90 day average was 105, further diabetic ROS: no polyuria or polydipsia, no chest pain, dyspnea or TIA's, no numbness, tingling or pain in extremities, no unusual visual symptoms, last eye exam approximately 2 years ago. Other symptoms and concerns:   Asthma/COPD - back on Symbicort. Not using Ventolin at this time. .    Current Outpatient Medications   Medication Sig Dispense Refill    atorvastatin (LIPITOR) 10 mg tablet TAKE ONE TABLET BY MOUTH EVERY DAY 30 Tab 0    metoprolol succinate (TOPROL-XL) 25 mg XL tablet TAKE ONE TABLET BY MOUTH ONCE DAILY. 90 Tab 3    dilTIAZem CD (CARDIZEM CD) 180 mg ER capsule TAKE ONE CAPSULE BY MOUTH ONCE DAILY. 90 Cap 3    Accu-Chek Guide strip CHECK BLOOD SUGAR TWO TIMES A  Strip 3    metFORMIN ER (GLUCOPHAGE XR) 500 mg tablet TAKE ONE TABLET BY MOUTH DAILY WITH DINNER 90 Tab 1    ACCU-CHEK FASTCLIX LANCET DRUM misc USE AS DIRECTED TWO TIMES A  Each 3    apixaban (ELIQUIS) 5 mg tablet TAKE 1 TABLET TWICE A  Tab 3    budesonide-formoterol (SYMBICORT) 80-4.5 mcg/actuation HFAA USE 2 PUFFS BY MOUTH TWICE A DAY 3 Inhaler 3    lisinopril-hydroCHLOROthiazide (PRINZIDE, ZESTORETIC) 20-12.5 mg per tablet TAKE ONE TABLET BY MOUTH EVERY DAY 30 Tab 11    B.infantis-B.ani-B.long-B.bifi (PROBIOTIC 4X) 10-15 mg TbEC Take  by mouth.       VENTOLIN HFA 90 mcg/actuation inhaler INHALE 2 PUFFS BY MOUTH EVERY 4 HOURS AS NEEDED FOR WHEEZING 1 Inhaler 11        Lab Results   Component Value Date/Time    Hemoglobin A1c 6.0 (H) 2019 09:37 AM    Hemoglobin A1c 6.1 (H) 2019 11:06 AM    Hemoglobin A1c 7.4 (H) 2019 05:03 AM    Glucose 152 (H) 2019 09:37 AM    Glucose (POC) 198 (H) 02/15/2019 11:43 AM    Microalb/Creat ratio (ug/mg creat.) 3.7 2019 11:06 AM    LDL, calculated 52.2 2019 09:37 AM    Creatinine 0.86 2019 09:37 AM      Lab Results   Component Value Date/Time Cholesterol, total 185 12/23/2019 09:37 AM    HDL Cholesterol 114 12/23/2019 09:37 AM    LDL, calculated 52.2 12/23/2019 09:37 AM    Triglyceride 94 12/23/2019 09:37 AM    CHOL/HDL Ratio 1.6 12/23/2019 09:37 AM     Lab Results   Component Value Date/Time    ALT (SGPT) 20 12/23/2019 09:37 AM    Alk. phosphatase 89 12/23/2019 09:37 AM    Bilirubin, total 0.9 12/23/2019 09:37 AM    Albumin 3.9 12/23/2019 09:37 AM    Protein, total 6.9 12/23/2019 09:37 AM    PLATELET 633 99/39/9810 05:23 AM     Lab Results   Component Value Date/Time    GFR est non-AA >60 12/23/2019 09:37 AM    GFR est AA >60 12/23/2019 09:37 AM    Creatinine 0.86 12/23/2019 09:37 AM    BUN 25 (H) 12/23/2019 09:37 AM    Sodium 132 (L) 12/23/2019 09:37 AM    Potassium 4.2 12/23/2019 09:37 AM    Chloride 99 12/23/2019 09:37 AM    CO2 27 12/23/2019 09:37 AM    Magnesium 1.7 02/10/2019 04:46 AM    Phosphorus 2.7 02/10/2019 04:46 AM        Review of Systems  Pertinent items are noted in HPI. Objective:     Visit Vitals  /75   Pulse 78   Wt 165 lb (74.8 kg)   BMI 25.09 kg/m²     Appearance: alert, well appearing, and in no distress and oriented to person, place, and time. Exam:   NECK - nml appearance  PULM - nml rate and effort  Labs from December reviewed with patient. Good control of DM and cholesterol      Assessment/Plan:     diabetes well controlled. Diabetic issues reviewed with her: discussed diet and exercise  Does not want to have labs drawn at this time. Continue same meds    htn controlled, cont same    Hyperlipidemia-   Check labs    Asthma/copd - . Controlled on Symbicort, cont same    No orders of the defined types were placed in this encounter. Follow-up and Dispositions    · Return in about 3 months (around 10/16/2020) for diabetes, htn, hyperlipidemia.

## 2021-01-01 ENCOUNTER — PATIENT MESSAGE (OUTPATIENT)
Dept: INTERNAL MEDICINE CLINIC | Age: 61
End: 2021-01-01

## 2021-01-01 ENCOUNTER — APPOINTMENT (OUTPATIENT)
Dept: GENERAL RADIOLOGY | Age: 61
DRG: 215 | End: 2021-01-01
Attending: NURSE PRACTITIONER
Payer: COMMERCIAL

## 2021-01-01 ENCOUNTER — APPOINTMENT (OUTPATIENT)
Dept: GENERAL RADIOLOGY | Age: 61
DRG: 215 | End: 2021-01-01
Attending: ANESTHESIOLOGY
Payer: COMMERCIAL

## 2021-01-01 ENCOUNTER — APPOINTMENT (OUTPATIENT)
Dept: GENERAL RADIOLOGY | Age: 61
DRG: 215 | End: 2021-01-01
Attending: THORACIC SURGERY (CARDIOTHORACIC VASCULAR SURGERY)
Payer: COMMERCIAL

## 2021-01-01 ENCOUNTER — APPOINTMENT (OUTPATIENT)
Dept: GENERAL RADIOLOGY | Age: 61
DRG: 215 | End: 2021-01-01
Attending: EMERGENCY MEDICINE
Payer: COMMERCIAL

## 2021-01-01 ENCOUNTER — APPOINTMENT (OUTPATIENT)
Dept: CT IMAGING | Age: 61
DRG: 215 | End: 2021-01-01
Attending: EMERGENCY MEDICINE
Payer: COMMERCIAL

## 2021-01-01 ENCOUNTER — ANESTHESIA (OUTPATIENT)
Dept: CARDIOTHORACIC SURGERY | Age: 61
DRG: 215 | End: 2021-01-01
Payer: COMMERCIAL

## 2021-01-01 ENCOUNTER — APPOINTMENT (OUTPATIENT)
Dept: GENERAL RADIOLOGY | Age: 61
DRG: 215 | End: 2021-01-01
Attending: STUDENT IN AN ORGANIZED HEALTH CARE EDUCATION/TRAINING PROGRAM
Payer: COMMERCIAL

## 2021-01-01 ENCOUNTER — HOSPITAL ENCOUNTER (INPATIENT)
Age: 61
LOS: 6 days | DRG: 215 | End: 2021-08-01
Attending: EMERGENCY MEDICINE | Admitting: ANESTHESIOLOGY
Payer: COMMERCIAL

## 2021-01-01 ENCOUNTER — HOSPITAL ENCOUNTER (OUTPATIENT)
Dept: NON INVASIVE DIAGNOSTICS | Age: 61
Discharge: HOME | End: 2021-07-27
Attending: THORACIC SURGERY (CARDIOTHORACIC VASCULAR SURGERY)

## 2021-01-01 ENCOUNTER — APPOINTMENT (OUTPATIENT)
Dept: NON INVASIVE DIAGNOSTICS | Age: 61
DRG: 215 | End: 2021-01-01
Attending: PHYSICIAN ASSISTANT
Payer: COMMERCIAL

## 2021-01-01 ENCOUNTER — APPOINTMENT (OUTPATIENT)
Dept: VASCULAR SURGERY | Age: 61
DRG: 215 | End: 2021-01-01
Attending: ANESTHESIOLOGY
Payer: COMMERCIAL

## 2021-01-01 ENCOUNTER — ANESTHESIA EVENT (OUTPATIENT)
Dept: CARDIOTHORACIC SURGERY | Age: 61
DRG: 215 | End: 2021-01-01
Payer: COMMERCIAL

## 2021-01-01 ENCOUNTER — APPOINTMENT (OUTPATIENT)
Dept: NON INVASIVE DIAGNOSTICS | Age: 61
DRG: 215 | End: 2021-01-01
Attending: STUDENT IN AN ORGANIZED HEALTH CARE EDUCATION/TRAINING PROGRAM
Payer: COMMERCIAL

## 2021-01-01 ENCOUNTER — APPOINTMENT (OUTPATIENT)
Dept: GENERAL RADIOLOGY | Age: 61
DRG: 215 | End: 2021-01-01
Attending: PHYSICIAN ASSISTANT
Payer: COMMERCIAL

## 2021-01-01 VITALS
OXYGEN SATURATION: 96 % | TEMPERATURE: 101.4 F | WEIGHT: 212.08 LBS | BODY MASS INDEX: 32.14 KG/M2 | DIASTOLIC BLOOD PRESSURE: 59 MMHG | HEIGHT: 68 IN | SYSTOLIC BLOOD PRESSURE: 105 MMHG

## 2021-01-01 DIAGNOSIS — R57.0 CARDIOGENIC SHOCK (HCC): ICD-10-CM

## 2021-01-01 DIAGNOSIS — H57.02 PHYSIOLOGIC ANISOCORIA: ICD-10-CM

## 2021-01-01 DIAGNOSIS — I21.4 NSTEMI (NON-ST ELEVATED MYOCARDIAL INFARCTION) (HCC): Primary | ICD-10-CM

## 2021-01-01 DIAGNOSIS — J96.21 ACUTE ON CHRONIC RESPIRATORY FAILURE WITH HYPOXIA (HCC): ICD-10-CM

## 2021-01-01 DIAGNOSIS — R40.4 SEDATED DUE TO MEDICATION: ICD-10-CM

## 2021-01-01 DIAGNOSIS — R63.30 FEEDING DIFFICULTIES: ICD-10-CM

## 2021-01-01 DIAGNOSIS — I95.9 HYPOTENSION, UNSPECIFIED HYPOTENSION TYPE: ICD-10-CM

## 2021-01-01 DIAGNOSIS — E11.65 TYPE 2 DIABETES MELLITUS WITH HYPERGLYCEMIA, WITHOUT LONG-TERM CURRENT USE OF INSULIN (HCC): ICD-10-CM

## 2021-01-01 DIAGNOSIS — R05.9 COUGH: ICD-10-CM

## 2021-01-01 DIAGNOSIS — J96.90 RESPIRATORY FAILURE REQUIRING INTUBATION (HCC): ICD-10-CM

## 2021-01-01 DIAGNOSIS — R09.02 HYPOXIA: ICD-10-CM

## 2021-01-01 DIAGNOSIS — R06.02 SHORTNESS OF BREATH: ICD-10-CM

## 2021-01-01 DIAGNOSIS — T50.905A SEDATED DUE TO MEDICATION: ICD-10-CM

## 2021-01-01 DIAGNOSIS — I21.3 ST ELEVATION MYOCARDIAL INFARCTION (STEMI), UNSPECIFIED ARTERY (HCC): ICD-10-CM

## 2021-01-01 DIAGNOSIS — E11.9 CONTROLLED TYPE 2 DIABETES MELLITUS WITHOUT COMPLICATION, WITHOUT LONG-TERM CURRENT USE OF INSULIN (HCC): ICD-10-CM

## 2021-01-01 DIAGNOSIS — E87.1 HYPONATREMIA: ICD-10-CM

## 2021-01-01 LAB
ABO + RH BLD: NORMAL
ABO + RH BLD: NORMAL
ACT BLD: 274 SECS (ref 79–138)
ACT BLD: 307 SECS (ref 79–138)
ACT BLD: 406 SECS (ref 79–138)
ACT BLD: 676 SECS (ref 79–138)
ADMINISTERED INITIALS, ADMINIT: NORMAL
ALBUMIN SERPL-MCNC: 2.2 G/DL (ref 3.5–5)
ALBUMIN SERPL-MCNC: 2.3 G/DL (ref 3.5–5)
ALBUMIN SERPL-MCNC: 2.4 G/DL (ref 3.5–5)
ALBUMIN SERPL-MCNC: 2.5 G/DL (ref 3.5–5)
ALBUMIN SERPL-MCNC: 2.6 G/DL (ref 3.5–5)
ALBUMIN SERPL-MCNC: 2.7 G/DL (ref 3.5–5)
ALBUMIN SERPL-MCNC: 2.7 G/DL (ref 3.5–5)
ALBUMIN SERPL-MCNC: 2.8 G/DL (ref 3.5–5)
ALBUMIN SERPL-MCNC: 3.2 G/DL (ref 3.5–5)
ALBUMIN SERPL-MCNC: 3.6 G/DL (ref 3.5–5)
ALBUMIN/GLOB SERPL: 0.9 {RATIO} (ref 1.1–2.2)
ALBUMIN/GLOB SERPL: 1 {RATIO} (ref 1.1–2.2)
ALBUMIN/GLOB SERPL: 1.1 {RATIO} (ref 1.1–2.2)
ALBUMIN/GLOB SERPL: 1.2 {RATIO} (ref 1.1–2.2)
ALBUMIN/GLOB SERPL: 1.4 {RATIO} (ref 1.1–2.2)
ALBUMIN/GLOB SERPL: 1.4 {RATIO} (ref 1.1–2.2)
ALBUMIN/GLOB SERPL: 1.5 {RATIO} (ref 1.1–2.2)
ALBUMIN/GLOB SERPL: 1.6 {RATIO} (ref 1.1–2.2)
ALBUMIN/GLOB SERPL: 1.8 {RATIO} (ref 1.1–2.2)
ALBUMIN/GLOB SERPL: 1.8 {RATIO} (ref 1.1–2.2)
ALP SERPL-CCNC: 34 U/L (ref 45–117)
ALP SERPL-CCNC: 35 U/L (ref 45–117)
ALP SERPL-CCNC: 35 U/L (ref 45–117)
ALP SERPL-CCNC: 36 U/L (ref 45–117)
ALP SERPL-CCNC: 37 U/L (ref 45–117)
ALP SERPL-CCNC: 38 U/L (ref 45–117)
ALP SERPL-CCNC: 38 U/L (ref 45–117)
ALP SERPL-CCNC: 41 U/L (ref 45–117)
ALP SERPL-CCNC: 41 U/L (ref 45–117)
ALP SERPL-CCNC: 43 U/L (ref 45–117)
ALP SERPL-CCNC: 43 U/L (ref 45–117)
ALP SERPL-CCNC: 44 U/L (ref 45–117)
ALP SERPL-CCNC: 44 U/L (ref 45–117)
ALP SERPL-CCNC: 51 U/L (ref 45–117)
ALP SERPL-CCNC: 52 U/L (ref 45–117)
ALP SERPL-CCNC: 96 U/L (ref 45–117)
ALP SERPL-CCNC: 99 U/L (ref 45–117)
ALT SERPL-CCNC: 133 U/L (ref 12–78)
ALT SERPL-CCNC: 136 U/L (ref 12–78)
ALT SERPL-CCNC: 139 U/L (ref 12–78)
ALT SERPL-CCNC: 141 U/L (ref 12–78)
ALT SERPL-CCNC: 147 U/L (ref 12–78)
ALT SERPL-CCNC: 158 U/L (ref 12–78)
ALT SERPL-CCNC: 170 U/L (ref 12–78)
ALT SERPL-CCNC: 171 U/L (ref 12–78)
ALT SERPL-CCNC: 176 U/L (ref 12–78)
ALT SERPL-CCNC: 18 U/L (ref 12–78)
ALT SERPL-CCNC: 180 U/L (ref 12–78)
ALT SERPL-CCNC: 20 U/L (ref 12–78)
ALT SERPL-CCNC: 25 U/L (ref 12–78)
ALT SERPL-CCNC: 26 U/L (ref 12–78)
ALT SERPL-CCNC: 28 U/L (ref 12–78)
ALT SERPL-CCNC: 30 U/L (ref 12–78)
ALT SERPL-CCNC: 30 U/L (ref 12–78)
ALT SERPL-CCNC: 35 U/L (ref 12–78)
ALT SERPL-CCNC: 36 U/L (ref 12–78)
ANION GAP SERPL CALC-SCNC: 10 MMOL/L (ref 5–15)
ANION GAP SERPL CALC-SCNC: 11 MMOL/L (ref 5–15)
ANION GAP SERPL CALC-SCNC: 11 MMOL/L (ref 5–15)
ANION GAP SERPL CALC-SCNC: 5 MMOL/L (ref 5–15)
ANION GAP SERPL CALC-SCNC: 5 MMOL/L (ref 5–15)
ANION GAP SERPL CALC-SCNC: 6 MMOL/L (ref 5–15)
ANION GAP SERPL CALC-SCNC: 7 MMOL/L (ref 5–15)
ANION GAP SERPL CALC-SCNC: 8 MMOL/L (ref 5–15)
ANION GAP SERPL CALC-SCNC: 9 MMOL/L (ref 5–15)
ANION GAP SERPL CALC-SCNC: 9 MMOL/L (ref 5–15)
APPEARANCE UR: CLEAR
APPEARANCE UR: CLEAR
APTT PPP: 25.4 SEC (ref 22.1–31)
APTT PPP: 25.4 SEC (ref 22.1–31)
APTT PPP: 25.5 SEC (ref 22.1–31)
APTT PPP: 25.8 SEC (ref 22.1–31)
APTT PPP: 25.8 SEC (ref 22.1–31)
APTT PPP: 26 SEC (ref 22.1–31)
APTT PPP: 26.2 SEC (ref 22.1–31)
APTT PPP: 26.9 SEC (ref 22.1–31)
APTT PPP: 27.1 SEC (ref 22.1–31)
APTT PPP: 27.5 SEC (ref 22.1–31)
APTT PPP: 27.9 SEC (ref 22.1–31)
APTT PPP: 29.3 SEC (ref 22.1–31)
APTT PPP: 30.4 SEC (ref 22.1–31)
APTT PPP: 31.4 SEC (ref 22.1–31)
APTT PPP: >130 SEC (ref 22.1–31)
ARTERIAL PATENCY WRIST A: ABNORMAL
ARTERIAL PATENCY WRIST A: NEGATIVE
ARTERIAL PATENCY WRIST A: NEGATIVE
ARTERIAL PATENCY WRIST A: YES
ARTERIAL PATENCY WRIST A: YES
AST SERPL-CCNC: 108 U/L (ref 15–37)
AST SERPL-CCNC: 112 U/L (ref 15–37)
AST SERPL-CCNC: 115 U/L (ref 15–37)
AST SERPL-CCNC: 115 U/L (ref 15–37)
AST SERPL-CCNC: 122 U/L (ref 15–37)
AST SERPL-CCNC: 143 U/L (ref 15–37)
AST SERPL-CCNC: 151 U/L (ref 15–37)
AST SERPL-CCNC: 198 U/L (ref 15–37)
AST SERPL-CCNC: 218 U/L (ref 15–37)
AST SERPL-CCNC: 224 U/L (ref 15–37)
AST SERPL-CCNC: 25 U/L (ref 15–37)
AST SERPL-CCNC: 28 U/L (ref 15–37)
AST SERPL-CCNC: 312 U/L (ref 15–37)
AST SERPL-CCNC: 370 U/L (ref 15–37)
AST SERPL-CCNC: 46 U/L (ref 15–37)
AST SERPL-CCNC: 58 U/L (ref 15–37)
AST SERPL-CCNC: 60 U/L (ref 15–37)
AST SERPL-CCNC: 74 U/L (ref 15–37)
AST SERPL-CCNC: 85 U/L (ref 15–37)
ATRIAL RATE: 116 BPM
ATRIAL RATE: 125 BPM
ATRIAL RATE: 214 BPM
ATRIAL RATE: 234 BPM
ATRIAL RATE: 86 BPM
BACTERIA SPEC CULT: NORMAL
BACTERIA URNS QL MICRO: ABNORMAL /HPF
BACTERIA URNS QL MICRO: NEGATIVE /HPF
BASE DEFICIT BLD-SCNC: 2.4 MMOL/L
BASE DEFICIT BLD-SCNC: 3.1 MMOL/L
BASE DEFICIT BLD-SCNC: 3.9 MMOL/L
BASE DEFICIT BLD-SCNC: 4.3 MMOL/L
BASE DEFICIT BLD-SCNC: 4.4 MMOL/L
BASE DEFICIT BLD-SCNC: 4.7 MMOL/L
BASE DEFICIT BLD-SCNC: 5 MMOL/L
BASE DEFICIT BLD-SCNC: 6.7 MMOL/L
BASE DEFICIT BLD-SCNC: 6.8 MMOL/L
BASE DEFICIT BLD-SCNC: 7.3 MMOL/L
BASE DEFICIT BLD-SCNC: 9.5 MMOL/L
BASE DEFICIT BLDA-SCNC: 10.2 MMOL/L
BASE DEFICIT BLDA-SCNC: 6.1 MMOL/L
BASE DEFICIT BLDA-SCNC: 6.5 MMOL/L
BASE DEFICIT BLDV-SCNC: 10.9 MMOL/L
BASE DEFICIT BLDV-SCNC: 13.6 MMOL/L
BASE DEFICIT BLDV-SCNC: 2 MMOL/L
BASE DEFICIT BLDV-SCNC: 4.2 MMOL/L
BASOPHILS # BLD: 0 K/UL (ref 0–0.1)
BASOPHILS # BLD: 0.1 K/UL (ref 0–0.1)
BASOPHILS NFR BLD: 0 % (ref 0–1)
BASOPHILS NFR BLD: 1 % (ref 0–1)
BDY SITE: ABNORMAL
BDY SITE: NORMAL
BILIRUB SERPL-MCNC: 0.8 MG/DL (ref 0.2–1)
BILIRUB SERPL-MCNC: 0.8 MG/DL (ref 0.2–1)
BILIRUB SERPL-MCNC: 0.9 MG/DL (ref 0.2–1)
BILIRUB SERPL-MCNC: 1 MG/DL (ref 0.2–1)
BILIRUB SERPL-MCNC: 1.1 MG/DL (ref 0.2–1)
BILIRUB SERPL-MCNC: 1.2 MG/DL (ref 0.2–1)
BILIRUB SERPL-MCNC: 1.3 MG/DL (ref 0.2–1)
BILIRUB SERPL-MCNC: 1.4 MG/DL (ref 0.2–1)
BILIRUB SERPL-MCNC: 1.5 MG/DL (ref 0.2–1)
BILIRUB SERPL-MCNC: 1.6 MG/DL (ref 0.2–1)
BILIRUB SERPL-MCNC: 1.6 MG/DL (ref 0.2–1)
BILIRUB SERPL-MCNC: 1.9 MG/DL (ref 0.2–1)
BILIRUB SERPL-MCNC: 2 MG/DL (ref 0.2–1)
BILIRUB SERPL-MCNC: 2 MG/DL (ref 0.2–1)
BILIRUB UR QL CFM: NEGATIVE
BILIRUB UR QL: NEGATIVE
BLD PROD TYP BPU: NORMAL
BLOOD GROUP ANTIBODIES SERPL: NORMAL
BLOOD GROUP ANTIBODIES SERPL: NORMAL
BNP SERPL-MCNC: ABNORMAL PG/ML
BNP SERPL-MCNC: ABNORMAL PG/ML (ref 0–125)
BPU ID: NORMAL
BUN SERPL-MCNC: 12 MG/DL (ref 6–20)
BUN SERPL-MCNC: 13 MG/DL (ref 6–20)
BUN SERPL-MCNC: 13 MG/DL (ref 6–20)
BUN SERPL-MCNC: 14 MG/DL (ref 6–20)
BUN SERPL-MCNC: 15 MG/DL (ref 6–20)
BUN SERPL-MCNC: 16 MG/DL (ref 6–20)
BUN SERPL-MCNC: 17 MG/DL (ref 6–20)
BUN SERPL-MCNC: 18 MG/DL (ref 6–20)
BUN SERPL-MCNC: 20 MG/DL (ref 6–20)
BUN SERPL-MCNC: 20 MG/DL (ref 6–20)
BUN/CREAT SERPL: 20 (ref 12–20)
BUN/CREAT SERPL: 20 (ref 12–20)
BUN/CREAT SERPL: 23 (ref 12–20)
BUN/CREAT SERPL: 24 (ref 12–20)
BUN/CREAT SERPL: 25 (ref 12–20)
BUN/CREAT SERPL: 26 (ref 12–20)
BUN/CREAT SERPL: 27 (ref 12–20)
BUN/CREAT SERPL: 28 (ref 12–20)
BUN/CREAT SERPL: 29 (ref 12–20)
BUN/CREAT SERPL: 29 (ref 12–20)
BUN/CREAT SERPL: 30 (ref 12–20)
BUN/CREAT SERPL: 32 (ref 12–20)
BUN/CREAT SERPL: 34 (ref 12–20)
BUN/CREAT SERPL: 39 (ref 12–20)
CA-I BLD-SCNC: 1.03 MMOL/L (ref 1.13–1.32)
CA-I BLD-SCNC: 1.08 MMOL/L (ref 1.13–1.32)
CA-I BLD-SCNC: 1.1 MMOL/L (ref 1.12–1.32)
CA-I BLD-SCNC: 1.14 MMOL/L (ref 1.12–1.32)
CA-I BLD-SCNC: 1.14 MMOL/L (ref 1.12–1.32)
CA-I BLD-SCNC: 1.15 MMOL/L (ref 1.12–1.32)
CA-I BLD-SCNC: 1.21 MMOL/L (ref 1.12–1.32)
CA-I BLD-SCNC: 1.23 MMOL/L (ref 1.13–1.32)
CALCIUM SERPL-MCNC: 6.6 MG/DL (ref 8.5–10.1)
CALCIUM SERPL-MCNC: 6.7 MG/DL (ref 8.5–10.1)
CALCIUM SERPL-MCNC: 6.8 MG/DL (ref 8.5–10.1)
CALCIUM SERPL-MCNC: 6.9 MG/DL (ref 8.5–10.1)
CALCIUM SERPL-MCNC: 6.9 MG/DL (ref 8.5–10.1)
CALCIUM SERPL-MCNC: 7 MG/DL (ref 8.5–10.1)
CALCIUM SERPL-MCNC: 7.2 MG/DL (ref 8.5–10.1)
CALCIUM SERPL-MCNC: 7.3 MG/DL (ref 8.5–10.1)
CALCIUM SERPL-MCNC: 7.4 MG/DL (ref 8.5–10.1)
CALCIUM SERPL-MCNC: 7.5 MG/DL (ref 8.5–10.1)
CALCIUM SERPL-MCNC: 7.9 MG/DL (ref 8.5–10.1)
CALCIUM SERPL-MCNC: 7.9 MG/DL (ref 8.5–10.1)
CALCIUM SERPL-MCNC: 8.2 MG/DL (ref 8.5–10.1)
CALCIUM SERPL-MCNC: 9 MG/DL (ref 8.5–10.1)
CALCULATED P AXIS, ECG09: 68 DEGREES
CALCULATED P AXIS, ECG09: 89 DEGREES
CALCULATED R AXIS, ECG10: 103 DEGREES
CALCULATED R AXIS, ECG10: 107 DEGREES
CALCULATED R AXIS, ECG10: 135 DEGREES
CALCULATED R AXIS, ECG10: 96 DEGREES
CALCULATED R AXIS, ECG10: 96 DEGREES
CALCULATED T AXIS, ECG11: -13 DEGREES
CALCULATED T AXIS, ECG11: -32 DEGREES
CALCULATED T AXIS, ECG11: -70 DEGREES
CALCULATED T AXIS, ECG11: -73 DEGREES
CALCULATED T AXIS, ECG11: 29 DEGREES
CHLORIDE SERPL-SCNC: 101 MMOL/L (ref 97–108)
CHLORIDE SERPL-SCNC: 102 MMOL/L (ref 97–108)
CHLORIDE SERPL-SCNC: 103 MMOL/L (ref 97–108)
CHLORIDE SERPL-SCNC: 104 MMOL/L (ref 97–108)
CHLORIDE SERPL-SCNC: 104 MMOL/L (ref 97–108)
CHLORIDE SERPL-SCNC: 105 MMOL/L (ref 97–108)
CHLORIDE SERPL-SCNC: 107 MMOL/L (ref 97–108)
CHLORIDE SERPL-SCNC: 107 MMOL/L (ref 97–108)
CHLORIDE SERPL-SCNC: 82 MMOL/L (ref 97–108)
CHLORIDE SERPL-SCNC: 88 MMOL/L (ref 97–108)
CHLORIDE SERPL-SCNC: 91 MMOL/L (ref 97–108)
CHLORIDE SERPL-SCNC: 95 MMOL/L (ref 97–108)
CHLORIDE SERPL-SCNC: 98 MMOL/L (ref 97–108)
CHLORIDE SERPL-SCNC: 99 MMOL/L (ref 97–108)
CK SERPL-CCNC: 1293 U/L (ref 26–192)
CK SERPL-CCNC: 134 U/L (ref 26–192)
CK SERPL-CCNC: 152 U/L (ref 26–192)
CK SERPL-CCNC: 157 U/L (ref 26–192)
CK SERPL-CCNC: 157 U/L (ref 26–192)
CK SERPL-CCNC: 166 U/L (ref 26–192)
CK SERPL-CCNC: 170 U/L (ref 26–192)
CK SERPL-CCNC: 189 U/L (ref 26–192)
CK SERPL-CCNC: 206 U/L (ref 26–192)
CK SERPL-CCNC: 2389 U/L (ref 26–192)
CK SERPL-CCNC: 263 U/L (ref 26–192)
CK SERPL-CCNC: 2777 U/L (ref 26–192)
CK SERPL-CCNC: 336 U/L (ref 26–192)
CK SERPL-CCNC: 386 U/L (ref 26–192)
CK SERPL-CCNC: 445 U/L (ref 26–192)
CK SERPL-CCNC: 91 U/L (ref 26–192)
CK SERPL-CCNC: 97 U/L (ref 26–192)
CK SERPL-CCNC: 97 U/L (ref 26–192)
CO2 SERPL-SCNC: 19 MMOL/L (ref 21–32)
CO2 SERPL-SCNC: 20 MMOL/L (ref 21–32)
CO2 SERPL-SCNC: 21 MMOL/L (ref 21–32)
CO2 SERPL-SCNC: 22 MMOL/L (ref 21–32)
CO2 SERPL-SCNC: 23 MMOL/L (ref 21–32)
CO2 SERPL-SCNC: 23 MMOL/L (ref 21–32)
CO2 SERPL-SCNC: 24 MMOL/L (ref 21–32)
CO2 SERPL-SCNC: 24 MMOL/L (ref 21–32)
CO2 SERPL-SCNC: 26 MMOL/L (ref 21–32)
COHGB MFR BLD: 0.8 % (ref 1–2)
COHGB MFR BLD: 0.9 % (ref 1–2)
COHGB MFR BLD: 1.2 % (ref 1–2)
COHGB MFR BLD: 1.3 % (ref 1–2)
COLOR UR: ABNORMAL
COLOR UR: ABNORMAL
COVID-19 RAPID TEST, COVR: NOT DETECTED
CREAT SERPL-MCNC: 0.44 MG/DL (ref 0.55–1.02)
CREAT SERPL-MCNC: 0.45 MG/DL (ref 0.55–1.02)
CREAT SERPL-MCNC: 0.48 MG/DL (ref 0.55–1.02)
CREAT SERPL-MCNC: 0.49 MG/DL (ref 0.55–1.02)
CREAT SERPL-MCNC: 0.5 MG/DL (ref 0.55–1.02)
CREAT SERPL-MCNC: 0.52 MG/DL (ref 0.55–1.02)
CREAT SERPL-MCNC: 0.53 MG/DL (ref 0.55–1.02)
CREAT SERPL-MCNC: 0.54 MG/DL (ref 0.55–1.02)
CREAT SERPL-MCNC: 0.57 MG/DL (ref 0.55–1.02)
CREAT SERPL-MCNC: 0.58 MG/DL (ref 0.55–1.02)
CREAT SERPL-MCNC: 0.59 MG/DL (ref 0.55–1.02)
CREAT SERPL-MCNC: 0.62 MG/DL (ref 0.55–1.02)
CREAT SERPL-MCNC: 0.62 MG/DL (ref 0.55–1.02)
CREAT SERPL-MCNC: 0.63 MG/DL (ref 0.55–1.02)
CREAT SERPL-MCNC: 0.63 MG/DL (ref 0.55–1.02)
CREAT SERPL-MCNC: 0.66 MG/DL (ref 0.55–1.02)
CREAT SERPL-MCNC: 0.68 MG/DL (ref 0.55–1.02)
CREAT SERPL-MCNC: 0.68 MG/DL (ref 0.55–1.02)
CREAT SERPL-MCNC: 0.69 MG/DL (ref 0.55–1.02)
CREAT SERPL-MCNC: 0.7 MG/DL (ref 0.55–1.02)
CREAT SERPL-MCNC: 0.88 MG/DL (ref 0.55–1.02)
CROSSMATCH RESULT,%XM: NORMAL
CRP SERPL-MCNC: 6.66 MG/DL
D DIMER PPP FEU-MCNC: 2.96 MG/L FEU (ref 0–0.65)
D50 ADMINISTERED, D50ADM: 0 ML
D50 ORDER, D50ORD: 0 ML
DIAGNOSIS, 93000: NORMAL
DIFFERENTIAL METHOD BLD: ABNORMAL
ECHO LV EDV A2C: 171.75 ML
ECHO LV EDV A4C: 146.29 ML
ECHO LV EDV BP: 159.67 ML (ref 56–104)
ECHO LV EDV INDEX A4C: 71 ML/M2
ECHO LV EDV INDEX BP: 77.5 ML/M2
ECHO LV EDV NDEX A2C: 83.4 ML/M2
ECHO LV EJECTION FRACTION A2C: 26 PERCENT
ECHO LV EJECTION FRACTION A4C: 37 PERCENT
ECHO LV EJECTION FRACTION BIPLANE: 32.3 PERCENT (ref 55–100)
ECHO LV ESV A2C: 126.51 ML
ECHO LV ESV A4C: 92.58 ML
ECHO LV ESV BP: 108.03 ML (ref 19–49)
ECHO LV ESV INDEX A2C: 61.4 ML/M2
ECHO LV ESV INDEX A4C: 44.9 ML/M2
ECHO LV ESV INDEX BP: 52.4 ML/M2
EOSINOPHIL # BLD: 0 K/UL (ref 0–0.4)
EOSINOPHIL NFR BLD: 0 % (ref 0–7)
EPITH CASTS URNS QL MICRO: ABNORMAL /LPF
EPITH CASTS URNS QL MICRO: ABNORMAL /LPF
ERYTHROCYTE [DISTWIDTH] IN BLOOD BY AUTOMATED COUNT: 12.8 % (ref 11.5–14.5)
ERYTHROCYTE [DISTWIDTH] IN BLOOD BY AUTOMATED COUNT: 13 % (ref 11.5–14.5)
ERYTHROCYTE [DISTWIDTH] IN BLOOD BY AUTOMATED COUNT: 13.2 % (ref 11.5–14.5)
ERYTHROCYTE [DISTWIDTH] IN BLOOD BY AUTOMATED COUNT: 13.3 % (ref 11.5–14.5)
ERYTHROCYTE [DISTWIDTH] IN BLOOD BY AUTOMATED COUNT: 13.8 % (ref 11.5–14.5)
ERYTHROCYTE [DISTWIDTH] IN BLOOD BY AUTOMATED COUNT: 13.9 % (ref 11.5–14.5)
ERYTHROCYTE [DISTWIDTH] IN BLOOD BY AUTOMATED COUNT: 14.1 % (ref 11.5–14.5)
ERYTHROCYTE [DISTWIDTH] IN BLOOD BY AUTOMATED COUNT: 14.4 % (ref 11.5–14.5)
ERYTHROCYTE [DISTWIDTH] IN BLOOD BY AUTOMATED COUNT: 14.5 % (ref 11.5–14.5)
ERYTHROCYTE [DISTWIDTH] IN BLOOD BY AUTOMATED COUNT: 14.6 % (ref 11.5–14.5)
ERYTHROCYTE [DISTWIDTH] IN BLOOD BY AUTOMATED COUNT: 14.6 % (ref 11.5–14.5)
ERYTHROCYTE [DISTWIDTH] IN BLOOD BY AUTOMATED COUNT: 14.7 % (ref 11.5–14.5)
ERYTHROCYTE [DISTWIDTH] IN BLOOD BY AUTOMATED COUNT: 14.9 % (ref 11.5–14.5)
ERYTHROCYTE [DISTWIDTH] IN BLOOD BY AUTOMATED COUNT: 15.1 % (ref 11.5–14.5)
ERYTHROCYTE [DISTWIDTH] IN BLOOD BY AUTOMATED COUNT: 15.2 % (ref 11.5–14.5)
ERYTHROCYTE [DISTWIDTH] IN BLOOD BY AUTOMATED COUNT: 15.3 % (ref 11.5–14.5)
ERYTHROCYTE [DISTWIDTH] IN BLOOD BY AUTOMATED COUNT: 15.5 % (ref 11.5–14.5)
ERYTHROCYTE [DISTWIDTH] IN BLOOD BY AUTOMATED COUNT: 15.6 % (ref 11.5–14.5)
ERYTHROCYTE [DISTWIDTH] IN BLOOD BY AUTOMATED COUNT: 15.8 % (ref 11.5–14.5)
ERYTHROCYTE [DISTWIDTH] IN BLOOD BY AUTOMATED COUNT: 16 % (ref 11.5–14.5)
ERYTHROCYTE [DISTWIDTH] IN BLOOD BY AUTOMATED COUNT: 16.1 % (ref 11.5–14.5)
ERYTHROCYTE [DISTWIDTH] IN BLOOD BY AUTOMATED COUNT: 16.4 % (ref 11.5–14.5)
EST. AVERAGE GLUCOSE BLD GHB EST-MCNC: 128 MG/DL
FIO2 ON VENT: 100 %
GAS FLOW.O2 O2 DELIVERY SYS: ABNORMAL L/MIN
GAS FLOW.O2 SETTING OXYMISER: 17 BPM
GAS FLOW.O2 SETTING OXYMISER: 20 BPM
GAS FLOW.O2 SETTING OXYMISER: 20 BPM
GAS FLOW.O2 SETTING OXYMISER: 22 BPM
GAS FLOW.O2 SETTING OXYMISER: 25 BPM
GAS FLOW.O2 SETTING OXYMISER: 26 BPM
GAS FLOW.O2 SETTING OXYMISER: 30 BPM
GAS FLOW.O2 SETTING OXYMISER: 30 L/MIN
GLOBULIN SER CALC-MCNC: 1.6 G/DL (ref 2–4)
GLOBULIN SER CALC-MCNC: 1.8 G/DL (ref 2–4)
GLOBULIN SER CALC-MCNC: 2 G/DL (ref 2–4)
GLOBULIN SER CALC-MCNC: 2.1 G/DL (ref 2–4)
GLOBULIN SER CALC-MCNC: 2.2 G/DL (ref 2–4)
GLOBULIN SER CALC-MCNC: 2.2 G/DL (ref 2–4)
GLOBULIN SER CALC-MCNC: 2.3 G/DL (ref 2–4)
GLOBULIN SER CALC-MCNC: 3.4 G/DL (ref 2–4)
GLOBULIN SER CALC-MCNC: 3.4 G/DL (ref 2–4)
GLSCOM COMMENTS: NORMAL
GLUCOSE BLD STRIP.AUTO-MCNC: 121 MG/DL (ref 65–117)
GLUCOSE BLD STRIP.AUTO-MCNC: 132 MG/DL (ref 65–117)
GLUCOSE BLD STRIP.AUTO-MCNC: 132 MG/DL (ref 65–117)
GLUCOSE BLD STRIP.AUTO-MCNC: 139 MG/DL (ref 65–117)
GLUCOSE BLD STRIP.AUTO-MCNC: 152 MG/DL (ref 65–117)
GLUCOSE BLD STRIP.AUTO-MCNC: 173 MG/DL (ref 65–117)
GLUCOSE BLD STRIP.AUTO-MCNC: 174 MG/DL (ref 65–117)
GLUCOSE BLD STRIP.AUTO-MCNC: 175 MG/DL (ref 65–117)
GLUCOSE BLD STRIP.AUTO-MCNC: 181 MG/DL (ref 65–117)
GLUCOSE BLD STRIP.AUTO-MCNC: 183 MG/DL (ref 65–117)
GLUCOSE BLD STRIP.AUTO-MCNC: 212 MG/DL (ref 65–117)
GLUCOSE BLD STRIP.AUTO-MCNC: 215 MG/DL (ref 65–117)
GLUCOSE BLD STRIP.AUTO-MCNC: 217 MG/DL (ref 65–117)
GLUCOSE BLD STRIP.AUTO-MCNC: 239 MG/DL (ref 65–117)
GLUCOSE BLD STRIP.AUTO-MCNC: 246 MG/DL (ref 65–117)
GLUCOSE BLD STRIP.AUTO-MCNC: 253 MG/DL (ref 65–117)
GLUCOSE BLD STRIP.AUTO-MCNC: 262 MG/DL (ref 65–117)
GLUCOSE BLD STRIP.AUTO-MCNC: 266 MG/DL (ref 65–117)
GLUCOSE BLD STRIP.AUTO-MCNC: 269 MG/DL (ref 65–117)
GLUCOSE BLD STRIP.AUTO-MCNC: 304 MG/DL (ref 65–117)
GLUCOSE BLD STRIP.AUTO-MCNC: 306 MG/DL (ref 65–117)
GLUCOSE BLD STRIP.AUTO-MCNC: 54 MG/DL (ref 65–117)
GLUCOSE BLD STRIP.AUTO-MCNC: 95 MG/DL (ref 65–117)
GLUCOSE BLD STRIP.AUTO-MCNC: NORMAL MG/DL (ref 65–117)
GLUCOSE SERPL-MCNC: 135 MG/DL (ref 65–100)
GLUCOSE SERPL-MCNC: 136 MG/DL (ref 65–100)
GLUCOSE SERPL-MCNC: 146 MG/DL (ref 65–100)
GLUCOSE SERPL-MCNC: 157 MG/DL (ref 65–100)
GLUCOSE SERPL-MCNC: 161 MG/DL (ref 65–100)
GLUCOSE SERPL-MCNC: 170 MG/DL (ref 65–100)
GLUCOSE SERPL-MCNC: 175 MG/DL (ref 65–100)
GLUCOSE SERPL-MCNC: 175 MG/DL (ref 65–100)
GLUCOSE SERPL-MCNC: 178 MG/DL (ref 65–100)
GLUCOSE SERPL-MCNC: 188 MG/DL (ref 65–100)
GLUCOSE SERPL-MCNC: 201 MG/DL (ref 65–100)
GLUCOSE SERPL-MCNC: 205 MG/DL (ref 65–100)
GLUCOSE SERPL-MCNC: 205 MG/DL (ref 65–100)
GLUCOSE SERPL-MCNC: 210 MG/DL (ref 65–100)
GLUCOSE SERPL-MCNC: 213 MG/DL (ref 65–100)
GLUCOSE SERPL-MCNC: 214 MG/DL (ref 65–100)
GLUCOSE SERPL-MCNC: 233 MG/DL (ref 65–100)
GLUCOSE SERPL-MCNC: 233 MG/DL (ref 65–100)
GLUCOSE SERPL-MCNC: 250 MG/DL (ref 65–100)
GLUCOSE SERPL-MCNC: 268 MG/DL (ref 65–100)
GLUCOSE SERPL-MCNC: 54 MG/DL (ref 65–100)
GLUCOSE UR STRIP.AUTO-MCNC: 250 MG/DL
GLUCOSE UR STRIP.AUTO-MCNC: NEGATIVE MG/DL
GLUCOSE, GLC: 115 MG/DL
GLUCOSE, GLC: 203 MG/DL
GLUCOSE, GLC: 299 MG/DL
GLUCOSE, GLC: 336 MG/DL
GRAM STN SPEC: NORMAL
GRAN CASTS URNS QL MICRO: ABNORMAL /LPF
HBA1C MFR BLD: 6.1 % (ref 4–5.6)
HCO3 BLD-SCNC: 17.1 MMOL/L (ref 22–26)
HCO3 BLD-SCNC: 17.7 MMOL/L (ref 22–26)
HCO3 BLD-SCNC: 18.4 MMOL/L (ref 22–26)
HCO3 BLD-SCNC: 19.8 MMOL/L (ref 22–26)
HCO3 BLD-SCNC: 20 MMOL/L (ref 22–26)
HCO3 BLD-SCNC: 20.1 MMOL/L (ref 22–26)
HCO3 BLD-SCNC: 21.8 MMOL/L (ref 22–26)
HCO3 BLD-SCNC: 23.1 MMOL/L (ref 22–26)
HCO3 BLD-SCNC: 23.1 MMOL/L (ref 22–26)
HCO3 BLD-SCNC: 23.3 MMOL/L (ref 22–26)
HCO3 BLD-SCNC: 23.8 MMOL/L (ref 22–26)
HCO3 BLDA-SCNC: 17 MMOL/L (ref 22–26)
HCO3 BLDA-SCNC: 18 MMOL/L (ref 22–26)
HCO3 BLDA-SCNC: 19 MMOL/L (ref 22–26)
HCO3 BLDV-SCNC: 11.7 MMOL/L (ref 23–28)
HCO3 BLDV-SCNC: 18.3 MMOL/L (ref 23–28)
HCO3 BLDV-SCNC: 21.8 MMOL/L (ref 23–28)
HCO3 BLDV-SCNC: 24 MMOL/L (ref 23–28)
HCT VFR BLD AUTO: 19.4 % (ref 35–47)
HCT VFR BLD AUTO: 19.4 % (ref 35–47)
HCT VFR BLD AUTO: 19.8 % (ref 35–47)
HCT VFR BLD AUTO: 20.3 % (ref 35–47)
HCT VFR BLD AUTO: 20.8 % (ref 35–47)
HCT VFR BLD AUTO: 20.8 % (ref 35–47)
HCT VFR BLD AUTO: 21.1 % (ref 35–47)
HCT VFR BLD AUTO: 21.8 % (ref 35–47)
HCT VFR BLD AUTO: 21.8 % (ref 35–47)
HCT VFR BLD AUTO: 21.9 % (ref 35–47)
HCT VFR BLD AUTO: 22.1 % (ref 35–47)
HCT VFR BLD AUTO: 22.3 % (ref 35–47)
HCT VFR BLD AUTO: 22.8 % (ref 35–47)
HCT VFR BLD AUTO: 24 % (ref 35–47)
HCT VFR BLD AUTO: 24 % (ref 35–47)
HCT VFR BLD AUTO: 24.2 % (ref 35–47)
HCT VFR BLD AUTO: 24.5 % (ref 35–47)
HCT VFR BLD AUTO: 24.7 % (ref 35–47)
HCT VFR BLD AUTO: 31.3 % (ref 35–47)
HCT VFR BLD AUTO: 36 % (ref 35–47)
HCT VFR BLD AUTO: 36.1 % (ref 35–47)
HEMOCCULT STL QL: POSITIVE
HGB BLD OXIMETRY-MCNC: 6.8 G/DL (ref 14–17)
HGB BLD OXIMETRY-MCNC: 7.7 G/DL (ref 14–17)
HGB BLD OXIMETRY-MCNC: 8 G/DL (ref 14–17)
HGB BLD OXIMETRY-MCNC: 9.2 G/DL (ref 14–17)
HGB BLD-MCNC: 11.1 G/DL (ref 11.5–16)
HGB BLD-MCNC: 12.5 G/DL (ref 11.5–16)
HGB BLD-MCNC: 12.6 G/DL (ref 11.5–16)
HGB BLD-MCNC: 6.5 G/DL (ref 11.5–16)
HGB BLD-MCNC: 6.7 G/DL (ref 11.5–16)
HGB BLD-MCNC: 6.8 G/DL (ref 11.5–16)
HGB BLD-MCNC: 6.8 G/DL (ref 11.5–16)
HGB BLD-MCNC: 7 G/DL (ref 11.5–16)
HGB BLD-MCNC: 7.1 G/DL (ref 11.5–16)
HGB BLD-MCNC: 7.3 G/DL (ref 11.5–16)
HGB BLD-MCNC: 7.3 G/DL (ref 11.5–16)
HGB BLD-MCNC: 7.4 G/DL (ref 11.5–16)
HGB BLD-MCNC: 7.4 G/DL (ref 11.5–16)
HGB BLD-MCNC: 7.5 G/DL (ref 11.5–16)
HGB BLD-MCNC: 7.5 G/DL (ref 11.5–16)
HGB BLD-MCNC: 7.8 G/DL (ref 11.5–16)
HGB BLD-MCNC: 7.9 G/DL (ref 11.5–16)
HGB BLD-MCNC: 8 G/DL (ref 11.5–16)
HGB BLD-MCNC: 8.2 G/DL (ref 11.5–16)
HGB BLD-MCNC: 8.3 G/DL (ref 11.5–16)
HGB BLD-MCNC: 8.5 G/DL (ref 11.5–16)
HGB UR QL STRIP: ABNORMAL
HGB UR QL STRIP: NEGATIVE
HIGH TARGET, HITG: 140 MG/DL
HISTORY CHECKED?,CKHIST: NORMAL
HYALINE CASTS URNS QL MICRO: ABNORMAL /LPF (ref 0–5)
IMM GRANULOCYTES # BLD AUTO: 0 K/UL
IMM GRANULOCYTES # BLD AUTO: 0 K/UL (ref 0–0.04)
IMM GRANULOCYTES # BLD AUTO: 0.1 K/UL (ref 0–0.04)
IMM GRANULOCYTES # BLD AUTO: 0.2 K/UL (ref 0–0.04)
IMM GRANULOCYTES # BLD AUTO: 0.4 K/UL (ref 0–0.04)
IMM GRANULOCYTES NFR BLD AUTO: 0 %
IMM GRANULOCYTES NFR BLD AUTO: 0 % (ref 0–0.5)
IMM GRANULOCYTES NFR BLD AUTO: 1 % (ref 0–0.5)
IMM GRANULOCYTES NFR BLD AUTO: 2 % (ref 0–0.5)
INR PPP: 1.1 (ref 0.9–1.1)
INR PPP: 1.2 (ref 0.9–1.1)
INR PPP: 1.3 (ref 0.9–1.1)
INR PPP: 1.4 (ref 0.9–1.1)
INR PPP: 1.4 (ref 0.9–1.1)
INSPIRATION.DURATION SETTING TIME VENT: 0.7 SEC
INSPIRATION.DURATION SETTING TIME VENT: 0.75 SEC
INSPIRATION.DURATION SETTING TIME VENT: 0.92 SEC
INSPIRATION.DURATION SETTING TIME VENT: 1.15 SEC
INSULIN ADMINSTERED, INSADM: 2.2 UNITS/HOUR
INSULIN ADMINSTERED, INSADM: 5.7 UNITS/HOUR
INSULIN ADMINSTERED, INSADM: 7.2 UNITS/HOUR
INSULIN ADMINSTERED, INSADM: 8.3 UNITS/HOUR
INSULIN ORDER, INSORD: 2.2 UNITS/HOUR
INSULIN ORDER, INSORD: 5.7 UNITS/HOUR
INSULIN ORDER, INSORD: 7.2 UNITS/HOUR
INSULIN ORDER, INSORD: 8.3 UNITS/HOUR
KETONES UR QL STRIP.AUTO: 15 MG/DL
KETONES UR QL STRIP.AUTO: NEGATIVE MG/DL
L PNEUMO1 AG UR QL IA: NEGATIVE
LACTATE SERPL-SCNC: 0.8 MMOL/L (ref 0.4–2)
LACTATE SERPL-SCNC: 0.9 MMOL/L (ref 0.4–2)
LACTATE SERPL-SCNC: 1 MMOL/L (ref 0.4–2)
LACTATE SERPL-SCNC: 1 MMOL/L (ref 0.4–2)
LACTATE SERPL-SCNC: 1.1 MMOL/L (ref 0.4–2)
LACTATE SERPL-SCNC: 1.1 MMOL/L (ref 0.4–2)
LACTATE SERPL-SCNC: 1.3 MMOL/L (ref 0.4–2)
LACTATE SERPL-SCNC: 1.4 MMOL/L (ref 0.4–2)
LACTATE SERPL-SCNC: 1.7 MMOL/L (ref 0.4–2)
LACTATE SERPL-SCNC: 1.7 MMOL/L (ref 0.4–2)
LACTATE SERPL-SCNC: 2.4 MMOL/L (ref 0.4–2)
LDH SERPL L TO P-CCNC: 396 U/L (ref 81–246)
LDH SERPL L TO P-CCNC: 422 U/L (ref 81–246)
LDH SERPL L TO P-CCNC: 462 U/L (ref 81–246)
LDH SERPL L TO P-CCNC: 518 U/L (ref 81–246)
LDH SERPL L TO P-CCNC: 519 U/L (ref 81–246)
LDH SERPL L TO P-CCNC: 638 U/L (ref 81–246)
LEFT DIST ATA VELOCITY: 5.7 CM/S
LEFT DIST PTA PSV: 6.4 CM/S
LEFT POP A PROX VEL RATIO: 0.76
LEFT POPLITEAL DIST SYS PSV: 11.7 CM/S
LEFT POPLITEAL PROX SYS PSV: 15.5 CM/S
LEFT PROX PFA A PSV: 23.6 CM/S
LEFT PROX PTA PSV: 9.5 CM/S
LEFT SUPER FEMORAL DIST SYS PSV: 20.3 CM/S
LEFT SUPER FEMORAL PROX SYS PSV: 25.3 CM/S
LEUKOCYTE ESTERASE UR QL STRIP.AUTO: NEGATIVE
LEUKOCYTE ESTERASE UR QL STRIP.AUTO: NEGATIVE
LOW TARGET, LOT: 100 MG/DL
LYMPHOCYTES # BLD: 0.2 K/UL (ref 0.8–3.5)
LYMPHOCYTES # BLD: 0.3 K/UL (ref 0.8–3.5)
LYMPHOCYTES # BLD: 0.4 K/UL (ref 0.8–3.5)
LYMPHOCYTES # BLD: 0.5 K/UL (ref 0.8–3.5)
LYMPHOCYTES # BLD: 0.5 K/UL (ref 0.8–3.5)
LYMPHOCYTES # BLD: 0.6 K/UL (ref 0.8–3.5)
LYMPHOCYTES # BLD: 0.8 K/UL (ref 0.8–3.5)
LYMPHOCYTES # BLD: 0.9 K/UL (ref 0.8–3.5)
LYMPHOCYTES # BLD: 1 K/UL (ref 0.8–3.5)
LYMPHOCYTES # BLD: 1.2 K/UL (ref 0.8–3.5)
LYMPHOCYTES # BLD: 1.3 K/UL (ref 0.8–3.5)
LYMPHOCYTES NFR BLD: 2 % (ref 12–49)
LYMPHOCYTES NFR BLD: 3 % (ref 12–49)
LYMPHOCYTES NFR BLD: 4 % (ref 12–49)
LYMPHOCYTES NFR BLD: 4 % (ref 12–49)
LYMPHOCYTES NFR BLD: 5 % (ref 12–49)
LYMPHOCYTES NFR BLD: 6 % (ref 12–49)
LYMPHOCYTES NFR BLD: 7 % (ref 12–49)
LYMPHOCYTES NFR BLD: 8 % (ref 12–49)
LYMPHOCYTES NFR BLD: 9 % (ref 12–49)
M PNEUMO IGM SER IA-ACNC: NONREACTIVE
MAGNESIUM SERPL-MCNC: 1.4 MG/DL (ref 1.6–2.4)
MAGNESIUM SERPL-MCNC: 1.7 MG/DL (ref 1.6–2.4)
MAGNESIUM SERPL-MCNC: 1.8 MG/DL (ref 1.6–2.4)
MAGNESIUM SERPL-MCNC: 1.9 MG/DL (ref 1.6–2.4)
MAGNESIUM SERPL-MCNC: 2 MG/DL (ref 1.6–2.4)
MAGNESIUM SERPL-MCNC: 2.1 MG/DL (ref 1.6–2.4)
MAGNESIUM SERPL-MCNC: 2.2 MG/DL (ref 1.6–2.4)
MAGNESIUM SERPL-MCNC: 2.4 MG/DL (ref 1.6–2.4)
MAGNESIUM SERPL-MCNC: 2.6 MG/DL (ref 1.6–2.4)
MAGNESIUM SERPL-MCNC: 2.6 MG/DL (ref 1.6–2.4)
MCH RBC QN AUTO: 30.2 PG (ref 26–34)
MCH RBC QN AUTO: 30.3 PG (ref 26–34)
MCH RBC QN AUTO: 30.5 PG (ref 26–34)
MCH RBC QN AUTO: 30.5 PG (ref 26–34)
MCH RBC QN AUTO: 30.7 PG (ref 26–34)
MCH RBC QN AUTO: 30.7 PG (ref 26–34)
MCH RBC QN AUTO: 30.8 PG (ref 26–34)
MCH RBC QN AUTO: 30.9 PG (ref 26–34)
MCH RBC QN AUTO: 31 PG (ref 26–34)
MCH RBC QN AUTO: 31.1 PG (ref 26–34)
MCH RBC QN AUTO: 31.2 PG (ref 26–34)
MCH RBC QN AUTO: 31.3 PG (ref 26–34)
MCH RBC QN AUTO: 31.3 PG (ref 26–34)
MCH RBC QN AUTO: 31.6 PG (ref 26–34)
MCH RBC QN AUTO: 31.9 PG (ref 26–34)
MCHC RBC AUTO-ENTMCNC: 32.9 G/DL (ref 30–36.5)
MCHC RBC AUTO-ENTMCNC: 33.5 G/DL (ref 30–36.5)
MCHC RBC AUTO-ENTMCNC: 33.6 G/DL (ref 30–36.5)
MCHC RBC AUTO-ENTMCNC: 33.7 G/DL (ref 30–36.5)
MCHC RBC AUTO-ENTMCNC: 33.8 G/DL (ref 30–36.5)
MCHC RBC AUTO-ENTMCNC: 33.9 G/DL (ref 30–36.5)
MCHC RBC AUTO-ENTMCNC: 34.3 G/DL (ref 30–36.5)
MCHC RBC AUTO-ENTMCNC: 34.4 G/DL (ref 30–36.5)
MCHC RBC AUTO-ENTMCNC: 34.5 G/DL (ref 30–36.5)
MCHC RBC AUTO-ENTMCNC: 34.6 G/DL (ref 30–36.5)
MCHC RBC AUTO-ENTMCNC: 35 G/DL (ref 30–36.5)
MCHC RBC AUTO-ENTMCNC: 35.1 G/DL (ref 30–36.5)
MCHC RBC AUTO-ENTMCNC: 35.5 G/DL (ref 30–36.5)
MCHC RBC AUTO-ENTMCNC: 35.7 G/DL (ref 30–36.5)
MCHC RBC AUTO-ENTMCNC: 35.8 G/DL (ref 30–36.5)
MCV RBC AUTO: 86.3 FL (ref 80–99)
MCV RBC AUTO: 87.6 FL (ref 80–99)
MCV RBC AUTO: 88.5 FL (ref 80–99)
MCV RBC AUTO: 88.9 FL (ref 80–99)
MCV RBC AUTO: 89.3 FL (ref 80–99)
MCV RBC AUTO: 89.6 FL (ref 80–99)
MCV RBC AUTO: 89.8 FL (ref 80–99)
MCV RBC AUTO: 89.9 FL (ref 80–99)
MCV RBC AUTO: 89.9 FL (ref 80–99)
MCV RBC AUTO: 90.1 FL (ref 80–99)
MCV RBC AUTO: 90.5 FL (ref 80–99)
MCV RBC AUTO: 91.2 FL (ref 80–99)
MCV RBC AUTO: 91.4 FL (ref 80–99)
MCV RBC AUTO: 91.6 FL (ref 80–99)
MCV RBC AUTO: 91.7 FL (ref 80–99)
MCV RBC AUTO: 91.9 FL (ref 80–99)
MCV RBC AUTO: 92 FL (ref 80–99)
MCV RBC AUTO: 92.1 FL (ref 80–99)
MCV RBC AUTO: 92.4 FL (ref 80–99)
MCV RBC AUTO: 92.7 FL (ref 80–99)
METAMYELOCYTES NFR BLD MANUAL: 1 %
METHGB MFR BLD: 0.8 % (ref 0–1.4)
METHGB MFR BLD: 1.1 % (ref 0–1.4)
METHGB MFR BLD: 1.3 % (ref 0–1.4)
METHGB MFR BLD: 1.4 % (ref 0–1.4)
MINUTES UNTIL NEXT BG, NBG: 60 MIN
MONOCYTES # BLD: 0.4 K/UL (ref 0–1)
MONOCYTES # BLD: 0.5 K/UL (ref 0–1)
MONOCYTES # BLD: 0.6 K/UL (ref 0–1)
MONOCYTES # BLD: 0.7 K/UL (ref 0–1)
MONOCYTES # BLD: 0.9 K/UL (ref 0–1)
MONOCYTES # BLD: 1 K/UL (ref 0–1)
MONOCYTES # BLD: 1.1 K/UL (ref 0–1)
MONOCYTES # BLD: 1.2 K/UL (ref 0–1)
MONOCYTES # BLD: 1.2 K/UL (ref 0–1)
MONOCYTES # BLD: 1.3 K/UL (ref 0–1)
MONOCYTES # BLD: 1.6 K/UL (ref 0–1)
MONOCYTES # BLD: 1.6 K/UL (ref 0–1)
MONOCYTES # BLD: 2.1 K/UL (ref 0–1)
MONOCYTES # BLD: 2.1 K/UL (ref 0–1)
MONOCYTES # BLD: 2.6 K/UL (ref 0–1)
MONOCYTES # BLD: 2.6 K/UL (ref 0–1)
MONOCYTES # BLD: 2.8 K/UL (ref 0–1)
MONOCYTES # BLD: 2.8 K/UL (ref 0–1)
MONOCYTES # BLD: 3 K/UL (ref 0–1)
MONOCYTES # BLD: 3 K/UL (ref 0–1)
MONOCYTES # BLD: 3.3 K/UL (ref 0–1)
MONOCYTES NFR BLD: 10 % (ref 5–13)
MONOCYTES NFR BLD: 12 % (ref 5–13)
MONOCYTES NFR BLD: 12 % (ref 5–13)
MONOCYTES NFR BLD: 13 % (ref 5–13)
MONOCYTES NFR BLD: 17 % (ref 5–13)
MONOCYTES NFR BLD: 17 % (ref 5–13)
MONOCYTES NFR BLD: 19 % (ref 5–13)
MONOCYTES NFR BLD: 19 % (ref 5–13)
MONOCYTES NFR BLD: 20 % (ref 5–13)
MONOCYTES NFR BLD: 24 % (ref 5–13)
MONOCYTES NFR BLD: 4 % (ref 5–13)
MONOCYTES NFR BLD: 5 % (ref 5–13)
MONOCYTES NFR BLD: 7 % (ref 5–13)
MONOCYTES NFR BLD: 7 % (ref 5–13)
MONOCYTES NFR BLD: 8 % (ref 5–13)
MONOCYTES NFR BLD: 9 % (ref 5–13)
MONOCYTES NFR BLD: 9 % (ref 5–13)
MULTIPLIER, MUL: 0.03
MULTIPLIER, MUL: 0.03
MULTIPLIER, MUL: 0.04
MULTIPLIER, MUL: 0.04
MYELOCYTES NFR BLD MANUAL: 1 %
NEUTS BAND NFR BLD MANUAL: 1 % (ref 0–6)
NEUTS BAND NFR BLD MANUAL: 1 % (ref 0–6)
NEUTS BAND NFR BLD MANUAL: 2 % (ref 0–6)
NEUTS BAND NFR BLD MANUAL: 2 % (ref 0–6)
NEUTS BAND NFR BLD MANUAL: 4 % (ref 0–6)
NEUTS BAND NFR BLD MANUAL: 7 % (ref 0–6)
NEUTS SEG # BLD: 10 K/UL (ref 1.8–8)
NEUTS SEG # BLD: 10.4 K/UL (ref 1.8–8)
NEUTS SEG # BLD: 10.6 K/UL (ref 1.8–8)
NEUTS SEG # BLD: 11.2 K/UL (ref 1.8–8)
NEUTS SEG # BLD: 11.5 K/UL (ref 1.8–8)
NEUTS SEG # BLD: 12 K/UL (ref 1.8–8)
NEUTS SEG # BLD: 12.4 K/UL (ref 1.8–8)
NEUTS SEG # BLD: 12.8 K/UL (ref 1.8–8)
NEUTS SEG # BLD: 13.4 K/UL (ref 1.8–8)
NEUTS SEG # BLD: 13.7 K/UL (ref 1.8–8)
NEUTS SEG # BLD: 13.7 K/UL (ref 1.8–8)
NEUTS SEG # BLD: 14.8 K/UL (ref 1.8–8)
NEUTS SEG # BLD: 14.9 K/UL (ref 1.8–8)
NEUTS SEG # BLD: 15.7 K/UL (ref 1.8–8)
NEUTS SEG # BLD: 18 K/UL (ref 1.8–8)
NEUTS SEG # BLD: 18.2 K/UL (ref 1.8–8)
NEUTS SEG # BLD: 6.9 K/UL (ref 1.8–8)
NEUTS SEG # BLD: 7.1 K/UL (ref 1.8–8)
NEUTS SEG # BLD: 8 K/UL (ref 1.8–8)
NEUTS SEG # BLD: 9.6 K/UL (ref 1.8–8)
NEUTS SEG # BLD: 9.7 K/UL (ref 1.8–8)
NEUTS SEG NFR BLD: 71 % (ref 32–75)
NEUTS SEG NFR BLD: 71 % (ref 32–75)
NEUTS SEG NFR BLD: 72 % (ref 32–75)
NEUTS SEG NFR BLD: 72 % (ref 32–75)
NEUTS SEG NFR BLD: 75 % (ref 32–75)
NEUTS SEG NFR BLD: 76 % (ref 32–75)
NEUTS SEG NFR BLD: 78 % (ref 32–75)
NEUTS SEG NFR BLD: 78 % (ref 32–75)
NEUTS SEG NFR BLD: 81 % (ref 32–75)
NEUTS SEG NFR BLD: 84 % (ref 32–75)
NEUTS SEG NFR BLD: 85 % (ref 32–75)
NEUTS SEG NFR BLD: 86 % (ref 32–75)
NEUTS SEG NFR BLD: 89 % (ref 32–75)
NEUTS SEG NFR BLD: 90 % (ref 32–75)
NEUTS SEG NFR BLD: 90 % (ref 32–75)
NEUTS SEG NFR BLD: 91 % (ref 32–75)
NEUTS SEG NFR BLD: 91 % (ref 32–75)
NEUTS SEG NFR BLD: 94 % (ref 32–75)
NITRITE UR QL STRIP.AUTO: NEGATIVE
NITRITE UR QL STRIP.AUTO: NEGATIVE
NRBC # BLD: 0 K/UL (ref 0–0.01)
NRBC # BLD: 0.02 K/UL (ref 0–0.01)
NRBC # BLD: 0.04 K/UL (ref 0–0.01)
NRBC # BLD: 0.04 K/UL (ref 0–0.01)
NRBC # BLD: 0.07 K/UL (ref 0–0.01)
NRBC # BLD: 0.13 K/UL (ref 0–0.01)
NRBC # BLD: 0.14 K/UL (ref 0–0.01)
NRBC # BLD: 0.21 K/UL (ref 0–0.01)
NRBC # BLD: 0.24 K/UL (ref 0–0.01)
NRBC # BLD: 0.26 K/UL (ref 0–0.01)
NRBC # BLD: 0.27 K/UL (ref 0–0.01)
NRBC # BLD: 0.28 K/UL (ref 0–0.01)
NRBC # BLD: 0.3 K/UL (ref 0–0.01)
NRBC # BLD: 0.31 K/UL (ref 0–0.01)
NRBC # BLD: 0.32 K/UL (ref 0–0.01)
NRBC BLD-RTO: 0 PER 100 WBC
NRBC BLD-RTO: 0.1 PER 100 WBC
NRBC BLD-RTO: 0.3 PER 100 WBC
NRBC BLD-RTO: 0.3 PER 100 WBC
NRBC BLD-RTO: 0.4 PER 100 WBC
NRBC BLD-RTO: 1 PER 100 WBC
NRBC BLD-RTO: 1.1 PER 100 WBC
NRBC BLD-RTO: 1.2 PER 100 WBC
NRBC BLD-RTO: 1.5 PER 100 WBC
NRBC BLD-RTO: 1.5 PER 100 WBC
NRBC BLD-RTO: 1.6 PER 100 WBC
NRBC BLD-RTO: 1.7 PER 100 WBC
NRBC BLD-RTO: 1.7 PER 100 WBC
NRBC BLD-RTO: 1.8 PER 100 WBC
NRBC BLD-RTO: 1.8 PER 100 WBC
NRBC BLD-RTO: 2 PER 100 WBC
NRBC BLD-RTO: 2.2 PER 100 WBC
O2/TOTAL GAS SETTING VFR VENT: 100 %
O2/TOTAL GAS SETTING VFR VENT: 40 %
O2/TOTAL GAS SETTING VFR VENT: 40 %
O2/TOTAL GAS SETTING VFR VENT: 60 %
O2/TOTAL GAS SETTING VFR VENT: 80 %
ORDER INITIALS, ORDINIT: NORMAL
OTHER,OTHU: ABNORMAL
OXYHGB MFR BLD: 48 % (ref 94–97)
OXYHGB MFR BLD: 61 % (ref 94–97)
OXYHGB MFR BLD: 64 % (ref 94–97)
OXYHGB MFR BLD: 73.3 % (ref 94–97)
P-R INTERVAL, ECG05: 196 MS
P-R INTERVAL, ECG05: 198 MS
PATH REV BLD -IMP: ABNORMAL
PAW @ MEAN EXP FLOW ON VENT: 14 CMH2O
PAW @ MEAN EXP FLOW ON VENT: 19 CMH2O
PAW @ MEAN EXP FLOW ON VENT: 20 CMH2O
PCO2 BLD: 26.2 MMHG (ref 35–45)
PCO2 BLD: 30.8 MMHG (ref 35–45)
PCO2 BLD: 33.5 MMHG (ref 35–45)
PCO2 BLD: 35.6 MMHG (ref 35–45)
PCO2 BLD: 42.1 MMHG (ref 35–45)
PCO2 BLD: 42.7 MMHG (ref 35–45)
PCO2 BLD: 45.5 MMHG (ref 35–45)
PCO2 BLD: 47 MMHG (ref 35–45)
PCO2 BLD: 49.1 MMHG (ref 35–45)
PCO2 BLD: 56.3 MMHG (ref 35–45)
PCO2 BLD: 64.2 MMHG (ref 35–45)
PCO2 BLDA: 35 MMHG (ref 35–45)
PCO2 BLDA: 37 MMHG (ref 35–45)
PCO2 BLDA: 43 MMHG (ref 35–45)
PCO2 BLDV: 23.2 MMHG (ref 41–51)
PCO2 BLDV: 43.9 MMHG (ref 41–51)
PCO2 BLDV: 46.4 MMHG (ref 41–51)
PCO2 BLDV: 52.9 MMHG (ref 41–51)
PEEP RESPIRATORY: 10 CM[H2O]
PEEP RESPIRATORY: 5 CMH2O
PEEP RESPIRATORY: 8 CMH2O
PEEP RESPIRATORY: 9 CMH2O
PH BLD: 7.18 [PH] (ref 7.35–7.45)
PH BLD: 7.22 [PH] (ref 7.35–7.45)
PH BLD: 7.23 [PH] (ref 7.35–7.45)
PH BLD: 7.25 [PH] (ref 7.35–7.45)
PH BLD: 7.28 [PH] (ref 7.35–7.45)
PH BLD: 7.28 [PH] (ref 7.35–7.45)
PH BLD: 7.35 [PH] (ref 7.35–7.45)
PH BLD: 7.35 [PH] (ref 7.35–7.45)
PH BLD: 7.36 [PH] (ref 7.35–7.45)
PH BLD: 7.42 [PH] (ref 7.35–7.45)
PH BLD: 7.42 [PH] (ref 7.35–7.45)
PH BLDA: 7.22 [PH] (ref 7.35–7.45)
PH BLDA: 7.33 [PH] (ref 7.35–7.45)
PH BLDA: 7.34 [PH] (ref 7.35–7.45)
PH BLDV: 7.15 [PH] (ref 7.32–7.42)
PH BLDV: 7.3 [PH] (ref 7.32–7.42)
PH BLDV: 7.31 [PH] (ref 7.32–7.42)
PH BLDV: 7.33 [PH] (ref 7.32–7.42)
PH UR STRIP: 5.5 [PH] (ref 5–8)
PH UR STRIP: 6 [PH] (ref 5–8)
PHOSPHATE SERPL-MCNC: 2 MG/DL (ref 2.6–4.7)
PHOSPHATE SERPL-MCNC: 2 MG/DL (ref 2.6–4.7)
PHOSPHATE SERPL-MCNC: 2.2 MG/DL (ref 2.6–4.7)
PHOSPHATE SERPL-MCNC: 2.4 MG/DL (ref 2.6–4.7)
PHOSPHATE SERPL-MCNC: 2.6 MG/DL (ref 2.6–4.7)
PHOSPHATE SERPL-MCNC: 2.6 MG/DL (ref 2.6–4.7)
PHOSPHATE SERPL-MCNC: 2.8 MG/DL (ref 2.6–4.7)
PHOSPHATE SERPL-MCNC: 2.9 MG/DL (ref 2.6–4.7)
PHOSPHATE SERPL-MCNC: 3.1 MG/DL (ref 2.6–4.7)
PHOSPHATE SERPL-MCNC: 3.1 MG/DL (ref 2.6–4.7)
PHOSPHATE SERPL-MCNC: 3.3 MG/DL (ref 2.6–4.7)
PHOSPHATE SERPL-MCNC: 3.4 MG/DL (ref 2.6–4.7)
PHOSPHATE SERPL-MCNC: 4.7 MG/DL (ref 2.6–4.7)
PIP ISTAT,IPIP: 22
PIP ISTAT,IPIP: 25
PLATELET # BLD AUTO: 101 K/UL (ref 150–400)
PLATELET # BLD AUTO: 115 K/UL (ref 150–400)
PLATELET # BLD AUTO: 134 K/UL (ref 150–400)
PLATELET # BLD AUTO: 141 K/UL (ref 150–400)
PLATELET # BLD AUTO: 39 K/UL (ref 150–400)
PLATELET # BLD AUTO: 44 K/UL (ref 150–400)
PLATELET # BLD AUTO: 46 K/UL (ref 150–400)
PLATELET # BLD AUTO: 47 K/UL (ref 150–400)
PLATELET # BLD AUTO: 49 K/UL (ref 150–400)
PLATELET # BLD AUTO: 50 K/UL (ref 150–400)
PLATELET # BLD AUTO: 53 K/UL (ref 150–400)
PLATELET # BLD AUTO: 54 K/UL (ref 150–400)
PLATELET # BLD AUTO: 57 K/UL (ref 150–400)
PLATELET # BLD AUTO: 57 K/UL (ref 150–400)
PLATELET # BLD AUTO: 58 K/UL (ref 150–400)
PLATELET # BLD AUTO: 58 K/UL (ref 150–400)
PLATELET # BLD AUTO: 60 K/UL (ref 150–400)
PLATELET # BLD AUTO: 60 K/UL (ref 150–400)
PLATELET # BLD AUTO: 65 K/UL (ref 150–400)
PLATELET # BLD AUTO: 72 K/UL (ref 150–400)
PLATELET # BLD AUTO: 86 K/UL (ref 150–400)
PLATELET # BLD AUTO: 92 K/UL (ref 150–400)
PLATELET COMMENTS,PCOM: ABNORMAL
PMV BLD AUTO: 10.9 FL (ref 8.9–12.9)
PMV BLD AUTO: 11.4 FL (ref 8.9–12.9)
PMV BLD AUTO: 11.5 FL (ref 8.9–12.9)
PMV BLD AUTO: 11.6 FL (ref 8.9–12.9)
PMV BLD AUTO: 11.7 FL (ref 8.9–12.9)
PMV BLD AUTO: 11.7 FL (ref 8.9–12.9)
PMV BLD AUTO: 11.8 FL (ref 8.9–12.9)
PMV BLD AUTO: 11.9 FL (ref 8.9–12.9)
PMV BLD AUTO: 11.9 FL (ref 8.9–12.9)
PMV BLD AUTO: 12.2 FL (ref 8.9–12.9)
PMV BLD AUTO: 12.4 FL (ref 8.9–12.9)
PMV BLD AUTO: 12.5 FL (ref 8.9–12.9)
PMV BLD AUTO: 12.6 FL (ref 8.9–12.9)
PMV BLD AUTO: 12.7 FL (ref 8.9–12.9)
PMV BLD AUTO: 12.8 FL (ref 8.9–12.9)
PMV BLD AUTO: 13 FL (ref 8.9–12.9)
PO2 BLD: 112 MMHG (ref 80–100)
PO2 BLD: 148 MMHG (ref 80–100)
PO2 BLD: 161 MMHG (ref 80–100)
PO2 BLD: 52 MMHG (ref 80–100)
PO2 BLD: 54 MMHG (ref 80–100)
PO2 BLD: 54 MMHG (ref 80–100)
PO2 BLD: 61 MMHG (ref 80–100)
PO2 BLD: 62 MMHG (ref 80–100)
PO2 BLD: 71 MMHG (ref 80–100)
PO2 BLD: 73 MMHG (ref 80–100)
PO2 BLD: 76 MMHG (ref 80–100)
PO2 BLDA: 135 MMHG (ref 80–100)
PO2 BLDA: 79 MMHG (ref 80–100)
PO2 BLDA: 98 MMHG (ref 80–100)
PO2 BLDV: 144 MMHG (ref 25–40)
PO2 BLDV: 24 MMHG (ref 25–40)
PO2 BLDV: 31 MMHG (ref 25–40)
PO2 BLDV: 70 MMHG (ref 25–40)
POTASSIUM SERPL-SCNC: 3.2 MMOL/L (ref 3.5–5.1)
POTASSIUM SERPL-SCNC: 3.2 MMOL/L (ref 3.5–5.1)
POTASSIUM SERPL-SCNC: 3.5 MMOL/L (ref 3.5–5.1)
POTASSIUM SERPL-SCNC: 3.6 MMOL/L (ref 3.5–5.1)
POTASSIUM SERPL-SCNC: 3.9 MMOL/L (ref 3.5–5.1)
POTASSIUM SERPL-SCNC: 3.9 MMOL/L (ref 3.5–5.1)
POTASSIUM SERPL-SCNC: 4 MMOL/L (ref 3.5–5.1)
POTASSIUM SERPL-SCNC: 4 MMOL/L (ref 3.5–5.1)
POTASSIUM SERPL-SCNC: 4.1 MMOL/L (ref 3.5–5.1)
POTASSIUM SERPL-SCNC: 4.2 MMOL/L (ref 3.5–5.1)
POTASSIUM SERPL-SCNC: 4.3 MMOL/L (ref 3.5–5.1)
POTASSIUM SERPL-SCNC: 4.4 MMOL/L (ref 3.5–5.1)
POTASSIUM SERPL-SCNC: 4.4 MMOL/L (ref 3.5–5.1)
POTASSIUM SERPL-SCNC: 4.5 MMOL/L (ref 3.5–5.1)
POTASSIUM SERPL-SCNC: 4.6 MMOL/L (ref 3.5–5.1)
POTASSIUM SERPL-SCNC: 4.6 MMOL/L (ref 3.5–5.1)
PROCALCITONIN SERPL-MCNC: 0.42 NG/ML
PROCALCITONIN SERPL-MCNC: 0.54 NG/ML
PROCALCITONIN SERPL-MCNC: 0.74 NG/ML
PROCALCITONIN SERPL-MCNC: 0.93 NG/ML
PROCALCITONIN SERPL-MCNC: <0.05 NG/ML
PROT SERPL-MCNC: 4 G/DL (ref 6.4–8.2)
PROT SERPL-MCNC: 4.2 G/DL (ref 6.4–8.2)
PROT SERPL-MCNC: 4.2 G/DL (ref 6.4–8.2)
PROT SERPL-MCNC: 4.3 G/DL (ref 6.4–8.2)
PROT SERPL-MCNC: 4.4 G/DL (ref 6.4–8.2)
PROT SERPL-MCNC: 4.5 G/DL (ref 6.4–8.2)
PROT SERPL-MCNC: 4.6 G/DL (ref 6.4–8.2)
PROT SERPL-MCNC: 4.6 G/DL (ref 6.4–8.2)
PROT SERPL-MCNC: 6.6 G/DL (ref 6.4–8.2)
PROT SERPL-MCNC: 7 G/DL (ref 6.4–8.2)
PROT UR STRIP-MCNC: 100 MG/DL
PROT UR STRIP-MCNC: ABNORMAL MG/DL
PROTHROMBIN TIME: 11.5 SEC (ref 9–11.1)
PROTHROMBIN TIME: 11.6 SEC (ref 9–11.1)
PROTHROMBIN TIME: 11.8 SEC (ref 9–11.1)
PROTHROMBIN TIME: 12.6 SEC (ref 9–11.1)
PROTHROMBIN TIME: 13.1 SEC (ref 9–11.1)
PROTHROMBIN TIME: 13.2 SEC (ref 9–11.1)
PROTHROMBIN TIME: 13.3 SEC (ref 9–11.1)
PROTHROMBIN TIME: 13.4 SEC (ref 9–11.1)
PROTHROMBIN TIME: 13.6 SEC (ref 9–11.1)
PROTHROMBIN TIME: 13.7 SEC (ref 9–11.1)
PROTHROMBIN TIME: 13.7 SEC (ref 9–11.1)
PROTHROMBIN TIME: 13.9 SEC (ref 9–11.1)
PROTHROMBIN TIME: 13.9 SEC (ref 9–11.1)
PROTHROMBIN TIME: 14 SEC (ref 9–11.1)
PROTHROMBIN TIME: 14.1 SEC (ref 9–11.1)
Q-T INTERVAL, ECG07: 300 MS
Q-T INTERVAL, ECG07: 348 MS
Q-T INTERVAL, ECG07: 364 MS
Q-T INTERVAL, ECG07: 388 MS
Q-T INTERVAL, ECG07: 390 MS
QRS DURATION, ECG06: 106 MS
QRS DURATION, ECG06: 110 MS
QRS DURATION, ECG06: 152 MS
QRS DURATION, ECG06: 86 MS
QRS DURATION, ECG06: 94 MS
QTC CALCULATION (BEZET), ECG08: 417 MS
QTC CALCULATION (BEZET), ECG08: 418 MS
QTC CALCULATION (BEZET), ECG08: 438 MS
QTC CALCULATION (BEZET), ECG08: 515 MS
QTC CALCULATION (BEZET), ECG08: 557 MS
RBC # BLD AUTO: 2.1 M/UL (ref 3.8–5.2)
RBC # BLD AUTO: 2.16 M/UL (ref 3.8–5.2)
RBC # BLD AUTO: 2.21 M/UL (ref 3.8–5.2)
RBC # BLD AUTO: 2.21 M/UL (ref 3.8–5.2)
RBC # BLD AUTO: 2.27 M/UL (ref 3.8–5.2)
RBC # BLD AUTO: 2.29 M/UL (ref 3.8–5.2)
RBC # BLD AUTO: 2.35 M/UL (ref 3.8–5.2)
RBC # BLD AUTO: 2.41 M/UL (ref 3.8–5.2)
RBC # BLD AUTO: 2.42 M/UL (ref 3.8–5.2)
RBC # BLD AUTO: 2.44 M/UL (ref 3.8–5.2)
RBC # BLD AUTO: 2.46 M/UL (ref 3.8–5.2)
RBC # BLD AUTO: 2.46 M/UL (ref 3.8–5.2)
RBC # BLD AUTO: 2.49 M/UL (ref 3.8–5.2)
RBC # BLD AUTO: 2.56 M/UL (ref 3.8–5.2)
RBC # BLD AUTO: 2.63 M/UL (ref 3.8–5.2)
RBC # BLD AUTO: 2.67 M/UL (ref 3.8–5.2)
RBC # BLD AUTO: 2.68 M/UL (ref 3.8–5.2)
RBC # BLD AUTO: 2.7 M/UL (ref 3.8–5.2)
RBC # BLD AUTO: 2.73 M/UL (ref 3.8–5.2)
RBC # BLD AUTO: 3.48 M/UL (ref 3.8–5.2)
RBC # BLD AUTO: 3.96 M/UL (ref 3.8–5.2)
RBC # BLD AUTO: 4.03 M/UL (ref 3.8–5.2)
RBC #/AREA URNS HPF: ABNORMAL /HPF (ref 0–5)
RBC #/AREA URNS HPF: ABNORMAL /HPF (ref 0–5)
RBC MORPH BLD: ABNORMAL
SAO2 % BLD: 49 % (ref 95–99)
SAO2 % BLD: 62 % (ref 95–99)
SAO2 % BLD: 66 % (ref 95–99)
SAO2 % BLD: 75 % (ref 95–99)
SAO2 % BLD: 82.6 % (ref 92–97)
SAO2 % BLD: 83 % (ref 92–97)
SAO2 % BLD: 86 % (ref 92–97)
SAO2 % BLD: 87.7 % (ref 92–97)
SAO2 % BLD: 89.2 % (ref 92–97)
SAO2 % BLD: 90.1 % (ref 92–97)
SAO2 % BLD: 94.4 % (ref 92–97)
SAO2 % BLD: 94.7 % (ref 92–97)
SAO2 % BLD: 95 % (ref 92–97)
SAO2 % BLD: 97 % (ref 92–97)
SAO2 % BLD: 98 % (ref 92–97)
SAO2 % BLD: 98.1 % (ref 92–97)
SAO2 % BLD: 98.8 % (ref 92–97)
SAO2 % BLD: 99.1 % (ref 92–97)
SAO2 % BLDV: 37.7 % (ref 65–88)
SAO2 % BLDV: 87.5 % (ref 65–88)
SAO2 % BLDV: 99.1 % (ref 65–88)
SAO2% DEVICE SAO2% SENSOR NAME: ABNORMAL
SAO2% DEVICE SAO2% SENSOR NAME: NORMAL
SERVICE CMNT-IMP: ABNORMAL
SERVICE CMNT-IMP: NORMAL
SODIUM SERPL-SCNC: 116 MMOL/L (ref 136–145)
SODIUM SERPL-SCNC: 118 MMOL/L (ref 136–145)
SODIUM SERPL-SCNC: 121 MMOL/L (ref 136–145)
SODIUM SERPL-SCNC: 126 MMOL/L (ref 136–145)
SODIUM SERPL-SCNC: 126 MMOL/L (ref 136–145)
SODIUM SERPL-SCNC: 127 MMOL/L (ref 136–145)
SODIUM SERPL-SCNC: 127 MMOL/L (ref 136–145)
SODIUM SERPL-SCNC: 128 MMOL/L (ref 136–145)
SODIUM SERPL-SCNC: 129 MMOL/L (ref 136–145)
SODIUM SERPL-SCNC: 130 MMOL/L (ref 136–145)
SODIUM SERPL-SCNC: 131 MMOL/L (ref 136–145)
SODIUM SERPL-SCNC: 132 MMOL/L (ref 136–145)
SODIUM SERPL-SCNC: 133 MMOL/L (ref 136–145)
SODIUM SERPL-SCNC: 134 MMOL/L (ref 136–145)
SODIUM SERPL-SCNC: 136 MMOL/L (ref 136–145)
SOURCE, COVRS: NORMAL
SP GR UR REFRACTOMETRY: 1.02 (ref 1–1.03)
SP GR UR REFRACTOMETRY: 1.02 (ref 1–1.03)
SPECIMEN EXP DATE BLD: NORMAL
SPECIMEN EXP DATE BLD: NORMAL
SPECIMEN SITE: ABNORMAL
SPECIMEN SITE: NORMAL
SPECIMEN SOURCE: NORMAL
SPECIMEN TYPE: ABNORMAL
STATUS OF UNIT,%ST: NORMAL
THERAPEUTIC RANGE,PTTT: ABNORMAL SECS (ref 58–77)
THERAPEUTIC RANGE,PTTT: ABNORMAL SECS (ref 58–77)
THERAPEUTIC RANGE,PTTT: NORMAL SECS (ref 58–77)
TROPONIN I SERPL-MCNC: 11 NG/ML
TROPONIN I SERPL-MCNC: 11.3 NG/ML
TROPONIN I SERPL-MCNC: 12.9 NG/ML
TROPONIN I SERPL-MCNC: 15.8 NG/ML
TROPONIN I SERPL-MCNC: 16.8 NG/ML
TROPONIN I SERPL-MCNC: 26.1 NG/ML
TROPONIN I SERPL-MCNC: 39.5 NG/ML
TROPONIN I SERPL-MCNC: 6.62 NG/ML
TROPONIN I SERPL-MCNC: 7.77 NG/ML
TSH SERPL DL<=0.05 MIU/L-ACNC: 0.84 UIU/ML (ref 0.36–3.74)
UNIT DIVISION, %UDIV: 0
UROBILINOGEN UR QL STRIP.AUTO: 0.2 EU/DL (ref 0.2–1)
UROBILINOGEN UR QL STRIP.AUTO: 1 EU/DL (ref 0.2–1)
VANCOMYCIN SERPL-MCNC: 21.7 UG/ML
VENTILATION MODE VENT: ABNORMAL
VENTRICULAR RATE, ECG03: 105 BPM
VENTRICULAR RATE, ECG03: 116 BPM
VENTRICULAR RATE, ECG03: 124 BPM
VENTRICULAR RATE, ECG03: 87 BPM
VENTRICULAR RATE, ECG03: 87 BPM
VT SETTING VENT: 400 ML
VT SETTING VENT: 480 ML
VT SETTING VENT: 480 ML
WBC # BLD AUTO: 11.5 K/UL (ref 3.6–11)
WBC # BLD AUTO: 12.2 K/UL (ref 3.6–11)
WBC # BLD AUTO: 13.3 K/UL (ref 3.6–11)
WBC # BLD AUTO: 13.6 K/UL (ref 3.6–11)
WBC # BLD AUTO: 13.8 K/UL (ref 3.6–11)
WBC # BLD AUTO: 14.5 K/UL (ref 3.6–11)
WBC # BLD AUTO: 14.6 K/UL (ref 3.6–11)
WBC # BLD AUTO: 14.8 K/UL (ref 3.6–11)
WBC # BLD AUTO: 15 K/UL (ref 3.6–11)
WBC # BLD AUTO: 15.2 K/UL (ref 3.6–11)
WBC # BLD AUTO: 15.7 K/UL (ref 3.6–11)
WBC # BLD AUTO: 15.8 K/UL (ref 3.6–11)
WBC # BLD AUTO: 16 K/UL (ref 3.6–11)
WBC # BLD AUTO: 16.5 K/UL (ref 3.6–11)
WBC # BLD AUTO: 16.6 K/UL (ref 3.6–11)
WBC # BLD AUTO: 17.4 K/UL (ref 3.6–11)
WBC # BLD AUTO: 17.8 K/UL (ref 3.6–11)
WBC # BLD AUTO: 20.9 K/UL (ref 3.6–11)
WBC # BLD AUTO: 21.4 K/UL (ref 3.6–11)
WBC # BLD AUTO: 7.8 K/UL (ref 3.6–11)
WBC # BLD AUTO: 8.9 K/UL (ref 3.6–11)
WBC # BLD AUTO: 9 K/UL (ref 3.6–11)
WBC MORPH BLD: ABNORMAL
WBC URNS QL MICRO: ABNORMAL /HPF (ref 0–4)
WBC URNS QL MICRO: ABNORMAL /HPF (ref 0–4)

## 2021-01-01 PROCEDURE — 82962 GLUCOSE BLOOD TEST: CPT

## 2021-01-01 PROCEDURE — 74011636637 HC RX REV CODE- 636/637: Performed by: EMERGENCY MEDICINE

## 2021-01-01 PROCEDURE — 74011250637 HC RX REV CODE- 250/637: Performed by: EMERGENCY MEDICINE

## 2021-01-01 PROCEDURE — 74011250636 HC RX REV CODE- 250/636: Performed by: ANESTHESIOLOGY

## 2021-01-01 PROCEDURE — 74011250637 HC RX REV CODE- 250/637: Performed by: STUDENT IN AN ORGANIZED HEALTH CARE EDUCATION/TRAINING PROGRAM

## 2021-01-01 PROCEDURE — 83735 ASSAY OF MAGNESIUM: CPT

## 2021-01-01 PROCEDURE — 74011000250 HC RX REV CODE- 250: Performed by: THORACIC SURGERY (CARDIOTHORACIC VASCULAR SURGERY)

## 2021-01-01 PROCEDURE — 74011636637 HC RX REV CODE- 636/637: Performed by: STUDENT IN AN ORGANIZED HEALTH CARE EDUCATION/TRAINING PROGRAM

## 2021-01-01 PROCEDURE — 74011250637 HC RX REV CODE- 250/637: Performed by: PHYSICIAN ASSISTANT

## 2021-01-01 PROCEDURE — 80053 COMPREHEN METABOLIC PANEL: CPT

## 2021-01-01 PROCEDURE — 74011000250 HC RX REV CODE- 250: Performed by: EMERGENCY MEDICINE

## 2021-01-01 PROCEDURE — 4A023N7 MEASUREMENT OF CARDIAC SAMPLING AND PRESSURE, LEFT HEART, PERCUTANEOUS APPROACH: ICD-10-PCS | Performed by: STUDENT IN AN ORGANIZED HEALTH CARE EDUCATION/TRAINING PROGRAM

## 2021-01-01 PROCEDURE — 84484 ASSAY OF TROPONIN QUANT: CPT

## 2021-01-01 PROCEDURE — 87040 BLOOD CULTURE FOR BACTERIA: CPT

## 2021-01-01 PROCEDURE — 74011000258 HC RX REV CODE- 258: Performed by: STUDENT IN AN ORGANIZED HEALTH CARE EDUCATION/TRAINING PROGRAM

## 2021-01-01 PROCEDURE — 74011000258 HC RX REV CODE- 258: Performed by: ANESTHESIOLOGY

## 2021-01-01 PROCEDURE — 74011250637 HC RX REV CODE- 250/637: Performed by: ANESTHESIOLOGY

## 2021-01-01 PROCEDURE — 87449 NOS EACH ORGANISM AG IA: CPT

## 2021-01-01 PROCEDURE — 83605 ASSAY OF LACTIC ACID: CPT

## 2021-01-01 PROCEDURE — 74011250636 HC RX REV CODE- 250/636: Performed by: THORACIC SURGERY (CARDIOTHORACIC VASCULAR SURGERY)

## 2021-01-01 PROCEDURE — 76060000042 HC ANESTHESIA 5.5 TO 6 HR: Performed by: THORACIC SURGERY (CARDIOTHORACIC VASCULAR SURGERY)

## 2021-01-01 PROCEDURE — 77030002996 HC SUT SLK J&J -A: Performed by: THORACIC SURGERY (CARDIOTHORACIC VASCULAR SURGERY)

## 2021-01-01 PROCEDURE — 85730 THROMBOPLASTIN TIME PARTIAL: CPT

## 2021-01-01 PROCEDURE — 33993 REPOSG PERQ R/L HRT VAD: CPT | Performed by: THORACIC SURGERY (CARDIOTHORACIC VASCULAR SURGERY)

## 2021-01-01 PROCEDURE — 36430 TRANSFUSION BLD/BLD COMPNT: CPT

## 2021-01-01 PROCEDURE — 74011250636 HC RX REV CODE- 250/636: Performed by: NURSE PRACTITIONER

## 2021-01-01 PROCEDURE — 74011250636 HC RX REV CODE- 250/636

## 2021-01-01 PROCEDURE — 74011000258 HC RX REV CODE- 258: Performed by: EMERGENCY MEDICINE

## 2021-01-01 PROCEDURE — 74011250637 HC RX REV CODE- 250/637: Performed by: NURSE PRACTITIONER

## 2021-01-01 PROCEDURE — 99291 CRITICAL CARE FIRST HOUR: CPT | Performed by: THORACIC SURGERY (CARDIOTHORACIC VASCULAR SURGERY)

## 2021-01-01 PROCEDURE — 77030004532 HC CATH ANGI DX IMP BSC -A: Performed by: STUDENT IN AN ORGANIZED HEALTH CARE EDUCATION/TRAINING PROGRAM

## 2021-01-01 PROCEDURE — C1769 GUIDE WIRE: HCPCS | Performed by: STUDENT IN AN ORGANIZED HEALTH CARE EDUCATION/TRAINING PROGRAM

## 2021-01-01 PROCEDURE — 87070 CULTURE OTHR SPECIMN AEROBIC: CPT

## 2021-01-01 PROCEDURE — 71045 X-RAY EXAM CHEST 1 VIEW: CPT

## 2021-01-01 PROCEDURE — 65620000000 HC RM CCU GENERAL

## 2021-01-01 PROCEDURE — 74011000250 HC RX REV CODE- 250: Performed by: NURSE PRACTITIONER

## 2021-01-01 PROCEDURE — 93005 ELECTROCARDIOGRAM TRACING: CPT

## 2021-01-01 PROCEDURE — 74011636637 HC RX REV CODE- 636/637: Performed by: NURSE PRACTITIONER

## 2021-01-01 PROCEDURE — 74011000250 HC RX REV CODE- 250: Performed by: ANESTHESIOLOGY

## 2021-01-01 PROCEDURE — 83880 ASSAY OF NATRIURETIC PEPTIDE: CPT

## 2021-01-01 PROCEDURE — 82803 BLOOD GASES ANY COMBINATION: CPT

## 2021-01-01 PROCEDURE — 33990 INSJ PERQ VAD L HRT ARTERIAL: CPT | Performed by: PHYSICIAN ASSISTANT

## 2021-01-01 PROCEDURE — 5A2204Z RESTORATION OF CARDIAC RHYTHM, SINGLE: ICD-10-PCS | Performed by: THORACIC SURGERY (CARDIOTHORACIC VASCULAR SURGERY)

## 2021-01-01 PROCEDURE — 84100 ASSAY OF PHOSPHORUS: CPT

## 2021-01-01 PROCEDURE — 96375 TX/PRO/DX INJ NEW DRUG ADDON: CPT

## 2021-01-01 PROCEDURE — 83615 LACTATE (LD) (LDH) ENZYME: CPT

## 2021-01-01 PROCEDURE — C9113 INJ PANTOPRAZOLE SODIUM, VIA: HCPCS | Performed by: EMERGENCY MEDICINE

## 2021-01-01 PROCEDURE — 77030022017 HC DRSG HEMO QCLOT ZMED -A

## 2021-01-01 PROCEDURE — P9016 RBC LEUKOCYTES REDUCED: HCPCS

## 2021-01-01 PROCEDURE — 03HY32Z INSERTION OF MONITORING DEVICE INTO UPPER ARTERY, PERCUTANEOUS APPROACH: ICD-10-PCS | Performed by: EMERGENCY MEDICINE

## 2021-01-01 PROCEDURE — 82375 ASSAY CARBOXYHB QUANT: CPT

## 2021-01-01 PROCEDURE — 93355 ECHO TRANSESOPHAGEAL (TEE): CPT | Performed by: THORACIC SURGERY (CARDIOTHORACIC VASCULAR SURGERY)

## 2021-01-01 PROCEDURE — 77030013797 HC KT TRNSDUC PRSSR EDWD -A

## 2021-01-01 PROCEDURE — 74011250636 HC RX REV CODE- 250/636: Performed by: EMERGENCY MEDICINE

## 2021-01-01 PROCEDURE — 85610 PROTHROMBIN TIME: CPT

## 2021-01-01 PROCEDURE — 36415 COLL VENOUS BLD VENIPUNCTURE: CPT

## 2021-01-01 PROCEDURE — P9045 ALBUMIN (HUMAN), 5%, 250 ML: HCPCS | Performed by: NURSE ANESTHETIST, CERTIFIED REGISTERED

## 2021-01-01 PROCEDURE — 94003 VENT MGMT INPAT SUBQ DAY: CPT

## 2021-01-01 PROCEDURE — 77030012468 HC VLV BLEEDBK CNTRL ABBT -B: Performed by: STUDENT IN AN ORGANIZED HEALTH CARE EDUCATION/TRAINING PROGRAM

## 2021-01-01 PROCEDURE — 74011000250 HC RX REV CODE- 250: Performed by: STUDENT IN AN ORGANIZED HEALTH CARE EDUCATION/TRAINING PROGRAM

## 2021-01-01 PROCEDURE — 94762 N-INVAS EAR/PLS OXIMTRY CONT: CPT

## 2021-01-01 PROCEDURE — 93321 DOPPLER ECHO F-UP/LMTD STD: CPT | Performed by: SPECIALIST

## 2021-01-01 PROCEDURE — 87899 AGENT NOS ASSAY W/OPTIC: CPT

## 2021-01-01 PROCEDURE — 74011250636 HC RX REV CODE- 250/636: Performed by: STUDENT IN AN ORGANIZED HEALTH CARE EDUCATION/TRAINING PROGRAM

## 2021-01-01 PROCEDURE — C1768 GRAFT, VASCULAR: HCPCS | Performed by: THORACIC SURGERY (CARDIOTHORACIC VASCULAR SURGERY)

## 2021-01-01 PROCEDURE — 77030014491 HC PLEDG PTFE BARD -A: Performed by: THORACIC SURGERY (CARDIOTHORACIC VASCULAR SURGERY)

## 2021-01-01 PROCEDURE — 82550 ASSAY OF CK (CPK): CPT

## 2021-01-01 PROCEDURE — 85025 COMPLETE CBC W/AUTO DIFF WBC: CPT

## 2021-01-01 PROCEDURE — P9045 ALBUMIN (HUMAN), 5%, 250 ML: HCPCS | Performed by: NURSE PRACTITIONER

## 2021-01-01 PROCEDURE — C1725 CATH, TRANSLUMIN NON-LASER: HCPCS | Performed by: STUDENT IN AN ORGANIZED HEALTH CARE EDUCATION/TRAINING PROGRAM

## 2021-01-01 PROCEDURE — 74011000258 HC RX REV CODE- 258: Performed by: NURSE ANESTHETIST, CERTIFIED REGISTERED

## 2021-01-01 PROCEDURE — 73610000026 HC INO THERAPY EACH HOUR

## 2021-01-01 PROCEDURE — 99152 MOD SED SAME PHYS/QHP 5/>YRS: CPT | Performed by: STUDENT IN AN ORGANIZED HEALTH CARE EDUCATION/TRAINING PROGRAM

## 2021-01-01 PROCEDURE — 33990 INSJ PERQ VAD L HRT ARTERIAL: CPT | Performed by: STUDENT IN AN ORGANIZED HEALTH CARE EDUCATION/TRAINING PROGRAM

## 2021-01-01 PROCEDURE — 77030041524 HC SEALNT FIBRN VITASEAL J&J -C: Performed by: THORACIC SURGERY (CARDIOTHORACIC VASCULAR SURGERY)

## 2021-01-01 PROCEDURE — 77030041076 HC DRSG AG OPTICELL MDII -A: Performed by: THORACIC SURGERY (CARDIOTHORACIC VASCULAR SURGERY)

## 2021-01-01 PROCEDURE — 2709999900 HC NON-CHARGEABLE SUPPLY

## 2021-01-01 PROCEDURE — 85347 COAGULATION TIME ACTIVATED: CPT

## 2021-01-01 PROCEDURE — 84132 ASSAY OF SERUM POTASSIUM: CPT

## 2021-01-01 PROCEDURE — 76010000117 HC CV SURG 5.5 TO 6 HR: Performed by: THORACIC SURGERY (CARDIOTHORACIC VASCULAR SURGERY)

## 2021-01-01 PROCEDURE — C1894 INTRO/SHEATH, NON-LASER: HCPCS | Performed by: STUDENT IN AN ORGANIZED HEALTH CARE EDUCATION/TRAINING PROGRAM

## 2021-01-01 PROCEDURE — B41G1ZZ FLUOROSCOPY OF LEFT LOWER EXTREMITY ARTERIES USING LOW OSMOLAR CONTRAST: ICD-10-PCS | Performed by: SURGERY

## 2021-01-01 PROCEDURE — 96361 HYDRATE IV INFUSION ADD-ON: CPT

## 2021-01-01 PROCEDURE — C1751 CATH, INF, PER/CENT/MIDLINE: HCPCS

## 2021-01-01 PROCEDURE — 99233 SBSQ HOSP IP/OBS HIGH 50: CPT | Performed by: SPECIALIST

## 2021-01-01 PROCEDURE — C1769 GUIDE WIRE: HCPCS | Performed by: THORACIC SURGERY (CARDIOTHORACIC VASCULAR SURGERY)

## 2021-01-01 PROCEDURE — 92941 PRQ TRLML REVSC TOT OCCL AMI: CPT | Performed by: STUDENT IN AN ORGANIZED HEALTH CARE EDUCATION/TRAINING PROGRAM

## 2021-01-01 PROCEDURE — 85379 FIBRIN DEGRADATION QUANT: CPT

## 2021-01-01 PROCEDURE — 82330 ASSAY OF CALCIUM: CPT

## 2021-01-01 PROCEDURE — 82270 OCCULT BLOOD FECES: CPT

## 2021-01-01 PROCEDURE — 36600 WITHDRAWAL OF ARTERIAL BLOOD: CPT

## 2021-01-01 PROCEDURE — 81001 URINALYSIS AUTO W/SCOPE: CPT

## 2021-01-01 PROCEDURE — 77030010221 HC SPLNT WR POS TELE -B: Performed by: STUDENT IN AN ORGANIZED HEALTH CARE EDUCATION/TRAINING PROGRAM

## 2021-01-01 PROCEDURE — 85018 HEMOGLOBIN: CPT

## 2021-01-01 PROCEDURE — P9100 PATHOGEN TEST FOR PLATELETS: HCPCS

## 2021-01-01 PROCEDURE — 99231 SBSQ HOSP IP/OBS SF/LOW 25: CPT | Performed by: CLINICAL NURSE SPECIALIST

## 2021-01-01 PROCEDURE — 05HM33Z INSERTION OF INFUSION DEVICE INTO RIGHT INTERNAL JUGULAR VEIN, PERCUTANEOUS APPROACH: ICD-10-PCS | Performed by: ANESTHESIOLOGY

## 2021-01-01 PROCEDURE — 86923 COMPATIBILITY TEST ELECTRIC: CPT

## 2021-01-01 PROCEDURE — 77030018798 HC PMP KT ENTRL FED COVD -A

## 2021-01-01 PROCEDURE — 93308 TTE F-UP OR LMTD: CPT | Performed by: SPECIALIST

## 2021-01-01 PROCEDURE — 74011636637 HC RX REV CODE- 636/637: Performed by: NURSE ANESTHETIST, CERTIFIED REGISTERED

## 2021-01-01 PROCEDURE — 74011000250 HC RX REV CODE- 250: Performed by: NURSE ANESTHETIST, CERTIFIED REGISTERED

## 2021-01-01 PROCEDURE — 77030038552 HC DRN WND MDII -A: Performed by: THORACIC SURGERY (CARDIOTHORACIC VASCULAR SURGERY)

## 2021-01-01 PROCEDURE — 77030013744: Performed by: STUDENT IN AN ORGANIZED HEALTH CARE EDUCATION/TRAINING PROGRAM

## 2021-01-01 PROCEDURE — 74011250636 HC RX REV CODE- 250/636: Performed by: NURSE ANESTHETIST, CERTIFIED REGISTERED

## 2021-01-01 PROCEDURE — 77030008771 HC TU NG SALEM SUMP -A

## 2021-01-01 PROCEDURE — 77030005402 HC CATH RAD ART LN KT TELE -B

## 2021-01-01 PROCEDURE — C1757 CATH, THROMBECTOMY/EMBOLECT: HCPCS | Performed by: THORACIC SURGERY (CARDIOTHORACIC VASCULAR SURGERY)

## 2021-01-01 PROCEDURE — 02HA3RZ INSERTION OF SHORT-TERM EXTERNAL HEART ASSIST SYSTEM INTO HEART, PERCUTANEOUS APPROACH: ICD-10-PCS | Performed by: THORACIC SURGERY (CARDIOTHORACIC VASCULAR SURGERY)

## 2021-01-01 PROCEDURE — 99222 1ST HOSP IP/OBS MODERATE 55: CPT | Performed by: NURSE PRACTITIONER

## 2021-01-01 PROCEDURE — 77030029065 HC DRSG HEMO QCLOT ZMED -B

## 2021-01-01 PROCEDURE — 84145 PROCALCITONIN (PCT): CPT

## 2021-01-01 PROCEDURE — B543ZZA ULTRASONOGRAPHY OF RIGHT JUGULAR VEINS, GUIDANCE: ICD-10-PCS | Performed by: ANESTHESIOLOGY

## 2021-01-01 PROCEDURE — 0BH17EZ INSERTION OF ENDOTRACHEAL AIRWAY INTO TRACHEA, VIA NATURAL OR ARTIFICIAL OPENING: ICD-10-PCS | Performed by: EMERGENCY MEDICINE

## 2021-01-01 PROCEDURE — 80202 ASSAY OF VANCOMYCIN: CPT

## 2021-01-01 PROCEDURE — 5A1945Z RESPIRATORY VENTILATION, 24-96 CONSECUTIVE HOURS: ICD-10-PCS | Performed by: EMERGENCY MEDICINE

## 2021-01-01 PROCEDURE — 99285 EMERGENCY DEPT VISIT HI MDM: CPT

## 2021-01-01 PROCEDURE — 94640 AIRWAY INHALATION TREATMENT: CPT

## 2021-01-01 PROCEDURE — 93308 TTE F-UP OR LMTD: CPT

## 2021-01-01 PROCEDURE — C1887 CATHETER, GUIDING: HCPCS | Performed by: STUDENT IN AN ORGANIZED HEALTH CARE EDUCATION/TRAINING PROGRAM

## 2021-01-01 PROCEDURE — B2111ZZ FLUOROSCOPY OF MULTIPLE CORONARY ARTERIES USING LOW OSMOLAR CONTRAST: ICD-10-PCS | Performed by: STUDENT IN AN ORGANIZED HEALTH CARE EDUCATION/TRAINING PROGRAM

## 2021-01-01 PROCEDURE — 77030041076 HC DRSG AG OPTICELL MDII -A

## 2021-01-01 PROCEDURE — 92978 ENDOLUMINL IVUS OCT C 1ST: CPT | Performed by: STUDENT IN AN ORGANIZED HEALTH CARE EDUCATION/TRAINING PROGRAM

## 2021-01-01 PROCEDURE — 74011250637 HC RX REV CODE- 250/637

## 2021-01-01 PROCEDURE — 77030041955 HC PMP IMPELLA ABIM -L: Performed by: THORACIC SURGERY (CARDIOTHORACIC VASCULAR SURGERY)

## 2021-01-01 PROCEDURE — 77030005401 HC CATH RAD ARRO -A

## 2021-01-01 PROCEDURE — 77030026908

## 2021-01-01 PROCEDURE — 93458 L HRT ARTERY/VENTRICLE ANGIO: CPT | Performed by: STUDENT IN AN ORGANIZED HEALTH CARE EDUCATION/TRAINING PROGRAM

## 2021-01-01 PROCEDURE — P9059 PLASMA, FRZ BETWEEN 8-24HOUR: HCPCS

## 2021-01-01 PROCEDURE — 77030013798 HC KT TRNSDUC PRSSR EDWD -B: Performed by: ANESTHESIOLOGY

## 2021-01-01 PROCEDURE — B24BZZ4 ULTRASONOGRAPHY OF HEART WITH AORTA, TRANSESOPHAGEAL: ICD-10-PCS | Performed by: SURGERY

## 2021-01-01 PROCEDURE — C1894 INTRO/SHEATH, NON-LASER: HCPCS

## 2021-01-01 PROCEDURE — B240ZZ3 ULTRASONOGRAPHY OF SINGLE CORONARY ARTERY, INTRAVASCULAR: ICD-10-PCS | Performed by: STUDENT IN AN ORGANIZED HEALTH CARE EDUCATION/TRAINING PROGRAM

## 2021-01-01 PROCEDURE — 77030013715 HC INFL SYS MRTM -B: Performed by: STUDENT IN AN ORGANIZED HEALTH CARE EDUCATION/TRAINING PROGRAM

## 2021-01-01 PROCEDURE — 86901 BLOOD TYPING SEROLOGIC RH(D): CPT

## 2021-01-01 PROCEDURE — 93926 LOWER EXTREMITY STUDY: CPT

## 2021-01-01 PROCEDURE — 93325 DOPPLER ECHO COLOR FLOW MAPG: CPT | Performed by: SPECIALIST

## 2021-01-01 PROCEDURE — 77030008462 HC STPLR SKN PROX J&J -A: Performed by: THORACIC SURGERY (CARDIOTHORACIC VASCULAR SURGERY)

## 2021-01-01 PROCEDURE — 77030002987 HC SUT PROL J&J -B: Performed by: THORACIC SURGERY (CARDIOTHORACIC VASCULAR SURGERY)

## 2021-01-01 PROCEDURE — 96365 THER/PROPH/DIAG IV INF INIT: CPT

## 2021-01-01 PROCEDURE — 77030014008 HC SPNG HEMSTAT J&J -C: Performed by: THORACIC SURGERY (CARDIOTHORACIC VASCULAR SURGERY)

## 2021-01-01 PROCEDURE — 73610000005 HC INO THERAPY INITIAL

## 2021-01-01 PROCEDURE — 77030019569 HC BND COMPR RAD TERU -B: Performed by: STUDENT IN AN ORGANIZED HEALTH CARE EDUCATION/TRAINING PROGRAM

## 2021-01-01 PROCEDURE — 94002 VENT MGMT INPAT INIT DAY: CPT

## 2021-01-01 PROCEDURE — 77030029641 HC PMP CARD PERC IMPELLA CP ABIM -L: Performed by: STUDENT IN AN ORGANIZED HEALTH CARE EDUCATION/TRAINING PROGRAM

## 2021-01-01 PROCEDURE — 77030020061 HC IV BLD WRMR ADMIN SET 3M -B: Performed by: ANESTHESIOLOGY

## 2021-01-01 PROCEDURE — C1753 CATH, INTRAVAS ULTRASOUND: HCPCS | Performed by: STUDENT IN AN ORGANIZED HEALTH CARE EDUCATION/TRAINING PROGRAM

## 2021-01-01 PROCEDURE — 85027 COMPLETE CBC AUTOMATED: CPT

## 2021-01-01 PROCEDURE — 84443 ASSAY THYROID STIM HORMONE: CPT

## 2021-01-01 PROCEDURE — 86140 C-REACTIVE PROTEIN: CPT

## 2021-01-01 PROCEDURE — 74011000250 HC RX REV CODE- 250

## 2021-01-01 PROCEDURE — 99223 1ST HOSP IP/OBS HIGH 75: CPT | Performed by: STUDENT IN AN ORGANIZED HEALTH CARE EDUCATION/TRAINING PROGRAM

## 2021-01-01 PROCEDURE — 77010033711 HC HIGH FLOW OXYGEN

## 2021-01-01 PROCEDURE — 31500 INSERT EMERGENCY AIRWAY: CPT

## 2021-01-01 PROCEDURE — 77030010938 HC CLP LIG TELE -A: Performed by: THORACIC SURGERY (CARDIOTHORACIC VASCULAR SURGERY)

## 2021-01-01 PROCEDURE — 74011000636 HC RX REV CODE- 636: Performed by: STUDENT IN AN ORGANIZED HEALTH CARE EDUCATION/TRAINING PROGRAM

## 2021-01-01 PROCEDURE — 2709999900 HC NON-CHARGEABLE SUPPLY: Performed by: THORACIC SURGERY (CARDIOTHORACIC VASCULAR SURGERY)

## 2021-01-01 PROCEDURE — 74011250637 HC RX REV CODE- 250/637: Performed by: SPECIALIST

## 2021-01-01 PROCEDURE — 77030013798 HC KT TRNSDUC PRSSR EDWD -B

## 2021-01-01 PROCEDURE — P9035 PLATELET PHERES LEUKOREDUCED: HCPCS

## 2021-01-01 PROCEDURE — 77030002986 HC SUT PROL J&J -A: Performed by: THORACIC SURGERY (CARDIOTHORACIC VASCULAR SURGERY)

## 2021-01-01 PROCEDURE — C1892 INTRO/SHEATH,FIXED,PEEL-AWAY: HCPCS | Performed by: STUDENT IN AN ORGANIZED HEALTH CARE EDUCATION/TRAINING PROGRAM

## 2021-01-01 PROCEDURE — 4A133J1 MONITORING OF ARTERIAL PULSE, PERIPHERAL, PERCUTANEOUS APPROACH: ICD-10-PCS | Performed by: EMERGENCY MEDICINE

## 2021-01-01 PROCEDURE — C1894 INTRO/SHEATH, NON-LASER: HCPCS | Performed by: THORACIC SURGERY (CARDIOTHORACIC VASCULAR SURGERY)

## 2021-01-01 PROCEDURE — 04UL0JZ SUPPLEMENT LEFT FEMORAL ARTERY WITH SYNTHETIC SUBSTITUTE, OPEN APPROACH: ICD-10-PCS | Performed by: SURGERY

## 2021-01-01 PROCEDURE — P9045 ALBUMIN (HUMAN), 5%, 250 ML: HCPCS | Performed by: ANESTHESIOLOGY

## 2021-01-01 PROCEDURE — 74011000636 HC RX REV CODE- 636: Performed by: SURGERY

## 2021-01-01 PROCEDURE — 5A0221D ASSISTANCE WITH CARDIAC OUTPUT USING IMPELLER PUMP, CONTINUOUS: ICD-10-PCS | Performed by: THORACIC SURGERY (CARDIOTHORACIC VASCULAR SURGERY)

## 2021-01-01 PROCEDURE — 77030005513 HC CATH URETH FOL11 MDII -B

## 2021-01-01 PROCEDURE — 33990 INSJ PERQ VAD L HRT ARTERIAL: CPT | Performed by: THORACIC SURGERY (CARDIOTHORACIC VASCULAR SURGERY)

## 2021-01-01 PROCEDURE — 99292 CRITICAL CARE ADDL 30 MIN: CPT | Performed by: THORACIC SURGERY (CARDIOTHORACIC VASCULAR SURGERY)

## 2021-01-01 PROCEDURE — 87635 SARS-COV-2 COVID-19 AMP PRB: CPT

## 2021-01-01 PROCEDURE — 77030011220 HC DEV ELECSURG COVD -B: Performed by: THORACIC SURGERY (CARDIOTHORACIC VASCULAR SURGERY)

## 2021-01-01 PROCEDURE — 77030003029 HC SUT VCRL J&J -B: Performed by: THORACIC SURGERY (CARDIOTHORACIC VASCULAR SURGERY)

## 2021-01-01 PROCEDURE — P9073 PLATELETS PHERESIS PATH REDU: HCPCS

## 2021-01-01 PROCEDURE — 86738 MYCOPLASMA ANTIBODY: CPT

## 2021-01-01 PROCEDURE — 99223 1ST HOSP IP/OBS HIGH 75: CPT | Performed by: PSYCHIATRY & NEUROLOGY

## 2021-01-01 PROCEDURE — 77030019908 HC STETH ESOPH SIMS -A: Performed by: ANESTHESIOLOGY

## 2021-01-01 PROCEDURE — 04CL0ZZ EXTIRPATION OF MATTER FROM LEFT FEMORAL ARTERY, OPEN APPROACH: ICD-10-PCS | Performed by: SURGERY

## 2021-01-01 PROCEDURE — 77030010505 HC ADH BIOGLU CRYO -F: Performed by: THORACIC SURGERY (CARDIOTHORACIC VASCULAR SURGERY)

## 2021-01-01 PROCEDURE — 83036 HEMOGLOBIN GLYCOSYLATED A1C: CPT

## 2021-01-01 PROCEDURE — 027034Z DILATION OF CORONARY ARTERY, ONE ARTERY WITH DRUG-ELUTING INTRALUMINAL DEVICE, PERCUTANEOUS APPROACH: ICD-10-PCS | Performed by: STUDENT IN AN ORGANIZED HEALTH CARE EDUCATION/TRAINING PROGRAM

## 2021-01-01 PROCEDURE — 4A133B1 MONITORING OF ARTERIAL PRESSURE, PERIPHERAL, PERCUTANEOUS APPROACH: ICD-10-PCS | Performed by: EMERGENCY MEDICINE

## 2021-01-01 PROCEDURE — 74018 RADEX ABDOMEN 1 VIEW: CPT

## 2021-01-01 PROCEDURE — 77030016699 HC CATH ANGI DX INFN1 CARD -A: Performed by: STUDENT IN AN ORGANIZED HEALTH CARE EDUCATION/TRAINING PROGRAM

## 2021-01-01 PROCEDURE — C1874 STENT, COATED/COV W/DEL SYS: HCPCS | Performed by: STUDENT IN AN ORGANIZED HEALTH CARE EDUCATION/TRAINING PROGRAM

## 2021-01-01 PROCEDURE — 2709999900 HC NON-CHARGEABLE SUPPLY: Performed by: STUDENT IN AN ORGANIZED HEALTH CARE EDUCATION/TRAINING PROGRAM

## 2021-01-01 PROCEDURE — 92943 PRQ TRLUML REVSC CH OCC ANT: CPT | Performed by: STUDENT IN AN ORGANIZED HEALTH CARE EDUCATION/TRAINING PROGRAM

## 2021-01-01 PROCEDURE — 99153 MOD SED SAME PHYS/QHP EA: CPT | Performed by: STUDENT IN AN ORGANIZED HEALTH CARE EDUCATION/TRAINING PROGRAM

## 2021-01-01 PROCEDURE — 76937 US GUIDE VASCULAR ACCESS: CPT | Performed by: STUDENT IN AN ORGANIZED HEALTH CARE EDUCATION/TRAINING PROGRAM

## 2021-01-01 DEVICE — XIENCE SIERRA™ EVEROLIMUS ELUTING CORONARY STENT SYSTEM 3.00 MM X 23 MM / RAPID-EXCHANGE
Type: IMPLANTABLE DEVICE | Status: FUNCTIONAL
Brand: XIENCE SIERRA™

## 2021-01-01 DEVICE — HEMASHIELD PLATINUM WOVEN STRAIGHT DOUBLE VELOUR VASCULAR GRAFT WITH GRAFT SIZER ACCESSORY
Type: IMPLANTABLE DEVICE | Site: AXILLARY | Status: FUNCTIONAL
Brand: HEMASHIELD

## 2021-01-01 RX ORDER — CALCIUM GLUCONATE 20 MG/ML
2 INJECTION, SOLUTION INTRAVENOUS ONCE
Status: COMPLETED | OUTPATIENT
Start: 2021-01-01 | End: 2021-01-01

## 2021-01-01 RX ORDER — DANTROLENE 20 MG/1
2.5 INJECTION, POWDER, FOR SOLUTION INTRAVENOUS ONCE
Status: CANCELLED | OUTPATIENT
Start: 2021-01-01 | End: 2021-01-01

## 2021-01-01 RX ORDER — FAMOTIDINE 20 MG/1
20 TABLET, FILM COATED ORAL EVERY 12 HOURS
Status: DISCONTINUED | OUTPATIENT
Start: 2021-01-01 | End: 2021-01-01

## 2021-01-01 RX ORDER — AMIODARONE HYDROCHLORIDE 150 MG/3ML
INJECTION, SOLUTION INTRAVENOUS AS NEEDED
Status: DISCONTINUED | OUTPATIENT
Start: 2021-01-01 | End: 2021-01-01 | Stop reason: HOSPADM

## 2021-01-01 RX ORDER — SODIUM CHLORIDE 0.9 % (FLUSH) 0.9 %
5-40 SYRINGE (ML) INJECTION EVERY 8 HOURS
Status: DISCONTINUED | OUTPATIENT
Start: 2021-01-01 | End: 2021-01-01 | Stop reason: HOSPADM

## 2021-01-01 RX ORDER — FUROSEMIDE 10 MG/ML
40 INJECTION INTRAMUSCULAR; INTRAVENOUS ONCE
Status: COMPLETED | OUTPATIENT
Start: 2021-01-01 | End: 2021-01-01

## 2021-01-01 RX ORDER — SODIUM CHLORIDE 0.9 % (FLUSH) 0.9 %
5-40 SYRINGE (ML) INJECTION AS NEEDED
Status: DISCONTINUED | OUTPATIENT
Start: 2021-01-01 | End: 2021-01-01 | Stop reason: HOSPADM

## 2021-01-01 RX ORDER — MUPIROCIN 20 MG/G
OINTMENT TOPICAL 2 TIMES DAILY
Status: DISCONTINUED | OUTPATIENT
Start: 2021-01-01 | End: 2021-01-01

## 2021-01-01 RX ORDER — CEFAZOLIN SODIUM 1 G/3ML
INJECTION, POWDER, FOR SOLUTION INTRAMUSCULAR; INTRAVENOUS AS NEEDED
Status: DISCONTINUED | OUTPATIENT
Start: 2021-01-01 | End: 2021-01-01 | Stop reason: HOSPADM

## 2021-01-01 RX ORDER — INSULIN LISPRO 100 [IU]/ML
INJECTION, SOLUTION INTRAVENOUS; SUBCUTANEOUS EVERY 6 HOURS
Status: DISCONTINUED | OUTPATIENT
Start: 2021-01-01 | End: 2021-01-01 | Stop reason: HOSPADM

## 2021-01-01 RX ORDER — ETOMIDATE 2 MG/ML
INJECTION INTRAVENOUS
Status: COMPLETED
Start: 2021-01-01 | End: 2021-01-01

## 2021-01-01 RX ORDER — ALBUMIN HUMAN 50 G/1000ML
SOLUTION INTRAVENOUS
Status: DISPENSED
Start: 2021-01-01 | End: 2021-01-01

## 2021-01-01 RX ORDER — SODIUM CHLORIDE 9 MG/ML
250 INJECTION, SOLUTION INTRAVENOUS AS NEEDED
Status: DISCONTINUED | OUTPATIENT
Start: 2021-01-01 | End: 2021-01-01

## 2021-01-01 RX ORDER — FENTANYL CITRATE 50 UG/ML
INJECTION, SOLUTION INTRAMUSCULAR; INTRAVENOUS AS NEEDED
Status: DISCONTINUED | OUTPATIENT
Start: 2021-01-01 | End: 2021-01-01 | Stop reason: HOSPADM

## 2021-01-01 RX ORDER — MAGNESIUM SULFATE HEPTAHYDRATE 40 MG/ML
2 INJECTION, SOLUTION INTRAVENOUS ONCE
Status: COMPLETED | OUTPATIENT
Start: 2021-01-01 | End: 2021-01-01

## 2021-01-01 RX ORDER — CALCIUM CHLORIDE INJECTION 100 MG/ML
INJECTION, SOLUTION INTRAVENOUS AS NEEDED
Status: DISCONTINUED | OUTPATIENT
Start: 2021-01-01 | End: 2021-01-01 | Stop reason: HOSPADM

## 2021-01-01 RX ORDER — ROCURONIUM BROMIDE 10 MG/ML
INJECTION, SOLUTION INTRAVENOUS
Status: COMPLETED
Start: 2021-01-01 | End: 2021-01-01

## 2021-01-01 RX ORDER — VERAPAMIL HYDROCHLORIDE 2.5 MG/ML
INJECTION, SOLUTION INTRAVENOUS AS NEEDED
Status: DISCONTINUED | OUTPATIENT
Start: 2021-01-01 | End: 2021-01-01 | Stop reason: HOSPADM

## 2021-01-01 RX ORDER — SODIUM BICARBONATE 1 MEQ/ML
SYRINGE (ML) INTRAVENOUS AS NEEDED
Status: DISCONTINUED | OUTPATIENT
Start: 2021-01-01 | End: 2021-01-01 | Stop reason: HOSPADM

## 2021-01-01 RX ORDER — ATORVASTATIN CALCIUM 40 MG/1
80 TABLET, FILM COATED ORAL
Status: DISCONTINUED | OUTPATIENT
Start: 2021-01-01 | End: 2021-01-01 | Stop reason: HOSPADM

## 2021-01-01 RX ORDER — MAGNESIUM SULFATE 1 G/100ML
1 INJECTION INTRAVENOUS ONCE
Status: COMPLETED | OUTPATIENT
Start: 2021-01-01 | End: 2021-01-01

## 2021-01-01 RX ORDER — MIDODRINE HYDROCHLORIDE 5 MG/1
10 TABLET ORAL EVERY 8 HOURS
Status: DISCONTINUED | OUTPATIENT
Start: 2021-01-01 | End: 2021-01-01

## 2021-01-01 RX ORDER — IPRATROPIUM BROMIDE AND ALBUTEROL SULFATE 2.5; .5 MG/3ML; MG/3ML
3 SOLUTION RESPIRATORY (INHALATION)
Status: DISPENSED | OUTPATIENT
Start: 2021-01-01 | End: 2021-01-01

## 2021-01-01 RX ORDER — MORPHINE SULFATE 4 MG/ML
4 INJECTION INTRAVENOUS
Status: DISCONTINUED | OUTPATIENT
Start: 2021-01-01 | End: 2021-01-01

## 2021-01-01 RX ORDER — IPRATROPIUM BROMIDE AND ALBUTEROL SULFATE 2.5; .5 MG/3ML; MG/3ML
SOLUTION RESPIRATORY (INHALATION)
Status: COMPLETED
Start: 2021-01-01 | End: 2021-01-01

## 2021-01-01 RX ORDER — PROPOFOL 10 MG/ML
INJECTION, EMULSION INTRAVENOUS
Status: COMPLETED
Start: 2021-01-01 | End: 2021-01-01

## 2021-01-01 RX ORDER — BACITRACIN 500 UNIT/G
1 PACKET (EA) TOPICAL AS NEEDED
Status: DISCONTINUED | OUTPATIENT
Start: 2021-01-01 | End: 2021-01-01 | Stop reason: HOSPADM

## 2021-01-01 RX ORDER — ALBUTEROL SULFATE 0.83 MG/ML
2.5 SOLUTION RESPIRATORY (INHALATION)
Status: DISCONTINUED | OUTPATIENT
Start: 2021-01-01 | End: 2021-01-01 | Stop reason: HOSPADM

## 2021-01-01 RX ORDER — CEFEPIME HYDROCHLORIDE 2 G/1
2 INJECTION, POWDER, FOR SOLUTION INTRAVENOUS ONCE
Status: DISCONTINUED | OUTPATIENT
Start: 2021-01-01 | End: 2021-01-01 | Stop reason: SDUPTHER

## 2021-01-01 RX ORDER — SODIUM CHLORIDE 9 MG/ML
9 INJECTION, SOLUTION INTRAVENOUS CONTINUOUS
Status: DISCONTINUED | OUTPATIENT
Start: 2021-01-01 | End: 2021-01-01 | Stop reason: HOSPADM

## 2021-01-01 RX ORDER — POTASSIUM CHLORIDE 29.8 MG/ML
20 INJECTION INTRAVENOUS
Status: DISPENSED | OUTPATIENT
Start: 2021-01-01 | End: 2021-01-01

## 2021-01-01 RX ORDER — NALOXONE HYDROCHLORIDE 0.4 MG/ML
0.4 INJECTION, SOLUTION INTRAMUSCULAR; INTRAVENOUS; SUBCUTANEOUS AS NEEDED
Status: DISCONTINUED | OUTPATIENT
Start: 2021-01-01 | End: 2021-01-01 | Stop reason: HOSPADM

## 2021-01-01 RX ORDER — INSULIN GLARGINE 100 [IU]/ML
16 INJECTION, SOLUTION SUBCUTANEOUS
Status: DISCONTINUED | OUTPATIENT
Start: 2021-01-01 | End: 2021-01-01

## 2021-01-01 RX ORDER — POLYETHYLENE GLYCOL 3350 17 G/17G
17 POWDER, FOR SOLUTION ORAL DAILY
Status: DISCONTINUED | OUTPATIENT
Start: 2021-01-01 | End: 2021-01-01 | Stop reason: HOSPADM

## 2021-01-01 RX ORDER — DEXTROSE 50 % IN WATER (D50W) INTRAVENOUS SYRINGE
AS NEEDED
Status: DISCONTINUED | OUTPATIENT
Start: 2021-01-01 | End: 2021-01-01 | Stop reason: HOSPADM

## 2021-01-01 RX ORDER — ALBUMIN HUMAN 50 G/1000ML
25 SOLUTION INTRAVENOUS ONCE
Status: COMPLETED | OUTPATIENT
Start: 2021-01-01 | End: 2021-01-01

## 2021-01-01 RX ORDER — POLYETHYLENE GLYCOL 3350 17 G/17G
17 POWDER, FOR SOLUTION ORAL DAILY PRN
Status: DISCONTINUED | OUTPATIENT
Start: 2021-01-01 | End: 2021-01-01 | Stop reason: HOSPADM

## 2021-01-01 RX ORDER — DEXTROSE 50 % IN WATER (D50W) INTRAVENOUS SYRINGE
25 ONCE
Status: ACTIVE | OUTPATIENT
Start: 2021-01-01 | End: 2021-01-01

## 2021-01-01 RX ORDER — MIDAZOLAM HYDROCHLORIDE 1 MG/ML
2 INJECTION, SOLUTION INTRAMUSCULAR; INTRAVENOUS ONCE
Status: COMPLETED | OUTPATIENT
Start: 2021-01-01 | End: 2021-01-01

## 2021-01-01 RX ORDER — PROPOFOL 10 MG/ML
INJECTION, EMULSION INTRAVENOUS
Status: COMPLETED | OUTPATIENT
Start: 2021-01-01 | End: 2021-01-01

## 2021-01-01 RX ORDER — INSULIN LISPRO 100 [IU]/ML
INJECTION, SOLUTION INTRAVENOUS; SUBCUTANEOUS
Status: DISCONTINUED | OUTPATIENT
Start: 2021-01-01 | End: 2021-01-01

## 2021-01-01 RX ORDER — OXYCODONE HYDROCHLORIDE 5 MG/1
5 TABLET ORAL
Status: DISCONTINUED | OUTPATIENT
Start: 2021-01-01 | End: 2021-01-01 | Stop reason: HOSPADM

## 2021-01-01 RX ORDER — ROCURONIUM BROMIDE 10 MG/ML
INJECTION, SOLUTION INTRAVENOUS
Status: DISPENSED
Start: 2021-01-01 | End: 2021-01-01

## 2021-01-01 RX ORDER — HYDROCORTISONE SODIUM SUCCINATE 100 MG/2ML
50 INJECTION, POWDER, FOR SOLUTION INTRAMUSCULAR; INTRAVENOUS EVERY 8 HOURS
Status: DISCONTINUED | OUTPATIENT
Start: 2021-01-01 | End: 2021-01-01 | Stop reason: HOSPADM

## 2021-01-01 RX ORDER — ROCURONIUM BROMIDE 10 MG/ML
30 INJECTION, SOLUTION INTRAVENOUS
Status: COMPLETED | OUTPATIENT
Start: 2021-01-01 | End: 2021-01-01

## 2021-01-01 RX ORDER — IPRATROPIUM BROMIDE AND ALBUTEROL SULFATE 2.5; .5 MG/3ML; MG/3ML
3 SOLUTION RESPIRATORY (INHALATION)
Status: COMPLETED | OUTPATIENT
Start: 2021-01-01 | End: 2021-01-01

## 2021-01-01 RX ORDER — POLYETHYLENE GLYCOL 3350 17 G/17G
17 POWDER, FOR SOLUTION ORAL DAILY PRN
Status: DISCONTINUED | OUTPATIENT
Start: 2021-01-01 | End: 2021-01-01 | Stop reason: SDUPTHER

## 2021-01-01 RX ORDER — ALBUMIN HUMAN 50 G/1000ML
12.5 SOLUTION INTRAVENOUS
Status: COMPLETED | OUTPATIENT
Start: 2021-01-01 | End: 2021-01-01

## 2021-01-01 RX ORDER — MAGNESIUM SULFATE 100 %
4 CRYSTALS MISCELLANEOUS AS NEEDED
Status: DISCONTINUED | OUTPATIENT
Start: 2021-01-01 | End: 2021-01-01 | Stop reason: HOSPADM

## 2021-01-01 RX ORDER — CHLORHEXIDINE GLUCONATE 1.2 MG/ML
10 RINSE ORAL EVERY 12 HOURS
Status: DISCONTINUED | OUTPATIENT
Start: 2021-01-01 | End: 2021-01-01 | Stop reason: HOSPADM

## 2021-01-01 RX ORDER — ROCURONIUM BROMIDE 10 MG/ML
100 INJECTION, SOLUTION INTRAVENOUS
Status: COMPLETED | OUTPATIENT
Start: 2021-01-01 | End: 2021-01-01

## 2021-01-01 RX ORDER — DOBUTAMINE HYDROCHLORIDE 200 MG/100ML
INJECTION INTRAVENOUS
Status: COMPLETED | OUTPATIENT
Start: 2021-01-01 | End: 2021-01-01

## 2021-01-01 RX ORDER — IPRATROPIUM BROMIDE 0.5 MG/2.5ML
0.5 SOLUTION RESPIRATORY (INHALATION)
Status: DISCONTINUED | OUTPATIENT
Start: 2021-01-01 | End: 2021-01-01

## 2021-01-01 RX ORDER — MIDAZOLAM HYDROCHLORIDE 1 MG/ML
1-2 INJECTION, SOLUTION INTRAMUSCULAR; INTRAVENOUS
Status: DISCONTINUED | OUTPATIENT
Start: 2021-01-01 | End: 2021-01-01 | Stop reason: HOSPADM

## 2021-01-01 RX ORDER — GUAIFENESIN 100 MG/5ML
81 LIQUID (ML) ORAL DAILY
Status: DISCONTINUED | OUTPATIENT
Start: 2021-01-01 | End: 2021-01-01

## 2021-01-01 RX ORDER — MIDAZOLAM IN 0.9 % SOD.CHLORID 1 MG/ML
0-10 PLASTIC BAG, INJECTION (ML) INTRAVENOUS
Status: DISCONTINUED | OUTPATIENT
Start: 2021-01-01 | End: 2021-01-01 | Stop reason: HOSPADM

## 2021-01-01 RX ORDER — MAGNESIUM SULFATE HEPTAHYDRATE 40 MG/ML
2 INJECTION, SOLUTION INTRAVENOUS
Status: COMPLETED | OUTPATIENT
Start: 2021-01-01 | End: 2021-01-01

## 2021-01-01 RX ORDER — INSULIN GLARGINE 100 [IU]/ML
25 INJECTION, SOLUTION SUBCUTANEOUS
Status: DISCONTINUED | OUTPATIENT
Start: 2021-01-01 | End: 2021-01-01 | Stop reason: HOSPADM

## 2021-01-01 RX ORDER — SODIUM CHLORIDE 450 MG/100ML
10 INJECTION, SOLUTION INTRAVENOUS CONTINUOUS
Status: DISCONTINUED | OUTPATIENT
Start: 2021-01-01 | End: 2021-01-01 | Stop reason: HOSPADM

## 2021-01-01 RX ORDER — POTASSIUM CHLORIDE 29.8 MG/ML
20 INJECTION INTRAVENOUS
Status: COMPLETED | OUTPATIENT
Start: 2021-01-01 | End: 2021-01-01

## 2021-01-01 RX ORDER — BUDESONIDE 0.25 MG/2ML
250 INHALANT ORAL
Status: DISCONTINUED | OUTPATIENT
Start: 2021-01-01 | End: 2021-01-01

## 2021-01-01 RX ORDER — PROPOFOL 10 MG/ML
0-50 VIAL (ML) INTRAVENOUS
Status: DISCONTINUED | OUTPATIENT
Start: 2021-01-01 | End: 2021-01-01

## 2021-01-01 RX ORDER — FENTANYL CITRATE 50 UG/ML
50-100 INJECTION, SOLUTION INTRAMUSCULAR; INTRAVENOUS
Status: DISCONTINUED | OUTPATIENT
Start: 2021-01-01 | End: 2021-01-01 | Stop reason: HOSPADM

## 2021-01-01 RX ORDER — SODIUM CHLORIDE 0.9 % (FLUSH) 0.9 %
5-40 SYRINGE (ML) INJECTION EVERY 8 HOURS
Status: DISCONTINUED | OUTPATIENT
Start: 2021-01-01 | End: 2021-01-01

## 2021-01-01 RX ORDER — MIDAZOLAM HYDROCHLORIDE 1 MG/ML
INJECTION, SOLUTION INTRAMUSCULAR; INTRAVENOUS AS NEEDED
Status: DISCONTINUED | OUTPATIENT
Start: 2021-01-01 | End: 2021-01-01 | Stop reason: HOSPADM

## 2021-01-01 RX ORDER — SODIUM CHLORIDE 9 MG/ML
25 INJECTION, SOLUTION INTRAVENOUS CONTINUOUS
Status: DISCONTINUED | OUTPATIENT
Start: 2021-01-01 | End: 2021-01-01 | Stop reason: HOSPADM

## 2021-01-01 RX ORDER — ACETAMINOPHEN 650 MG/1
650 SUPPOSITORY RECTAL
Status: DISCONTINUED | OUTPATIENT
Start: 2021-01-01 | End: 2021-01-01

## 2021-01-01 RX ORDER — SODIUM CHLORIDE 9 MG/ML
INJECTION, SOLUTION INTRAVENOUS
Status: DISCONTINUED | OUTPATIENT
Start: 2021-01-01 | End: 2021-01-01 | Stop reason: HOSPADM

## 2021-01-01 RX ORDER — OXYCODONE HYDROCHLORIDE 5 MG/1
10 TABLET ORAL
Status: DISCONTINUED | OUTPATIENT
Start: 2021-01-01 | End: 2021-01-01 | Stop reason: HOSPADM

## 2021-01-01 RX ORDER — MAGNESIUM SULFATE 1 G/100ML
1 INJECTION INTRAVENOUS AS NEEDED
Status: DISCONTINUED | OUTPATIENT
Start: 2021-01-01 | End: 2021-01-01 | Stop reason: HOSPADM

## 2021-01-01 RX ORDER — DILTIAZEM HYDROCHLORIDE 180 MG/1
CAPSULE, COATED, EXTENDED RELEASE ORAL
Qty: 30 CAP | Refills: 0 | Status: SHIPPED | OUTPATIENT
Start: 2021-01-01 | End: 2021-01-01

## 2021-01-01 RX ORDER — KETOROLAC TROMETHAMINE 30 MG/ML
30 INJECTION, SOLUTION INTRAMUSCULAR; INTRAVENOUS ONCE
Status: COMPLETED | OUTPATIENT
Start: 2021-01-01 | End: 2021-01-01

## 2021-01-01 RX ORDER — SODIUM CHLORIDE 0.9 % (FLUSH) 0.9 %
5-40 SYRINGE (ML) INJECTION AS NEEDED
Status: DISCONTINUED | OUTPATIENT
Start: 2021-01-01 | End: 2021-01-01

## 2021-01-01 RX ORDER — AMIODARONE HYDROCHLORIDE 200 MG/1
400 TABLET ORAL EVERY 12 HOURS
Status: DISCONTINUED | OUTPATIENT
Start: 2021-01-01 | End: 2021-01-01 | Stop reason: HOSPADM

## 2021-01-01 RX ORDER — HYDROCORTISONE SODIUM SUCCINATE 100 MG/2ML
100 INJECTION, POWDER, FOR SOLUTION INTRAMUSCULAR; INTRAVENOUS EVERY 8 HOURS
Status: DISCONTINUED | OUTPATIENT
Start: 2021-01-01 | End: 2021-01-01

## 2021-01-01 RX ORDER — HEPARIN SODIUM 1000 [USP'U]/ML
INJECTION, SOLUTION INTRAVENOUS; SUBCUTANEOUS AS NEEDED
Status: DISCONTINUED | OUTPATIENT
Start: 2021-01-01 | End: 2021-01-01 | Stop reason: HOSPADM

## 2021-01-01 RX ORDER — LANOLIN ALCOHOL/MO/W.PET/CERES
400 CREAM (GRAM) TOPICAL 2 TIMES DAILY
Status: DISCONTINUED | OUTPATIENT
Start: 2021-01-01 | End: 2021-01-01 | Stop reason: HOSPADM

## 2021-01-01 RX ORDER — FACIAL-BODY WIPES
10 EACH TOPICAL DAILY PRN
Status: DISCONTINUED | OUTPATIENT
Start: 2021-01-01 | End: 2021-01-01 | Stop reason: HOSPADM

## 2021-01-01 RX ORDER — MIDODRINE HYDROCHLORIDE 5 MG/1
15 TABLET ORAL EVERY 8 HOURS
Status: DISCONTINUED | OUTPATIENT
Start: 2021-01-01 | End: 2021-01-01 | Stop reason: HOSPADM

## 2021-01-01 RX ORDER — DANTROLENE 20 MG/1
2.5 INJECTION, POWDER, FOR SOLUTION INTRAVENOUS ONCE
Status: DISCONTINUED | OUTPATIENT
Start: 2021-01-01 | End: 2021-01-01 | Stop reason: SDUPTHER

## 2021-01-01 RX ORDER — 3% SODIUM CHLORIDE 3 G/100ML
15 INJECTION, SOLUTION INTRAVENOUS CONTINUOUS
Status: DISPENSED | OUTPATIENT
Start: 2021-01-01 | End: 2021-01-01

## 2021-01-01 RX ORDER — MORPHINE SULFATE 2 MG/ML
2 INJECTION, SOLUTION INTRAMUSCULAR; INTRAVENOUS
Status: DISCONTINUED | OUTPATIENT
Start: 2021-01-01 | End: 2021-01-01

## 2021-01-01 RX ORDER — NOREPINEPHRINE BITARTRATE/D5W 8 MG/250ML
.5-3 PLASTIC BAG, INJECTION (ML) INTRAVENOUS
Status: DISCONTINUED | OUTPATIENT
Start: 2021-01-01 | End: 2021-01-01 | Stop reason: HOSPADM

## 2021-01-01 RX ORDER — ONDANSETRON 4 MG/1
4 TABLET, ORALLY DISINTEGRATING ORAL
Status: DISCONTINUED | OUTPATIENT
Start: 2021-01-01 | End: 2021-01-01 | Stop reason: HOSPADM

## 2021-01-01 RX ORDER — ACETAMINOPHEN 325 MG/1
650 TABLET ORAL EVERY 4 HOURS
Status: DISPENSED | OUTPATIENT
Start: 2021-01-01 | End: 2021-01-01

## 2021-01-01 RX ORDER — LANOLIN ALCOHOL/MO/W.PET/CERES
3 CREAM (GRAM) TOPICAL
Status: DISCONTINUED | OUTPATIENT
Start: 2021-01-01 | End: 2021-01-01 | Stop reason: HOSPADM

## 2021-01-01 RX ORDER — ROCURONIUM BROMIDE 10 MG/ML
INJECTION, SOLUTION INTRAVENOUS AS NEEDED
Status: DISCONTINUED | OUTPATIENT
Start: 2021-01-01 | End: 2021-01-01 | Stop reason: HOSPADM

## 2021-01-01 RX ORDER — DEXTROSE 50 % IN WATER (D50W) INTRAVENOUS SYRINGE
12.5-25 AS NEEDED
Status: DISCONTINUED | OUTPATIENT
Start: 2021-01-01 | End: 2021-01-01 | Stop reason: HOSPADM

## 2021-01-01 RX ORDER — DOBUTAMINE HYDROCHLORIDE 200 MG/100ML
7.5 INJECTION INTRAVENOUS
Status: DISCONTINUED | OUTPATIENT
Start: 2021-01-01 | End: 2021-01-01

## 2021-01-01 RX ORDER — INSULIN GLARGINE 100 [IU]/ML
10 INJECTION, SOLUTION SUBCUTANEOUS
Status: DISCONTINUED | OUTPATIENT
Start: 2021-01-01 | End: 2021-01-01

## 2021-01-01 RX ORDER — SCOLOPAMINE TRANSDERMAL SYSTEM 1 MG/1
1 PATCH, EXTENDED RELEASE TRANSDERMAL
Status: DISCONTINUED | OUTPATIENT
Start: 2021-01-01 | End: 2021-01-01

## 2021-01-01 RX ORDER — ACETAMINOPHEN 325 MG/1
650 TABLET ORAL
Status: DISCONTINUED | OUTPATIENT
Start: 2021-01-01 | End: 2021-01-01

## 2021-01-01 RX ORDER — LIDOCAINE HYDROCHLORIDE 10 MG/ML
INJECTION INFILTRATION; PERINEURAL AS NEEDED
Status: DISCONTINUED | OUTPATIENT
Start: 2021-01-01 | End: 2021-01-01 | Stop reason: HOSPADM

## 2021-01-01 RX ORDER — FUROSEMIDE 10 MG/ML
20 INJECTION INTRAMUSCULAR; INTRAVENOUS ONCE
Status: COMPLETED | OUTPATIENT
Start: 2021-01-01 | End: 2021-01-01

## 2021-01-01 RX ORDER — AMOXICILLIN 250 MG
1 CAPSULE ORAL 2 TIMES DAILY
Status: DISCONTINUED | OUTPATIENT
Start: 2021-01-01 | End: 2021-01-01 | Stop reason: HOSPADM

## 2021-01-01 RX ORDER — POTASSIUM CHLORIDE 29.8 MG/ML
20 INJECTION INTRAVENOUS ONCE
Status: COMPLETED | OUTPATIENT
Start: 2021-01-01 | End: 2021-01-01

## 2021-01-01 RX ORDER — GUAIFENESIN 100 MG/5ML
81 LIQUID (ML) ORAL DAILY
Status: DISCONTINUED | OUTPATIENT
Start: 2021-01-01 | End: 2021-01-01 | Stop reason: HOSPADM

## 2021-01-01 RX ORDER — ALBUMIN HUMAN 50 G/1000ML
SOLUTION INTRAVENOUS AS NEEDED
Status: DISCONTINUED | OUTPATIENT
Start: 2021-01-01 | End: 2021-01-01 | Stop reason: HOSPADM

## 2021-01-01 RX ORDER — MAGNESIUM SULFATE HEPTAHYDRATE 40 MG/ML
INJECTION, SOLUTION INTRAVENOUS
Status: COMPLETED
Start: 2021-01-01 | End: 2021-01-01

## 2021-01-01 RX ORDER — CALCIUM GLUCONATE 20 MG/ML
1 INJECTION, SOLUTION INTRAVENOUS ONCE
Status: COMPLETED | OUTPATIENT
Start: 2021-01-01 | End: 2021-01-01

## 2021-01-01 RX ORDER — FENTANYL CITRATE 50 UG/ML
100 INJECTION, SOLUTION INTRAMUSCULAR; INTRAVENOUS
Status: COMPLETED | OUTPATIENT
Start: 2021-01-01 | End: 2021-01-01

## 2021-01-01 RX ORDER — ROCURONIUM BROMIDE 10 MG/ML
50 INJECTION, SOLUTION INTRAVENOUS
Status: COMPLETED | OUTPATIENT
Start: 2021-01-01 | End: 2021-01-01

## 2021-01-01 RX ORDER — MIDAZOLAM HYDROCHLORIDE 1 MG/ML
1 INJECTION, SOLUTION INTRAMUSCULAR; INTRAVENOUS
Status: DISCONTINUED | OUTPATIENT
Start: 2021-01-01 | End: 2021-01-01 | Stop reason: HOSPADM

## 2021-01-01 RX ORDER — ONDANSETRON 2 MG/ML
4 INJECTION INTRAMUSCULAR; INTRAVENOUS
Status: DISCONTINUED | OUTPATIENT
Start: 2021-01-01 | End: 2021-01-01

## 2021-01-01 RX ORDER — DEXMEDETOMIDINE HYDROCHLORIDE 4 UG/ML
.1-1.5 INJECTION, SOLUTION INTRAVENOUS
Status: DISCONTINUED | OUTPATIENT
Start: 2021-01-01 | End: 2021-01-01

## 2021-01-01 RX ORDER — GUAIFENESIN 100 MG/5ML
324 LIQUID (ML) ORAL
Status: COMPLETED | OUTPATIENT
Start: 2021-01-01 | End: 2021-01-01

## 2021-01-01 RX ORDER — NOREPINEPHRINE BITARTRATE/D5W 8 MG/250ML
.5-3 PLASTIC BAG, INJECTION (ML) INTRAVENOUS
Status: DISCONTINUED | OUTPATIENT
Start: 2021-01-01 | End: 2021-01-01 | Stop reason: SDUPTHER

## 2021-01-01 RX ORDER — ACETAMINOPHEN 325 MG/1
650 TABLET ORAL
Status: DISCONTINUED | OUTPATIENT
Start: 2021-01-01 | End: 2021-01-01 | Stop reason: HOSPADM

## 2021-01-01 RX ORDER — GUAIFENESIN 100 MG/5ML
LIQUID (ML) ORAL
Status: COMPLETED
Start: 2021-01-01 | End: 2021-01-01

## 2021-01-01 RX ORDER — GUAIFENESIN 100 MG/5ML
81 LIQUID (ML) ORAL
Status: COMPLETED | OUTPATIENT
Start: 2021-01-01 | End: 2021-01-01

## 2021-01-01 RX ADMIN — TICAGRELOR 90 MG: 90 TABLET ORAL at 08:31

## 2021-01-01 RX ADMIN — Medication 10 ML: at 21:11

## 2021-01-01 RX ADMIN — POLYETHYLENE GLYCOL 3350 17 G: 17 POWDER, FOR SOLUTION ORAL at 08:47

## 2021-01-01 RX ADMIN — PHENYLEPHRINE HYDROCHLORIDE 300 MCG/MIN: 10 INJECTION INTRAVENOUS at 11:20

## 2021-01-01 RX ADMIN — EPINEPHRINE 10 MCG/MIN: 1 INJECTION INTRAMUSCULAR; INTRAVENOUS; SUBCUTANEOUS at 08:26

## 2021-01-01 RX ADMIN — ALBUMIN (HUMAN) 12.5 G: 12.5 INJECTION, SOLUTION INTRAVENOUS at 17:12

## 2021-01-01 RX ADMIN — POTASSIUM PHOSPHATE, MONOBASIC AND POTASSIUM PHOSPHATE, DIBASIC: 224; 236 INJECTION, SOLUTION, CONCENTRATE INTRAVENOUS at 20:06

## 2021-01-01 RX ADMIN — FENTANYL CITRATE 100 MCG/HR: 50 INJECTION, SOLUTION INTRAMUSCULAR; INTRAVENOUS at 00:26

## 2021-01-01 RX ADMIN — EPINEPHRINE 3 MCG/MIN: 1 INJECTION INTRAMUSCULAR; INTRAVENOUS; SUBCUTANEOUS at 12:42

## 2021-01-01 RX ADMIN — AMIODARONE HYDROCHLORIDE 150 MG: 50 INJECTION, SOLUTION INTRAVENOUS at 05:56

## 2021-01-01 RX ADMIN — PROPOFOL 1000 MG: 10 INJECTION, EMULSION INTRAVENOUS at 18:22

## 2021-01-01 RX ADMIN — CHLORHEXIDINE GLUCONATE 10 ML: 1.2 RINSE ORAL at 20:09

## 2021-01-01 RX ADMIN — SODIUM CHLORIDE 40 MG: 9 INJECTION, SOLUTION INTRAMUSCULAR; INTRAVENOUS; SUBCUTANEOUS at 08:12

## 2021-01-01 RX ADMIN — VASOPRESSIN 0.04 UNITS/MIN: 20 INJECTION INTRAVENOUS at 05:07

## 2021-01-01 RX ADMIN — POTASSIUM CHLORIDE 20 MEQ: 29.8 INJECTION, SOLUTION INTRAVENOUS at 08:19

## 2021-01-01 RX ADMIN — DEXMEDETOMIDINE HYDROCHLORIDE 1.5 MCG/KG/HR: 400 INJECTION INTRAVENOUS at 14:29

## 2021-01-01 RX ADMIN — VASOPRESSIN 0.04 UNITS/MIN: 20 INJECTION INTRAVENOUS at 17:19

## 2021-01-01 RX ADMIN — NOREPINEPHRINE BITARTRATE 22 MCG/MIN: 1 INJECTION, SOLUTION, CONCENTRATE INTRAVENOUS at 20:22

## 2021-01-01 RX ADMIN — POTASSIUM CHLORIDE 20 MEQ: 29.8 INJECTION, SOLUTION INTRAVENOUS at 22:05

## 2021-01-01 RX ADMIN — INSULIN LISPRO 3 UNITS: 100 INJECTION, SOLUTION INTRAVENOUS; SUBCUTANEOUS at 23:49

## 2021-01-01 RX ADMIN — MIDODRINE HYDROCHLORIDE 10 MG: 5 TABLET ORAL at 21:11

## 2021-01-01 RX ADMIN — SODIUM CHLORIDE 40 MG: 9 INJECTION, SOLUTION INTRAMUSCULAR; INTRAVENOUS; SUBCUTANEOUS at 08:30

## 2021-01-01 RX ADMIN — DOCUSATE SODIUM 50 MG AND SENNOSIDES 8.6 MG 1 TABLET: 8.6; 5 TABLET, FILM COATED ORAL at 17:38

## 2021-01-01 RX ADMIN — Medication 10 ML: at 13:45

## 2021-01-01 RX ADMIN — Medication 10 ML: at 06:22

## 2021-01-01 RX ADMIN — HYDROCORTISONE SODIUM SUCCINATE 100 MG: 100 INJECTION, POWDER, FOR SOLUTION INTRAMUSCULAR; INTRAVENOUS at 17:22

## 2021-01-01 RX ADMIN — PHENYLEPHRINE HYDROCHLORIDE 125 MCG/MIN: 10 INJECTION INTRAVENOUS at 05:31

## 2021-01-01 RX ADMIN — CEFAZOLIN 2 G: 330 INJECTION, POWDER, FOR SOLUTION INTRAMUSCULAR; INTRAVENOUS at 06:00

## 2021-01-01 RX ADMIN — DIBASIC SODIUM PHOSPHATE, MONOBASIC POTASSIUM PHOSPHATE AND MONOBASIC SODIUM PHOSPHATE 2 TABLET: 852; 155; 130 TABLET ORAL at 18:19

## 2021-01-01 RX ADMIN — SODIUM BICARBONATE 50 MEQ: 84 INJECTION, SOLUTION INTRAVENOUS at 06:00

## 2021-01-01 RX ADMIN — EPINEPHRINE 3 MCG/KG/MIN: 1 INJECTION INTRAMUSCULAR; INTRAVENOUS; SUBCUTANEOUS at 06:00

## 2021-01-01 RX ADMIN — IPRATROPIUM BROMIDE 0.5 MG: 0.5 SOLUTION RESPIRATORY (INHALATION) at 09:02

## 2021-01-01 RX ADMIN — FUROSEMIDE 40 MG: 10 INJECTION, SOLUTION INTRAVENOUS at 09:15

## 2021-01-01 RX ADMIN — SODIUM CHLORIDE 15 ML/HR: 3 INJECTION, SOLUTION INTRAVENOUS at 03:14

## 2021-01-01 RX ADMIN — MAGNESIUM SULFATE HEPTAHYDRATE 1 G: 1 INJECTION, SOLUTION INTRAVENOUS at 23:50

## 2021-01-01 RX ADMIN — DIBASIC SODIUM PHOSPHATE, MONOBASIC POTASSIUM PHOSPHATE AND MONOBASIC SODIUM PHOSPHATE 2 TABLET: 852; 155; 130 TABLET ORAL at 08:12

## 2021-01-01 RX ADMIN — ALBUMIN (HUMAN) 250 ML: 12.5 INJECTION, SOLUTION INTRAVENOUS at 08:09

## 2021-01-01 RX ADMIN — FUROSEMIDE 40 MG: 10 INJECTION, SOLUTION INTRAMUSCULAR; INTRAVENOUS at 08:13

## 2021-01-01 RX ADMIN — SODIUM BICARBONATE: 84 INJECTION, SOLUTION INTRAVENOUS at 02:33

## 2021-01-01 RX ADMIN — DOCUSATE SODIUM 50 MG AND SENNOSIDES 8.6 MG 1 TABLET: 8.6; 5 TABLET, FILM COATED ORAL at 08:13

## 2021-01-01 RX ADMIN — NOREPINEPHRINE BITARTRATE 30 MCG/MIN: 1 INJECTION, SOLUTION, CONCENTRATE INTRAVENOUS at 10:00

## 2021-01-01 RX ADMIN — PROPOFOL 25 MCG/KG/MIN: 10 INJECTION, EMULSION INTRAVENOUS at 06:29

## 2021-01-01 RX ADMIN — SODIUM CHLORIDE 9 ML/HR: 900 INJECTION, SOLUTION INTRAVENOUS at 00:00

## 2021-01-01 RX ADMIN — DOCUSATE SODIUM 50 MG AND SENNOSIDES 8.6 MG 1 TABLET: 8.6; 5 TABLET, FILM COATED ORAL at 17:22

## 2021-01-01 RX ADMIN — HEPARIN SODIUM 6000 UNITS: 1000 INJECTION, SOLUTION INTRAVENOUS; SUBCUTANEOUS at 08:11

## 2021-01-01 RX ADMIN — ACETAMINOPHEN 650 MG: 325 TABLET ORAL at 12:55

## 2021-01-01 RX ADMIN — PIPERACILLIN AND TAZOBACTAM 3.38 G: 3; .375 INJECTION, POWDER, LYOPHILIZED, FOR SOLUTION INTRAVENOUS at 20:05

## 2021-01-01 RX ADMIN — EPINEPHRINE 8 MCG/MIN: 1 INJECTION INTRAMUSCULAR; INTRAVENOUS; SUBCUTANEOUS at 06:50

## 2021-01-01 RX ADMIN — VANCOMYCIN HYDROCHLORIDE 2250 MG: 10 INJECTION, POWDER, LYOPHILIZED, FOR SOLUTION INTRAVENOUS at 16:22

## 2021-01-01 RX ADMIN — ASPIRIN 81 MG: 81 TABLET, CHEWABLE ORAL at 08:25

## 2021-01-01 RX ADMIN — HYDROCORTISONE SODIUM SUCCINATE 100 MG: 100 INJECTION, POWDER, FOR SOLUTION INTRAMUSCULAR; INTRAVENOUS at 09:06

## 2021-01-01 RX ADMIN — Medication 10 ML: at 21:14

## 2021-01-01 RX ADMIN — Medication 400 MG: at 17:22

## 2021-01-01 RX ADMIN — NOREPINEPHRINE BITARTRATE 6 MCG/MIN: 1 INJECTION, SOLUTION, CONCENTRATE INTRAVENOUS at 05:46

## 2021-01-01 RX ADMIN — INSULIN LISPRO 5 UNITS: 100 INJECTION, SOLUTION INTRAVENOUS; SUBCUTANEOUS at 12:12

## 2021-01-01 RX ADMIN — SODIUM CHLORIDE 30 MCG/MIN: 900 INJECTION, SOLUTION INTRAVENOUS at 21:33

## 2021-01-01 RX ADMIN — TICAGRELOR 90 MG: 90 TABLET ORAL at 20:04

## 2021-01-01 RX ADMIN — Medication 400 MG: at 08:30

## 2021-01-01 RX ADMIN — Medication 10 ML: at 14:18

## 2021-01-01 RX ADMIN — POTASSIUM CHLORIDE 20 MEQ: 29.8 INJECTION, SOLUTION INTRAVENOUS at 21:08

## 2021-01-01 RX ADMIN — MIDAZOLAM 10 MG/HR: 5 INJECTION INTRAMUSCULAR; INTRAVENOUS at 21:14

## 2021-01-01 RX ADMIN — PIPERACILLIN AND TAZOBACTAM 3.38 G: 3; .375 INJECTION, POWDER, LYOPHILIZED, FOR SOLUTION INTRAVENOUS at 04:17

## 2021-01-01 RX ADMIN — IPRATROPIUM BROMIDE 0.5 MG: 0.5 SOLUTION RESPIRATORY (INHALATION) at 20:19

## 2021-01-01 RX ADMIN — PIPERACILLIN AND TAZOBACTAM 3.38 G: 3; .375 INJECTION, POWDER, LYOPHILIZED, FOR SOLUTION INTRAVENOUS at 04:10

## 2021-01-01 RX ADMIN — HYDROCORTISONE SODIUM SUCCINATE 100 MG: 100 INJECTION, POWDER, FOR SOLUTION INTRAMUSCULAR; INTRAVENOUS at 01:59

## 2021-01-01 RX ADMIN — ALBUMIN (HUMAN) 12.5 G: 12.5 INJECTION, SOLUTION INTRAVENOUS at 20:19

## 2021-01-01 RX ADMIN — IPRATROPIUM BROMIDE 0.5 MG: 0.5 SOLUTION RESPIRATORY (INHALATION) at 21:48

## 2021-01-01 RX ADMIN — NOREPINEPHRINE BITARTRATE 4 MCG/MIN: 1 INJECTION, SOLUTION, CONCENTRATE INTRAVENOUS at 03:35

## 2021-01-01 RX ADMIN — SODIUM CHLORIDE 10 UNITS: 9 INJECTION, SOLUTION INTRAVENOUS at 07:04

## 2021-01-01 RX ADMIN — PHENYLEPHRINE HYDROCHLORIDE 90 MCG/MIN: 10 INJECTION INTRAVENOUS at 15:30

## 2021-01-01 RX ADMIN — AMIODARONE HYDROCHLORIDE 400 MG: 200 TABLET ORAL at 17:12

## 2021-01-01 RX ADMIN — DOXYCYCLINE 100 MG: 100 INJECTION, POWDER, LYOPHILIZED, FOR SOLUTION INTRAVENOUS at 17:05

## 2021-01-01 RX ADMIN — ASPIRIN 81 MG: 81 TABLET, CHEWABLE ORAL at 08:48

## 2021-01-01 RX ADMIN — SODIUM CHLORIDE 10 ML/HR: 4.5 INJECTION, SOLUTION INTRAVENOUS at 12:32

## 2021-01-01 RX ADMIN — PIPERACILLIN AND TAZOBACTAM 3.38 G: 3; .375 INJECTION, POWDER, LYOPHILIZED, FOR SOLUTION INTRAVENOUS at 12:15

## 2021-01-01 RX ADMIN — TICAGRELOR 90 MG: 90 TABLET ORAL at 08:13

## 2021-01-01 RX ADMIN — Medication 10 ML: at 14:03

## 2021-01-01 RX ADMIN — ATORVASTATIN CALCIUM 80 MG: 40 TABLET, FILM COATED ORAL at 21:13

## 2021-01-01 RX ADMIN — HYDROCORTISONE SODIUM SUCCINATE 100 MG: 100 INJECTION, POWDER, FOR SOLUTION INTRAMUSCULAR; INTRAVENOUS at 01:03

## 2021-01-01 RX ADMIN — FENTANYL CITRATE 200 MCG/HR: 50 INJECTION, SOLUTION INTRAMUSCULAR; INTRAVENOUS at 19:16

## 2021-01-01 RX ADMIN — ETOMIDATE 20 MG: 2 INJECTION, SOLUTION INTRAVENOUS at 18:18

## 2021-01-01 RX ADMIN — SODIUM PHOSPHATE, MONOBASIC, MONOHYDRATE: 276; 142 INJECTION, SOLUTION INTRAVENOUS at 06:58

## 2021-01-01 RX ADMIN — MAGNESIUM SULFATE HEPTAHYDRATE 1 G: 1 INJECTION, SOLUTION INTRAVENOUS at 18:54

## 2021-01-01 RX ADMIN — FUROSEMIDE 20 MG: 10 INJECTION, SOLUTION INTRAMUSCULAR; INTRAVENOUS at 14:00

## 2021-01-01 RX ADMIN — CALCIUM CHLORIDE 1 G: 100 INJECTION, SOLUTION INTRAVENOUS; INTRAVENTRICULAR at 06:52

## 2021-01-01 RX ADMIN — VASOPRESSIN 0.04 UNITS/MIN: 20 INJECTION INTRAVENOUS at 20:21

## 2021-01-01 RX ADMIN — POTASSIUM CHLORIDE 20 MEQ: 29.8 INJECTION, SOLUTION INTRAVENOUS at 01:41

## 2021-01-01 RX ADMIN — MAGNESIUM SULFATE HEPTAHYDRATE 2 G: 40 INJECTION, SOLUTION INTRAVENOUS at 13:22

## 2021-01-01 RX ADMIN — SODIUM BICARBONATE: 84 INJECTION, SOLUTION INTRAVENOUS at 02:00

## 2021-01-01 RX ADMIN — INSULIN LISPRO 2 UNITS: 100 INJECTION, SOLUTION INTRAVENOUS; SUBCUTANEOUS at 23:53

## 2021-01-01 RX ADMIN — CEFAZOLIN 2 G: 330 INJECTION, POWDER, FOR SOLUTION INTRAMUSCULAR; INTRAVENOUS at 09:32

## 2021-01-01 RX ADMIN — ASPIRIN 81 MG: 81 TABLET, CHEWABLE ORAL at 13:28

## 2021-01-01 RX ADMIN — FENTANYL CITRATE 200 MCG/HR: 50 INJECTION, SOLUTION INTRAMUSCULAR; INTRAVENOUS at 02:36

## 2021-01-01 RX ADMIN — ACETAMINOPHEN 650 MG: 325 TABLET ORAL at 08:31

## 2021-01-01 RX ADMIN — NOREPINEPHRINE BITARTRATE 17 MCG/MIN: 1 INJECTION, SOLUTION, CONCENTRATE INTRAVENOUS at 01:38

## 2021-01-01 RX ADMIN — VASOPRESSIN 2 UNITS/MIN: 20 INJECTION INTRAVENOUS at 06:02

## 2021-01-01 RX ADMIN — Medication 10 ML: at 05:25

## 2021-01-01 RX ADMIN — ALBUMIN (HUMAN) 250 ML: 12.5 INJECTION, SOLUTION INTRAVENOUS at 08:38

## 2021-01-01 RX ADMIN — PHENYLEPHRINE HYDROCHLORIDE 300 MCG/MIN: 10 INJECTION INTRAVENOUS at 13:03

## 2021-01-01 RX ADMIN — Medication 10 ML: at 05:00

## 2021-01-01 RX ADMIN — VANCOMYCIN HYDROCHLORIDE 750 MG: 750 INJECTION, POWDER, LYOPHILIZED, FOR SOLUTION INTRAVENOUS at 06:37

## 2021-01-01 RX ADMIN — MIDAZOLAM 10 MG/HR: 5 INJECTION INTRAMUSCULAR; INTRAVENOUS at 11:28

## 2021-01-01 RX ADMIN — DOXYCYCLINE 100 MG: 100 INJECTION, POWDER, LYOPHILIZED, FOR SOLUTION INTRAVENOUS at 01:03

## 2021-01-01 RX ADMIN — INSULIN LISPRO 2 UNITS: 100 INJECTION, SOLUTION INTRAVENOUS; SUBCUTANEOUS at 11:27

## 2021-01-01 RX ADMIN — PHENYLEPHRINE HYDROCHLORIDE 300 MCG/MIN: 10 INJECTION INTRAVENOUS at 14:59

## 2021-01-01 RX ADMIN — SODIUM CHLORIDE 25 ML/HR: 9 INJECTION, SOLUTION INTRAVENOUS at 00:00

## 2021-01-01 RX ADMIN — MAGNESIUM SULFATE HEPTAHYDRATE 2 G: 40 INJECTION, SOLUTION INTRAVENOUS at 14:51

## 2021-01-01 RX ADMIN — Medication 400 MG: at 08:48

## 2021-01-01 RX ADMIN — MAGNESIUM SULFATE HEPTAHYDRATE 1 G: 1 INJECTION, SOLUTION INTRAVENOUS at 01:41

## 2021-01-01 RX ADMIN — PIPERACILLIN AND TAZOBACTAM 3.38 G: 3; .375 INJECTION, POWDER, LYOPHILIZED, FOR SOLUTION INTRAVENOUS at 20:06

## 2021-01-01 RX ADMIN — AMIODARONE HYDROCHLORIDE 400 MG: 200 TABLET ORAL at 08:13

## 2021-01-01 RX ADMIN — SODIUM CHLORIDE 500 ML: 900 INJECTION, SOLUTION INTRAVENOUS at 00:00

## 2021-01-01 RX ADMIN — DOXYCYCLINE 100 MG: 100 INJECTION, POWDER, LYOPHILIZED, FOR SOLUTION INTRAVENOUS at 13:29

## 2021-01-01 RX ADMIN — FENTANYL CITRATE 100 MCG: 50 INJECTION INTRAMUSCULAR; INTRAVENOUS at 22:50

## 2021-01-01 RX ADMIN — BUDESONIDE 250 MCG: 0.25 INHALANT RESPIRATORY (INHALATION) at 09:02

## 2021-01-01 RX ADMIN — SODIUM BICARBONATE: 84 INJECTION, SOLUTION INTRAVENOUS at 12:50

## 2021-01-01 RX ADMIN — VASOPRESSIN 0.04 UNITS/MIN: 20 INJECTION INTRAVENOUS at 19:30

## 2021-01-01 RX ADMIN — IPRATROPIUM BROMIDE AND ALBUTEROL SULFATE 3 ML: .5; 3 SOLUTION RESPIRATORY (INHALATION) at 18:05

## 2021-01-01 RX ADMIN — ACETAMINOPHEN 650 MG: 325 TABLET ORAL at 20:05

## 2021-01-01 RX ADMIN — FUROSEMIDE 40 MG: 10 INJECTION, SOLUTION INTRAMUSCULAR; INTRAVENOUS at 13:37

## 2021-01-01 RX ADMIN — DOCUSATE SODIUM 50 MG AND SENNOSIDES 8.6 MG 1 TABLET: 8.6; 5 TABLET, FILM COATED ORAL at 08:25

## 2021-01-01 RX ADMIN — POTASSIUM CHLORIDE 20 MEQ: 29.8 INJECTION, SOLUTION INTRAVENOUS at 16:10

## 2021-01-01 RX ADMIN — DEXMEDETOMIDINE HYDROCHLORIDE 1.5 MCG/KG/HR: 400 INJECTION INTRAVENOUS at 13:33

## 2021-01-01 RX ADMIN — POTASSIUM CHLORIDE 20 MEQ: 29.8 INJECTION, SOLUTION INTRAVENOUS at 00:10

## 2021-01-01 RX ADMIN — MIDODRINE HYDROCHLORIDE 10 MG: 5 TABLET ORAL at 08:25

## 2021-01-01 RX ADMIN — SODIUM CHLORIDE 10 ML/HR: 4.5 INJECTION, SOLUTION INTRAVENOUS at 06:19

## 2021-01-01 RX ADMIN — POTASSIUM CHLORIDE 20 MEQ: 29.8 INJECTION, SOLUTION INTRAVENOUS at 23:04

## 2021-01-01 RX ADMIN — IPRATROPIUM BROMIDE 0.5 MG: 0.5 SOLUTION RESPIRATORY (INHALATION) at 04:58

## 2021-01-01 RX ADMIN — INSULIN GLARGINE 10 UNITS: 100 INJECTION, SOLUTION SUBCUTANEOUS at 23:49

## 2021-01-01 RX ADMIN — DEXMEDETOMIDINE HYDROCHLORIDE 1.5 MCG/KG/HR: 400 INJECTION INTRAVENOUS at 18:20

## 2021-01-01 RX ADMIN — IPRATROPIUM BROMIDE 0.5 MG: 0.5 SOLUTION RESPIRATORY (INHALATION) at 02:58

## 2021-01-01 RX ADMIN — Medication 10 ML: at 06:21

## 2021-01-01 RX ADMIN — TICAGRELOR 90 MG: 90 TABLET ORAL at 20:06

## 2021-01-01 RX ADMIN — FENTANYL CITRATE 100 MCG: 50 INJECTION INTRAMUSCULAR; INTRAVENOUS at 15:40

## 2021-01-01 RX ADMIN — Medication 324 MG: at 00:14

## 2021-01-01 RX ADMIN — FENTANYL CITRATE 200 MCG/HR: 50 INJECTION, SOLUTION INTRAMUSCULAR; INTRAVENOUS at 14:11

## 2021-01-01 RX ADMIN — POTASSIUM CHLORIDE 20 MEQ: 29.8 INJECTION, SOLUTION INTRAVENOUS at 13:23

## 2021-01-01 RX ADMIN — TICAGRELOR 90 MG: 90 TABLET ORAL at 20:08

## 2021-01-01 RX ADMIN — AMIODARONE HYDROCHLORIDE 1 MG/MIN: 50 INJECTION, SOLUTION INTRAVENOUS at 21:39

## 2021-01-01 RX ADMIN — ACETAMINOPHEN ORAL SOLUTION 650 MG: 650 SOLUTION ORAL at 12:38

## 2021-01-01 RX ADMIN — DOXYCYCLINE 100 MG: 100 INJECTION, POWDER, LYOPHILIZED, FOR SOLUTION INTRAVENOUS at 13:53

## 2021-01-01 RX ADMIN — FENTANYL CITRATE 200 MCG/HR: 50 INJECTION, SOLUTION INTRAMUSCULAR; INTRAVENOUS at 17:30

## 2021-01-01 RX ADMIN — ASPIRIN 81 MG: 81 TABLET, CHEWABLE ORAL at 08:13

## 2021-01-01 RX ADMIN — DOXYCYCLINE 100 MG: 100 INJECTION, POWDER, LYOPHILIZED, FOR SOLUTION INTRAVENOUS at 01:43

## 2021-01-01 RX ADMIN — KETOROLAC TROMETHAMINE 30 MG: 30 INJECTION, SOLUTION INTRAMUSCULAR at 14:13

## 2021-01-01 RX ADMIN — VASOPRESSIN 0.04 UNITS/MIN: 20 INJECTION INTRAVENOUS at 03:03

## 2021-01-01 RX ADMIN — NOREPINEPHRINE BITARTRATE 9 MCG/MIN: 1 INJECTION, SOLUTION, CONCENTRATE INTRAVENOUS at 10:51

## 2021-01-01 RX ADMIN — SODIUM CHLORIDE 5 ML/HR: 9 INJECTION, SOLUTION INTRAVENOUS at 06:21

## 2021-01-01 RX ADMIN — ALBUMIN (HUMAN) 250 ML: 12.5 INJECTION, SOLUTION INTRAVENOUS at 10:00

## 2021-01-01 RX ADMIN — MIDODRINE HYDROCHLORIDE 10 MG: 5 TABLET ORAL at 05:01

## 2021-01-01 RX ADMIN — DEXMEDETOMIDINE HYDROCHLORIDE 1.5 MCG/KG/HR: 400 INJECTION INTRAVENOUS at 11:24

## 2021-01-01 RX ADMIN — Medication 10 ML: at 13:54

## 2021-01-01 RX ADMIN — INSULIN LISPRO 7 UNITS: 100 INJECTION, SOLUTION INTRAVENOUS; SUBCUTANEOUS at 05:48

## 2021-01-01 RX ADMIN — AMIODARONE HYDROCHLORIDE 0.5 MG/MIN: 50 INJECTION, SOLUTION INTRAVENOUS at 21:00

## 2021-01-01 RX ADMIN — POTASSIUM CHLORIDE 20 MEQ: 29.8 INJECTION, SOLUTION INTRAVENOUS at 19:34

## 2021-01-01 RX ADMIN — VASOPRESSIN 0.04 UNITS/MIN: 20 INJECTION INTRAVENOUS at 10:00

## 2021-01-01 RX ADMIN — ATORVASTATIN CALCIUM 80 MG: 40 TABLET, FILM COATED ORAL at 21:11

## 2021-01-01 RX ADMIN — AMIODARONE HYDROCHLORIDE 0.5 MG/MIN: 50 INJECTION, SOLUTION INTRAVENOUS at 09:30

## 2021-01-01 RX ADMIN — POLYETHYLENE GLYCOL 3350 17 G: 17 POWDER, FOR SOLUTION ORAL at 08:13

## 2021-01-01 RX ADMIN — POTASSIUM CHLORIDE 20 MEQ: 29.8 INJECTION, SOLUTION INTRAVENOUS at 16:54

## 2021-01-01 RX ADMIN — DEXMEDETOMIDINE HYDROCHLORIDE 1.5 MCG/KG/HR: 400 INJECTION INTRAVENOUS at 18:48

## 2021-01-01 RX ADMIN — VANCOMYCIN HYDROCHLORIDE 750 MG: 750 INJECTION, POWDER, LYOPHILIZED, FOR SOLUTION INTRAVENOUS at 06:02

## 2021-01-01 RX ADMIN — PROPOFOL 25 MCG/KG/MIN: 10 INJECTION, EMULSION INTRAVENOUS at 12:15

## 2021-01-01 RX ADMIN — ROCURONIUM BROMIDE 30 MG: 10 INJECTION, SOLUTION INTRAVENOUS at 06:39

## 2021-01-01 RX ADMIN — Medication 10 ML: at 01:20

## 2021-01-01 RX ADMIN — POTASSIUM CHLORIDE 20 MEQ: 29.8 INJECTION, SOLUTION INTRAVENOUS at 20:27

## 2021-01-01 RX ADMIN — PHENYLEPHRINE HYDROCHLORIDE 30 MCG/MIN: 10 INJECTION INTRAVENOUS at 08:59

## 2021-01-01 RX ADMIN — BUDESONIDE 250 MCG: 0.25 INHALANT RESPIRATORY (INHALATION) at 21:48

## 2021-01-01 RX ADMIN — AMIODARONE HYDROCHLORIDE 1 MG/MIN: 50 INJECTION, SOLUTION INTRAVENOUS at 02:46

## 2021-01-01 RX ADMIN — ROCURONIUM BROMIDE 25 MG: 10 INJECTION INTRAVENOUS at 07:27

## 2021-01-01 RX ADMIN — Medication 10 ML: at 13:32

## 2021-01-01 RX ADMIN — ROCURONIUM BROMIDE 50 MG: 50 INJECTION, SOLUTION INTRAVENOUS at 04:30

## 2021-01-01 RX ADMIN — SODIUM BICARBONATE 50 MEQ: 84 INJECTION, SOLUTION INTRAVENOUS at 06:56

## 2021-01-01 RX ADMIN — IPRATROPIUM BROMIDE AND ALBUTEROL SULFATE 3 ML: 2.5; .5 SOLUTION RESPIRATORY (INHALATION) at 18:05

## 2021-01-01 RX ADMIN — PIPERACILLIN AND TAZOBACTAM 3.38 G: 3; .375 INJECTION, POWDER, LYOPHILIZED, FOR SOLUTION INTRAVENOUS at 14:42

## 2021-01-01 RX ADMIN — VASOPRESSIN 0.04 UNITS/MIN: 20 INJECTION INTRAVENOUS at 12:40

## 2021-01-01 RX ADMIN — DEXTROSE MONOHYDRATE 25 G: 25 INJECTION, SOLUTION INTRAVENOUS at 13:01

## 2021-01-01 RX ADMIN — INSULIN LISPRO 7 UNITS: 100 INJECTION, SOLUTION INTRAVENOUS; SUBCUTANEOUS at 23:42

## 2021-01-01 RX ADMIN — PHENYLEPHRINE HYDROCHLORIDE 200 MCG/MIN: 10 INJECTION INTRAVENOUS at 20:12

## 2021-01-01 RX ADMIN — HYDROCORTISONE SODIUM SUCCINATE 100 MG: 100 INJECTION, POWDER, FOR SOLUTION INTRAMUSCULAR; INTRAVENOUS at 10:47

## 2021-01-01 RX ADMIN — MIDAZOLAM 10 MG/HR: 5 INJECTION INTRAMUSCULAR; INTRAVENOUS at 23:49

## 2021-01-01 RX ADMIN — VASOPRESSIN 0.04 UNITS/MIN: 20 INJECTION INTRAVENOUS at 12:41

## 2021-01-01 RX ADMIN — TICAGRELOR 90 MG: 90 TABLET ORAL at 08:25

## 2021-01-01 RX ADMIN — MIDAZOLAM 8 MG/HR: 5 INJECTION INTRAMUSCULAR; INTRAVENOUS at 05:48

## 2021-01-01 RX ADMIN — DIBASIC SODIUM PHOSPHATE, MONOBASIC POTASSIUM PHOSPHATE AND MONOBASIC SODIUM PHOSPHATE 2 TABLET: 852; 155; 130 TABLET ORAL at 08:25

## 2021-01-01 RX ADMIN — INSULIN LISPRO 2 UNITS: 100 INJECTION, SOLUTION INTRAVENOUS; SUBCUTANEOUS at 16:51

## 2021-01-01 RX ADMIN — INSULIN LISPRO 2 UNITS: 100 INJECTION, SOLUTION INTRAVENOUS; SUBCUTANEOUS at 17:38

## 2021-01-01 RX ADMIN — ASPIRIN 81 MG: 81 TABLET, CHEWABLE ORAL at 08:30

## 2021-01-01 RX ADMIN — AMIODARONE HYDROCHLORIDE 0.5 MG/MIN: 50 INJECTION, SOLUTION INTRAVENOUS at 23:18

## 2021-01-01 RX ADMIN — CHLORHEXIDINE GLUCONATE 10 ML: 1.2 RINSE ORAL at 21:00

## 2021-01-01 RX ADMIN — PIPERACILLIN AND TAZOBACTAM 3.38 G: 3; .375 INJECTION, POWDER, LYOPHILIZED, FOR SOLUTION INTRAVENOUS at 20:04

## 2021-01-01 RX ADMIN — DEXMEDETOMIDINE HYDROCHLORIDE 1.5 MCG/KG/HR: 400 INJECTION INTRAVENOUS at 10:38

## 2021-01-01 RX ADMIN — VANCOMYCIN HYDROCHLORIDE 750 MG: 750 INJECTION, POWDER, LYOPHILIZED, FOR SOLUTION INTRAVENOUS at 22:30

## 2021-01-01 RX ADMIN — AMIODARONE HYDROCHLORIDE 1 MG/MIN: 50 INJECTION, SOLUTION INTRAVENOUS at 04:05

## 2021-01-01 RX ADMIN — AMIODARONE HYDROCHLORIDE 400 MG: 200 TABLET ORAL at 08:48

## 2021-01-01 RX ADMIN — DEXMEDETOMIDINE HYDROCHLORIDE 1.5 MCG/KG/HR: 400 INJECTION INTRAVENOUS at 07:55

## 2021-01-01 RX ADMIN — Medication 10 ML: at 13:33

## 2021-01-01 RX ADMIN — PHENYLEPHRINE HYDROCHLORIDE 75 MCG/MIN: 10 INJECTION INTRAVENOUS at 05:47

## 2021-01-01 RX ADMIN — NOREPINEPHRINE BITARTRATE 12 MCG/MIN: 1 INJECTION, SOLUTION, CONCENTRATE INTRAVENOUS at 17:45

## 2021-01-01 RX ADMIN — FENTANYL CITRATE 200 MCG/HR: 50 INJECTION, SOLUTION INTRAMUSCULAR; INTRAVENOUS at 23:16

## 2021-01-01 RX ADMIN — CHLORHEXIDINE GLUCONATE 10 ML: 1.2 RINSE ORAL at 20:04

## 2021-01-01 RX ADMIN — ACETAMINOPHEN 650 MG: 325 TABLET ORAL at 15:39

## 2021-01-01 RX ADMIN — NOREPINEPHRINE BITARTRATE 13 MCG/MIN: 1 INJECTION, SOLUTION, CONCENTRATE INTRAVENOUS at 15:30

## 2021-01-01 RX ADMIN — PIPERACILLIN AND TAZOBACTAM 3.38 G: 3; .375 INJECTION, POWDER, LYOPHILIZED, FOR SOLUTION INTRAVENOUS at 05:05

## 2021-01-01 RX ADMIN — AMIODARONE HYDROCHLORIDE 150 MG: 50 INJECTION, SOLUTION INTRAVENOUS at 03:30

## 2021-01-01 RX ADMIN — PIPERACILLIN AND TAZOBACTAM 3.38 G: 3; .375 INJECTION, POWDER, LYOPHILIZED, FOR SOLUTION INTRAVENOUS at 12:38

## 2021-01-01 RX ADMIN — DOCUSATE SODIUM 50 MG AND SENNOSIDES 8.6 MG 1 TABLET: 8.6; 5 TABLET, FILM COATED ORAL at 17:12

## 2021-01-01 RX ADMIN — ALBUMIN (HUMAN) 12.5 G: 12.5 INJECTION, SOLUTION INTRAVENOUS at 11:56

## 2021-01-01 RX ADMIN — HYDROCORTISONE SODIUM SUCCINATE 100 MG: 100 INJECTION, POWDER, FOR SOLUTION INTRAMUSCULAR; INTRAVENOUS at 18:30

## 2021-01-01 RX ADMIN — Medication 400 MG: at 17:12

## 2021-01-01 RX ADMIN — PIPERACILLIN AND TAZOBACTAM 3.38 G: 3; .375 INJECTION, POWDER, LYOPHILIZED, FOR SOLUTION INTRAVENOUS at 12:49

## 2021-01-01 RX ADMIN — DEXMEDETOMIDINE HYDROCHLORIDE 1.5 MCG/KG/HR: 400 INJECTION INTRAVENOUS at 07:23

## 2021-01-01 RX ADMIN — POTASSIUM CHLORIDE 20 MEQ: 29.8 INJECTION, SOLUTION INTRAVENOUS at 20:04

## 2021-01-01 RX ADMIN — DOBUTAMINE HYDROCHLORIDE 7.5 MCG/KG/MIN: 200 INJECTION INTRAVENOUS at 03:36

## 2021-01-01 RX ADMIN — ATORVASTATIN CALCIUM 80 MG: 40 TABLET, FILM COATED ORAL at 21:23

## 2021-01-01 RX ADMIN — VASOPRESSIN 0.04 UNITS/MIN: 20 INJECTION INTRAVENOUS at 10:48

## 2021-01-01 RX ADMIN — PROPOFOL 25 MCG/KG/MIN: 10 INJECTION, EMULSION INTRAVENOUS at 01:15

## 2021-01-01 RX ADMIN — INSULIN GLARGINE 16 UNITS: 100 INJECTION, SOLUTION SUBCUTANEOUS at 21:23

## 2021-01-01 RX ADMIN — Medication 400 MG: at 08:25

## 2021-01-01 RX ADMIN — MAGNESIUM SULFATE HEPTAHYDRATE 1 G: 1 INJECTION, SOLUTION INTRAVENOUS at 16:00

## 2021-01-01 RX ADMIN — HYDROCORTISONE SODIUM SUCCINATE 100 MG: 100 INJECTION, POWDER, FOR SOLUTION INTRAMUSCULAR; INTRAVENOUS at 10:07

## 2021-01-01 RX ADMIN — CALCIUM GLUCONATE 2 G: 20 INJECTION, SOLUTION INTRAVENOUS at 09:30

## 2021-01-01 RX ADMIN — ROCURONIUM BROMIDE 50 MG: 10 INJECTION INTRAVENOUS at 05:35

## 2021-01-01 RX ADMIN — INSULIN LISPRO 3 UNITS: 100 INJECTION, SOLUTION INTRAVENOUS; SUBCUTANEOUS at 05:48

## 2021-01-01 RX ADMIN — POLYETHYLENE GLYCOL 3350 17 G: 17 POWDER, FOR SOLUTION ORAL at 08:25

## 2021-01-01 RX ADMIN — PROPOFOL 50 MCG/KG/MIN: 10 INJECTION, EMULSION INTRAVENOUS at 03:39

## 2021-01-01 RX ADMIN — SODIUM CHLORIDE 150 ML/HR: 9 INJECTION, SOLUTION INTRAVENOUS at 00:07

## 2021-01-01 RX ADMIN — INSULIN LISPRO 5 UNITS: 100 INJECTION, SOLUTION INTRAVENOUS; SUBCUTANEOUS at 08:47

## 2021-01-01 RX ADMIN — VASOPRESSIN 0.04 UNITS/MIN: 20 INJECTION INTRAVENOUS at 10:43

## 2021-01-01 RX ADMIN — DEXTROSE MONOHYDRATE 10 ML: 25 INJECTION, SOLUTION INTRAVENOUS at 11:05

## 2021-01-01 RX ADMIN — CHLORHEXIDINE GLUCONATE 10 ML: 1.2 RINSE ORAL at 08:14

## 2021-01-01 RX ADMIN — PIPERACILLIN AND TAZOBACTAM 3.38 G: 3; .375 INJECTION, POWDER, LYOPHILIZED, FOR SOLUTION INTRAVENOUS at 20:26

## 2021-01-01 RX ADMIN — NOREPINEPHRINE BITARTRATE 20 MCG/MIN: 1 INJECTION, SOLUTION, CONCENTRATE INTRAVENOUS at 12:18

## 2021-01-01 RX ADMIN — Medication 400 MG: at 17:38

## 2021-01-01 RX ADMIN — ACETAMINOPHEN 650 MG: 325 TABLET ORAL at 11:47

## 2021-01-01 RX ADMIN — Medication 10 ML: at 05:01

## 2021-01-01 RX ADMIN — EPINEPHRINE 3 MCG/MIN: 1 INJECTION INTRAMUSCULAR; INTRAVENOUS; SUBCUTANEOUS at 14:31

## 2021-01-01 RX ADMIN — SODIUM CHLORIDE: 900 INJECTION, SOLUTION INTRAVENOUS at 05:27

## 2021-01-01 RX ADMIN — CHLORHEXIDINE GLUCONATE 10 ML: 1.2 RINSE ORAL at 20:05

## 2021-01-01 RX ADMIN — INSULIN LISPRO 3 UNITS: 100 INJECTION, SOLUTION INTRAVENOUS; SUBCUTANEOUS at 07:00

## 2021-01-01 RX ADMIN — ATORVASTATIN CALCIUM 80 MG: 40 TABLET, FILM COATED ORAL at 20:06

## 2021-01-01 RX ADMIN — DEXMEDETOMIDINE HYDROCHLORIDE 1.5 MCG/KG/HR: 400 INJECTION INTRAVENOUS at 21:54

## 2021-01-01 RX ADMIN — INSULIN LISPRO 3 UNITS: 100 INJECTION, SOLUTION INTRAVENOUS; SUBCUTANEOUS at 18:08

## 2021-01-01 RX ADMIN — MIDAZOLAM HYDROCHLORIDE 2 MG: 1 INJECTION, SOLUTION INTRAMUSCULAR; INTRAVENOUS at 15:40

## 2021-01-01 RX ADMIN — POTASSIUM CHLORIDE 20 MEQ: 29.8 INJECTION, SOLUTION INTRAVENOUS at 02:40

## 2021-01-01 RX ADMIN — BUDESONIDE 250 MCG: 0.25 INHALANT RESPIRATORY (INHALATION) at 07:17

## 2021-01-01 RX ADMIN — TICAGRELOR 90 MG: 90 TABLET ORAL at 20:02

## 2021-01-01 RX ADMIN — INSULIN LISPRO 2 UNITS: 100 INJECTION, SOLUTION INTRAVENOUS; SUBCUTANEOUS at 23:54

## 2021-01-01 RX ADMIN — TICAGRELOR 90 MG: 90 TABLET ORAL at 08:30

## 2021-01-01 RX ADMIN — PROPOFOL 10 MCG/KG/MIN: 10 INJECTION, EMULSION INTRAVENOUS at 15:46

## 2021-01-01 RX ADMIN — HYDROCORTISONE SODIUM SUCCINATE 100 MG: 100 INJECTION, POWDER, FOR SOLUTION INTRAMUSCULAR; INTRAVENOUS at 09:12

## 2021-01-01 RX ADMIN — VANCOMYCIN HYDROCHLORIDE 750 MG: 750 INJECTION, POWDER, LYOPHILIZED, FOR SOLUTION INTRAVENOUS at 22:46

## 2021-01-01 RX ADMIN — DOXYCYCLINE 100 MG: 100 INJECTION, POWDER, LYOPHILIZED, FOR SOLUTION INTRAVENOUS at 01:11

## 2021-01-01 RX ADMIN — IPRATROPIUM BROMIDE 0.5 MG: 0.5 SOLUTION RESPIRATORY (INHALATION) at 02:57

## 2021-01-01 RX ADMIN — BUDESONIDE 250 MCG: 0.25 INHALANT RESPIRATORY (INHALATION) at 12:10

## 2021-01-01 RX ADMIN — IPRATROPIUM BROMIDE AND ALBUTEROL SULFATE 3 ML: .5; 3 SOLUTION RESPIRATORY (INHALATION) at 16:16

## 2021-01-01 RX ADMIN — DEXMEDETOMIDINE HYDROCHLORIDE 0.5 MCG/KG/HR: 400 INJECTION INTRAVENOUS at 12:17

## 2021-01-01 RX ADMIN — MAGNESIUM SULFATE HEPTAHYDRATE 2 G: 40 INJECTION, SOLUTION INTRAVENOUS at 16:03

## 2021-01-01 RX ADMIN — IPRATROPIUM BROMIDE 0.5 MG: 0.5 SOLUTION RESPIRATORY (INHALATION) at 23:49

## 2021-01-01 RX ADMIN — AMIODARONE HYDROCHLORIDE 1 MG/MIN: 50 INJECTION, SOLUTION INTRAVENOUS at 15:39

## 2021-01-01 RX ADMIN — CHLORHEXIDINE GLUCONATE 10 ML: 1.2 RINSE ORAL at 20:02

## 2021-01-01 RX ADMIN — DEXMEDETOMIDINE HYDROCHLORIDE 0.5 MCG/KG/HR: 100 INJECTION, SOLUTION, CONCENTRATE INTRAVENOUS at 10:39

## 2021-01-01 RX ADMIN — DOXYCYCLINE 100 MG: 100 INJECTION, POWDER, LYOPHILIZED, FOR SOLUTION INTRAVENOUS at 02:02

## 2021-01-01 RX ADMIN — INSULIN GLARGINE 25 UNITS: 100 INJECTION, SOLUTION SUBCUTANEOUS at 21:36

## 2021-01-01 RX ADMIN — Medication 400 MG: at 08:13

## 2021-01-01 RX ADMIN — SODIUM CHLORIDE 40 MG: 9 INJECTION, SOLUTION INTRAMUSCULAR; INTRAVENOUS; SUBCUTANEOUS at 08:47

## 2021-01-01 RX ADMIN — CHLORHEXIDINE GLUCONATE 10 ML: 1.2 RINSE ORAL at 12:36

## 2021-01-01 RX ADMIN — FENTANYL CITRATE 200 MCG/HR: 50 INJECTION, SOLUTION INTRAMUSCULAR; INTRAVENOUS at 15:36

## 2021-01-01 RX ADMIN — PROPOFOL 35 MCG/KG/MIN: 10 INJECTION, EMULSION INTRAVENOUS at 19:47

## 2021-01-01 RX ADMIN — BUDESONIDE 250 MCG: 0.25 INHALANT RESPIRATORY (INHALATION) at 08:43

## 2021-01-01 RX ADMIN — NOREPINEPHRINE BITARTRATE 30 MCG/MIN: 1 INJECTION, SOLUTION, CONCENTRATE INTRAVENOUS at 00:08

## 2021-01-01 RX ADMIN — FENTANYL CITRATE 200 MCG/HR: 50 INJECTION, SOLUTION INTRAMUSCULAR; INTRAVENOUS at 11:52

## 2021-01-01 RX ADMIN — ATORVASTATIN CALCIUM 80 MG: 40 TABLET, FILM COATED ORAL at 21:54

## 2021-01-01 RX ADMIN — SODIUM CHLORIDE 1000 ML: 9 INJECTION, SOLUTION INTRAVENOUS at 21:06

## 2021-01-01 RX ADMIN — DOXYCYCLINE 100 MG: 100 INJECTION, POWDER, LYOPHILIZED, FOR SOLUTION INTRAVENOUS at 14:12

## 2021-01-01 RX ADMIN — INSULIN LISPRO 2 UNITS: 100 INJECTION, SOLUTION INTRAVENOUS; SUBCUTANEOUS at 12:13

## 2021-01-01 RX ADMIN — TICAGRELOR 90 MG: 90 TABLET ORAL at 14:43

## 2021-01-01 RX ADMIN — INSULIN LISPRO 5 UNITS: 100 INJECTION, SOLUTION INTRAVENOUS; SUBCUTANEOUS at 16:39

## 2021-01-01 RX ADMIN — TICAGRELOR 90 MG: 90 TABLET ORAL at 08:48

## 2021-01-01 RX ADMIN — PROPOFOL 15 MCG/KG/MIN: 10 INJECTION, EMULSION INTRAVENOUS at 01:37

## 2021-01-01 RX ADMIN — SODIUM CHLORIDE 10 UNITS: 9 INJECTION, SOLUTION INTRAVENOUS at 06:18

## 2021-01-01 RX ADMIN — CHLORHEXIDINE GLUCONATE 10 ML: 1.2 RINSE ORAL at 08:34

## 2021-01-01 RX ADMIN — DOCUSATE SODIUM 50 MG AND SENNOSIDES 8.6 MG 1 TABLET: 8.6; 5 TABLET, FILM COATED ORAL at 08:48

## 2021-01-01 RX ADMIN — PIPERACILLIN AND TAZOBACTAM 3.38 G: 3; .375 INJECTION, POWDER, LYOPHILIZED, FOR SOLUTION INTRAVENOUS at 04:07

## 2021-01-01 RX ADMIN — FENTANYL CITRATE 50 MCG/HR: 50 INJECTION, SOLUTION INTRAMUSCULAR; INTRAVENOUS at 12:33

## 2021-01-01 RX ADMIN — PHENYLEPHRINE HYDROCHLORIDE 195 MCG/MIN: 10 INJECTION INTRAVENOUS at 02:03

## 2021-01-01 RX ADMIN — CHLORHEXIDINE GLUCONATE 10 ML: 1.2 RINSE ORAL at 08:58

## 2021-01-01 RX ADMIN — MIDAZOLAM 10 MG/HR: 5 INJECTION INTRAMUSCULAR; INTRAVENOUS at 13:33

## 2021-01-01 RX ADMIN — ASPIRIN 324 MG: 81 TABLET, CHEWABLE ORAL at 00:14

## 2021-01-01 RX ADMIN — FENTANYL CITRATE 200 MCG/HR: 50 INJECTION, SOLUTION INTRAMUSCULAR; INTRAVENOUS at 01:02

## 2021-01-01 RX ADMIN — AMIODARONE HYDROCHLORIDE 1 MG/MIN: 50 INJECTION, SOLUTION INTRAVENOUS at 10:45

## 2021-01-01 RX ADMIN — MIDAZOLAM HYDROCHLORIDE 2 MG: 1 INJECTION, SOLUTION INTRAMUSCULAR; INTRAVENOUS at 04:11

## 2021-01-01 RX ADMIN — INSULIN LISPRO 3 UNITS: 100 INJECTION, SOLUTION INTRAVENOUS; SUBCUTANEOUS at 10:44

## 2021-01-01 RX ADMIN — ACETAMINOPHEN 650 MG: 325 TABLET ORAL at 11:30

## 2021-01-01 RX ADMIN — MAGNESIUM SULFATE HEPTAHYDRATE 1 G: 1 INJECTION, SOLUTION INTRAVENOUS at 07:24

## 2021-01-01 RX ADMIN — SODIUM CHLORIDE 40 MG: 9 INJECTION, SOLUTION INTRAMUSCULAR; INTRAVENOUS; SUBCUTANEOUS at 08:22

## 2021-01-01 RX ADMIN — INSULIN GLARGINE 25 UNITS: 100 INJECTION, SOLUTION SUBCUTANEOUS at 21:14

## 2021-01-01 RX ADMIN — DEXMEDETOMIDINE HYDROCHLORIDE 1.5 MCG/KG/HR: 400 INJECTION INTRAVENOUS at 05:05

## 2021-01-01 RX ADMIN — NOREPINEPHRINE BITARTRATE 12 MCG/MIN: 1 INJECTION, SOLUTION, CONCENTRATE INTRAVENOUS at 20:05

## 2021-01-01 RX ADMIN — SODIUM CHLORIDE 40 MG: 9 INJECTION, SOLUTION INTRAMUSCULAR; INTRAVENOUS; SUBCUTANEOUS at 08:17

## 2021-01-01 RX ADMIN — ROCURONIUM BROMIDE 100 MG: 50 INJECTION, SOLUTION INTRAVENOUS at 13:23

## 2021-01-01 RX ADMIN — PIPERACILLIN AND TAZOBACTAM 3.38 G: 3; .375 INJECTION, POWDER, LYOPHILIZED, FOR SOLUTION INTRAVENOUS at 12:00

## 2021-01-01 RX ADMIN — PHENYLEPHRINE HYDROCHLORIDE 300 MCG/MIN: 10 INJECTION INTRAVENOUS at 19:49

## 2021-01-01 RX ADMIN — CHLORHEXIDINE GLUCONATE 10 ML: 1.2 RINSE ORAL at 08:31

## 2021-01-01 RX ADMIN — VANCOMYCIN HYDROCHLORIDE 750 MG: 750 INJECTION, POWDER, LYOPHILIZED, FOR SOLUTION INTRAVENOUS at 15:00

## 2021-01-01 RX ADMIN — CHLORHEXIDINE GLUCONATE 10 ML: 1.2 RINSE ORAL at 08:37

## 2021-01-01 RX ADMIN — MIDODRINE HYDROCHLORIDE 10 MG: 5 TABLET ORAL at 13:37

## 2021-01-01 RX ADMIN — DANTROLENE SODIUM 213 MG: 20 INJECTION, POWDER, LYOPHILIZED, FOR SOLUTION INTRAVENOUS at 17:14

## 2021-01-01 RX ADMIN — SODIUM CHLORIDE 9 ML/HR: 900 INJECTION, SOLUTION INTRAVENOUS at 21:28

## 2021-01-01 RX ADMIN — BUDESONIDE 250 MCG: 0.25 INHALANT RESPIRATORY (INHALATION) at 23:49

## 2021-01-01 RX ADMIN — DOXYCYCLINE 100 MG: 100 INJECTION, POWDER, LYOPHILIZED, FOR SOLUTION INTRAVENOUS at 13:30

## 2021-01-01 RX ADMIN — SODIUM CHLORIDE 1000 ML: 9 INJECTION, SOLUTION INTRAVENOUS at 16:16

## 2021-01-01 RX ADMIN — Medication 10 ML: at 21:00

## 2021-01-01 RX ADMIN — IPRATROPIUM BROMIDE 0.5 MG: 0.5 SOLUTION RESPIRATORY (INHALATION) at 03:24

## 2021-01-01 RX ADMIN — NOREPINEPHRINE BITARTRATE 2 MCG/MIN: 1 INJECTION, SOLUTION, CONCENTRATE INTRAVENOUS at 05:54

## 2021-01-01 RX ADMIN — POTASSIUM CHLORIDE 20 MEQ: 29.8 INJECTION, SOLUTION INTRAVENOUS at 15:38

## 2021-01-01 RX ADMIN — IPRATROPIUM BROMIDE 0.5 MG: 0.5 SOLUTION RESPIRATORY (INHALATION) at 07:15

## 2021-01-01 RX ADMIN — FENTANYL CITRATE 200 MCG/HR: 50 INJECTION, SOLUTION INTRAMUSCULAR; INTRAVENOUS at 08:07

## 2021-01-01 RX ADMIN — NOREPINEPHRINE BITARTRATE 25 MCG/MIN: 1 INJECTION, SOLUTION, CONCENTRATE INTRAVENOUS at 19:50

## 2021-01-01 RX ADMIN — DOXYCYCLINE 100 MG: 100 INJECTION, POWDER, LYOPHILIZED, FOR SOLUTION INTRAVENOUS at 14:13

## 2021-01-01 RX ADMIN — MIDAZOLAM 8 MG/HR: 5 INJECTION INTRAMUSCULAR; INTRAVENOUS at 16:53

## 2021-01-01 RX ADMIN — VASOPRESSIN 0.04 UNITS/MIN: 20 INJECTION INTRAVENOUS at 21:11

## 2021-01-01 RX ADMIN — NOREPINEPHRINE BITARTRATE 30 MCG/MIN: 1 INJECTION, SOLUTION, CONCENTRATE INTRAVENOUS at 08:24

## 2021-01-01 RX ADMIN — ROCURONIUM BROMIDE 25 MG: 10 INJECTION INTRAVENOUS at 09:25

## 2021-01-01 RX ADMIN — VASOPRESSIN 0.04 UNITS/MIN: 20 INJECTION INTRAVENOUS at 10:03

## 2021-01-01 RX ADMIN — DEXMEDETOMIDINE HYDROCHLORIDE 0.4 MCG/KG/HR: 400 INJECTION INTRAVENOUS at 00:12

## 2021-01-01 RX ADMIN — SODIUM PHOSPHATE, MONOBASIC, MONOHYDRATE: 276; 142 INJECTION, SOLUTION INTRAVENOUS at 08:33

## 2021-01-01 RX ADMIN — FENTANYL CITRATE 200 MCG/HR: 50 INJECTION, SOLUTION INTRAMUSCULAR; INTRAVENOUS at 10:04

## 2021-01-01 RX ADMIN — FENTANYL CITRATE 200 MCG/HR: 50 INJECTION, SOLUTION INTRAMUSCULAR; INTRAVENOUS at 23:17

## 2021-01-01 RX ADMIN — MIDAZOLAM 5 MG/HR: 5 INJECTION INTRAMUSCULAR; INTRAVENOUS at 04:13

## 2021-01-01 RX ADMIN — HYDROCORTISONE SODIUM SUCCINATE 100 MG: 100 INJECTION, POWDER, FOR SOLUTION INTRAMUSCULAR; INTRAVENOUS at 17:38

## 2021-01-01 RX ADMIN — PHENYLEPHRINE HYDROCHLORIDE 265 MCG/MIN: 10 INJECTION INTRAVENOUS at 03:09

## 2021-01-01 RX ADMIN — SODIUM CHLORIDE 9 ML/HR: 900 INJECTION, SOLUTION INTRAVENOUS at 12:19

## 2021-01-01 RX ADMIN — NOREPINEPHRINE BITARTRATE 30 MCG/MIN: 1 INJECTION, SOLUTION, CONCENTRATE INTRAVENOUS at 04:35

## 2021-01-01 RX ADMIN — METHYLPREDNISOLONE SODIUM SUCCINATE 125 MG: 125 INJECTION, POWDER, FOR SOLUTION INTRAMUSCULAR; INTRAVENOUS at 16:16

## 2021-01-01 RX ADMIN — ALBUMIN (HUMAN) 25 G: 12.5 INJECTION, SOLUTION INTRAVENOUS at 03:43

## 2021-01-01 RX ADMIN — VASOPRESSIN 0.04 UNITS/MIN: 20 INJECTION INTRAVENOUS at 00:50

## 2021-01-01 RX ADMIN — SODIUM BICARBONATE 50 MEQ: 84 INJECTION, SOLUTION INTRAVENOUS at 08:46

## 2021-01-01 RX ADMIN — DOXYCYCLINE 100 MG: 100 INJECTION, POWDER, LYOPHILIZED, FOR SOLUTION INTRAVENOUS at 01:23

## 2021-01-01 RX ADMIN — CEFEPIME HYDROCHLORIDE 2 G: 2 INJECTION, POWDER, FOR SOLUTION INTRAVENOUS at 18:56

## 2021-01-01 RX ADMIN — IPRATROPIUM BROMIDE 0.5 MG: 0.5 SOLUTION RESPIRATORY (INHALATION) at 20:21

## 2021-01-01 RX ADMIN — VASOPRESSIN 0.04 UNITS/MIN: 20 INJECTION INTRAVENOUS at 02:00

## 2021-01-01 RX ADMIN — PIPERACILLIN AND TAZOBACTAM 3.38 G: 3; .375 INJECTION, POWDER, LYOPHILIZED, FOR SOLUTION INTRAVENOUS at 20:02

## 2021-01-01 RX ADMIN — IPRATROPIUM BROMIDE 0.5 MG: 0.5 SOLUTION RESPIRATORY (INHALATION) at 08:43

## 2021-01-01 RX ADMIN — ROCURONIUM BROMIDE 100 MG: 10 INJECTION, SOLUTION INTRAVENOUS at 18:22

## 2021-01-01 RX ADMIN — AMIODARONE HYDROCHLORIDE 1 MG/MIN: 50 INJECTION, SOLUTION INTRAVENOUS at 09:13

## 2021-01-01 RX ADMIN — MIDAZOLAM 10 MG/HR: 5 INJECTION INTRAMUSCULAR; INTRAVENOUS at 05:21

## 2021-01-01 RX ADMIN — Medication 10 ML: at 14:19

## 2021-01-01 RX ADMIN — FENTANYL CITRATE 100 MCG/HR: 50 INJECTION, SOLUTION INTRAMUSCULAR; INTRAVENOUS at 09:19

## 2021-01-01 RX ADMIN — FENTANYL CITRATE 200 MCG/HR: 50 INJECTION, SOLUTION INTRAMUSCULAR; INTRAVENOUS at 06:43

## 2021-01-01 RX ADMIN — PIPERACILLIN AND TAZOBACTAM 3.38 G: 3; .375 INJECTION, POWDER, LYOPHILIZED, FOR SOLUTION INTRAVENOUS at 04:05

## 2021-01-01 RX ADMIN — AMIODARONE HYDROCHLORIDE 400 MG: 200 TABLET ORAL at 08:25

## 2021-01-01 RX ADMIN — DEXMEDETOMIDINE HYDROCHLORIDE 1.5 MCG/KG/HR: 400 INJECTION INTRAVENOUS at 01:33

## 2021-01-01 RX ADMIN — ROCURONIUM BROMIDE 30 MG: 50 INJECTION, SOLUTION INTRAVENOUS at 06:39

## 2021-01-01 RX ADMIN — HYDROCORTISONE SODIUM SUCCINATE 100 MG: 100 INJECTION, POWDER, FOR SOLUTION INTRAMUSCULAR; INTRAVENOUS at 01:10

## 2021-01-01 RX ADMIN — CALCIUM GLUCONATE 1000 MG: 20 INJECTION, SOLUTION INTRAVENOUS at 09:28

## 2021-01-01 RX ADMIN — SODIUM BICARBONATE 50 MEQ: 84 INJECTION, SOLUTION INTRAVENOUS at 05:49

## 2021-01-01 RX ADMIN — MIDAZOLAM 2 MG: 1 INJECTION INTRAMUSCULAR; INTRAVENOUS at 05:35

## 2021-01-01 RX ADMIN — POTASSIUM BICARBONATE 40 MEQ: 782 TABLET, EFFERVESCENT ORAL at 08:13

## 2021-01-01 RX ADMIN — DEXMEDETOMIDINE HYDROCHLORIDE 1.5 MCG/KG/HR: 400 INJECTION INTRAVENOUS at 01:30

## 2021-01-01 RX ADMIN — IPRATROPIUM BROMIDE 0.5 MG: 0.5 SOLUTION RESPIRATORY (INHALATION) at 12:11

## 2021-07-26 PROBLEM — J96.90 RESPIRATORY FAILURE REQUIRING INTUBATION (HCC): Status: ACTIVE | Noted: 2021-01-01

## 2021-07-26 NOTE — Clinical Note
Balloon inserted. Balloon inflated using multiple inflation technique. Lesion #1: Pressure = 6 niranjan; Duration = 7 sec. Inflation 2: Pressure = 2 niranjan; Duration = 16 sec. Inflation 3: Pressure = 6 niranjan; Duration = 12 sec.

## 2021-07-26 NOTE — ED NOTES
Received bedside report from Geovany Centeno RN. Propofol infusing at 20 mcg/kg/hr: 9.6 mL/hr through 20 gauge IV in left AC. VSS. ETT 4.25 at the lip. Orogastric tube on low to intermittent suction with 50 mL brown fluid in suction container. Pt's skin PWD. Pt on Nguyễn T1 ventilator on 100% FiO2, Vt 480, Peep 8. Respirations 22. Strong pedal pulses. Extremities warm. 13 mL yellow urine in briscoe collection device.

## 2021-07-26 NOTE — Clinical Note
Balloon inserted. Balloon inflated using multiple inflation technique. Lesion #1: Pressure = 6 niranjan; Duration = 6 sec. Inflation 2: Pressure = 6 niranjan; Duration = 6 sec. Inflation 3: Pressure = 6 niranjan; Duration = 4 sec.

## 2021-07-26 NOTE — Clinical Note
Balloon inserted. Balloon inflated using multiple inflation technique. Lesion #1: Pressure = 16 niranjan; Duration = 20 sec. Inflation 2: Pressure = 18 niranjan; Duration = 14 sec. Inflation 3: Pressure = 20 niranjan; Duration = 15 sec.

## 2021-07-26 NOTE — ED NOTES
Pt called for help, stating she was having increased SOB, oxygen sat dropped to low 80's on 4L NC, Dr Misbah Culp at bedside, ordered to place pt on Bipap 12/5 on 100% Oxygen. Attempted Bipap with patient, but pt would not tolerate the bipap at all, Dr Misbah Culp aware, Also tried to place pt on NRB, pt tolerated better but her oxygen level dropped into low 80's again. Dr Misbah Culp spoke with pt, explaining that the next step to help her breathe would be to intubate her. Pt states she wants to be intubated to help her breathe, she cannot take the bipap mask anymore after trying to use it for about 20 minutes.
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
## 2021-07-26 NOTE — Clinical Note
Impella inserted. Impella inserted over the wire. Impella insertion site: LFA, at the left ventricle.

## 2021-07-26 NOTE — Clinical Note
Balloon inserted. Balloon inflated using multiple inflation technique. Lesion #1: Pressure = 12 niranjan; Duration = 16 sec. Inflation 2: Pressure = 12 niranjan; Duration = 14 sec.

## 2021-07-26 NOTE — Clinical Note
Stent inserted. Stent deployed. Single technique used. First inflation pressure = 14 niranjan; inflation time: 30 sec.

## 2021-07-26 NOTE — Clinical Note
Balloon inflated using multiple inflation technique. Lesion #1: Pressure = 6 niranjan; Duration = 5 sec. Inflation 2: Pressure = 6 niranjan; Duration = 4 sec. Inflation 3: Pressure = 12 niranjan; Duration = 10 sec. Inflation 4: Pressure = 6 niranjan; Duration = 4 sec.

## 2021-07-26 NOTE — ED TRIAGE NOTES
Pt c/o SOB for the last 2 days, with non productive cough. Pt denies pain and fever.  Pt smokes 2 packs a day

## 2021-07-26 NOTE — ED NOTES
Pt will not tolerate Bipap anymore, pt prepared for intubation, code cart brought to bedside. Dr Preciado Holes at bedside.

## 2021-07-27 NOTE — BRIEF OP NOTE
BRIEF OP NOTE  Pre-Op Diagnosis: CAD/cardiogenic shock/Aflutter    Post-Op Diagnosis: CAD/cardiogenic shock/rate controlled AFib    Procedure:   Placement of Right Axillary Impella 5.5  Removal of left femoral Impella CP  Cardioversion    Surgeon: Natasha    Assistant(s): BECCA Patel    Anesthesia: General     Infusions: Epi, norepi    Estimated Blood Loss: 50 mL    Cell Saver: N/A mL    Specimens: * No specimens in log *    Drains and pacing wires: No additional     Complications: None    Findings: CAD    Implants:   Implant Name Type Inv.  Item Serial No.  Lot No. LRB No. Used Action   GRAFT VSCLR L30CM [id number] [address] [facility] DBLE VLR PASS [address] [employer] - [id number]  GRAFT VSCLR L30CM [id number] [address] [facility] [place] PASS [id number] [employer] [id number] [facility] N/A 1 Implanted       Kailey Patel

## 2021-07-27 NOTE — PROGRESS NOTES
Critical Care Note     Cardiac surgery was called for the evaluation and management of: Cardiogenic shock    The critical nature of the patient's condition includes the following: STEMI  Acute/Chronic Hypoxic Respiratory Failure      Diagnosis for which the procedure was performed: Cardiogenic shock    Procedure performed: Insertion of left ventricular assist device Impella 5.5    Other critical care diagnoses that are being treated and are above and beyond the standard care for the procedure being performed:    Acute respiratory failure   Acute right ventricular failure             Intake/Output Summary (Last 24 hours) at 7/27/2021 0708  Last data filed at 7/27/2021 0644  Gross per 24 hour   Intake 3376.7 ml   Output 513 ml   Net 2863.7 ml      Visit Vitals  BP 97/73   Pulse (!) 127   Temp 99 °F (37.2 °C)   Resp 19   Ht 5' 8\" (1.727 m)   Wt 176 lb 5.9 oz (80 kg)   SpO2 100%   BMI 26.82 kg/m²      CXR Results  (Last 48 hours)               07/27/21 0516  XR CHEST PORT Final result    Impression:  Central venous acces catheter in position for use. No pneumothorax       No other significant interval change. Narrative:  Clinical history: Central venous catheter placement   INDICATION:   Central venous catheter placement   COMPARISON: 7/27/2021       FINDINGS:   AP portable upright view of the chest demonstrates a stable  cardiopericardial   silhouette. The lungs are unchanged in overall appearance. . Right  sided central   venous access catheter is in place. Catheter tip is in appropriate position for   use. There is no associated pneumothorax. ET tube and NG tube and cardiac assist   device. Patient is on a cardiac monitor. 07/27/21 0021  XR CHEST PORT Final result    Impression:  NG tube extends below the diaphragm. ET tube in position. No other change.        Narrative:  Clinical history: OGT placement   INDICATION:   OGT placement   COMPARISON: 6/26/2021       FINDINGS:   AP portable upright view of the chest demonstrates a stable enlarged   cardiopericardial silhouette. ET tube is in appropriate position. NG tube extends   below the diaphragm. .Parenchymal opacity in the right lower lobe more than the   left is not changed. .There is no pneumothorax. . Patient is on a cardiac monitor. 07/26/21 1834  XR CHEST PORT Final result    Impression:      1. Endotracheal tube in appropriate position. 2. Increased patchy opacification in the lung bases, greater on the right. 3. Unchanged cardiomegaly and mild edema           Narrative:  EXAM: XR CHEST PORT       HISTORY: intubation. COMPARISON: 7/26/2021       FINDINGS: Single view(s) of the chest. The endotracheal tube is in appropriate   position. The nasogastric tube terminates off the edge of film. The heart is   mildly enlarged, but unchanged. There is increased patchy opacification   throughout the right lower hemithorax with retrocardiac left lower lobe. No   pleural effusion or pneumothorax. There is a background of mild edema. The heart   is mildly enlarged, but unchanged. The bones are osteopenic.           07/26/21 1630  XR CHEST PORT Final result    Impression:  1. Persistent hazy density in the medial aspect of the right lung base   suspicious for a right middle lobe process. Developing infiltration in this   region must be considered. A follow-up examination is recommended. 2. Presence of a small right pleural effusion. Narrative:  Portable chest 2 views dated 7/26/2021. Comparison chest dated 2/10/2019. The CTA examination of the chest of 2/10/2019   is available for correlation. 2 portable AP upright views of the chest were obtained. The patient is rotated   towards the left. It is difficult to accurately assess the size of the cardiac   silhouette.  There continues be abnormal hazy density in the medial aspect of the   right lung base and there is obliteration of the right border of the cardiac   silhouette. This is suggestive of a right middle lobe process. A lateral view of   the chest may prove useful. There is minimal blunting of the right costophrenic   angle. This is compatible with the presence of a small pleural effusion. Total critical care time -  95 minutes      I personally spent the above critical care time. This is time spent at this critically ill patient's bedside / unit / floor actively involved in patient care as well as the coordination of care. This does not include any procedural time which has been billed separately. This does not include any NP/PA patient care time. I had a face to face encounter with the patient, reviewed and interpreted patient data including clinical events, labs, images, vital signs, I/O's, and examined patient. I have discussed the case and the plan and management of the patient's care with the consulting services and bedside nurses. This patient has a high probability of imminent, clinically significant deterioration, which requires the highest level of preparedness to intervene urgently. I participated in the decision-making and personally managed or directed the management of life and organ supporting interventions to treat or prevent imminent deterioration. This patient has a critical illness or injury that is acutely impairing one or more vital organ systems such that there is a high probability of imminent or life threatening deterioration in the patient's condition. This patient requires high complexity medical decision making to assess, manipulate, and support vital organ system failure. After stabilization, this patient still requires management to prevent further life / organ threatening deterioration.

## 2021-07-27 NOTE — ED NOTES
Patient continues to bite at the ETT. VS remain stable. Propofol titrated up to 30 mcg/kg/hr: 14.4 ml/hr.

## 2021-07-27 NOTE — ED NOTES
Spoke with Nika Wall who is sister in law. Her and pts brother Nick are POA per Nika Wall. [facility] just [facility] and went home to [place] on Saturday. Updated them on pts condition and plan to be admitted. Please call if any info needed [phone].
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
## 2021-07-27 NOTE — PROGRESS NOTES
2330: Patient arrived to CCU via [facility]. Intubated on [address], Propofol 50mcg. EKG performed st elevation V3, V4, V5. CODE STEMI called. Cath lab team and Dr. Karrie Gudino returned page. 0000: Patient taken to cath lab. 0230: TRANSFER - IN REPORT:    Verbal report received from Cheyanne (name) on Arvilla Eisenmenger  being received from Cath lab (unit) for routine post - op      Report consisted of patients Situation, Background, Assessment and   Recommendations(SBAR). Information from the following report(s) SBAR, Kardex, Intake/Output, MAR, Recent Results, Med Rec Status, Cardiac Rhythm Aflutter and Alarm Parameters  was reviewed with the receiving nurse. Opportunity for questions and clarification was provided. Assessment completed upon patients arrival to unit and care assumed. Primary Nurse Adam Porras RN and Johana Burk RN performed a dual skin assessment on this patient No impairment noted Dario score is 16 Patient resting on Striker bed.    0320: Patient arrived on unit from cath lab large hematoma at [place] [address] site. Manual hand held compression applied. Left leg cold to touch, mottled, no pulses. ECHO tech called in to confirm impella placement. 0345: ECHO tech at bedside. Impella repositioned by Dr. Saw Quintero with echo, and impella rep. Paging vascular tech. Vascular MD paged. Patient maxed on levophed. 150 chirag, dobutamine. 0400: Spoke with vascular and [facility] plans to take patient for exploration of left femoral artery and placement of right axillary impella. Telephone Consent obtained from brother. 0515: Patient transported to OR with 2 CCU RN's, Dr. Horacio Lobo, Impella rep.

## 2021-07-27 NOTE — H&P
[facility]    ICU TEAM H&P    Name: Leia Dang   : 1960   MRN: [id number]   Date: 2021      Assessment:     ICU Problems:    1. STEMI  2. Acute/Chronic Hypoxic Respiratory Failure  3. Hyponatremia  4. Hypochloremia  5. Elevated BNP  6. COPD  7. Hypertension  8. Hypercholesterolemia      ICU Comprehensive Plan of Care:     Plans for this Shift:     1. Admit to ICU  2. STAT CTA chest for PE  3. STEMI alert, STAT cardiology consultation  4. Trend labs  5. ABG, adjust ventilator settings as needed  6. ABCDEF bundle  7. Saline infusion for sodium, possible nephrology consultation  8. Hypochloremia likely secondary to hyponatremia  9. Repeat ECHO  10. COVID Treatment:  a. Vaccinated  11. SBP Goal of: > 90 mmHg  12. MAP Goal of: > 65 mmHg  13. None - For above SBP/MAP goals  14. IVFs:   15. Transfusion Trigger (Hgb): <7 g/dL  16. Respiratory Goals:  a. Chlorhexidine   b. Optimize PEEP/Ventilation/Oxygenation  c. Goal Tidal Volume 6 cc/kg based on IBW  d. Aim for lung protective ventilation  e. Head of bed > 30 degrees  f. Aggressive bronchopulmonary hygiene  17. Pulmonary toilet: Duo-Nebs   18. SpO2 Goal: > 92%  19. Keep K>4; Mg>2   20. PT/OT: Will consult when appropriate   21. Goals of Care Discussion with family Yes   25. Plan of Care/Code Status: Full Code  23. Appreciate Consultants Input   24. Discussed Care Plan with Bedside RN  25. Documentation of Current Medications  26.  Rest of Plan Below:    F - Feeding:  Pending   A - Analgesia: Fentanyl and Acetaminophen  S - Sedation: Propofol  T - DVT Prophylaxis: SCD's or Sequential Compression Device   H - Head of Bed: > 30 Degrees  U - Ulcer Prophylaxis: Not at this time   G - Glycemic Control: Insulin  S - Spontaneous Breathing Trial: Pending  B - Bowel Regimen: MiraLax  I - Indwelling Catheter:   Tubes: ETT  Lines: Peripheral IV  Drains: Mims Catheter  D - De-escalation of Antibiotics: None at this time     Subjective:   Progress Note: 7/26/2021      Reason for ICU Admission:  Respiratory failure    HPI: This is a 59-year-old female with history of COPD, diabetes, high cholesterol, hypertension, significant smoking history presented to the  [place] emergency department with nonproductive cough and shortness for breath for 2 days. She does not use oxygen at home. She been using her inhaler without significant relief. Last time she was on steroids was 3 years ago in association with pneumonia. No fever. She is fully Covid vaccinated. After arrival to [facility] ED she continued to have increasing shortness of breath and dropping oxygen saturations and the decision was made to intubate her. She was noted to have eleveated troponin but no changes on EKG per MD report. She was transferred to [facility]. Upon arrival to [facility] she was noted to have ST elevation on EKG. CODE STEMI was initiated. Interval events:    Patient taken to cath lab for OSCAR to LAD with post-PCI LOI flow 3. LVEDP was noted to be 40mmHg and a left femoral Impella was placed. On arrival in CCU Impella flows were noted to have decreased from 3.8L/min to 2.2 L/min. Bedside echo was performed and the Impella withdrawn 3.5cm  under direct echo visualization with a resulting increase in flow to 3.5L/min. An expanding hematoma at the left groin site was also noted on arrival in the CCU. The limb was cool and mottled with no doppler signal in the left DP, PT or popliteal. CT Surgery was consulted for placement of an axillary Impella both to increase support and to allow Vascular Surgery to address the threatened left lower extremity.          Active Problem List:     Problem List  Date Reviewed: 7/7/2021        Codes Class    Respiratory failure requiring intubation ([facility] ICD-10-CM: J96.90  ICD-9-CM: 518.81         Atrial flutter, unspecified type ([facility] ICD-10-CM: I48.92  ICD-9-CM: 427.32         COPD with acute exacerbation ([facility] ICD-10-CM: J44.1  ICD-9-CM: 491.21 Controlled type 2 diabetes mellitus without complication, without long-term current use of insulin (HCC) ICD-10-CM: E11.9  ICD-9-CM: 250.00         Polyp of colon ICD-10-CM: K63.5  ICD-9-CM: 211.3         COPD (chronic obstructive pulmonary disease) ([facility] ICD-10-CM: J44.9  ICD-9-CM: 496         Impaired fasting glucose ICD-10-CM: R73.01  ICD-9-CM: 790.21         Vitamin D deficiency ICD-10-CM: E55.9  ICD-9-CM: 268.9         HTN (hypertension) ICD-10-CM: I10  ICD-9-CM: 401.9         Hyperlipidemia ICD-10-CM: E78.5  ICD-9-CM: 272.4         Depressed ICD-10-CM: F32.9  ICD-9-CM: 311         Asthma ICD-10-CM: J45.909  ICD-9-CM: 493.90         Allergic rhinitis ICD-10-CM: J30.9  ICD-9-CM: 477.9               Past Medical History:      has a past medical history of Colon polyps, COPD (chronic obstructive pulmonary disease) ([facility] (11/28/2012), Diabetes ([facility] GERD (gastroesophageal reflux disease), OTHER MEDICAL, Hypercholesterolemia, Hypertension, Menopause, and Pilonidal cyst.    Past Surgical History:      has a past surgical history that includes endoscopy, colon, diagnostic (9/2004) and hx other surgical (1978). Home Medications:     Prior to Admission medications    Medication Sig Start Date End Date Taking? Authorizing Provider   Cartia  mg capsule TAKE ONE CAPSULE BY MOUTH ONCE DAILY. 7/9/21  Yes Deana Amaro MD   metoprolol succinate (TOPROL-XL) 25 mg XL tablet Take 1 Tablet by mouth daily. Last fill before visit. Please call office to schedule. 6/28/21  Yes Deana Amaro MD   metFORMIN ER (GLUCOPHAGE XR) 500 mg tablet TAKE ONE TABLET BY MOUTH ONCE DAILY WITH DINNER. Last fill before visit. Please call office to schedule.  6/28/21  Yes Deana Amaro MD   Accu-Chek Guide test strips strip CHECK BLOOD SUGAR TWO TIMES A DAY 5/26/21  Yes Sarika Amaro MD   Accu-Chek Fastclix Lancet Drum misc USE AS DIRECTED TWO TIMES A DAY 1/5/21  Yes Deana Amaro MD   apixaban (Eliquis) 5 mg tablet TAKE 1 TABLET TWICE A DAY 10/7/20  Yes Deana Amaro MD   lisinopril-hydroCHLOROthiazide (PRINZIDE, ZESTORETIC) 20-12.5 mg per tablet TAKE ONE TABLET BY MOUTH ONCE DAILY. 20  Yes Deana Amaro MD   atorvastatin (LIPITOR) 10 mg tablet TAKE ONE TABLET BY MOUTH ONCE DAILY. 20  Yes Deana Amaro MD   budesonide-formoterol (SYMBICORT) 80-4.5 mcg/actuation HFAA USE 2 PUFFS BY MOUTH TWICE A DAY 10/31/19  Yes Deana Amaro MD   VENTOLIN HFA 90 mcg/actuation inhaler INHALE 2 PUFFS BY MOUTH EVERY 4 HOURS AS NEEDED FOR WHEEZING 18  Yes Deana Amaro MD   B.infantis-B.ani-B.long-B.bifi (PROBIOTIC 4X) 10-15 mg TbEC Take  by mouth. Yes Provider, Historical       Allergies/Social/Family History: Allergies   Allergen Reactions    Sporanox [Itraconazole] Hives      Social History     Tobacco Use    Smoking status: Current Every Day Smoker     Packs/day: 2.00     Years: 40.00     Pack years: 80.00     Types: Cigarettes    Smokeless tobacco: Never Used    Tobacco comment: quit a few weeks ago   Substance Use Topics    Alcohol use: Yes     Alcohol/week: 11.0 - 12.0 standard drinks     Types: 4 Shots of liquor, 7 - 8 Standard drinks or equivalent per week      Family History   Problem Relation Age of Onset   Naomi Simental Cancer Mother         lung    Cancer Father         leukemia    Colon Cancer Brother 52    Hypertension Brother        Review of Systems:     Review of systems not obtained due to patient factors.     Objective:   Vital Signs:  Visit Vitals  /79   Pulse (!) 113   Temp 99.9 °F (37.7 °C)   Resp 24   Ht 5' 8\" (1.727 m)   Wt 80 kg (176 lb 5.9 oz)   SpO2 99%   BMI 26.82 kg/m²      O2 Device: Endotracheal tube Temp (24hrs), Av.6 °F (37 °C), Min:97.2 °F (36.2 °C), Max:99.9 °F (37.7 °C)           Intake/Output:     Intake/Output Summary (Last 24 hours) at 2021 2336  Last data filed at 2021 2139  Gross per 24 hour   Intake 2220 ml   Output 13 ml   Net 2207 ml       Physical Exam:    General:  Sedated and on the ventilator. No acute distress. Eyes:  Sclera anicteric. Pupils equal, round, reactive to light. Mouth/Throat: Orotracheal tube in place. Neck: Supple. Lungs:   Diminished breath sounds b/l   Cardiovascular:  tachycardic, no murmur, click, rub, or gallop. Abdomen:   Soft, non-tender, bowel sounds absent, non-distended. Extremities: No cyanosis or edema. Skin: No acute rash or lesions. Lymph Nodes: Cervical and supraclavicular normal.   Musculoskeletal:  No swelling or deformity. Lines/Devices:  Intact, no erythema, drainage, or tenderness. Psychiatric: Sedated and appears comfortable on ventilator. LABS AND  DATA: Personally reviewed  Recent Labs     07/26/21  1615   WBC 8.9   HGB 12.6   HCT 36.0   *     Recent Labs     07/26/21  1615   *   K 4.5   CL 82*   CO2 23   BUN 15   CREA 0.66   *   CA 9.0     Recent Labs     07/26/21  1615   AP 99   TP 7.0   ALB 3.6   GLOB 3.4     No results for input(s): INR, PTP, APTT, INREXT in the last 72 hours. No results for input(s): PHI, PCO2I, PO2I, FIO2I in the last 72 hours. Recent Labs     07/26/21  1615   TROIQ 6.62*       Hemodynamics:   PAP:   CO:     Wedge:   CI:     CVP:    SVR:       PVR:       Ventilator Settings:  Mode Rate Tidal Volume Pressure FiO2 PEEP   SIMV   480 ml  12 cm H2O 100 % 8 cm H20     Peak airway pressure:      Minute ventilation:          MEDS: Reviewed    Chest X-Ray:  CXR Results  (Last 48 hours)               07/26/21 1834  XR CHEST PORT Final result    Impression:      1. Endotracheal tube in appropriate position. 2. Increased patchy opacification in the lung bases, greater on the right. 3. Unchanged cardiomegaly and mild edema           Narrative:  EXAM: XR CHEST PORT       HISTORY: intubation. COMPARISON: 7/26/2021       FINDINGS: Single view(s) of the chest. The endotracheal tube is in appropriate   position.  The nasogastric tube terminates off the edge of film. The heart is   mildly enlarged, but unchanged. There is increased patchy opacification   throughout the right lower hemithorax with retrocardiac left lower lobe. No   pleural effusion or pneumothorax. There is a background of mild edema. The heart   is mildly enlarged, but unchanged. The bones are osteopenic.           07/26/21 1630  XR CHEST PORT Final result    Impression:  1. Persistent hazy density in the medial aspect of the right lung base   suspicious for a right middle lobe process. Developing infiltration in this   region must be considered. A follow-up examination is recommended. 2. Presence of a small right pleural effusion. Narrative:  Portable chest 2 views dated 7/26/2021. Comparison chest dated 2/10/2019. The CTA examination of the chest of 2/10/2019   is available for correlation. 2 portable AP upright views of the chest were obtained. The patient is rotated   towards the left. It is difficult to accurately assess the size of the cardiac   silhouette. There continues be abnormal hazy density in the medial aspect of the   right lung base and there is obliteration of the right border of the cardiac   silhouette. This is suggestive of a right middle lobe process. A lateral view of   the chest may prove useful. There is minimal blunting of the right costophrenic   angle. This is compatible with the presence of a small pleural effusion. ECHO:  Pending    2/2019:  · Estimated left ventricular ejection fraction is 56 - 60%. Normal left ventricular wall motion, no regional wall motion abnormality noted. · Right ventricular cavity size is moderately dilated. Right ventricular global systolic function is mildly reduced. · Right atrial cavity size is moderately dilated. · Mitral valve non-specific thickening. Mild mitral valve regurgitation. · Mild tricuspid valve regurgitation is present.       Multidisciplinary Rounds Completed:  Pending    ABCDEF Bundle/Checklist Completed:  Yes    SPECIAL EQUIPMENT  None    DISPOSITION  Stay in ICU    CRITICAL CARE CONSULTANT NOTE  I had a face to face encounter with the patient, reviewed and interpreted patient data including clinical events, labs, images, vital signs, I/O's, and examined patient. I have discussed the case and the plan and management of the patient's care with the consulting services, the bedside nurses and the respiratory therapist.      NOTE OF PERSONAL INVOLVEMENT IN CARE   This patient has a high probability of imminent, clinically significant deterioration, which requires the highest level of preparedness to intervene urgently. I participated in the decision-making and personally managed or directed the management of the following life and organ supporting interventions that required my frequent assessment to treat or prevent imminent deterioration. I personally spent 120 minutes of critical care time. This is time spent at this critically ill patient's bedside actively involved in patient care as well as the coordination of care and discussions with the patient's family. This does not include any procedural time which has been billed separately.       Sweetie Winkler MD     Critical Care Medicine  [facility]

## 2021-07-27 NOTE — ED NOTES
Report given to [facility], pt awaiting bed assignment at [facility] ICU. Pts upper dentures, glasses, purse, cellphone and clothes all placed in belonging bag and placed at bedside.

## 2021-07-27 NOTE — OP NOTES
[address] [facility]  OPERATIVE REPORT    Name:  Espinoza Chau  MR#:  [id number]  :  1960  ACCOUNT #:  [de-identified]  DATE OF SERVICE:  2021      PREOPERATIVE DIAGNOSES:  Cardiogenic shock with left lower extremity ischemia and expanding left femoral hematoma due to left femoral Impella. POSTOPERATIVE DIAGNOSES:  Cardiogenic shock with left lower extremity ischemia and expanding left femoral hematoma due to left femoral Impella. PROCEDURES PERFORMED:  1. Removal of left femoral Impella. 2.  Left femoral thrombectomy. 3.  Left femoral endarterectomy and patch angioplasty. 4.  Left iliac arteriogram.    SURGEON:  Jessica Flores MD    ASSISTANT:  BECCA Wilder    ANESTHESIA:  General.    COMPLICATIONS:  None. SPECIMENS REMOVED:  None. IMPLANTS:  Bovine pericardial patch. ESTIMATED BLOOD LOSS:  1500 mL. DRAINS:  #10 Will-Jain. 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 Following that procedure, the patient was transferred to the intensive care unit where she was noted to have a densely ischemic left lower extremity with no detectable Doppler flow in the left foot. She also had an expanding left femoral hematoma requiring placement of a FemoStop. The patient was on significant vasopressor and inotropic support. PROCEDURE:  The patient was taken to the operating room by Cardiac Surgery where a right axillary Impella 5.5 was placed to allow removal of the left femoral Impella. I was summoned to the operating room after successful placement of the right axillary Impella. The patient's entire left lower leg and foot were densely cyanotic and cold with no detectable Doppler flow. The right foot was cyanotic and cold in the toes and the forefoot.   The patient had significant bleeding of the left groin and expansion of the groin hematoma with releasing the FemoStop, so this had been maintained throughout placement of the right axillary Impella. Blood loss for the initial portion of the case for insertion of the Impella was somewhere in the neighborhood of 750 mL. The FemoStop was removed from the left groin and the left groin and entire left leg were prepped and draped as a sterile field. There was bright red blood oozing around the Impella insertion site and significant bruising and swelling in the groin. The Impella had already been retracted down into the descending thoracic aorta and was running at P-2. The patient had not been heparinized due to the left groin bleeding. Sutures securing the Impella were cut and a vertical incision was made in the left groin extending above and below the Impella insertion site. Dissection was carried down along the lateral edge of the Impella to the common femoral artery. As the insertion site was dissected free, the bleeding was more prevalent. I was able to dissect the common femoral artery free up to and just above the inguinal ligament. The lateral circumflex iliac was identified and was controlled with a small vessel loop. The distal external iliac artery was dissected free and was encircled with a vessel loop just proximal to the lateral circumflex iliac artery. Dissection was then carried distal to the Impella where the femoral bifurcation was dissected free and the proximal SFA and the profunda were encircled with vessel loops. The patient was then given 6000 units of intravenous heparin. After allowing a few minutes for circulation, the vessel loops were secured and the Impella was turned off and removed in the normal fashion. An attempt was made to insert a Wisam catheter retrograde up the left iliac artery through the Impella insertion puncture site.   The Wisam inserted easily into the arterial lumen, but I met resistance at about 10 cm that felt like the Wisam was likely in the dissection plane. The Wisam was removed and the Impella insertion site was converted to a longitudinal arteriotomy. This revealed extensive dissection throughout the posterior wall of the common femoral and distal external iliac artery, beginning at the level of the Impella insertion and extending proximally. There were multiple dissection flaps and channels. It did not appear that it would be possible to secure these with tacking sutures. I was able to discern the true lumen and insert a 7-Kyrgyz sheath retrograde into this. Retrograde left iliac arteriogram was done, which showed normal left iliac artery all the way up to bifurcation. The sheath was removed and the arteriotomy was extended a little bit proximally to allow proper assessment of the proximal extent of the dissection. There was some significant posteromedial plaque and so an endarterectomy was performed to remove all of the dissection flaps as well as the posteromedial plaque. The plaque extended down into the proximal SFA, so the arteriotomy was extended down into the proximal SFA. The plaque did continue somewhat beyond this but was a reasonably small shelf of plaque, so it was transected with Howard scissors in the proximal SFA. There was good backbleeding from the profunda. There had been some thrombus in the common femoral artery and then the dissections and this had all been removed. #3 and #4 Fogarties were passed down all the way to the foot and no thrombus was retrieved and there was good  backbleeding. #4 Wisam catheter was passed down the profunda and no thrombus was retrieved and there was good backbleeding. After removing all loose debris from the lumen of the artery, the long arteriotomy was closed using an 8 mm Bovine pericardial patch using running 5-0 Prolene suture.   The patch closure was completed; flow was released first to the profunda and then to the SFA. Multiple small points of bleeding were controlled with figure-of-eight 5-0 and 6-0 Prolene sutures. There was diffuse oozing throughout the wound consistent with the patient's antiplatelet therapy since the emergent cardiac stent and also suggestive of significant coagulopathy. After placing all possible repair sutures, there was still significant diffuse oozing. Clamps were applied to all of the inflow and outflow vessels and Tisseel was applied to the entire patch suture line and this was covered with Fibrillar and allowed to remain in place with clamps occluding flow for a few minutes. Flow was released first to the profunda and then to the SFA. This helped dramatically, but there was still some diffuse oozing, so there was now good Doppler flow in the SFA and the profunda, so 25 mg of protamine was given and a #10 Will-Jain drain was placed in the deep space just lateral to the arterial repair. The incision was then closed with multiple layers of Vicryl suture and skin staples. A dry dressing was applied. The patient was returned to the intensive care unit in critical condition. All counts were correct.       Myles Hedrick MD      [id number]  D:  07/27/2021 10:24  T:  07/27/2021 13:12  JOB #:  [id number]

## 2021-07-27 NOTE — OP NOTES
[address]  OPERATIVE REPORT    Name:  Ginette Yeh  MR#:  [id number]  :  1960  ACCOUNT #:  [de-identified]  DATE OF SERVICE:  2021      PREOPERATIVE DIAGNOSES:  1. Cardiogenic shock. 2.  Acute ST elevated myocardial infarction. 3.  Acute respiratory failure. POSTOPERATIVE DIAGNOSES:  1. Cardiogenic shock. 2.  Acute ST elevated myocardial infarction. 3.  Acute respiratory failure. PROCEDURE:  Placement of left ventricular assist device, Impella 5.5 via right axillary artery. SURGEON:  Loy Castleman, MD    ASSISTANT:  BECCA Power    ANESTHESIA:  General.    COMPLICATIONS:  None. SPECIMENS REMOVED:  None. IMPLANTS:  Axillary Impella. ESTIMATED BLOOD LOSS:  100 mL. INDICATIONS:  This is a 57-year-old female with the above diagnosis who had acute vascular injury during placement of an Impella through the left groin associated with management of her acute myocardial infarction and stent placement. Because of the need to remove the Impella in the groin and the patient's need for escalating pressor support and persistent cardiogenic shock, she was taken to the operating room for an Impella 5.5 device. PROCEDURE:  The patient was transported from the critical care unit to the operating room. She was intubated and a surgical time-out was completed. The standard prep of the right infraclavicular area was undertaken. An incision was made. The axillary artery exposed and a 10-mm tube graft inserted end-to-side for placement of the Impella. Prior to placement of the Impella 5.5 device, the previously placed peripheral device was turned off and removed to the descending thoracic aorta. The Impella 5.5 device was then positioned under echocardiographic and fluoroscopic guidance with proper positioning. The Impella device improved hemodynamics.   The right infraclavicular incision was then reapproximated with interrupted and running Vicryl suture and skin staples. Vascular surgery was consulted to remove the left groin Impella. ..PA assistance was required due to difficulty of the procedure. The PA assisted in dissection of tissues, identification and expose of pertinent anatomy and in wire placement of the left ventricular Impella assist device and  conduct of the operation to assure optimal patient outcome.          MD SHE [id number]  D:  07/27/2021 7:26  T:  07/27/2021 10:33  JOB #:  [id number]

## 2021-07-27 NOTE — ROUTINE PROCESS
Initial SBAR report given to Dallas Parry RN at [phone]. Report given to [facility] transport crew upon their arrival at [phone]. Repeat SBAR report called to Dallas Parry RN at time of patient transfer.

## 2021-07-27 NOTE — PROGRESS NOTES
Transitions of Care note:      CM noted admit today for cardiogenic shock and received from OR forleft lower extremity ischemia and expanding left femoral hematoma due to left femoral Impella. CM received a call from [facility] with [facility] that patients insurance is not in network and once patient is medically stable will need to be transferred to an in network facility  - Dr Jorge Alberto Escalera attending notified and patient is not medically stable for transfer at this time. Will await input from attending for when patient is medically ready for transfer to another in network facility if agreeable.   TODD Leonard

## 2021-07-27 NOTE — ADT AUTH CERT NOTES
Utilization Reviews       Cardiovascular Surgery or Procedure GR - Care Day 2 (7/27/2021) by Nayeli Cardoza RN       Review Entered Review Status   7/27/2021 15:22 Completed      Criteria Review      Care Day: 2 Care Date: 7/27/2021 Level of Care: ICU    Guideline Day 1    Level Of Care    (X) OR to ICU or intermediate care    Clinical Status    (X) * Clinical Indications met    7/27/2021 15:22:03 EDT by Denice Mclaughlin      Other critical care diagnoses that are being treated and are above and beyond the standard care for the procedure being performed:     Acute respiratory failure   Acute right ventricular failure    (X) * Procedure completed    7/27/2021 15:22:03 EDT by Denice Mclaughlin      PROCEDURE:  Placement of left ventricular assist device, Impella 5.5 via right axillary artery. Interventions    (X) Inpatient interventions as needed    (X) Vascular and neurologic checks    * Milestone   Additional Notes   7/27/21  inpt ICU   96.8   127   25   97/68   100% on vent    WBC: 13.6 (H)   HGB: 8.3 (L)   HCT: 24.0 (L)   PLATELET: 57 (L)   Sodium: 131 (L)   Potassium: 3.2 (L)   Glucose: 54 (L)   BUN: 20   Creatinine: 0.63   BUN/Creatinine ratio: 32 (H)   Calcium: 7.5 (L)   Magnesium: 1.4 (L)   Bilirubin, total: 1.6 (H)   Protein, total: 4.4 (L)   Albumin: 2.8 (L)   Globulin: 1.6 (L)   AST: 115 (H)   Alk.  phosphatase: 38 (L)   LD: 462 (H)   Lactic acid: 2.4 (HH)   NT pro-BNP: 32,466 (H)   Hemoglobin A1c, (calculated): 6.1 (H)   Meds  NS at 25   precedex titrated gtt   dobutrex titrated gtt   vibramycin 100mg iv q12   adrenalin titrated gtt   fentanyl titrated gtt   mag sulfate 2g iv q1   levophed iv titrated gtt   aung titrated gtt   zosyn 3.375g iv q8   KCL 20meq iv q1    diprivan titrated gtt    vasostrict titrated gtt   buminate 25g iv once   asa 324mg po now   dobutrex x1   brilinta 90mg po once   NS 500cc iv once        Orders:  npo   PT/OT  I&O  incentive   BR    briscoe   daily wt     Vascular surgery op note      PREOPERATIVE DIAGNOSES:  Cardiogenic shock with left lower extremity ischemia and expanding left femoral hematoma due to left femoral Impella.       POSTOPERATIVE DIAGNOSES:  Cardiogenic shock with left lower extremity ischemia and expanding left femoral hematoma due to left femoral Impella.       PROCEDURES PERFORMED:   1.  Removal of left femoral Impella. 2.  Left femoral thrombectomy. 3.  Left femoral endarterectomy and patch angioplasty. 4.  Left iliac arteriogram.          Card surgery op note   PREOPERATIVE DIAGNOSES:   1.  Cardiogenic shock. 2.  Acute ST elevated myocardial infarction. 3.  Acute respiratory failure.       POSTOPERATIVE DIAGNOSES:   1.  Cardiogenic shock. 2.  Acute ST elevated myocardial infarction. 3.  Acute respiratory failure.       PROCEDURE:  Placement of left ventricular assist device, Impella 5.5 via right axillary artery.          Critical Care Note        Cardiac surgery was called for the evaluation and management of: Cardiogenic shock       The critical nature of the patient's condition includes the following: STEMI   Acute/Chronic Hypoxic Respiratory Failure           Diagnosis for which the procedure was performed: Cardiogenic shock       Procedure performed: Insertion of left ventricular assist device Impella 5.5       Other critical care diagnoses that are being treated and are above and beyond the standard care for the procedure being performed:       Acute respiratory failure    Acute right ventricular failure            Cardiovascular Surgery or Procedure GRG - Clinical Indications for Procedure by Marianela Gustafson RN       Review Entered Review Status   7/27/2021 15:20 Completed      Criteria Review      Clinical Indications for Procedure    Most Recent : Paul Pino Most Recent Date: 7/27/2021 15:20:55 EDT    (X) Surgery or other procedure covered by this guideline is indicated for  1 or more  of the following    :       (X) Intra-aortic balloon pump or temporary (eg, non-implanted) ventricular assist device [D] needed,       as indicated by  1 or more  of the following  (88) (89) (91) (92):          (X) Life-threatening or severe cardiac dysfunction (eg, cardiogenic shock, acute heart failure)          and  ALL  of the following :             (X) Maximal medical therapy has failed to provide sufficient improvement.             (X) Clinical improvement (ie, improved cardiac function) is anticipated (within reasonable degree             of clinical certainty) with further medical or procedural treatment (care is not             felt to be futile).              7/27/2021 15:20:55 EDT by Prasad Wilburn        PROCEDURE:  Placement of left ventricular assist device, Impella 5.5 via right axillary artery.      Myocardial Infarction - Care Day 1 (7/26/2021) by Hayden Vazquez RN       Review Entered Review Status   7/27/2021 11:57 Completed      Criteria Review      Care Day: 1 Care Date: 7/26/2021 Level of Care: ICU    Guideline Day 1    Level Of Care    (X) ICU or intermediate care after emergency treatment    Clinical Status    (X) * Clinical Indications met    Activity    (X) Bed rest    Routes    ( ) Parenteral and oral medications    7/27/2021 11:57:57 EDT by [place] Patricia      diprivan titrated   duoneb now x2   maxipime 2g iv now  dobutrex titrated gtt   vibramycin 100mg iv once   amidate   fentanyl mcg iv  now   solumedrol 125mg iv now   aung  titrated gtt    NS 1000cc bolus   500cc NS bolus    ( ) Clear liquid diet    7/27/2021 11:57:57 EDT by Montana Patricia      npo    Interventions    (X) ECG, cardiac biomarkers    (X) Fibrinolysis or percutaneous coronary intervention    * Milestone   Additional Notes   7/26/21  inpt ICU   97.2   126   36   73/60   88% ra   WBC: 8.9   HGB: 12.6   HCT: 36.0   PLATELET: 109 (L)   Sodium: 116 (LL)   Potassium: 4.5   Chloride: 82 (L)   CO2: 23   Anion gap: 11   Glucose: 146 (H)   BUN: 15   Creatinine: 0.66   BUN/Creatinine ratio: 23 (H)   AST: 60 (H)   Alk. phosphatase: 99   Troponin-I, Qt.: 6.62 (H)   NT pro-BNP: 22,423 (H)   C-Reactive protein: 6.66 (H)   XR CHEST PORT  IMPRESSION   1. Persistent hazy density in the medial aspect of the right lung base   suspicious for a right middle lobe process. Developing infiltration in this   region must be considered. A follow-up examination is recommended. 2. Presence of a small right pleural effusion      Meds:  diprivan titrated   duoneb now x2   maxipime 2g iv now  dobutrex titrated gtt   vibramycin 100mg iv once   amidate   fentanyl mcg iv  now   solumedrol 125mg iv now   chirag  titrated gtt    NS 1000cc bolus   500cc NS bolus      Orders:  card surgery and vacular surgery consult   npo   BR   daily wt   SCD         Cardiovascular  consult    Jyoti Santos is a 57 y.o. female admitted for Respiratory failure requiring intubation ([facility] [address] [J96.90].  Consult requested by Selvin Chan MD       Assessment/Plan    STEMI   Cardiogenic shock   Acute hypoxic respiratory failure   Hyponatremia   Type 2 diabetes   Atrial flutter   Tobacco abuse history   COPD       Patient was emergently taken to cardiac Cath Lab for treatment of STEMI and cardiogenic shock.  POCUS prior to cardiac cath procedure showed severe LV dysfunction with LVEF in the 20s with akinesis of the anterior anterior septal and apical walls.  Status post OSCAR to mid LAD.  Impella placed for cardiogenic shock.       -Recommend changing Chirag-Synephrine to norepinephrine   -Continue dobutamine at 7.5 mcg/kg/min   -Ticagrelor based DAPT.  180 mg given in the Cath Lab   -Atorvastatin 80 mg daily   -Bedside echo to assess Impella placement   -Systemic anticoagulation for atrial flutter and Impella       Suspect she has underlying cardiomyopathy.  Based on her angiographic and echocardiographic data, likely has a nonviable distal anterior wall and apex.              High complexity decision making was performed       Patient is at high-risk of decompensation with multiple organ involvement     Subjective:   Gerardo Lozoya is a 57 y.o. female presented to [facility] urgent care facility for evaluation of shortness of breath.  Patient has a history of hypertension, diabetes, atrial flutter.  She presented with acute onset of shortness of breath.  She was initially placed on BiPAP and continued to demonstrate severe hypoxic respiratory failure, subsequently was intubated.  Chest x-ray showed concern for infiltrate within the right middle lobe.  On arrival to [facility] she was found to have Q waves in her anterior septal leads with ST elevation in V3 4 and 5.  POCUS showed LVEF approximately 20 to 25%.  Angiography showed occluded LAD. Gen:    Ill-appearing, intubated, pale appearing   HEENT:  Pink conjunctivae, hearing intact to voice, moist mucous membranes   Neck:  Supple,No JVD, No Carotid Bruit   Resp:  No accessory muscle use, Clear breath sounds, No rales or rhonchi   Card:   Normal Rate,Regular Rythm,Normal S1, S2, No murmurs, rubs or gallop. No thrills.     Abd:  Soft, non-tender, non-distended, normoactive bowel sounds are present    MSK:   No cyanosis or clubbing   Skin:   No rashes or ulcers   Neuro:  Cranial nerves are grossly intact, moving all four extremities, no focal deficit, follows commands appropriately   Psych:  Good insight, oriented to person, place and time, alert, Nml Affect   LE: No edema   Vascular: Difficult to assess radial and pedal pulses            Myocardial Infarction - Clinical Indications for Admission to Inpatient Care by Anahi Rueda RN       Review Entered Review Status   7/27/2021 11:56 Completed      Criteria Review      Clinical Indications for Admission to Inpatient Care    Most Recent : Irving Kate Most Recent Date: 7/27/2021 11:56:32 EDT    (X) Admission is indicated for  1 or more  of the following  (1) (2) (3) (4) (5) (6) (7) (8) (9):       (X) Acute myocardial infarction (MI) [A] (not in context of cardiac procedure within last 48 hours),       as indicated by  ALL  of the following  (11):          (X) Elevated cardiac troponin level, as indicated by  1 or more  of the following :             (X) New or presumed new elevation of cardiac troponin level (ie, elevation clearly not due to             chronic condition, see footnotes) [B] [C] [D]             2021 11:56:32 EDT by Ned Joshi               Troponin-I, Qt.: 6.62 (H)          (X) Myocardial injury due to acute ischemia, as indicated by  1 or more  of the following :             (X) Symptoms consistent with myocardial ischemia (eg, chest pain, dyspnea)         H&P Notes     H&P by Woody Jc MD at 21 documented on ED to Hosp-Admission (Current) from 2021 in [facility] 4 CORONARY CARE  Author: Woody Jc MD Author Type: Physician Filed: 21 [id number]   Note Status: Addendum Cosign: Cosign Not Required Date of Service: 21   : Woody Jc MD (Physician)   Prior Versions: 1. Woody Jc MD (Physician) at 21 [id number] - Signed    2. Woody Jc MD (Physician) at 21 0542 - Shared    3. Woody Jc MD (Physician) at 21 0522 - Shared    4. Woody Jc MD (Physician) at 21 0120 - Shared    5. Ana Paula Padilla NP (Nurse Practitioner) at 21 0004 - Shared            [facility] CRITICAL CARE     ICU TEAM H&P     Name: Jona Hampton   : 1960   MRN: [id number]   Date: 2021       Assessment:      ICU Problems:     1. STEMI  2. Acute/Chronic Hypoxic Respiratory Failure  3. Hyponatremia  4. Hypochloremia  5. Elevated BNP  6. COPD  7. Hypertension  8. Hypercholesterolemia        ICU Comprehensive Plan of Care:      Plans for this Shift:      1. Admit to ICU  2. STAT CTA chest for PE  3. STEMI alert, STAT cardiology consultation  4. Trend labs  5. ABG, adjust ventilator settings as needed  6.  ABCDEF bundle  7. Saline infusion for sodium, possible nephrology consultation  8. Hypochloremia likely secondary to hyponatremia  9. Repeat ECHO  10. COVID Treatment:  a. Vaccinated  11. SBP Goal of: > 90 mmHg  12. MAP Goal of: > 65 mmHg  13. None - For above SBP/MAP goals  14. IVFs:   15. Transfusion Trigger (Hgb): <7 g/dL  16. Respiratory Goals:  a. Chlorhexidine   b. Optimize PEEP/Ventilation/Oxygenation  c. Goal Tidal Volume 6 cc/kg based on IBW  d. Aim for lung protective ventilation  e. Head of bed > 30 degrees  f. Aggressive bronchopulmonary hygiene  17. Pulmonary toilet: Duo-Nebs   18. SpO2 Goal: > 92%  19. Keep K>4; Mg>2   20. PT/OT: Will consult when appropriate   21. Goals of Care Discussion with family Yes   25. Plan of Care/Code Status: Full Code  23. Appreciate Consultants Input   24. Discussed Care Plan with Bedside RN  25. Documentation of Current Medications  26. Rest of Plan Below:     F - Feeding:  Pending   A - Analgesia: Fentanyl and Acetaminophen  S - Sedation: Propofol  T - DVT Prophylaxis: SCD's or Sequential Compression Device   H - Head of Bed: > 30 Degrees  U - Ulcer Prophylaxis: Not at this time   G - Glycemic Control: Insulin  S - Spontaneous Breathing Trial: Pending  B - Bowel Regimen: MiraLax  I - Indwelling Catheter:              Tubes: ETT  Lines: Peripheral IV  Drains: Mims Catheter  D - De-escalation of Antibiotics: None at this time      Subjective:   Progress Note: 7/26/2021       Reason for ICU Admission:  Respiratory failure     HPI: This is a 78-year-old female with history of COPD, diabetes, high cholesterol, hypertension, significant smoking history presented to the  [facility] emergency department with nonproductive cough and shortness for breath for 2 days. Janet Aquino does not use oxygen at home. Janet Aquino been using her inhaler without significant relief.  Last time she was on steroids was 3 years ago in association with pneumonia.  No fever.  She is fully Covid vaccinated.  After arrival to [place] ED she continued to have increasing shortness of breath and dropping oxygen saturations and the decision was made to intubate her. She was noted to have eleveated troponin but no changes on EKG per MD report. She was transferred to [facility]. Upon arrival to [facility] she was noted to have ST elevation on EKG. CODE STEMI was initiated.      Interval events:     Patient taken to cath lab for OSCAR to LAD with post-PCI LOI flow 3. LVEDP was noted to be 40mmHg and a left femoral Impella was placed. On arrival in CCU Impella flows were noted to have decreased from 3.8L/min to 2.2 L/min. Bedside echo was performed and the Impella withdrawn 3.5cm  under direct echo visualization with a resulting increase in flow to 3.5L/min. An expanding hematoma at the left groin site was also noted on arrival in the CCU. The limb was cool and mottled with no doppler signal in the left DP, PT or popliteal. CT Surgery was consulted for placement of an axillary Impella both to increase support and to allow Vascular Surgery to address the threatened left lower extremity.            Active Problem List:               Problem List  Date Reviewed: 7/7/2021         Codes Class     Res[facility] ICD-10-CM: J96.90  ICD-9-CM: 518.81             Atrial flutter, unspecified type ([facility] [address] ICD-10-CM: I48.92  ICD-9-CM: 427.32             COPD with acute exacerbation (HCC) ICD-10-CM: J44.1  ICD-9-CM: 491.21             Controlled type 2 diabetes mellitus without complication, without long-term current use of insulin (HCC) ICD-10-CM: E11.9  ICD-9-CM: 250.00             Polyp of colon ICD-10-CM: K63.5  ICD-9-CM: 211. 3             COPD (chronic obstructive pulmonary disease) (HCC) ICD-10-CM: J44.9  ICD-9-CM: 496             Impaired fasting glucose ICD-10-CM: R73.01  ICD-9-CM: 790.21             Vitamin D deficiency ICD-10-CM: E55.9  ICD-9-CM: 268. 9             HTN (hypertension) ICD-10-CM: I10  ICD-9-CM: 401. 9             Hyperlipidemia ICD-10-CM: E78.5  ICD-9-CM: 272. 4             Depressed ICD-10-CM: F32.9  ICD-9-CM: 311             Asthma ICD-10-CM: J45.909  ICD-9-CM: 493.90             Allergic rhinitis ICD-10-CM: J30.9  ICD-9-CM: 477.9                     Past Medical History:       has a past medical history of Colon polyps, COPD (chronic obstructive pulmonary disease) ([facility] [address] (11/28/2012), Diabetes ([facility] [address] GERD (gastroesophageal reflux disease), OTHER MEDICAL, Hypercholesterolemia, Hypertension, Menopause, and Pilonidal cyst.     Past Surgical History:       has a past surgical history that includes endoscopy, colon, diagnostic (9/2004) and hx other surgical (1978).    Home Medications:              Prior to Admission medications    Medication Sig Start Date End Date Taking? Authorizing Provider   Cartia  mg capsule TAKE ONE CAPSULE BY MOUTH ONCE DAILY. 7/9/21   Yes Deana Amaro MD   metoprolol succinate (TOPROL-XL) 25 mg XL tablet Take 1 Tablet by mouth daily. Last fill before visit. Please call office to schedule. 6/28/21   Yes Deana Amaro MD   metFORMIN ER (GLUCOPHAGE XR) 500 mg tablet TAKE ONE TABLET BY MOUTH ONCE DAILY WITH DINNER. Last fill before visit. Please call office to schedule. 6/28/21   Yes Deana Amaro MD Accu-Chek Guide test strips strip CHECK BLOOD SUGAR TWO TIMES A DAY 5/26/21   Yes Deana Amaro MD Accu-Chek Fastclix Lancet Drum misc USE AS DIRECTED TWO TIMES A DAY 1/5/21   Yes Deana Amaro MD   apixaban (Eliquis) 5 mg tablet TAKE 1 TABLET TWICE A DAY 10/7/20   Yes Deana Amaro MD   lisinopril-hydroCHLOROthiazide (PRINZIDE, ZESTORETIC) 20-12.5 mg per tablet TAKE ONE TABLET BY MOUTH ONCE DAILY. 8/7/20   Yes Deana Amaro MD   atorvastatin (LIPITOR) 10 mg tablet TAKE ONE TABLET BY MOUTH ONCE DAILY.  7/23/20   Yes Deana Amaro MD   budesonide-formoterol (SYMBICORT) 80-4.5 mcg/actuation HFAA USE 2 PUFFS BY MOUTH TWICE A DAY 10/31/19   Yes Almaz Sanchez MD VENTOLIN HFA 90 mcg/actuation inhaler INHALE 2 PUFFS BY MOUTH EVERY 4 HOURS AS NEEDED FOR WHEEZING 18   Yes Deana Amaro MD B.infantis-B.ani-B.long-B.bifi (PROBIOTIC 4X) 10-15 mg TbEC Take  by mouth.     Yes Provider, Historical         Allergies/Social/Family History:           Allergies   Allergen Reactions    Sporanox [Itraconazole] Hives      Social History            Tobacco Use    Smoking status: Current Every Day Smoker       Packs/day: 2.00       Years: 40.00       Pack years: 80.00       Types: Cigarettes    Smokeless tobacco: Never Used    Tobacco comment: quit a few weeks ago   Substance Use Topics    Alcohol use: Yes       Alcohol/week: 11.0 - 12.0 standard drinks       Types: 4 Shots of liquor, 7 - 8 Standard drinks or equivalent per week            Family History   Problem Relation Age of Onset    Cancer Mother           lung    Cancer Father           leukemia    Colon Cancer Brother 52    Hypertension Brother           Review of Systems:      Review of systems not obtained due to patient factors.     Objective:   Vital Signs:  Visit Vitals  /79   Pulse (!) 113   Temp 99.9 °F (37.7 °C)   Resp 24   Ht 5' 8\" (1.727 m)   Wt 80 kg (176 lb 5.9 oz)   SpO2 99%   BMI 26.82 kg/m²      O2 Device: Endotracheal tube Temp (24hrs), Av.6 °F (37 °C), Min:97.2 °F (36.2 °C), Max:99.9 °F (37.7 °C)             Intake/Output:      Intake/Output Summary (Last 24 hours) at 2021 2336  Last data filed at 2021 2139      Gross per 24 hour   Intake 2220 ml   Output 13 ml   Net 2207 ml         Physical Exam:     General:  Sedated and on the ventilator. No acute distress. Eyes:  Sclera anicteric. Pupils equal, round, reactive to light. Mouth/Throat: Orotracheal tube in place. Neck: Supple. Lungs:   Diminished breath sounds b/l   Cardiovascular:  tachycardic, no murmur, click, rub, or gallop. Abdomen:   Soft, non-tender, bowel sounds absent, non-distended.    Extremities: No cyanosis or edema. Skin: No acute rash or lesions. Lymph Nodes: Cervical and supraclavicular normal.   Musculoskeletal:  No swelling or deformity. Lines/Devices:  Intact, no erythema, drainage, or tenderness. Psychiatric: Sedated and appears comfortable on ventilator.            LABS AND  DATA: Personally reviewed      Recent Labs     07/26/21  1615   WBC 8.9   HGB 12.6   HCT 36.0   *          Recent Labs     07/26/21  1615   *   K 4.5   CL 82*   CO2 23   BUN 15   CREA 0.66   *   CA 9.0          Recent Labs     07/26/21  1615   AP 99   TP 7.0   ALB 3.6   GLOB 3.4      No results for input(s): INR, PTP, APTT, INREXT in the last 72 hours. No results for input(s): PHI, PCO2I, PO2I, FIO2I in the last 72 hours. Recent Labs     07/26/21  1615   TROIQ 6.62*         Hemodynamics:   PAP:  CO:    Wedge:  CI:    CVP:   SVR:        PVR:       Ventilator Settings:  Mode Rate Tidal Volume Pressure FiO2 PEEP   SIMV  480 ml  12 cm H2O 100 % 8 cm H20      Peak airway pressure:      Minute ventilation:            MEDS: Reviewed     Chest X-Ray:          CXR Results  (Last 48 hours)                 07/26/21 1834   XR CHEST PORT Final result     Impression:       1. Endotracheal tube in appropriate position. 2. Increased patchy opacification in the lung bases, greater on the right. 3. Unchanged cardiomegaly and mild edema            Narrative:   EXAM: XR CHEST PORT       HISTORY: intubation. COMPARISON: 7/26/2021       FINDINGS: Single view(s) of the chest. The endotracheal tube is in appropriate   position. The nasogastric tube terminates off the edge of film. The heart is   mildly enlarged, but unchanged. There is increased patchy opacification   throughout the right lower hemithorax with retrocardiac left lower lobe. No   pleural effusion or pneumothorax. There is a background of mild edema. The heart   is mildly enlarged, but unchanged.  The bones are osteopenic.            07/26/21 1630   XR CHEST PORT Final result     Impression:   1. Persistent hazy density in the medial aspect of the right lung base   suspicious for a right middle lobe process. Developing infiltration in this   region must be considered. A follow-up examination is recommended. 2. Presence of a small right pleural effusion.        Narrative:   Portable chest 2 views dated 7/26/2021. Comparison chest dated 2/10/2019. The CTA examination of the chest of 2/10/2019   is available for correlation. 2 portable AP upright views of the chest were obtained. The patient is rotated   towards the left. It is difficult to accurately assess the size of the cardiac   silhouette. There continues be abnormal hazy density in the medial aspect of the   right lung base and there is obliteration of the right border of the cardiac   silhouette. This is suggestive of a right middle lobe process. A lateral view of   the chest may prove useful. There is minimal blunting of the right costophrenic   angle. This is compatible with the presence of a small pleural effusion.                       ECHO:  Pending     2/2019:  · Estimated left ventricular ejection fraction is 56 - 60%. Normal left ventricular wall motion, no regional wall motion abnormality noted. · Right ventricular cavity size is moderately dilated. Right ventricular global systolic function is mildly reduced. · Right atrial cavity size is moderately dilated. · Mitral valve non-specific thickening. Mild mitral valve regurgitation. · Mild tricuspid valve regurgitation is present.        Multidisciplinary Rounds Completed:  Pending     ABCDEF Bundle/Checklist Completed: Yes     SPECIAL EQUIPMENT  None     DISPOSITION  Stay in ICU     CRITICAL CARE CONSULTANT NOTE  I had a face to face encounter with the patient, reviewed and interpreted patient data including clinical events, labs, images, vital signs, I/O's, and examined patient.   I have discussed the case and the plan and management of the patient's care with the consulting services, the bedside nurses and the respiratory therapist.       NOTE OF PERSONAL INVOLVEMENT IN CARE   This patient has a high probability of imminent, clinically significant deterioration, which requires the highest level of preparedness to intervene urgently. I participated in the decision-making and personally managed or directed the management of the following life and organ supporting interventions that required my frequent assessment to treat or prevent imminent deterioration.     I personally spent 120 minutes of critical care time. This is time spent at this critically ill patient's bedside actively involved in patient care as well as the coordination of care and discussions with the patient's family.   This does not include any procedural time which has been billed separately.        Rupa Calix MD                            Critical Care Medicine  [facility]

## 2021-07-27 NOTE — PROCEDURES
[facility]      Procedure Note - Central Venous Access:   Performed by Arturo Salgado MD  Diagnosis: STEMI  Insertion Date: 07/27/21  Time:6:47 AM  Obtained Consent? no; emergent   Procedure Location:  ICU. Immediately prior to the procedure, the patient was reevaluated and found suitable for the planned procedure and any planned medications. Immediately prior to the procedure a time out was called to verify the correct patient, procedure, equipment, staff, and marking as appropriate. Central line Bundle:  Full sterile barrier precautions used. 7-Step Sterility Protocol followed. (cap, mask sterile gown, sterile gloves, large sterile sheet, hand hygiene, 2% chlorhexidine for cutaneous antisepsis)  5 mL 1% Lidocaine placed at insertion site. Patient positioned in Trendelenburg?yes   The site was prepped with ChloraPrep and Sterile draping. Catheter inserted into a new site. Using Seldinger technique a Arrow Quad lumen was placed in the Right, Internal Jugular Vein via direct cannulation with 1 number of attempts for IV Access. Ultrasound Guidance was utilized. There was good dark, non-pulsatile blood return in all ports. Femoral Site? no.   Catheter secured. Biopatch in place? yes. Sterile Bio-occlusive dressing placed. The following complications were encountered: None. A follow-up chest x-ray was ordered post procedure. The procedure was tolerated well.         Arturo Salgado MD  Critical Care Medicine  [facility]

## 2021-07-27 NOTE — DISCHARGE INSTRUCTIONS
Smoking Cessation Program: This is a free, phone/text/email based, smoking cessation program. The program is individualized to meet each patient's needs. To enroll use the link - [facility]. com/quit or text [facility] to [phone] from any smart phone.

## 2021-07-27 NOTE — PROGRESS NOTES
Physical Therapy  7/27/2021    Orders received, chart reviewed and patient just came up from procedure. Will f/u tomorrow once extubated. Thank you.     Kina Hannon, PT, DPT

## 2021-07-27 NOTE — CARDIO/PULMONARY
[id number]  Cardiac Rehab: Per EMR, patient is a current, 2 ppd smoker. Smoking Cessation Program information placed on the AVS.      Mai Barrientos is also s/p STEMI/stent/Impella. Will follow to discuss cardiac rehab enrollment. 1220 Cardiac Rehab: MI education folder to the bedside of Mai Barrientos  with the following added:Jeanmarie cath lab booklet, AHA smoking cessation brochure, smoking cessation program flyer. Patient is intubated and no family present at current time. Will follow for enrollment in cardiac rehab.  In the meantime, [facility] contact information placed on AVS. DEDRICK Britt RN

## 2021-07-27 NOTE — ED NOTES
Report given to Vin Mata RN at [facility]. Phenylephrine titrated up to 55 mcg/kg/min due to continued hypotension.

## 2021-07-27 NOTE — ANESTHESIA PREPROCEDURE EVALUATION
Relevant Problems   No relevant active problems       Anesthetic History   No history of anesthetic complications            Review of Systems / Medical History  Patient summary reviewed, nursing notes reviewed and pertinent labs reviewed    Pulmonary    COPD        Asthma        Neuro/Psych         Psychiatric history     Cardiovascular    Hypertension        Dysrhythmias : atrial flutter      Exercise tolerance: <4 METS  Comments: STEMI   GI/Hepatic/Renal     GERD           Endo/Other    Diabetes         Other Findings              Physical Exam    Airway          Intubated   Cardiovascular               Dental         Pulmonary                 Abdominal         Other Findings            Anesthetic Plan    ASA: 5, emergent  Anesthesia type: general    Monitoring Plan: Arterial line, BIS, CVP, [place] and AXEL    Post procedure ventilation   Induction: Intravenous       Plan for GETA with standard ASA monitors, 2 PIV, arterial line, central line, PA catheter, AXEL, blood transfusion likely, ICU post op and all other indicated procedures.

## 2021-07-27 NOTE — DIABETES MGMT
[facility]    CLINICAL NURSE SPECIALIST CONSULT   INITIAL NOTE    Initial Presentation   Radha Lees is a 57 y.o. female admitted from [place] urgent care after having SOB for 2 days. While at [place], patient respiratory status worsened significantly and patient required intubation. She was then transferred to [facility] and noted to have ST elevation on EKG as well as elevated troponin. After emergent PCI and L groin impella placement, patient discovered to have L groin hematoma with L leg ishcemia. Vascular surgery performed L leg thrombectomy today and impella taken out of that leg. Patient remains intubated on vasopressors. Initial troponin 6.62/7.77/39.50    HX:   Past Medical History:   Diagnosis Date    Colon polyps     COPD (chronic obstructive pulmonary disease) ([facility] [address] 11/28/2012    Diabetes (HCC)     GERD (gastroesophageal reflux disease)     HX OTHER MEDICAL     left rib fracture w/punctured kidney    Hypercholesterolemia     hypertriglyceridemia    Hypertension     Menopause     Pilonidal cyst         INITIAL DX: Respiratory failure, STEMI, cardiogenic shock    Treatment plan     TX: Followed by cardiology, cardiac surgery, vascular surgery, impella    Hospital course   Clinical progress has been complicated by cardiogenic shock, need for vascular surgery d/t hematoma. Diabetes    Patient has known Type 2 diabetes, treated with Metformin PTA. Admission  and A1c 6.1% indicate acceptable diabetes control. Previous A1Cs in past years have been 7% or lower starting in 2014.     Ambulatory blood glucose management provided by primary care provider- Tracey Cardoza MD recent visit [id number]/72021  Consulted by Provider for advanced diabetes nursing assessment and care, specifically related to   [] Transitioning off Roxine Aguirre   [x] Inpatient management strategy  [] Home management assessment  [] Survival skill education    Diabetes-related medical history  Acute complications  Stress due to cardiogenic shock, respiratory failure  Neurological complications  None noted  Microvascular disease  None noted  Macrovascular disease  CAD and Myocardial infarction  Other associated conditions     COPD on steroids at this time; HTN    Diabetes medication history  Drug class Currently in use Discontinued Never used   Biguanide Metformin 500 mg daily     DDP-4 inhibitor       Sulfonylurea      Thiazolidinedione      GLP-1 RA      SGLT-2 inhibitors      Basal insulin      Bolus insulin      Fixed Dose  Combinations        Subjective   Deferred for now since patient intubated in CCU    Patient reports the following home diabetes self-care practices: Deferred   Eating pattern  Physical activity pattern  Monitoring pattern  Taking medications pattern  [x] Consistent administration  [x] Affordable      Objective   Physical exam  General Patient intubated in CCU  Neuro  Alert, oriented   Vital Signs   Visit Vitals  BP (!) 82/65 (BP 1 Location: Left arm, BP Patient Position: At rest)   Pulse 83   Temp 98 °F (36.7 °C)   Resp 21   Ht 5' 8\" (1.727 m)   Wt 80 kg (176 lb 5.9 oz)   SpO2 98%   BMI 26.82 kg/m²     Skin  Warm and dry. Heart   Regular rate and rhythm.  No murmurs, rubs or gallops  Lungs  Clear to auscultation without rales or rhonchi  Extremities No foot wounds    Diabetic foot exam:    deferred    Laboratory  BMP:   Lab Results   Component Value Date/Time     (L) 07/27/2021 11:32 AM    K 3.2 (L) 07/27/2021 11:32 AM    CL 99 07/27/2021 11:32 AM    CO2 23 07/27/2021 11:32 AM    AGAP 9 07/27/2021 11:32 AM    GLU 54 (L) 07/27/2021 11:32 AM    BUN 20 07/27/2021 11:32 AM    CREA 0.63 07/27/2021 11:32 AM    GFRAA >60 07/27/2021 11:32 AM    GFRNA >60 07/27/2021 11:32 AM      Factors impacting BG management  Factor Dose Comments   Nutrition: NPO at this time         Drugs:  Vasopressor load    Steroids       Levophed, Vasopressin, Neosynephrine  One time dose of methylprednisolone Pain Postop pain: on fentanyl infusion    Infection Not at this time      Blood glucose pattern        Assessment and Plan   Nursing Diagnosis Risk for unstable blood glucose pattern   Nursing Intervention Domain 9086 Decision-making Support   Nursing Interventions Examined current inpatient diabetes control   Explored factors facilitating and impeding inpatient management  Identified self-management practices impeding diabetes control  Explored corrective strategies with patient and responsible inpatient provider   Informed patient of rational for insulin strategy while hospitalized       Evaluation   This 57year old female, with Type 2 diabetes, did achieve diabetes control prior to admission, as evidenced by admission BG of 146 and A1c of 6.1%. Currently admitted with STEMI s/p respiratory failure. Patient is critically ill intubated and sedated requiring vasopressor therapy and impella support. Patient was placed on glucostabilizer for short period of time but then BG dropped to 50's and this was stopped. Patient is critically ill and will need BG management. During this hospitalization, the patient has achieved inpatient blood glucose target of 100-180mg/dl most of the time Several factors have played a role in blood glucose management including:  [x] Critical nature of illness state  [x]  Changing nutritive sources & needs   [] Compromised insulin absorption or delivery  [] Glucocorticoid use  []  Kidney dysfunction  []  Liver disease    The Subcutaneous Insulin Order set (3279) has not been in use. Hence, the next step in optimizing blood glucose control would be    [] Implement the Subcutaneous Insulin order set  [x]  Optimize basal insulin dosing   []  Add mealtime insulin    BG range  since admission  No FBG since patient NPO  Since only on 500 mg metformin daily and A1c was only 6.1%, patient will not likely have high insulin needs  Recommendations   1.   IF BG trends consistently >200, consider recs below  [] Use of Subcutaneous Insulin Order set (9146)  Insulin Dosing Specific recommendation   START Basal                                      (Based on weight, BMI & GFR) 10 units daily      CONTINUE Corrective                       (Useful in adjusting insulin dosing) [x] Normal sensitivity      2. Will reevaluate with nutritional changes if started on TPN or tube feeds. Billing Code(s)   44073    Before making these care recommendations, I personally reviewed the hospitalization record, including notes, laboratory & diagnostic data and current medications, and examined the patient at the bedside (circumstances permitting) before making care recommendations. Total minutes: 35    I have reviewed this student's note and provided guidance. Agree with assessment/ recommendations and plan for this patient.      Cristina Mirza RN, CNS Student,[facility]  Diabetes Clinical Nurse Specialist  Program for Diabetes Health  Access via [place]

## 2021-07-27 NOTE — PROGRESS NOTES
[facility]    ICU TEAM H&P    Name: Raymond Christianson   : 1960   MRN: [id number]   Date: 2021      Subjective:   Progress Note: 2021      Reason for ICU Admission:  Respiratory failure    HPI: This is a 76-year-old female with history of COPD, diabetes, high cholesterol, hypertension, significant smoking history presented to the  [place] emergency department with nonproductive cough and shortness for breath for 2 days. She does not use oxygen at home. She been using her inhaler without significant relief. Last time she was on steroids was 3 years ago in association with pneumonia. No fever. She is fully Covid vaccinated. After arrival to [facility] ED she continued to have increasing shortness of breath and dropping oxygen saturations and the decision was made to intubate her. She was noted to have eleveated troponin but no changes on EKG per MD report. She was transferred to [facility]. Upon arrival to [facility] she was noted to have ST elevation on EKG. CODE STEMI was initiated. Interval events:     a.m.-patient taken to cath lab for OSCAR to LAD with post-PCI LOI flow 3. LVEDP was noted to be 40mmHg and a left femoral Impella was placed. On arrival in CCU Impella flows were noted to have decreased from 3.8L/min to 2.2 L/min. Bedside echo was performed and the Impella withdrawn 3.5cm  under direct echo visualization with a resulting increase in flow to 3.5L/min. An expanding hematoma at the left groin site was also noted on arrival in the CCU. The limb was cool and mottled with no doppler signal in the left DP, PT or popliteal. CT Surgery was consulted for placement of an axillary Impella both to increase support and to allow Vascular Surgery to address the threatened left lower extremity.       PM-now status post placement of axillary Impella 5.5, Removal of left femoral Impella CP, left groin exploration with, common femoral endarterectomy and patch angioplasty, left femoral thrombectomy and left iliac arteriogram, received 5 PRBCs and 2 of FFP in the OR, went into a flutter RVR in the OR-cardioverted x2 but did not respond-responded somewhat to an amnio bolus, came back on 3 pressors      Assessment:     ICU Problems:    1. STEMI  2. Acute/Chronic Hypoxic Respiratory Failure  3. Hyponatremia  4. Hypochloremia  5. Elevated BNP  6. COPD  7. Hypertension  8.  Hypercholesterolemia      ICU Comprehensive Plan of Care:     Neuro-analgesia/sedation with Precedex, propofol and opioids as needed    Cardiovascular-continue hemodynamic monitoring, on aspirin, Brilinta, Lipitor, trend troponins, Impella in situ-currently at P8, map goal greater than 65, on norepinephrine, epinephrine and vasopressin-wean as tolerated, heart rate goal less than 110-if necessary will resume amiodarone therapy, keep magnesium above 2 and potassium above 4, serial vascular checks, at risk of losing limb-check a CK and and trend lactate levels, monitor JEROME output follow-up cardiology, cardiac surgery and vascular surgery recommendations    Pulmonary-continue lung protective mechanical ventilation, seems to have some chronic pulmonary hypertension, history of COPD-start DEMSOND/inhaled steroid therapy, will wean when appropriate    GI-n.p.o. for now, antiemetics as needed, serial abdominal examinations, PPI GI prophylaxis    Renal-monitor urine output, correct electrolyte derangements as needed, some hematuria in the OR-monitor    Hematology-EBL in OR 1.5 L-status post 2 FFP and 5 of PRBCs, platelet count in the 50s-we will give a bag, serial labs and transfuse for hemoglobin less than 7, platelet count less than 50, INR greater than 1.6 or fibrinogen less than 100    ID-still infiltrates on chest x-ray-likely secondary to edema but we will err on the side of caution and treat with antibiotics for possible aspiration and community-acquired pneumonia-Zosyn/doxycycline, send respiratory cultures    Endocrinology-keep glucose between 100 & 180    Prognosis very guarded at this time    Objective:   Vital Signs:  Visit Vitals  BP (!) 82/65 (BP 1 Location: Left arm, BP Patient Position: At rest)   Pulse 82   Temp 98 °F (36.7 °C)   Resp 24   Ht 5' 8\" (1.727 m)   Wt 80 kg (176 lb 5.9 oz)   SpO2 98%   BMI 26.82 kg/m²      O2 Device: Endotracheal tube, Ventilator Temp (24hrs), Av.6 °F (37 °C), Min:96.8 °F (36 °C), Max:99.9 °F (37.7 °C)    CVP (mmHg): 7 mmHg (21 1600)      Intake/Output:     Intake/Output Summary (Last 24 hours) at 2021 1607  Last data filed at 2021 1600  Gross per 24 hour   Intake 7587.5 ml   Output 3473 ml   Net 4114.5 ml       Physical Exam:  General-intubated, sedated  Neuro-pupils reactive, strong cough, withdrawing to noxious stimuli  Cardiac-RRR, Impella in situ  Lungs-clear anteriorly  Abdomen-soft, nontender, nondistended  Extremities-cool-left worse than right, no DPs, JEROME in left groin with sanguinous drainage    LABS AND  DATA: Personally reviewed  Recent Labs     21  032   WBC 13.6* 7.8   HGB 8.3* 11.1*   HCT 24.0* 31.3*   PLT 57* 115*     Recent Labs     21  11321  0320 21  2349   * 121*   < > 118*   K 3.2* 4.0   < > 4.4   CL 99 91*   < > 88*   CO2 23 19*   < > 20*   BUN 20 18   < > 18   CREA 0.63 0.68   < > 0.88   GLU 54* 214*   < > 205*   CA 7.5* 7.9*   < > 8.2*   MG 1.4* 1.7   < > 1.7   PHOS  --   --   --  4.7    < > = values in this interval not displayed. Recent Labs     21  1132 21   AP 38* 96   TP 4.4* 6.6   ALB 2.8* 3.2*   GLOB 1.6* 3.4     Recent Labs     21  1132 21  0320   INR 1.3* 1.2*   PTP 13.9* 12.6*   APTT 29.3 >130.0*      Recent Labs     21  1433 21  1427 21  0400 21  0400   PHI  --  7.35  --  7.23*   PCO2I  --  33.5*  --  42.7   PO2I  --  76*  --  148*   FIO2I 40 40   < > 100    < > = values in this interval not displayed.      Recent Labs     21  0320 07/26/21  2349   TROIQ 39.50* 7.77*       Hemodynamics:   PAP: PAP Systolic: 45 (34/60/99 3600) CO: CO (l/min): 4.7 l/min (07/27/21 1600)   Wedge:   CI: CI (l/min/m2): 2.4 l/min/m2 (07/27/21 1600)   CVP:  CVP (mmHg): 7 mmHg (07/27/21 1600) SVR:       PVR:       Ventilator Settings:  Mode Rate Tidal Volume Pressure FiO2 PEEP   Assist control   480 ml  12 cm H2O 40 % 8 cm H20     Peak airway pressure: 20 cm H2O    Minute ventilation: 11.2 l/min        MEDS: Reviewed    Chest X-Ray:  CXR Results  (Last 48 hours)               07/27/21 1152  XR CHEST PORT Final result    Impression:  Satisfactory IMPELLA placement. Narrative:  EXAM: Portable CXR. 1136 hours. COMPARISON: 0500 hours. INDICATION: post-Impella       FINDINGS:   [place] has been placed via the right subclavian vein and appears satisfactorily   positioned, replacing prior device placed from the femoral access. ET tube   remains satisfactory. [place] catheter tip is in the main pulmonary artery. There is no pneumothorax. There remains bibasilar pulmonary haziness,   nonspecific.           07/27/21 0516  XR CHEST PORT Final result    Impression:  Central venous acces catheter in position for use. No pneumothorax       No other significant interval change. Narrative:  Clinical history: Central venous catheter placement   INDICATION:   Central venous catheter placement   COMPARISON: 7/27/2021       FINDINGS:   AP portable upright view of the chest demonstrates a stable  cardiopericardial   silhouette. The lungs are unchanged in overall appearance. . Right  sided central   venous access catheter is in place. Catheter tip is in appropriate position for   use. There is no associated pneumothorax. ET tube and NG tube and cardiac assist   device. Patient is on a cardiac monitor. 07/27/21 0021  XR CHEST PORT Final result    Impression:  NG tube extends below the diaphragm. ET tube in position.        No other change. Narrative:  Clinical history: OGT placement   INDICATION:   OGT placement   COMPARISON: 6/26/2021       FINDINGS:   AP portable upright view of the chest demonstrates a stable enlarged   cardiopericardial silhouette. ET tube is in appropriate position. NG tube extends   below the diaphragm. .Parenchymal opacity in the right lower lobe more than the   left is not changed. .There is no pneumothorax. . Patient is on a cardiac monitor. 07/26/21 1834  XR CHEST PORT Final result    Impression:      1. Endotracheal tube in appropriate position. 2. Increased patchy opacification in the lung bases, greater on the right. 3. Unchanged cardiomegaly and mild edema           Narrative:  EXAM: XR CHEST PORT       HISTORY: intubation. COMPARISON: 7/26/2021       FINDINGS: Single view(s) of the chest. The endotracheal tube is in appropriate   position. The nasogastric tube terminates off the edge of film. The heart is   mildly enlarged, but unchanged. There is increased patchy opacification   throughout the right lower hemithorax with retrocardiac left lower lobe. No   pleural effusion or pneumothorax. There is a background of mild edema. The heart   is mildly enlarged, but unchanged. The bones are osteopenic.           07/26/21 1630  XR CHEST PORT Final result    Impression:  1. Persistent hazy density in the medial aspect of the right lung base   suspicious for a right middle lobe process. Developing infiltration in this   region must be considered. A follow-up examination is recommended. 2. Presence of a small right pleural effusion. Narrative:  Portable chest 2 views dated 7/26/2021. Comparison chest dated 2/10/2019. The CTA examination of the chest of 2/10/2019   is available for correlation. 2 portable AP upright views of the chest were obtained. The patient is rotated   towards the left. It is difficult to accurately assess the size of the cardiac   silhouette.  There continues be abnormal hazy density in the medial aspect of the   right lung base and there is obliteration of the right border of the cardiac   silhouette. This is suggestive of a right middle lobe process. A lateral view of   the chest may prove useful. There is minimal blunting of the right costophrenic   angle. This is compatible with the presence of a small pleural effusion. Imaging reviewed      NOTE OF PERSONAL INVOLVEMENT IN CARE   This patient has a high probability of imminent, clinically significant deterioration, which requires the highest level of preparedness to intervene urgently. I participated in the decision-making and personally managed or directed the management of the following life and organ supporting interventions that required my frequent assessment to treat or prevent imminent deterioration. I personally spent 80 minutes of critical care time. This does not include any procedural time.       Signed By: Evelia Alfonso MD     July 27, 2021

## 2021-07-27 NOTE — BRIEF OP NOTE
Brief Postoperative Note    Patient: Tad Montana  YOB: 1960  MRN: [id number]    Date of Procedure: 7/27/2021     Pre-Op Diagnosis: Cardiogenic shock with left lower extremity ischemia and expanding left femoral hematoma due to left femoral Impella. Post-Op Diagnosis: Same as preoperative diagnosis. Procedure(s):  Placement of Right Axillary Impella 5.5,  Removal of Left Femoral Impella CP, Left Groin Exploration with Common Femoral Endarterectomy and Patch Angioplasty, Left Femoral Thrombectomy, and Left Iliac Arteriogram  AXEL by Dr. Vladimir Frank):  MD Loc Gant MD    Surgical Assistant: Physician Assistant: BECCA Sands    Anesthesia: General     Estimated Blood Loss (mL): 6,860 ml    Complications: None    Specimens: * No specimens in log *     Implants:   Implant Name Type Inv. Item Serial No.  Lot No. LRB No. Used Action   GRAFT VSCLR L30CM D10MM [address] [facility] DBLE VLR PASS SWNG TUBE - [id number]  GRAFT VSCLR L30CM D10MM [address] [facility] DBLE VLR PASS [facility] TUBE [id number] N/A 1 Implanted   Vascu-Guard Pericardial Vascular Patch 0.5soc1hi Other    [id number] Left 1 Implanted       Drains:   Will-Jain Drain 07/27/21 Left Groin (Active)       Nasogastric Tube 07/26/21 (Active)   Site Assessment Clean, dry, & intact 07/27/21 0000   Securement Device Tape 07/27/21 0000       Findings: Dissection in posterior proximal CFA extending into distal EIA. Retrograde left iliac arteriogram showed normal anatomy. Some thrombus in CFA but #3 & #4 Fogarties passed easily down the SFA to the foot and down the profunda with no clot retrieved. Endarterectomy performed and patched w/ bovine pericardium. Diffuse bleeding ultimately controlled w/ additional sutures, fibrillar, tisseal, and protamine. #10 JEROME placed. Good doppler flow in SFA and PFA on completion.  Both feet appear ischemic on vasopressor and inotrope support.     Electronically Signed by Rio Sifuentes MD on 7/27/2021 at 10:00 AM

## 2021-07-27 NOTE — ED NOTES
Pt noted to be moving her head and biting at the ETT. Dr Jerome Escalona made aware.  VSS remain stable
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
## 2021-07-27 NOTE — ANESTHESIA POSTPROCEDURE EVALUATION
Post-Anesthesia Evaluation and Assessment    Patient: Luca Aparicio MRN: [id number]  SSN: [id number]    YOB: 1960  Age: 57 y.o. Sex: female      I have evaluated the patient and they are stable in the ICU. Cardiovascular Function/Vital Signs  Visit Vitals  BP 97/68   Pulse 93   Temp 36 °C (96.8 °F)   Resp 20   Ht 5' 8\" (1.727 m)   Wt 80 kg (176 lb 5.9 oz)   SpO2 98%   BMI 26.82 kg/m²       Patient is status post General anesthesia for Procedure(s):  Placement of Right Axillary Impella 5.5,  Removal of Left Femoral Impella CP, Left Groin Exploration with Common Femoral Patch Angioplasty, Left Femoral Arteriogram and Thrombectomy,  AXEL by Dr. Rubi Cruz. Nausea/Vomiting: None    Postoperative hydration reviewed and adequate. Pain:  Pain Scale 1: Adult Nonverbal Pain Scale (07/27/21 0400)  Pain Intensity 1: 0 (07/27/21 0400)   Managed    Neurological Status:   Neuro  Neurologic State: Pharmacologically induced (comment) (07/27/21 0000)  Orientation Level: Unable to verbalize (07/27/21 0000)  Cognition: Unable to assess (comment) (07/27/21 0000)  Speech: Intubated (07/27/21 0000)  Assessment L Pupil: Round;Brisk (07/27/21 0000)  Size L Pupil (mm): 3 (07/27/21 0000)  Assessment R Pupil: Brisk;Round (07/27/21 0000)  Size R Pupil (mm): 3 (07/27/21 0000)  LUE Motor Response: Purposeful (07/27/21 0000)  LLE Motor Response: Purposeful (07/27/21 0000)  RUE Motor Response: Purposeful (07/27/21 0000)  RLE Motor Response: Purposeful (07/27/21 0000)   Intubated and sedated     Pulmonary Status:   O2 Device: Endotracheal tube (07/27/21 0400)   Intubated with adequate ventilation and oxygenation     Complications related to anesthesia: None    Post-anesthesia assessment completed.  No concerns    Signed By: Janina Sherman MD     July 27, 2021

## 2021-07-27 NOTE — ANESTHESIA PROCEDURE NOTES
Central Line Placement    Start time: 7/27/2021 10:56 AM  Performed by: Zoila Weiner MD  Authorized by: Zoila Weiner MD     Indications: vascular access, central pressure monitoring and need for vasopressors  Preanesthetic Checklist: patient identified, risks and benefits discussed, anesthesia consent, site marked, patient being monitored and timeout performed      Pre-procedure: All elements of maximal sterile barrier technique followed?  Yes    2% Chlorhexidine for cutaneous antisepsis, Hand hygiene performed prior to catheter insertion and Ultrasound guidance    Sterile Ultrasound Technique followed?: Yes            Procedure:   Prep:  Chlorhexidine  Location: internal jugular  Orientation:  Right  Patient position:  Trendelenburg  Catheter type:  Double lumen  Catheter size:  9 Fr  Catheter length:  12 cm  Number of attempts:  1  Successful placement: Yes      Assessment:   Post-procedure:  Catheter secured and sterile dressing applied  Assessment:  Blood return through all ports, free fluid flow and guidewire removal verified  Insertion:  Uncomplicated  Patient tolerance:  Patient tolerated the procedure well with no immediate complications  8 Fr CCO PAC  Exchanged quad lumen over a wire to MAC catheter, floated PA catheter

## 2021-07-27 NOTE — ED NOTES
7:17 PM  Change of shift. Care of patient taken over from [facility]; H&P reviewed, bedside handoff complete. Awaiting ICU bed assignment, follow-up CTA to r/o PE.    9:25 PM  Discussed with Med Chavez NP, intensivist, patient with decreasing BP's 2nd L IVNS bolus ordered but procalcitonin < 0.05 making sepsis unlikely. Lactic acid had not been done nor BNP. Labs added on. Unable to obtain CTA to r/o PE at this time. He recommended adding Neosynephrine for BP support at this time.

## 2021-07-27 NOTE — ED NOTES
Dr Jerome Escalona made aware that the patient's blood pressure has continued to decrease despite the IV fluid bolus. Awaiting orders.

## 2021-07-27 NOTE — ED NOTES
Dr Tyrell Patrick made aware of patient's decreased blood pressure. Normal Saline bolus infusing per orders.

## 2021-07-27 NOTE — PROGRESS NOTES
[facility] [address] [place] [address] Drive, [address] [address]   Sean Batres   [phone]                                                                                Pt seen in follow up this afternoon. 57 y.o. female who presented to [place] [facility] with chief c/o SOB and noted to have STEMI upon arrival to [facility]. She required emergent PCI/ OSCAR of LAD and impella insertion for cardiogenic shock. Additional Events of overnight and this a.m. reviewed, including vascular intervention noted. Plan:  Continue vasopressor support support (currently on norepineprine 20 mcg/min, neosynephrine 50 mcg/min and vasopressin) and wean as able. IRt axillary mpella management per CT surgery. Will need to continue Elvira Cook. ASA currently on hold since was on Eliquis PTA (clearly off Eliquis now due to impella/groin bleed/hematoma)  Continue amiodarone, Pt is currently in NSR. Cardiology will continue to follow. Patient seen on the day of progress note  and agree with Advance Practice Provider (STEW, NP,PA)  assessment and plans.     We will continue to follow    supportive rx for now

## 2021-07-27 NOTE — PROGRESS NOTES
1130: TRANSFER - IN REPORT:    Verbal report received from [facility] (name) on Omar Soto  being received from OR(unit) for routine post - op      Report consisted of patients Situation, Background, Assessment and   Recommendations(SBAR). Information from the following report(s) SBAR, Kardex, ED Summary, OR Summary, Procedure Summary, Intake/Output, MAR, Recent Results, Cardiac Rhythm A-fib and Alarm Parameters  was reviewed with the receiving nurse. Opportunity for questions and clarification was provided. Assessment completed upon patients arrival to unit and care assumed. 1145: Lines and gtts verified. Labs drawn    1200: MD Edd at bedside. Orders received for fentanyl gtt. 1250: Impella purge fluid changed to bicarb per Ricci Romberg, MD.    1330: OGT placed. 1350: X-ray at bedside. 1400: MD Edd at bedside. FiO2 decreased to 40%. 1510: Chirag weaned off per Blu Fung MD.    1600: Labs drawn. 1800: Repeat lactic sent. 1804: One unit of platelets transfusing. No transfusion reaction noted. 1845Alicia Barba MD at bedside. FiO2 on vent increased to 60%. 1930: Bedside and Verbal shift change report given to Aurora Louise RN (oncoming nurse) by Samuel Paniagua RN (offgoing nurse). Report included the following information SBAR, Kardex, ED Summary, OR Summary, Procedure Summary, Intake/Output, MAR, Recent Results, Cardiac Rhythm Sinus arrhythmia and Alarm Parameters .

## 2021-07-27 NOTE — PROCEDURES
BRIEF PROCEDURE NOTE    Date of Procedure: 7/27/2021   Preoperative Diagnosis: STEMI  Postoperative Diagnosis: STEMI  Procedure: Left heart cath, coronary angiography, complex PCI with OSCAR to mid LAD, IVUS, Impella insertion  Interventional Cardiologist: Mann Lyons DO  Assistant: None  Anesthesia: local + IV moderate sedation   I administered moderate sedation throughout this procedure. An independent trained observer pushed medications at my direction, and monitored the patients level of consciousness and physiological status throughout. Estimated Blood Loss: Minimal    Access: Right radial artery, 6F. Unable to track 5 Western Deisy and subsequently 4 Western Deisy catheters through the brachial artery, suspect she has high branching radial artery    Right common femoral artery, ultrasound guidance, micropuncture, 6 Vietnamese    Left common femoral artery, ultrasound guidance, micropuncture, Impella    Catheters:  Left coronary: JL 4, 5F  Right coronary: JR4, 5F    Findings:   L Main: Large caliber vessel, no critical disease  LAD: Large caliber vessel, 100% occlusion with LOI 0 flow within the mid LAD  LCx: Large caliber vessel, dominant, moderate to large OM1 with diffuse mild disease, to moderate caliber LPL branches, moderate PDA, diffuse mild disease  RCA: Small to moderate caliber nondominant vessel    LVEDP:  40 mmhg    PCI:  EBU 4, 6 Western Deisy guide  Systemically anticoagulated with heparin    Unable to wire vessel with Gregorio nor run-through wire. Rama Shaka with great difficulty was passed into distal LAD. Mid LAD was dilated with a 2.5 compliant balloon. Marcelino [employer] XT was exchanged for run-through wire via fine cross with guide trapping. Impella was subsequently placed via the left common femoral artery. Left common femoral artery was serially dilated with a 10 and 12 Western Deisy dilators. 15 Vietnamese short Impella inducer sheath was passed into the left common femoral artery without difficulty.   Pigtail was used to cross into the left ventricle. 0.018 stiff wire was placed in the left ventricle. Impella flows approximately 4 L. Blood pressures remain soft. Dobutamine 7.5 mcg/kg/min plus titration of Chirag-Synephrine was performed. Our attention was returned back to the LAD. Subsequently dilated the mid LAD with a 3.0 compliant balloon. Mid LAD was subsequently stented with a 3.0X 23 mm Xience [address] OSCAR deployed at 16 niranjan. Stent was postdilated with a 3.5 noncompliant balloon at 16 to 20 niranjan. Initial LOI flow 0, post PCI LOI flow 3. No dissection or perforation     Specimens Removed: None    Implants: Impella, Xience Candelaria OSCAR    Closure Device: radial TR band, manual pressure of right femoral artery    See full cath note. Complications: none    **Significant delay in balloon time due to difficulty wiring LAD occlusion. Findings:  1. Acute occlusion of mid LAD successfully treated with a 3.0X 23 mm Xience Candelaria OSCAR optimized with a 3.5 noncompliant balloon at high pressure. 2.  Severely elevated LVEDP of 40 mmHg  3. Successful Impella implantation    Plan:    Wean Impella as tolerated. Subsequently add goal-directed medical therapy as tolerated. Brilinta based DAPT. High-dose statin therapy.         Eitan Aguilera,         Flaquito Bowels, DO  [facility] [id number]                                              Office [phone],[place] [phone]

## 2021-07-27 NOTE — PROGRESS NOTES
Orders received, chart reviewed and patient just came up from procedure. Will f/u tomorrow once extubated.

## 2021-07-27 NOTE — ANESTHESIA PROCEDURE NOTES
AXEL  Date/Time: 7/27/2021 10:58 AM      Procedure Details: probe placement, image aquisition & interpretation    Risks and benefits discussed with the patient and plans are to proceed    Procedure Note    Performed by: Ashely Traylor MD  Authorized by: Ashely Traylor MD       Indications: assessment of ascending aorta and assessment of surgical repair  Modalities: 2D, CF, CWD, PWD  Probe Type: biplane and multiplane  Insertion: atraumatic  Patient Status: intubated and sedated    Echocardiographic and Doppler Measurements   Aorta  Size  Diam(cm)  Dissection PlaqueThick(mm)  Plaque Mobile    Ascending normal  No  No    Arch normal  No  No    Descending normal  No 0-3 No          Valves  Annulus  Stenosis  Area/Grad  Regurg  Leaflet   Morph  Leaflet   Motion    Aortic normal    calcified     Mitral normal none  1+ normal normal    Tricuspid dilated none  1+ normal normal          Atria  Size  SEC (smoke)  Thrombus  Tumor  Device    Rt Atrium dilated No No  No    Lt Atrium dilated No No  No     Interatrial Septum Morphology: normal    Interventricular Septum Morphology: normal    Ventricle  Cavity Size  Cavity Dimension Hypertrophy  Thrombus  Gloal FXN  EF    RV dilated  No no moderately impaired     LV dilated   No severely impaired 10%       Regional Function  (1 = normal, 2 = mildly hypokinetic, 3 = severely hypokinetic, 4 = akinetic, 5 = dyskinetic) LAV - [place]   ME LAV = 0  ME LAV = 90  ME LAV = 130   Basal Sept:4 Basal Ant:4 Basal Post:4   Mid Sept:4 Mid Ant:4 Mid Post:4   Apical Sept:4 Apical Ant:4 Basal Ant Sept:4   Basal Lat:3 Basal Inf:3 Mid Ant Sept:4   Mid Lat:4 Mid Inf:3    Apical Lat:4 Apical Inf:3        Pericardium: normal    Post Intervention Follow-up Study  Ventricular Global Function: unchanged  Ventricular Regional Function: unchanged     Valve  Function  Regurgitation  Area    Aortic no change      Mitral no change 1+     Tricuspid no change 1+     Prosthetic Complications: None  Comments: Exam done emergently for acute heart failure requiring impella. Severe LV dysfunction LVEF 10% with anterior and inferior akinesis, lateral wall severely  Hypokinetic, LV dilated. RV dilated with moderate dysfunction. Aortic valve with impella through it, inially shallow, removed and replaced with one from axilla, 5.0 cm from aortic valve. AI difficult to assess due to impella but at least moderate. Mitral valve appears normal with trace MR. Tricuspid valve annulus dilated with mild TR. B/l Dilated atrium, no PFO, no pericardial effusion. No intracardiac thrombus visualized, no thrombus seen in main PA although difficult to assess.

## 2021-07-27 NOTE — ANESTHESIA PROCEDURE NOTES
Arterial Line Placement    Start time: 7/27/2021 10:55 AM  Performed by: Ema Ruff CRNA  Authorized by: Gladys Tyler MD     Pre-Procedure  Indications:  Arterial pressure monitoring and blood sampling  Preanesthetic Checklist: patient identified, risks and benefits discussed, anesthesia consent, site marked, patient being monitored, timeout performed and patient being monitored    Timeout Time: 10:55 EDT        Procedure:   Prep:  ChloraPrep  Seldinger Technique?: Yes    Orientation:  Left  Location:  Radial artery  Catheter size:  20 G  Number of attempts:  1    Assessment:   Post-procedure:  Line secured and sterile dressing applied  Patient Tolerance:  Patient tolerated the procedure well with no immediate complications

## 2021-07-27 NOTE — PROGRESS NOTES
TRANSFER - IN REPORT:    Verbal report received from Ade(name) on Gerardo Lozoya  being received from [facility] emergency (unit) for urgent transfer      Report consisted of patients Situation, Background, Assessment and   Recommendations(SBAR). Information from the following report(s) SBAR, Kardex, Intake/Output, MAR, Accordion, Recent Results, Med Rec Status, Cardiac Rhythm Aflutter and Alarm Parameters  was reviewed with the receiving nurse. Opportunity for questions and clarification was provided. Assessment completed upon patients arrival to unit and care assumed.

## 2021-07-28 NOTE — PROGRESS NOTES
Neurocritical Care Code Stroke Documentation    Symptoms:    Unequal pupils left 2 mm and oval shape, right 3 mm and round both are sluggishly reactive. Last Known Well:   2200   Medical hx: Active Problems:    Respiratory failure requiring intubation ([facility] [address] (7/26/2021)       Anticoagulation:   None but is on Brilinta 90 mg BID   VAN:   Positive   NIHSS:   TOYIN, intubated and sedated, minimally responsive. Imaging:   Pt with Impella in place, is hemodynamically too unstable for transport the CT at this time. Is on multiple pressors and did not tolerate a turn in bed without shift in hemodynamics, so cannot safely transport to CT. Plan is to obtain CT head when pt is stable enough to go. Plan:   TPA Candidate: NO    Mechanical thrombectomy Candidate: NO     Discussed with: Dr. Sarai Boyle and CCU RN. Arrival time: 2315  Time spent: 30 minutes.      Mark Blackwell NP  Neurocritical Care Nurse Practitioner  [phone]

## 2021-07-28 NOTE — PROGRESS NOTES
Vascular    Overnight events noted, poor neuro exam  Visit Vitals  /73 (BP 1 Location: Left arm, BP Patient Position: At rest)   Pulse 80   Temp 99.6 °F (37.6 °C)   Resp 22   Ht 5' 8\" (1.727 m)   Wt 82.6 kg (182 lb 1.6 oz)   SpO2 99%   BMI 27.69 kg/m²     Drips reviewed  Bilateral LE cool  Pop signals  Left JUDI in place  Groin bruised but soft    56 y/o WF with cardiogenic shock s/p left groin reconstruction  - critically ill and systemically clamped down. Continue supportive care. Please call with any concerns. Remove left groin judi.    - Following

## 2021-07-28 NOTE — PROGRESS NOTES
1930: Bedside shift change report given to Leighton Roberts (oncoming nurse) by Génesis Dent (offgoing nurse). Report included the following information SBAR, Intake/Output, MAR and Recent Results. 2000: Patient sedated on Propofol 35mcg, Fentanyl 200mcg, and Precedex 1.5mcg. Pinpoint sluggish pupils. No withdrawal in extremities. Minimal gag. Epi @ 3, Levo @ 13, Vaso @ 0.04. Wean Levo for MAP >65. Impella is P8 with flows 4, purge flow 11, and purge pressure 480s. PAP 40s/20s and CVP 7-9. Appropriate urine output. 2300: Upon assessment noted pupils to be unequal - left 2 and more irregular/oval and right 3. Mercedes Fajardo MD at bedside and Code Stroke called. Due to hemodynamic instability and the presence of multiple pressors, decision made to not transport patient to CT at this time. Will plan to go to CT when stable. 0200: Attempted to wean Propofol to 25mcg to try to get withdrawals in extremities. Patient became dyssynchronous with abdominal breathing; still no withdrawals. Patient turned to left side with subsequent suction alarms on Impella. Patient returned supine with resolution of alarms. Propofol increased back to 35mcg. 0400: Patient with no cough or gag. Per Mercedes Fajardo MD drop Fentanyl to 100mcg. MD also aware that the left leg has a faintly dopplerable pulse. 0730: Bedside shift change report given to [address] [place] [address] (oncoming nurse) by Alejandra Lopez RN (offgoing nurse). Report included the following information SBAR, Intake/Output, MAR and Recent Results.

## 2021-07-28 NOTE — PROGRESS NOTES
[facility] ICU Progress Note    Admit Date: 2021  POD:  1 Day Post-Op    Procedure:  Procedure(s):  Placement of Right Axillary Impella 5.5,  Removal of Left Femoral Impella CP, Left Groin Exploration with Common Femoral Patch Angioplasty, Left Femoral Arteriogram and Thrombectomy,  AXEL by Dr. Sylvia Melo        Subjective:   Pt seen with Dr. Maira Tejeda. Pt remains intubated/sedated. Code S called overnight for unequal pupils, no cough/gag reflex. On vaso 0.04 units, epi 3 mcg, levo 9 mcg. T max 101F, FiO2 60%    Impella at P8, D5 w/ bicarb as purge      Objective:   Vitals:  Blood pressure 111/73, pulse 92, temperature 100 °F (37.8 °C), resp. rate 20, height 5' 8\" (1.727 m), weight 182 lb 1.6 oz (82.6 kg), SpO2 99 %.   Temp (24hrs), Av.6 °F (37.6 °C), Min:96.8 °F (36 °C), Max:101 °F (38.3 °C)    Hemodynamics:   CO: CO (l/min): 5.6 l/min   CI: CI (l/min/m2): 2.9 l/min/m2   CVP: CVP (mmHg): 5 mmHg (21)   SVR: SVR (dyne*sec)/cm5: 786 (dyne*sec)/cm5 (21)   PAP Systolic: PAP Systolic: 42 ( 0080)   PAP Diastolic: PAP Diastolic: 21 ( 1834)   PVR:     SV02:     SCV02: SCVO2 (%): 76 % (21)    EKG/Rhythm:  A-fib 80-90's     JEROME Output: +60 ml    Ventilator:  Ventilator Volumes  Vt Set (ml): 40 ml (21)  Vt Exhaled (Machine Breath) (ml): 610 ml (21)  Ve Observed (l/min): 11.5 l/min (21)    Oxygen Therapy:  Oxygen Therapy  O2 Sat (%): 99 % (21)  Pulse via Oximetry: 116 beats per minute (21 1645)  O2 Device: Endotracheal tube;Ventilator (21 08)  Skin Assessment: Clean, dry, & intact (21 08)  Skin Protection for O2 Device: Yes (21 08)  Orientation: Middle (21)  Location: Cheek (21)  Interventions: Mouth Care (21 08)  FIO2 (%): 60 % (21 08)  ETCO2 (mmHg): 21 mmHg (21 2245)    CXR:   CXR Results  (Last 48 hours)               21 0508  XR CHEST PORT Final result Impression:  1. No significant change in the appearance of the chest or support hardware. 2. Interval placement of a gastric tube. Narrative:  INDICATION: . postop heart   COMPARISON: Previous chest xray, yesterday. LIMITATIONS: Portable technique. Avis [place] FINDINGS: Single frontal view of the chest.    .   Lines/tubes/surgical: No significant change in the appearance of the ET tube,   right IJ approach [place] catheter and Impella. Interval placement of a   gastric tube which traverses the expected course of the esophagus, extends into   the left upper quadrant and terminates below the image. Heart/mediastinum: Unremarkable. Lungs/pleura: Hazy opacity over the right midlung and right base. Opacified left   base. Obscured left hemidiaphragm and partially obscured right hemidiaphragm. No   visible pneumothorax. Additional Comments: Surgical clips and cutaneous staples overlying the right   axilla. Jeannelynda Yenifer 07/27/21 1152  XR CHEST PORT Final result    Impression:  Satisfactory IMPELLA placement. Narrative:  EXAM: Portable CXR. 1136 hours. COMPARISON: 0500 hours. INDICATION: post-Impella       FINDINGS:   Alexx Distel has been placed via the right subclavian vein and appears satisfactorily   positioned, replacing prior device placed from the femoral access. ET tube   remains satisfactory. [place] catheter tip is in the main pulmonary artery. There is no pneumothorax. There remains bibasilar pulmonary haziness,   nonspecific.           07/27/21 0516  XR CHEST PORT Final result    Impression:  Central venous acces catheter in position for use. No pneumothorax       No other significant interval change. Narrative:  Clinical history: Central venous catheter placement   INDICATION:   Central venous catheter placement   COMPARISON: 7/27/2021       FINDINGS:   AP portable upright view of the chest demonstrates a stable  cardiopericardial   silhouette.  The lungs are unchanged in overall appearance. . Right  sided central   venous access catheter is in place. Catheter tip is in appropriate position for   use. There is no associated pneumothorax. ET tube and NG tube and cardiac assist   device. Patient is on a cardiac monitor. 07/27/21 0021  XR CHEST PORT Final result    Impression:  NG tube extends below the diaphragm. ET tube in position. No other change. Narrative:  Clinical history: OGT placement   INDICATION:   OGT placement   COMPARISON: 6/26/2021       FINDINGS:   AP portable upright view of the chest demonstrates a stable enlarged   cardiopericardial silhouette. ET tube is in appropriate position. NG tube extends   below the diaphragm. .Parenchymal opacity in the right lower lobe more than the   left is not changed. .There is no pneumothorax. . Patient is on a cardiac monitor. 07/26/21 1834  XR CHEST PORT Final result    Impression:      1. Endotracheal tube in appropriate position. 2. Increased patchy opacification in the lung bases, greater on the right. 3. Unchanged cardiomegaly and mild edema           Narrative:  EXAM: XR CHEST PORT       HISTORY: intubation. COMPARISON: 7/26/2021       FINDINGS: Single view(s) of the chest. The endotracheal tube is in appropriate   position. The nasogastric tube terminates off the edge of film. The heart is   mildly enlarged, but unchanged. There is increased patchy opacification   throughout the right lower hemithorax with retrocardiac left lower lobe. No   pleural effusion or pneumothorax. There is a background of mild edema. The heart   is mildly enlarged, but unchanged. The bones are osteopenic.           07/26/21 1630  XR CHEST PORT Final result    Impression:  1. Persistent hazy density in the medial aspect of the right lung base   suspicious for a right middle lobe process. Developing infiltration in this   region must be considered.  A follow-up examination is recommended. 2. Presence of a small right pleural effusion. Narrative:  Portable chest 2 views dated 7/26/2021. Comparison chest dated 2/10/2019. The CTA examination of the chest of 2/10/2019   is available for correlation. 2 portable AP upright views of the chest were obtained. The patient is rotated   towards the left. It is difficult to accurately assess the size of the cardiac   silhouette. There continues be abnormal hazy density in the medial aspect of the   right lung base and there is obliteration of the right border of the cardiac   silhouette. This is suggestive of a right middle lobe process. A lateral view of   the chest may prove useful. There is minimal blunting of the right costophrenic   angle. This is compatible with the presence of a small pleural effusion. Admission Weight: Last Weight   Weight: 176 lb 5.9 oz (80 kg) Weight: 182 lb 1.6 oz (82.6 kg)     Intake / Output / Drain:  Current Shift: 07/28 0701 - 07/28 1900  In: 206.4 [I.V.:206.4]  Out: 100 [Urine:100]  Last 24 hrs.:     Intake/Output Summary (Last 24 hours) at 7/28/2021 0927  Last data filed at 7/28/2021 0800  Gross per 24 hour   Intake 7240.54 ml   Output 3595 ml   Net 3645.54 ml       EXAM:  General:  No distress                                                                                           Lungs:   Clear to auscultation bilaterally. Incision:  Axillary, groin sites C/D/I   Heart:  Irregular rate and rhythm, S1, S2 normal, no murmur, click, rub or gallop. Abdomen:   Soft, non-tender. Bowel sounds hypoactive. No masses,  No organomegaly. Extremities:  No edema.  No pulses below the knee bilat, feet cool bilat   Neurologic:  Sedated, unable to assess - pupils unequal      Labs:   Recent Labs     07/28/21  0657 07/28/21  0434 07/27/21  2347 07/27/21  2317 07/27/21  2156   WBC  --  15.0*  --   --    < >   HGB  --  7.8*  --   --    < >   HCT  --  21.8*  --   --    < >   PLT  --  92*  -- --    < >   NA  --  127*  --   --    < >   K  --  4.6  --   --    < >   BUN  --  15  --   --    < >   CREA  --  0.50*  --   --    < >   GLU  --  213*  --   --    < >   GLUCPOC 215*  --   --    < >  --    INR  --   --  1.1  --   --     < > = values in this interval not displayed. Assessment:     Active Problems:    Respiratory failure requiring intubation ([facility] [address] (2021)         Plan/Recommendations/Medical Decision Makin. Cardiogenic shock, STEMI: s/p OSCAR to LAD, upgrade to Impella 5.5 insertion. Cont D5 w/ bicarb as purge d/t bleeding. Cont at P8. Trend labs - lactate 1.0, pBNP trending down. ASA on hold d/t plts, on brilinta, lipitor per cards. Cont current gtts for now. On broad spectrum abx - if dc'd will need abx coverage for Impella     2. Aflutter s/p cardioversion: now in a-fib, on PO amio    3. L lower ext ischemia s/p L fem thrombectomy, endarterectomy: vascular surgery following, aware of decreased pulses. Orders to remove L groin JEROME drain today. Monitor for bleeding    4. Code S: neuro following, was too unstable for CT. Monitor    5. Acute hypoxic resp failure, hx of COPD: vent management per intensivist. Cont scheduled nebs    6. Acute bleeding, anemia: no further signs of active bleeding. Hgb 7.8, . Monitor     7. Thrombocytopenia: plts down to 92K, trend. Holding ASA. Could be d/t consumption from Impella      8. DM: A1c 6.1%, on Lantus 10 units, SSI     9. HTN: currently hypotensive on multiple gtts    10. HLD: on lipitor    11. Leukocytosis, fever: on doxy, Zosyn. Blood, sputum cx NGTD. UA w/ only +1 bacteria, no culture sent     12. Nutrition: starting TF's today    Dispo: remains critically ill. Cont Impella today - no plans to wean yet. Further orders per primary teams    Update: since this morning, pt is waking when calling name, follows simple commands and HUDSON's.      Signed By: Arleth Baird NP

## 2021-07-28 NOTE — CONSULTS
Neuro consult completed. Dictated note to follow. Pt intubated on propofol, opens eyes and is very alert when name called, nods appropriately, follows commands, coughs, pupils - 2mm on right, 1 mm on left, 5/5  bilaterally, wiggles toes equally. Will follow exam. May have been medication effect.
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
## 2021-07-28 NOTE — PROGRESS NOTES
Progress note:    Called to bedside ~2300 to evaluate patient - newly noted unequal pupils. Left 2mm and Right 3mm - sluggish but reactive. Code stroke called. Not candidate for [facility] thrombectomy or tpa. Patient too unstable for transport to CT at this time as she does not tolerate turns. Will obtain when able. Around 0400, patient noted to have no cough/gag reflex. Both were present, though weak before. No w/d in 4/4. Will wean sedatives as able for more accurate neuro exam.    Neurology aware. RLE cool below knee. R CFA sheath remains in per cardiology.     CCT: 60 minutes    Yuri Sanford MD  7/28/2021  4:33 AM

## 2021-07-28 NOTE — CONSULTS
[address]    Name:  Yulissa Hernandes  MR#:  [id number]  :  1960  ACCOUNT #:  [de-identified]  DATE OF SERVICE:  2021    NEUROLOGY CONSULTATION    HISTORY OF PRESENT ILLNESS:  This is a 27-year-old female who was admitted 2021 after she presented to [facility] with complaints of cough and shortness of breath x2 days. She was felt to have acute on chronic respiratory failure with history of COPD, subsequently went into A-flutter, had a STEMI with occluded LAD, cardiogenic shock, and LV of 20 and Impella was placed in her left femoral artery. However, yesterday she was noted to have left lower extremity ischemia with expanding left femoral hematoma secondary to the Impella. She underwent surgery to remove this and repair of femoral artery and had the Impella placed in the right axillary artery. Last night around midnight, a code neuro called because her pupils were noted to be unequal, the left was 2 mm, the right was 3 mm. The patient was on propofol at that time. She was also on Brilinta 90 mg b.i.d. She was not on anticoagulation. The patient had 2.5 L of blood loss in the OR and is thrombocytopenic. She was too unstable to undergo a CT of the head on pressors at the time. Later on around 4 a.m., it was noted she did not have a cough or a gag, again still on propofol. Today, these issues seemed to have resolved. The patient was able of ask the nurse why she was in the hospital.    PAST MEDICAL HISTORY:  1.  Diabetes. 2.  Hypertension. 3.  Hyperlipidemia. 4.  Smoking extensive history. 5.  COPD. 6.  GERD. 7.  Pilonidal cyst.  8.  Right ulnar nerve surgery after trauma. REVIEW OF SYSTEMS:  Cannot be obtained secondary to the patient's intubated state. MEDICATIONS AT HOME:  1. Cartia  mg a day. 2.  Metoprolol. 3.  Metformin. 4.  Eliquis 5 mg b.i.d.  5.  Lisinopril-hydrochlorothiazide. 6.  Lipitor.   7. Symbicort. 8.  Ventolin. 9.  Probiotics. ALLERGIES:  SPORANOX. SOCIAL HISTORY:  She is . She smokes two packs a day x40 years. There is some mention in the chart she quit a couple of weeks ago but I do not know how accurate this is. Chart states she is drinking 12 alcoholic beverages a week. Remainder of her social history cannot be obtained due to her intubated state. FAMILY HISTORY:  In the chart. Mom lung cancer. Dad leukemia. Brother, colon cancer and hypertension. PHYSICAL EXAMINATION:  VITAL SIGNS:  Blood pressure is 103/64, pulse 88, respiratory rate 20, saturating 98% on the ventilator. Temperature is 100.2, T max is 101 at 2100 last night. BMI of 27. GENERAL:  She is a critically ill-appearing female who appears older than her stated age, intubated and sedated. HEART:  Her heart has a regular rhythm. CAROTIDS:  2+ no bruits appreciated  EXTREMITIES:  Cool with edema and bruising in her right upper extremity. NEUROLOGIC:  Mental status, the patient wakes briskly to her name being called and is alert during my visit. She is able to nod appropriately. She follows commands. She tries to mouth an answer to questions, but I am unable to understand what she is trying to say. Cranial nerve exam:  no facial asymmetry. No ptosis. Extraocular eye movements, I could not get her to follow my finger but appear intact to spontaneous movement. Pupils, the right is 2 mm, the left is 1 mm but reactive. She has a cough. Motor exam shows 5/5  bilaterally. She wiggles her toes equally bilaterally. Remainder of the exam is deferred due to patient factors. LABORATORY DATA:  Studies and reports; CBC this morning, hemoglobin is 7.3, white count 15.7, platelet count 86. Troponin 12.9. . Sodium 127, glucose 213, AST 58.     ASSESSMENT AND PLAN: This is a 79-year-old female admitted with acute on chronic respiratory failure, STEMI, occluded LAD, atrial flutter, cardiogenic shock, LV of 20, now on Impella, noted to have asymmetric pupils within 1 mm of each other last night and no cough or gag while sedated on propofol. Sedation has been lightened, she is now alert, following commands. No obvious focal deficits. Her pupils are asymmetric with the right 2 mm and the left 1 mm, which is within physiological norm. She has a cough now, suspect that changes seen were related to sedation. Will follow her exam over time to determine if she needs any neuroimaging.       Hans Alarcon MD      [id number]  D:  07/28/2021 13:31  T:  07/28/2021 17:37  JOB #:  [id number]

## 2021-07-28 NOTE — PROGRESS NOTES
Physical Therapy  7/28/2021    Chart reviewed. Patient with code stroke overnight (too unstable for transfer off the floor for imaging) and with poor neuro exam. Remains intubated and sedated (FiO2 60% and PEEP 5.0). Not tolerant of turns in bed. Per ABCDEF protocol, will work with patient when PEEP is 10.0 or less, FIO2 60% or less, and patient is following basic commands. Will follow patient peripherally. Thank you.     Kina Garcia, PT, DPT

## 2021-07-28 NOTE — PROGRESS NOTES
Chart reviewed, patient received sedated, poor neuro stability with code stroke called last night and on vent (60% FIO2 and PEEP 5.0). Per ABCDEF protocol, will work with patient when PEEP is 10.0 or less, FIO2 60% or less, and patient is following basic commands. Will follow patient peripherally. Recommend nursing to complete with patient, as able and per protocol, in order to promote cardiopulmonary systems, maintain strength, endurance and independence:   -bed in chair position or maximize full reverse Trendelenburg position to facilitate upright activity with foot board and non-skid footwear on 3x/day ~30-60 mins each   -ROM during bathing B UEs and LEs  -positioning to prevent contractures and edema. RASS -1/0/+1 (during SAT)  Active ROM   Sitting (bed in chair position)   Standing (reverse Trendelenburg)   ADLs   RASS -3/2  Passive ROM   Sit (bed in chair position)   RASS -5/-4  Passive ROM       Thank you for your assistance.

## 2021-07-28 NOTE — PROGRESS NOTES
0730 Report received from Roberta Manning at bedside. Orders to remove JEROME drain today. 0800 Pt opens eyes to loud voice, + cough, grimacing with mouth care, wiggles toes upon request. Pupils unchanged from previous assessment (R>L, L irregularly shaped). Weaning sedation for neurological checks. 0900 Carlos Boxer, NP orders to remove right groin sheath today. 0930 Rounds with CT Surgery held at bedside. Continue Impella management at current settings. 1100 JEROME drain removed. 1200 PRN Albumin given for CVP of 5 and suction alarms on Impella. Post Albumin, CVP at 7 and Impella alarms resolved. 1230 Aspirin restarted per Dr. Kia Bae.     1300  [facility] at bedside. 1715 CVP 6. PRN Albumin given again. Made Ulises Villareal, NP aware. 1800 Sheath removed from right groin. Pt's MAP dropping into 50s requiring more pressor support. Hgb resulted at 6.7. Dr. Mirian Tomlinson aware, orders for transfusion of 1 PRBCs. 1815 Suction alarms on Impella again, resolution of alarms with dropping from P8 to P6. CVP 5. Paged Dr. Christen Bernardo. uLca Acosta, Impella representative, made aware. Dr. Christen Bernardo orders to keep pt at P6 setting. Pt is now at P6. No echocardiogram needed at this time. 1900 Transfusion started. Pt's brother at bedside and updated on plan of care. 1930 Bedside and Verbal shift change report given to Julio Camargo RN (oncoming nurse) by Tierney Thompson RN (offgoing nurse). Report included the following information SBAR.

## 2021-07-28 NOTE — PROGRESS NOTES
[facility]    ICU TEAM H&P    Name: Radha Lees   : 1960   MRN: [id number]   Date: 2021      Subjective:   Progress Note: 2021      Reason for ICU Admission:  Respiratory failure    HPI: This is a 21-year-old female with history of COPD, diabetes, high cholesterol, hypertension, significant smoking history presented to the  [place] emergency department with nonproductive cough and shortness for breath for 2 days. She does not use oxygen at home. She been using her inhaler without significant relief. Last time she was on steroids was 3 years ago in association with pneumonia. No fever. She is fully Covid vaccinated. After arrival to [facility] ED she continued to have increasing shortness of breath and dropping oxygen saturations and the decision was made to intubate her. She was noted to have eleveated troponin but no changes on EKG per MD report. She was transferred to [facility]. Upon arrival to [facility] she was noted to have ST elevation on EKG. CODE STEMI was initiated. Interval events:     a.m.-patient taken to cath lab for OSCAR to LAD with post-PCI LOI flow 3. LVEDP was noted to be 40mmHg and a left femoral Impella was placed. On arrival in CCU Impella flows were noted to have decreased from 3.8L/min to 2.2 L/min. Bedside echo was performed and the Impella withdrawn 3.5cm  under direct echo visualization with a resulting increase in flow to 3.5L/min. An expanding hematoma at the left groin site was also noted on arrival in the CCU. The limb was cool and mottled with no doppler signal in the left DP, PT or popliteal. CT Surgery was consulted for placement of an axillary Impella both to increase support and to allow Vascular Surgery to address the threatened left lower extremity.       PM-now status post placement of axillary Impella 5.5, Removal of left femoral Impella CP, left groin exploration with, common femoral endarterectomy and patch angioplasty, left femoral thrombectomy and left iliac arteriogram, received 5 PRBCs and 2 of FFP in the OR, went into a flutter RVR in the OR-cardioverted x2 but did not respond-responded somewhat to an amnio bolus, came back on 3 pressors    7/28 - unequal pupils ON - code stroke called, now seems non focal, still intubated on 3 pressors with impella in situ      Assessment:     ICU Problems:    1. STEMI  2. Acute/Chronic Hypoxic Respiratory Failure  3. Hyponatremia  4. Hypochloremia  5. Elevated BNP  6. COPD  7. Hypertension  8.  Hypercholesterolemia      ICU Comprehensive Plan of Care:     Neuro-analgesia/sedation with Precedex, propofol and opioids as needed    Cardiovascular-continue hemodynamic monitoring, on aspirin, Brilinta, Lipitor, trend troponins, Impella in situ - currently @ P8, map goal greater than 65, on norepinephrine, epinephrine and vasopressin-wean as tolerated, heart rate goal less than 110-if necessary will resume amiodarone therapy, keep magnesium above 2 and potassium above 4, serial vascular checks, CKs WNL, monitor JEROME output follow-up cardiology, cardiac surgery and vascular surgery recommendations    Pulmonary-continue lung protective mechanical ventilation, seems to have some chronic pulmonary hypertension, history of COPD- cont DESMOND/inhaled steroid therapy, will wean when appropriate    GI-start trickle feeds, PPI GI prophylaxis    Renal-monitor urine output, correct electrolyte derangements as needed    Hematology-EBL in OR 1.5 L-status post 2 FFP, 1 plt and 5 of PRBCs 7/27, serial labs and transfuse for hemoglobin less than 7, platelet count less than 50, INR greater than 1.6 or fibrinogen less than 100    ID-still infiltrates on chest x-ray - erring on the side of caution and treating with antibiotics for possible aspiration and community-acquired pneumonia-Zosyn/doxycycline, f/u respiratory cultures and studies    Endocrinology-keep glucose between 100 & 180    Prognosis very guarded at this time    Objective:   Vital Signs:  Visit Vitals  /64 (BP 1 Location: Left arm, BP Patient Position: At rest)   Pulse 84   Temp 100.2 °F (37.9 °C)   Resp 23   Ht 5' 8\" (1.727 m)   Wt 82.6 kg (182 lb 1.6 oz)   SpO2 99%   BMI 27.69 kg/m²      O2 Device: Endotracheal tube Temp (24hrs), Av °F (37.8 °C), Min:97.9 °F (36.6 °C), Max:101 °F (38.3 °C)    CVP (mmHg): 7 mmHg (21 1300)      Intake/Output:     Intake/Output Summary (Last 24 hours) at 2021 1342  Last data filed at 2021 1300  Gross per 24 hour   Intake 4298.44 ml   Output 2235 ml   Net 2063.44 ml       Physical Exam:  General-intubated, sedated  Neuro-pupils reactive, following commands, seems non focal  Cardiac-RRR, Impella in situ  Lungs-clear anteriorly  Abdomen-soft, nontender, nondistended  Extremities-cool-left worse than right, no DPs, JEROME in left groin with sanguinous drainage    LABS AND  DATA: Personally reviewed  Recent Labs     21  0434   WBC 15.7* 15.0*   HGB 7.3* 7.8*   HCT 20.8* 21.8*   PLT 86* 92*     Recent Labs     21  1007 21  0434 21  2157 21  2157 21  1631 21  1631   * 127*   < > 131*   < > 127*   K 4.2 4.6   < > 3.6   < > 3.9   CL 99 98   < > 102   < > 95*   CO2 24 22   < > 20*   < > 22   BUN 15 15   < > 17   < > 20   CREA 0.52* 0.50*   < > 0.44*   < > 0.58   * 213*   < > 175*   < > 188*   CA 7.4* 7.4*   < > 6.6*   < > 7.9*   MG  --  2.4  --  1.9   < > 2.6*   PHOS  --  3.1  --   --   --  3.1    < > = values in this interval not displayed.      Recent Labs     21  1007 21  0434   AP 43* 44*   TP 4.5* 4.6*   ALB 2.7* 2.8*   GLOB 1.8* 1.8*     Recent Labs     21  2347 21  1132   INR 1.1 1.3*   PTP 11.6* 13.9*   APTT 25.4 29.3      Recent Labs     21  0431 21  1433 21  1427 21  1427 21  0400 21  0400   PHI  --   --   --  7.35  --  7.23*   PCO2I  --   --   --  33.5*  --  42.7   PO2I  --   --   -- 76*  --  148*   FIO2I 60 40   < > 40   < > 100    < > = values in this interval not displayed. Recent Labs     07/28/21  1007 07/28/21  0434   * 336*   TROIQ 12.90* 15.80*       Hemodynamics:   PAP: PAP Systolic: 49 (48/09/35 4194) CO: CO (l/min): 6.9 l/min (07/28/21 1300)   Wedge:   CI: CI (l/min/m2): 3.6 l/min/m2 (07/28/21 1300)   CVP:  CVP (mmHg): 7 mmHg (07/28/21 1300) SVR:       PVR:       Ventilator Settings:  Mode Rate Tidal Volume Pressure FiO2 PEEP   Assist control   40 ml  12 cm H2O 60 % 5 cm H20     Peak airway pressure: 16 cm H2O    Minute ventilation: 11.3 l/min        MEDS: Reviewed    Chest X-Ray:  CXR Results  (Last 48 hours)               07/28/21 0508  XR CHEST PORT Final result    Impression:  1. No significant change in the appearance of the chest or support hardware. 2. Interval placement of a gastric tube. Narrative:  INDICATION: . postop heart   COMPARISON: Previous chest xray, yesterday. LIMITATIONS: Portable technique. [place] FINDINGS: Single frontal view of the chest.    .   Lines/tubes/surgical: No significant change in the appearance of the ET tube,   right IJ approach [place] catheter and Impella. Interval placement of a   gastric tube which traverses the expected course of the esophagus, extends into   the left upper quadrant and terminates below the image. Heart/mediastinum: Unremarkable. Lungs/pleura: Hazy opacity over the right midlung and right base. Opacified left   base. Obscured left hemidiaphragm and partially obscured right hemidiaphragm. No   visible pneumothorax. Additional Comments: Surgical clips and cutaneous staples overlying the right   axilla. [place] 07/27/21 1152  XR CHEST PORT Final result    Impression:  Satisfactory IMPELLA placement. Narrative:  EXAM: Portable CXR. 1136 hours. COMPARISON: 0500 hours.          INDICATION: post-Impella       FINDINGS:   Roni Mitts has been placed via the right subclavian vein and appears satisfactorily   positioned, replacing prior device placed from the femoral access. ET tube   remains satisfactory. [place] catheter tip is in the main pulmonary artery. There is no pneumothorax. There remains bibasilar pulmonary haziness,   nonspecific.           07/27/21 0516  XR CHEST PORT Final result    Impression:  Central venous acces catheter in position for use. No pneumothorax       No other significant interval change. Narrative:  Clinical history: Central venous catheter placement   INDICATION:   Central venous catheter placement   COMPARISON: 7/27/2021       FINDINGS:   AP portable upright view of the chest demonstrates a stable  cardiopericardial   silhouette. The lungs are unchanged in overall appearance. . Right  sided central   venous access catheter is in place. Catheter tip is in appropriate position for   use. There is no associated pneumothorax. ET tube and NG tube and cardiac assist   device. Patient is on a cardiac monitor. 07/27/21 0021  XR CHEST PORT Final result    Impression:  NG tube extends below the diaphragm. ET tube in position. No other change. Narrative:  Clinical history: OGT placement   INDICATION:   OGT placement   COMPARISON: 6/26/2021       FINDINGS:   AP portable upright view of the chest demonstrates a stable enlarged   cardiopericardial silhouette. ET tube is in appropriate position. NG tube extends   below the diaphragm. .Parenchymal opacity in the right lower lobe more than the   left is not changed. .There is no pneumothorax. . Patient is on a cardiac monitor. 07/26/21 1834  XR CHEST PORT Final result    Impression:      1. Endotracheal tube in appropriate position. 2. Increased patchy opacification in the lung bases, greater on the right. 3. Unchanged cardiomegaly and mild edema           Narrative:  EXAM: XR CHEST PORT       HISTORY: intubation.        COMPARISON: 7/26/2021       FINDINGS: Single view(s) of the chest. The endotracheal tube is in appropriate   position. The nasogastric tube terminates off the edge of film. The heart is   mildly enlarged, but unchanged. There is increased patchy opacification   throughout the right lower hemithorax with retrocardiac left lower lobe. No   pleural effusion or pneumothorax. There is a background of mild edema. The heart   is mildly enlarged, but unchanged. The bones are osteopenic.           07/26/21 1630  XR CHEST PORT Final result    Impression:  1. Persistent hazy density in the medial aspect of the right lung base   suspicious for a right middle lobe process. Developing infiltration in this   region must be considered. A follow-up examination is recommended. 2. Presence of a small right pleural effusion. Narrative:  Portable chest 2 views dated 7/26/2021. Comparison chest dated 2/10/2019. The CTA examination of the chest of 2/10/2019   is available for correlation. 2 portable AP upright views of the chest were obtained. The patient is rotated   towards the left. It is difficult to accurately assess the size of the cardiac   silhouette. There continues be abnormal hazy density in the medial aspect of the   right lung base and there is obliteration of the right border of the cardiac   silhouette. This is suggestive of a right middle lobe process. A lateral view of   the chest may prove useful. There is minimal blunting of the right costophrenic   angle. This is compatible with the presence of a small pleural effusion. Imaging reviewed      NOTE OF PERSONAL INVOLVEMENT IN CARE   This patient has a high probability of imminent, clinically significant deterioration, which requires the highest level of preparedness to intervene urgently.  I participated in the decision-making and personally managed or directed the management of the following life and organ supporting interventions that required my frequent assessment to treat or prevent imminent deterioration. I personally spent 80 minutes of critical care time. This does not include any procedural time.       Signed By: Evelia Alfonso MD     July 28, 2021

## 2021-07-29 NOTE — DIABETES MGMT
[facility]    CLINICAL NURSE SPECIALIST CONSULT   FOLLOW UP  NOTE    Initial Presentation   Any Mary is a 57 y.o. female admitted from [place] urgent care after having SOB for 2 days. While at [facility], patient respiratory status worsened significantly and patient required intubation. She was then transferred to [facility] and noted to have ST elevation on EKG as well as elevated troponin. After emergent PCI and L groin impella placement, patient discovered to have L groin hematoma with L leg ishcemia. Vascular surgery performed L leg thrombectomy today and impella taken out of that leg. Patient remains intubated on vasopressors. Initial troponin 6.62/7.77/39.50    HX:   Past Medical History:   Diagnosis Date    Colon polyps     COPD (chronic obstructive pulmonary disease) ([facility] [address] 11/28/2012    Diabetes (HCC)     GERD (gastroesophageal reflux disease)     HX OTHER MEDICAL     left rib fracture w/punctured kidney    Hypercholesterolemia     hypertriglyceridemia    Hypertension     Menopause     Pilonidal cyst         INITIAL DX: Respiratory failure, STEMI, cardiogenic shock    Treatment plan     TX: Followed by cardiology, cardiac surgery, vascular surgery, impella    Hospital course   Clinical progress has been complicated by cardiogenic shock, need for vascular surgery d/t hematoma. 7/30: started on steroid therapy today; follows commands when sedation weaned; Impella; remains on vasoactive infusions. TF at trickle rate, 20cc/h; started on steroid regimen. Diabetes    Patient has known Type 2 diabetes, treated with Metformin PTA. Admission  and A1c 6.1% indicate acceptable diabetes control. Previous A1Cs in past years have been 7% or lower starting in 2014.     Ambulatory blood glucose management provided by primary care provider- Jeremy Carpio MD recent visit [id number]/72021  Consulted by Provider for advanced diabetes nursing assessment and care, specifically related to   [] Transitioning off Inge Alexandre   [x] Inpatient management strategy  [] Home management assessment  [] Survival skill education    Diabetes-related medical history  Acute complications  Stress due to cardiogenic shock, respiratory failure  Neurological complications  None noted  Microvascular disease  None noted  Macrovascular disease  CAD and Myocardial infarction  Other associated conditions     COPD on steroids at this time; HTN    Diabetes medication history  Drug class Currently in use Discontinued Never used   Biguanide Metformin 500 mg daily     DDP-4 inhibitor       Sulfonylurea      Thiazolidinedione      GLP-1 RA      SGLT-2 inhibitors      Basal insulin      Bolus insulin      Fixed Dose  Combinations        Subjective   Deferred for now since patient intubated in CCU    Patient reports the following home diabetes self-care practices: Deferred   Eating pattern  Physical activity pattern  Monitoring pattern  Taking medications pattern  [x] Consistent administration  [x] Affordable      Objective   Physical exam  General Patient intubated in CCU  Neuro  Alert, oriented   Vital Signs   Visit Vitals  BP (!) 101/50 (BP 1 Location: Left arm, BP Patient Position: At rest)   Pulse (!) 127   Temp (!) 101.3 °F (38.5 °C)   Resp 13   Ht 5' 8\" (1.727 m)   Wt 85.2 kg (187 lb 13.3 oz)   SpO2 (!) 89%   BMI 28.56 kg/m²     Skin  Warm and dry. Heart   Regular rate and rhythm.  No murmurs, rubs or gallops  Lungs  Clear to auscultation without rales or rhonchi  Extremities No foot wounds    Diabetic foot exam:    deferred    Laboratory  BMP:   Lab Results   Component Value Date/Time     (L) 07/29/2021 05:10 AM    K 4.1 07/29/2021 05:10 AM    CL 99 07/29/2021 05:10 AM    CO2 24 07/29/2021 05:10 AM    AGAP 5 07/29/2021 05:10 AM     (H) 07/29/2021 05:10 AM    BUN 13 07/29/2021 05:10 AM    CREA 0.45 (L) 07/29/2021 05:10 AM    GFRAA >60 07/29/2021 05:10 AM    GFRNA >60 07/29/2021 05:10 AM Factors impacting BG management  Factor Dose Comments   Nutrition:   Osmolite 1.2    Currently on trickle feeds at 20cc/h    Drugs:  Vasopressor load    Steroids       Levophed, Vasopressin, Neosynephrine  Hydrocortisone 100mg q8h   20 mg Hydrocortisone: add 0.05 units/kg Lantus to total daily insulin dose  40 mg Hydrocortisone: add 0.1 units/kg Lantus to total daily insulin dose  50 mg Hydrocortisone: add 0.15 units/kg Lantus to total daily insulin dose  100 mg Hydrocortisone: add 0.3 units/kg Lantus to total daily insulin dose     Pain Postop pain: on fentanyl infusion    Infection Not at this time      Blood glucose pattern        Assessment and Plan   Nursing Diagnosis Risk for unstable blood glucose pattern   Nursing Intervention Domain 5259 Decision-making Support   Nursing Interventions Examined current inpatient diabetes control   Explored factors facilitating and impeding inpatient management  Identified self-management practices impeding diabetes control  Explored corrective strategies with patient and responsible inpatient provider   Informed patient of rational for insulin strategy while hospitalized       Evaluation   This 57year old female, with Type 2 diabetes, did achieve diabetes control prior to admission, as evidenced by admission BG of 146 and A1c of 6.1%. Currently admitted with STEMI s/p respiratory failure. Patient is critically ill intubated and sedated requiring vasopressor therapy and impella support. Patient was placed on glucostabilizer for short period of time but then BG dropped to 50's and this was stopped. Patient is critically ill and will need BG management.         During this hospitalization, the patient has achieved inpatient blood glucose target of 100-180mg/dl most of the time Several factors have played a role in blood glucose management including:  [x] Critical nature of illness state  [x]  Changing nutritive sources & needs   [x] Compromised insulin absorption or delivery  [x] Glucocorticoid use  []  Kidney dysfunction  []  Liver disease    The Subcutaneous Insulin Order set (1542) has not been in use. Hence, the next step in optimizing blood glucose control would be    [] Implement the Subcutaneous Insulin order set  [x]  Optimize basal insulin dosing   []  Add mealtime insulin    FBG consistently >200 x 2days  No FBG since patient NPO  Since now on steroid regiman q8, and TF will likely require insulin adjustments over next 24-48h. Recommendations   1. IF BG trends consistently >200, consider recs below  [] Use of Subcutaneous Insulin Order set (4185)  Insulin Dosing Specific recommendation   MODIFIED  Basal                                      (Based on weight, BMI & GFR) 16 units daily   ADJUST basal dose by 20% if FBG >200   CONTINUE Corrective                       (Useful in adjusting insulin dosing) [x] Normal sensitivity          Billing Code(s)   37129    Before making these care recommendations, I personally reviewed the hospitalization record, including notes, laboratory & diagnostic data and current medications, and examined the patient at the bedside (circumstances permitting) before making care recommendations.      Total minutes: 1139 Nikolai Kevin [facility]  Diabetes Clinical Nurse Specialist  [facility]  Access via [address] [place] [address]

## 2021-07-29 NOTE — PROGRESS NOTES
Chart reviewed, patient received sedated and on vent (100% FIO2, PEEP 12.0). Per ABCDEF protocol, will work with patient when PEEP is 10.0 or less, FIO2 60% or less, and patient is following basic commands. Will follow patient peripherally. Recommend nursing to complete with patient, as able and per protocol, in order to promote cardiopulmonary systems, maintain strength, endurance and independence:   -bed in chair position or maximize full reverse Trendelenburg position to facilitate upright activity with foot board and non-skid footwear on 3x/day ~30-60 mins each   -ROM during bathing B UEs and LEs  -positioning to prevent contractures and edema. RASS -1/0/+1 (during SAT)  Active ROM   Sitting (bed in chair position)   Standing (reverse Trendelenburg)   ADLs   RASS -3/2  Passive ROM   Sit (bed in chair position)   RASS -5/-4  Passive ROM       Thank you for your assistance.

## 2021-07-29 NOTE — PROGRESS NOTES
Vascular    Discussed with nursing and MD. Afib overnight with worsening HDs. Visit Vitals  BP (!) 77/54   Pulse (!) 114   Temp (!) 101.3 °F (38.5 °C)   Resp 16   Ht 5' 8\" (1.727 m)   Wt 85.2 kg (187 lb 13.3 oz)   SpO2 95%   BMI 28.56 kg/m²     Drips reviewed  Bilateral LE warm  Pop signals  Groin bruised but soft     58 y/o WF with cardiogenic shock s/p left groin reconstruction  - critically ill and systemically clamped down. Continue supportive care. Please call with any concerns.    - Following peripherally

## 2021-07-29 NOTE — PROGRESS NOTES
MAYNOR -     CM continues to follow for MAYNOR - per Dr. Kristian Simon patient still remains not stable to transfer to an in [facility] hospital at this time. CM will continue to be available to assist with MAYNOR as they arise.  TODD Sow

## 2021-07-29 NOTE — PROGRESS NOTES
0730 Report received from [facility].     0800 Dr. Gomez Pizano at bedside updated on events noted overnight, orders for cardioversion. EKG obtained to confirm rthym. EKG shows ST PVCs therefore pt not cardioverted. 0830 Pt labored breathing. Dr. Aaron Cota orders to increase fentanyl gtt to 200mcg/hr. ABGs obtained and reviewed with MD.     0900 Amio gtt kept at 1mg/min per Dr. Aaron Cota. Chirag gtt ordered to wean Epi gtt to goal MAP of >65.    0930 UA and blood cultures sent per order. 0945 Noted irregular breathing with O2 saturations 88%. ABGs obtained and reviewed with MD.     1000 IDR held. 1145 Pt's PA temperature increasing, PRN Tylenol given. Blood cultures pending from this AM. IV ABX infusing. 1245 PA temperature now 103.6, made Dr. Aaron Cota aware. Additional Tylenol given, cooling blanket applied. 1300 Pt turned for soiled linens. Pt's MAP dropped to 50s, SpO2 70s and unable to recover. Made Dr. Aaron Cota aware. MD orders for 100mg rocuronium. 1330 Pts O2 saturations still 70-80%. Dr. Aaron Cota at bedside. Orders for RT to start Nitric. Pt's PA temperature 104.2 with secondary temperature matching, MD orders for Toradol IVP. 1400 Nitric started. O2 saturations stable >92%. BP elevated, Levo weaned. 1430 Pt's arterial line nonfunctioning. No success with flushing or rezeroing. No blood return noted. Brachial pulse present via doppler. Made Dr. Aaron Cota aware. Continuing to wean pressors based on Impella 5.5 readings. 1500 Pt's brother at bedside and updated on plan of care. 1600 Dr. Aaron Cota at bedside with pt's family. Precedex stopped d/t high fevers unrelieved by Tylenol/Toradol (104.8 PA, 105.1 Bladder). Dantrolene ordered. 1720 MAP in 50s. Chirag and Vaso at max. Levo restarted. Epi on hold as cardiac output WDL. 1800 Dr. Aaron Cota at bedside attempting to place a new arterial line. 1900 Pt's hgb 6.5, MD orders for transfusion of 1 PRBC.      1930 Bedside and Verbal shift change report given to Sterling Dodson RN (oncoming nurse) by Lilibeth Estevez RN (offgoing nurse). Report included the following information SBAR.

## 2021-07-29 NOTE — PROGRESS NOTES
1930: Bedside and Verbal shift change report given to Chandra Mora RN (oncoming nurse) by Tierney Thompson RN (offgoing nurse). Report included the following information SBAR, Kardex, ED Summary, OR Summary, Procedure Summary, Intake/Output, MAR, Recent Results, Med Rec Status, Cardiac Rhythm NSR, Alarm Parameters  and Dual Neuro Assessment. Drips:     Vent:    Impella:   KVO @ 5 + 20   PC 30   P-5  Amio @ 1   Vt 200-400  D5/Bicarb Purge Fluid    Fentanyl @ 200  PEEP 8    Versed @ 10   FiO2 100%   Levo @ 25   Nitric @ 40ppm  Chirag @ 300  Vaso @ 0.04     2000: Resumed pt care. Temp 103.5, cooling blanket in place. 2100: MRSA swab sent     2130: Blood transfusion complete, VSS.     2300: Nitric machine not delivering set 40ppm (only given 19ppm), RT @ bedside to trouble shoot, machine alarming \"contact . \" Nitric machine switched to new one     2315: ABG:  pO2 78.6; pCO2 36.9; pH 7.331;  HCO3 19.1, %O2 Sat 94.9. No changes made     0000: Q6 labs drawn and sent. MD @ bedside to attempt arterial line placement, unable    0030: Critical PLT 44, MD notified     0300: Temp 100.4, cooling blanket removed     0400: FO2Hb 48, tHb 8, SWAN recalibrated     0600: Critical PLT 39, MD notified     0630: Na 126, Phos 2.0. Orders to replace. K+/Mag hemolyzed, labs redrawn and sent     0645: Dr. Esdras Alan @ bedside, plans to attempt femoral arterial line     0730: Bedside and Verbal shift change report given to Carrie Coleman RN (oncoming nurse) by Chandra Mora RN (offgoing nurse). Report included the following information SBAR, Kardex, ED Summary, OR Summary, Procedure Summary, Intake/Output, MAR, Recent Results, Med Rec Status, Cardiac Rhythm NSR/PAC/PVC, Alarm Parameters  and Dual Neuro Assessment.      Drips: KVO @ 5 + 20, Amio @ 1, Fentanyl @ 200, Versed @ 10, Levo @ 30, Chirag @ 150, Vaso @ 0.04

## 2021-07-29 NOTE — PROGRESS NOTES
Problem: Falls - Risk of  Goal: *Absence of Falls  Description: Document Baldev Agosto Fall Risk and appropriate interventions in the flowsheet.   7/29/2021 0223 by Shantanu HERRERA  Outcome: Progressing Towards Goal  Note: Fall Risk Interventions:       Mentation Interventions: Bed/chair exit alarm, Door open when patient unattended, Familiar objects from home, Family/sitter at bedside    Medication Interventions: Evaluate medications/consider consulting pharmacy, Patient to call before getting OOB, Teach patient to arise slowly    Elimination Interventions: Call light in reach, Patient to call for help with toileting needs, Toileting schedule/hourly rounds           7/29/2021 0222 by Roseline Chaudhary  Outcome: Progressing Towards Goal  Note: Fall Risk Interventions:       Mentation Interventions: Bed/chair exit alarm, Door open when patient unattended, Familiar objects from home, Family/sitter at bedside    Medication Interventions: Evaluate medications/consider consulting pharmacy, Patient to call before getting OOB, Teach patient to arise slowly    Elimination Interventions: Call light in reach, Patient to call for help with toileting needs, Toileting schedule/hourly rounds           7/29/2021 0222 by Shantanu HERRERA  Outcome: Progressing Towards Goal  Note: Fall Risk Interventions:       Mentation Interventions: Bed/chair exit alarm, Door open when patient unattended, Familiar objects from home, Family/sitter at bedside    Medication Interventions: Evaluate medications/consider consulting pharmacy, Patient to call before getting OOB, Teach patient to arise slowly    Elimination Interventions: Call light in reach, Patient to call for help with toileting needs, Toileting schedule/hourly rounds              Problem: Patient Education: Go to Patient Education Activity  Goal: Patient/Family Education  7/29/2021 0223 by Shantanu HERRERA  Outcome: Progressing Towards Goal  7/29/2021 0222 by Shantanu HERRERA  Outcome: Progressing Towards Goal  7/29/2021 0222 by Kobi HERRERA  Outcome: Progressing Towards Goal     Problem: Pressure Injury - Risk of  Goal: *Prevention of pressure injury  Description: Document Dario Scale and appropriate interventions in the flowsheet. 7/29/2021 0223 by Kobi HERRERA  Outcome: Progressing Towards Goal  Note: Pressure Injury Interventions:  Sensory Interventions: Assess need for specialty bed, Avoid rigorous massage over bony prominences, Keep linens dry and wrinkle-free, Minimize linen layers, Maintain/enhance activity level, Sit a 90-degree angle/use footstool if needed, Turn and reposition approx. every two hours (pillows and wedges if needed)    Moisture Interventions: Minimize layers, Moisture barrier, Maintain skin hydration (lotion/cream)    Activity Interventions: Assess need for specialty bed, Pressure redistribution bed/mattress(bed type), Increase time out of bed    Mobility Interventions: Pressure redistribution bed/mattress (bed type), HOB 30 degrees or less    Nutrition Interventions: Discuss nutritional consult with provider, Offer support with meals,snacks and hydration    Friction and Shear Interventions: Feet elevated on foot rest, Foam dressings/transparent film/skin sealants, Lift team/patient mobility team, Minimize layers, Transferring/repositioning devices             7/29/2021 0222 by Kobi HERRERA  Outcome: Progressing Towards Goal  Note: Pressure Injury Interventions:  Sensory Interventions: Assess need for specialty bed, Avoid rigorous massage over bony prominences, Keep linens dry and wrinkle-free, Minimize linen layers, Maintain/enhance activity level, Sit a 90-degree angle/use footstool if needed, Turn and reposition approx.  every two hours (pillows and wedges if needed)    Moisture Interventions: Minimize layers, Moisture barrier, Maintain skin hydration (lotion/cream)    Activity Interventions: Assess need for specialty bed, Pressure redistribution bed/mattress(bed type), Increase time out of bed    Mobility Interventions: Pressure redistribution bed/mattress (bed type), HOB 30 degrees or less    Nutrition Interventions: Discuss nutritional consult with provider, Offer support with meals,snacks and hydration    Friction and Shear Interventions: Feet elevated on foot rest, Foam dressings/transparent film/skin sealants, Lift team/patient mobility team, Minimize layers, Transferring/repositioning devices             7/29/2021 0222 by Ericka HERRERA  Outcome: Progressing Towards Goal  Note: Pressure Injury Interventions:  Sensory Interventions: Assess need for specialty bed, Avoid rigorous massage over bony prominences, Keep linens dry and wrinkle-free, Minimize linen layers, Maintain/enhance activity level, Sit a 90-degree angle/use footstool if needed, Turn and reposition approx.  every two hours (pillows and wedges if needed)    Moisture Interventions: Minimize layers, Moisture barrier, Maintain skin hydration (lotion/cream)    Activity Interventions: Assess need for specialty bed, Pressure redistribution bed/mattress(bed type), Increase time out of bed    Mobility Interventions: Pressure redistribution bed/mattress (bed type), HOB 30 degrees or less    Nutrition Interventions: Discuss nutritional consult with provider, Offer support with meals,snacks and hydration    Friction and Shear Interventions: Feet elevated on foot rest, Foam dressings/transparent film/skin sealants, Lift team/patient mobility team, Minimize layers, Transferring/repositioning devices                Problem: Patient Education: Go to Patient Education Activity  Goal: Patient/Family Education  7/29/2021 0223 by Ericka HERRERA  Outcome: Progressing Towards Goal  7/29/2021 0222 by Ericka Echavarria R  Outcome: Progressing Towards Goal  7/29/2021 0222 by Ericka HERRERA  Outcome: Progressing Towards Goal     Problem: Patient Education: Go to Patient Education Activity  Goal: Patient/Family Education  7/29/2021 0223 by Christopher Pinks  Outcome: Progressing Towards Goal  7/29/2021 0222 by Christopher Pinks  Outcome: Progressing Towards Goal  7/29/2021 0222 by Christopher Pinks  Outcome: Progressing Towards Goal     Problem: Cath Lab Procedures: Post-Cath Day 1  Goal: Off Pathway (Use only if patient is Off Pathway)  7/29/2021 0223 by Christopher Pinks  Outcome: Progressing Towards Goal  7/29/2021 0222 by Christopher Pinks  Outcome: Progressing Towards Goal  7/29/2021 0222 by Christopher Pinks  Outcome: Progressing Towards Goal  Goal: Activity/Safety  7/29/2021 0223 by Christopher Pinks  Outcome: Progressing Towards Goal  7/29/2021 0222 by Christopher Pinks  Outcome: Progressing Towards Goal  7/29/2021 0222 by Christopher Pinks  Outcome: Progressing Towards Goal  Goal: Diagnostic Test/Procedures  7/29/2021 0223 by Christopher Pinks  Outcome: Progressing Towards Goal  7/29/2021 0222 by Christopher Pinks  Outcome: Progressing Towards Goal  7/29/2021 0222 by Christopher Pinks  Outcome: Progressing Towards Goal  Goal: Nutrition/Diet  7/29/2021 0223 by Christopehr Pinks  Outcome: Progressing Towards Goal  7/29/2021 0222 by Christopher Pinks  Outcome: Progressing Towards Goal  7/29/2021 0222 by Christopher Pinks  Outcome: Progressing Towards Goal  Goal: Discharge Planning  7/29/2021 0223 by Christopher Pinks  Outcome: Progressing Towards Goal  7/29/2021 0222 by Christopher Pinks  Outcome: Progressing Towards Goal  7/29/2021 0222 by Christopher Pinks  Outcome: Progressing Towards Goal  Goal: Medications  7/29/2021 0223 by Christopher Pinks  Outcome: Progressing Towards Goal  7/29/2021 0222 by Christopher Pinks  Outcome: Progressing Towards Goal  7/29/2021 0222 by Christopher Pinks  Outcome: Progressing Towards Goal  Goal: Respiratory  7/29/2021 0223 by Christopher Pinks  Outcome: Progressing Towards Goal  7/29/2021 0222 by Christopher Pinks  Outcome: Progressing Towards Goal  7/29/2021 0222 by Lisandro HERRERA  Outcome: Progressing Towards Goal  Goal: Treatments/Interventions/Procedures  7/29/2021 0223 by Hernan Gallegos R  Outcome: Progressing Towards Goal  7/29/2021 0222 by Alex Akers  Outcome: Progressing Towards Goal  7/29/2021 0222 by Alex Akers  Outcome: Progressing Towards Goal  Goal: Psychosocial  7/29/2021 0223 by Alex Delphine  Outcome: Progressing Towards Goal  7/29/2021 0222 by Alex Akers  Outcome: Progressing Towards Goal  7/29/2021 0222 by Hernan Gallegos R  Outcome: Progressing Towards Goal     Problem: Cath Lab Procedures: Discharge Outcomes  Goal: *Stable cardiac rhythm  7/29/2021 0223 by Hernan Gallegos R  Outcome: Progressing Towards Goal  7/29/2021 0222 by Alex Akers  Outcome: Progressing Towards Goal  7/29/2021 0222 by Alex Akers  Outcome: Progressing Towards Goal  Goal: *Hemodynamically stable  7/29/2021 0223 by Alex Delphine  Outcome: Progressing Towards Goal  7/29/2021 0222 by Alex Akers  Outcome: Progressing Towards Goal  7/29/2021 0222 by Hernan Gallegos R  Outcome: Progressing Towards Goal  Goal: *Optimal pain control at patient's stated goal  7/29/2021 0223 by Alex Akers  Outcome: Progressing Towards Goal  7/29/2021 0222 by Alex Delphine  Outcome: Progressing Towards Goal  7/29/2021 0222 by Alex Delphine  Outcome: Progressing Towards Goal  Goal: *Pulses palpable, skin color within defined limits, skin temperature warm  7/29/2021 0223 by Hernan Gallegos R  Outcome: Progressing Towards Goal  7/29/2021 0222 by Alex Delphine  Outcome: Progressing Towards Goal  7/29/2021 0222 by Alex Delphine  Outcome: Progressing Towards Goal  Goal: *Lungs clear or at baseline  7/29/2021 0223 by Alex Delphine  Outcome: Progressing Towards Goal  7/29/2021 0222 by Alex Delphine  Outcome: Progressing Towards Goal  7/29/2021 0222 by Alex Delphine  Outcome: Progressing Towards Goal  Goal: *Demonstrates ability to perform prescribed activity without shortness of breath or discomfort  7/29/2021 0223 by Hernan Gallegos R  Outcome: Progressing Towards Goal  7/29/2021 0222 by Michaelne Tse  Outcome: Progressing Towards Goal  7/29/2021 0222 by Daralene Tse  Outcome: Progressing Towards Goal  Goal: *Verbalizes home exercise program, activity guidelines, cardiac precautions  7/29/2021 0223 by Gregoria Tillmanon  Outcome: Progressing Towards Goal  7/29/2021 0222 by Gregoria Tillmanon  Outcome: Progressing Towards Goal  7/29/2021 0222 by Gregoria Tillmanon  Outcome: Progressing Towards Goal  Goal: *Verbalizes understanding and describes prescribed diet  7/29/2021 0223 by Gregoria Tillmanon  Outcome: Progressing Towards Goal  7/29/2021 0222 by Gregoria Tillmanon  Outcome: Progressing Towards Goal  7/29/2021 0222 by Gregoria Tillmanon  Outcome: Progressing Towards Goal  Goal: *Verbalizes understanding and describes medication purposes and frequencies  7/29/2021 0223 by Gregoria Tillmanon  Outcome: Progressing Towards Goal  7/29/2021 0222 by Gregoria Tillmanon  Outcome: Progressing Towards Goal  7/29/2021 0222 by Gregoria Tillmanon  Outcome: Progressing Towards Goal  Goal: *Identifies cardiac risk factors  7/29/2021 0223 by Gregoria Tillmanon  Outcome: Progressing Towards Goal  7/29/2021 0222 by Michaelne Tse  Outcome: Progressing Towards Goal  7/29/2021 0222 by Daralene Tse  Outcome: Progressing Towards Goal  Goal: *No signs and symptoms of infection or wound complications  3/15/2462 0223 by Angelique HERRERA  Outcome: Progressing Towards Goal  7/29/2021 0222 by Gregoria Tillmanon  Outcome: Progressing Towards Goal  7/29/2021 0222 by Michaelne Tse  Outcome: Progressing Towards Goal  Goal: *Anxiety reduced or absent  7/29/2021 0223 by Michaelne Tse  Outcome: Progressing Towards Goal  7/29/2021 0222 by Michaelne Tse  Outcome: Progressing Towards Goal  7/29/2021 0222 by Michaelne Tse  Outcome: Progressing Towards Goal  Goal: *Verbalizes and demonstrates incision care  7/29/2021 0223 by Gregoria Tse  Outcome: Progressing Towards Goal  7/29/2021 0222 by Peyman Bae R  Outcome: Progressing Towards Goal  7/29/2021 0222 by Peyman Bae R  Outcome: Progressing Towards Goal  Goal: *Understands and describes signs and symptoms to report to providers(Stroke Metric)  7/29/2021 0223 by Pancho Zamudio  Outcome: Progressing Towards Goal  7/29/2021 0222 by Pancho Zamudio  Outcome: Progressing Towards Goal  7/29/2021 0222 by Pancho Zamudio  Outcome: Progressing Towards Goal  Goal: *Describes follow-up/return visits to physicians  7/29/2021 0223 by Pancho Zamudio  Outcome: Progressing Towards Goal  7/29/2021 0222 by Pancho Zamudio  Outcome: Progressing Towards Goal  7/29/2021 0222 by Pancho Zamudio  Outcome: Progressing Towards Goal  Goal: *Describes available resources and support systems  7/29/2021 0223 by Pancho Zamudio  Outcome: Progressing Towards Goal  7/29/2021 0222 by Pancho Zamudio  Outcome: Progressing Towards Goal  7/29/2021 0222 by Pancho Zamudio  Outcome: Progressing Towards Goal  Goal: *Influenza immunization  7/29/2021 0223 by Pancho Zamudio  Outcome: Progressing Towards Goal  7/29/2021 0222 by Pancho Zamudio  Outcome: Progressing Towards Goal  7/29/2021 0222 by Pancho Zamudio  Outcome: Progressing Towards Goal  Goal: *Pneumococcal immunization  7/29/2021 0223 by Pancho Zamudio  Outcome: Progressing Towards Goal  7/29/2021 0222 by Pancho Zamudio  Outcome: Progressing Towards Goal  7/29/2021 0222 by Pancho Zamudio  Outcome: Progressing Towards Goal     Problem: Patient Education: Go to Patient Education Activity  Goal: Patient/Family Education  7/29/2021 0223 by Pancho Zamudio  Outcome: Progressing Towards Goal  7/29/2021 0222 by Pancho Zamudio  Outcome: Progressing Towards Goal  7/29/2021 0222 by Pancho Zamudio  Outcome: Progressing Towards Goal     Problem: AMI: Day 1  Goal: Off Pathway (Use only if patient is Off Pathway)  7/29/2021 0223 by Pancho Zamudio  Outcome: Progressing Towards Goal  7/29/2021 0222 by Pancho Zamudio  Outcome: Progressing Towards Goal  7/29/2021 0222 by Daralene Tse  Outcome: Progressing Towards Goal  Goal: Activity/Safety  7/29/2021 0223 by Daralene Tse  Outcome: Progressing Towards Goal  7/29/2021 0222 by Daralene Tse  Outcome: Progressing Towards Goal  7/29/2021 0222 by Daralene Tse  Outcome: Progressing Towards Goal  Goal: Consults, if ordered  7/29/2021 0223 by Daralene Tse  Outcome: Progressing Towards Goal  7/29/2021 0222 by Daralene Tse  Outcome: Progressing Towards Goal  7/29/2021 0222 by Daralene Tse  Outcome: Progressing Towards Goal  Goal: Diagnostic Test/Procedures  7/29/2021 0223 by Daralene Tse  Outcome: Progressing Towards Goal  7/29/2021 0222 by Daralene Tse  Outcome: Progressing Towards Goal  7/29/2021 0222 by Daralene Tse  Outcome: Progressing Towards Goal  Goal: Nutrition/Diet  7/29/2021 0223 by Daralene Tse  Outcome: Progressing Towards Goal  7/29/2021 0222 by Daralene Tse  Outcome: Progressing Towards Goal  7/29/2021 0222 by Daralene Tse  Outcome: Progressing Towards Goal  Goal: Discharge Planning  7/29/2021 0223 by Daralene Tse  Outcome: Progressing Towards Goal  7/29/2021 0222 by Daralene Tse  Outcome: Progressing Towards Goal  7/29/2021 0222 by Daralene Tse  Outcome: Progressing Towards Goal  Goal: Medications  7/29/2021 0223 by Daralene Tse  Outcome: Progressing Towards Goal  7/29/2021 0222 by Daralene Tse  Outcome: Progressing Towards Goal  7/29/2021 0222 by Daralene Tse  Outcome: Progressing Towards Goal  Goal: Respiratory  7/29/2021 0223 by Daralene Tse  Outcome: Progressing Towards Goal  7/29/2021 0222 by Daralene Tse  Outcome: Progressing Towards Goal  7/29/2021 0222 by Daralene Tse  Outcome: Progressing Towards Goal  Goal: Treatments/Interventions/Procedures  7/29/2021 0223 by Daralene Tse  Outcome: Progressing Towards Goal  7/29/2021 0222 by Daralene Tse  Outcome: Progressing Towards Goal  7/29/2021 0222 by Yolie Baires, Liseth HERRERA  Outcome: Progressing Towards Goal  Goal: Psychosocial  7/29/2021 0223 by Cricket HERRERA  Outcome: Progressing Towards Goal  7/29/2021 0222 by Alex Akers  Outcome: Progressing Towards Goal  7/29/2021 0222 by Alex Delphine  Outcome: Progressing Towards Goal  Goal: *Eligible patient receives reperfusion therapy thrombolytics/PCI  7/29/2021 0223 by Cricket HERRERA  Outcome: Progressing Towards Goal  7/29/2021 0222 by Alex Akers  Outcome: Progressing Towards Goal  7/29/2021 0222 by Alex Delphine  Outcome: Progressing Towards Goal  Goal: *Optimal pain control at patient's stated goal  7/29/2021 0223 by Alex Delphine  Outcome: Progressing Towards Goal  7/29/2021 0222 by Alex Delphine  Outcome: Progressing Towards Goal  7/29/2021 0222 by Alex Delphine  Outcome: Progressing Towards Goal  Goal: *Hemodynamically stable  7/29/2021 0223 by Alex Delphine  Outcome: Progressing Towards Goal  7/29/2021 0222 by Alex Delphine  Outcome: Progressing Towards Goal  7/29/2021 0222 by Alex Delphine  Outcome: Progressing Towards Goal  Goal: *Received aspirin and beta-blocker within 24 hours of arrival unless contraindicated  7/29/2021 0223 by Alex Delphine  Outcome: Progressing Towards Goal  7/29/2021 0222 by Alex Delphine  Outcome: Progressing Towards Goal  7/29/2021 0222 by Alex Delphine  Outcome: Progressing Towards Goal     Problem: AMI: Day 2  Goal: Off Pathway (Use only if patient is Off Pathway)  7/29/2021 0223 by Alex Akers  Outcome: Progressing Towards Goal  7/29/2021 0222 by Alex Delphine  Outcome: Progressing Towards Goal  7/29/2021 0222 by Alex Delphine  Outcome: Progressing Towards Goal  Goal: Activity/Safety  7/29/2021 0223 by Alex Delphine  Outcome: Progressing Towards Goal  7/29/2021 0222 by Alex Delphine  Outcome: Progressing Towards Goal  7/29/2021 0222 by Alex Delphine  Outcome: Progressing Towards Goal  Goal: Consults, if ordered  7/29/2021 0223 by Cricket Enrique R  Outcome: Progressing Towards Goal  7/29/2021 0222 by Mor Mahajan R  Outcome: Progressing Towards Goal  7/29/2021 0222 by Shireen Nay  Outcome: Progressing Towards Goal  Goal: Diagnostic Test/Procedures  7/29/2021 0223 by Shireen Nay  Outcome: Progressing Towards Goal  7/29/2021 0222 by Shireen Nay  Outcome: Progressing Towards Goal  7/29/2021 0222 by Shireen Nay  Outcome: Progressing Towards Goal  Goal: Nutrition/Diet  7/29/2021 0223 by Shireen Nay  Outcome: Progressing Towards Goal  7/29/2021 0222 by Shireen Nay  Outcome: Progressing Towards Goal  7/29/2021 0222 by Shireen Nay  Outcome: Progressing Towards Goal  Goal: Discharge Planning  7/29/2021 0223 by Shireen Nay  Outcome: Progressing Towards Goal  7/29/2021 0222 by Shireen Nay  Outcome: Progressing Towards Goal  7/29/2021 0222 by Shireen Nay  Outcome: Progressing Towards Goal  Goal: Medications  7/29/2021 0223 by Shireen Nay  Outcome: Progressing Towards Goal  7/29/2021 0222 by Shireen Nay  Outcome: Progressing Towards Goal  7/29/2021 0222 by Shireen Nay  Outcome: Progressing Towards Goal  Goal: Respiratory  7/29/2021 0223 by Shireen Nay  Outcome: Progressing Towards Goal  7/29/2021 0222 by Shireen Nay  Outcome: Progressing Towards Goal  7/29/2021 0222 by Shireen Nay  Outcome: Progressing Towards Goal  Goal: Treatments/Interventions/Procedures  7/29/2021 0223 by Shireen Nay  Outcome: Progressing Towards Goal  7/29/2021 0222 by Shireen Nay  Outcome: Progressing Towards Goal  7/29/2021 0222 by Shireen Nay  Outcome: Progressing Towards Goal  Goal: Psychosocial  7/29/2021 0223 by Shireen Nay  Outcome: Progressing Towards Goal  7/29/2021 0222 by Shireen Nay  Outcome: Progressing Towards Goal  7/29/2021 0222 by Mor HERRERA  Outcome: Progressing Towards Goal  Goal: *Optimal pain control at patient's stated goal  7/29/2021 0223 by Mor HERRERA  Outcome: Progressing Towards Goal  7/29/2021 0222 by Lisandro HERRERA  Outcome: Progressing Towards Goal  7/29/2021 0222 by Christopher Pinks  Outcome: Progressing Towards Goal  Goal: *Hemodynamically stable  7/29/2021 0223 by Christopher Pinks  Outcome: Progressing Towards Goal  7/29/2021 0222 by Christopher Pinks  Outcome: Progressing Towards Goal  7/29/2021 0222 by Christopher Pinks  Outcome: Progressing Towards Goal  Goal: *Demonstrates progressive activity  7/29/2021 0223 by Christopher Pinks  Outcome: Progressing Towards Goal  7/29/2021 0222 by Christopher Pinks  Outcome: Progressing Towards Goal  7/29/2021 0222 by Christopher Pinks  Outcome: Progressing Towards Goal  Goal: *Tolerating diet  7/29/2021 0223 by Christopher Pinks  Outcome: Progressing Towards Goal  7/29/2021 0222 by Christopher Pinks  Outcome: Progressing Towards Goal  7/29/2021 0222 by Christopher Pinks  Outcome: Progressing Towards Goal     Problem: AMI: Day 3  Goal: Off Pathway (Use only if patient is Off Pathway)  7/29/2021 0223 by Christopher Pinks  Outcome: Progressing Towards Goal  7/29/2021 0222 by Christopher Pinks  Outcome: Progressing Towards Goal  7/29/2021 0222 by Christopher Pinks  Outcome: Progressing Towards Goal  Goal: Activity/Safety  7/29/2021 0223 by Christopher Pinks  Outcome: Progressing Towards Goal  7/29/2021 0222 by Christopher Pinks  Outcome: Progressing Towards Goal  7/29/2021 0222 by Christopher Pinks  Outcome: Progressing Towards Goal  Goal: Consults, if ordered  7/29/2021 0223 by Christopher Pinks  Outcome: Progressing Towards Goal  7/29/2021 0222 by Christopher Pinks  Outcome: Progressing Towards Goal  7/29/2021 0222 by Christopher Pinks  Outcome: Progressing Towards Goal  Goal: Diagnostic Test/Procedures  7/29/2021 0223 by Christopher Pinks  Outcome: Progressing Towards Goal  7/29/2021 0222 by Christopher Pinks  Outcome: Progressing Towards Goal  7/29/2021 0222 by Christopher Pinks  Outcome: Progressing Towards Goal  Goal: Nutrition/Diet  7/29/2021 0223 by Lisandro HERRERA  Outcome: Progressing Towards Goal  7/29/2021 0222 by Avanell Primus R  Outcome: Progressing Towards Goal  7/29/2021 0222 by Roc Bridges  Outcome: Progressing Towards Goal  Goal: Discharge Planning  7/29/2021 0223 by Roc Bridges  Outcome: Progressing Towards Goal  7/29/2021 0222 by Roc Bridges  Outcome: Progressing Towards Goal  7/29/2021 0222 by Roc Bridges  Outcome: Progressing Towards Goal  Goal: Medications  7/29/2021 0223 by Roc Bridges  Outcome: Progressing Towards Goal  7/29/2021 0222 by Roc Bridges  Outcome: Progressing Towards Goal  7/29/2021 0222 by Roc Bridges  Outcome: Progressing Towards Goal  Goal: Respiratory  7/29/2021 0223 by Roc Bridges  Outcome: Progressing Towards Goal  7/29/2021 0222 by Roc Bridges  Outcome: Progressing Towards Goal  7/29/2021 0222 by Roc Bridges  Outcome: Progressing Towards Goal  Goal: Treatments/Interventions/Procedures  7/29/2021 0223 by Roc Bridges  Outcome: Progressing Towards Goal  7/29/2021 0222 by Roc Bridges  Outcome: Progressing Towards Goal  7/29/2021 0222 by Roc Bridges  Outcome: Progressing Towards Goal  Goal: Psychosocial  7/29/2021 0223 by Roc Bridges  Outcome: Progressing Towards Goal  7/29/2021 0222 by Roc Bridges  Outcome: Progressing Towards Goal  7/29/2021 0222 by Avanell Primus R  Outcome: Progressing Towards Goal  Goal: *Optimal pain control at patient's stated goal  7/29/2021 0223 by Roc Bridges  Outcome: Progressing Towards Goal  7/29/2021 0222 by Roc Bridges  Outcome: Progressing Towards Goal  7/29/2021 0222 by Roc Bridges  Outcome: Progressing Towards Goal  Goal: *Hemodynamically stable  7/29/2021 0223 by Roc Bridges  Outcome: Progressing Towards Goal  7/29/2021 0222 by Roc Bridges  Outcome: Progressing Towards Goal  7/29/2021 0222 by Avanell Primus R  Outcome: Progressing Towards Goal  Goal: *Demonstrates progressive activity  7/29/2021 0223 by Roc Bridges  Outcome: Progressing Towards Goal  7/29/2021 0222 by Monalisa Poncho  Outcome: Progressing Towards Goal  7/29/2021 0222 by Monalisa Poncho  Outcome: Progressing Towards Goal  Goal: *Tolerating diet  7/29/2021 0223 by Monalisa Poncho  Outcome: Progressing Towards Goal  7/29/2021 0222 by Monalisa Poncho  Outcome: Progressing Towards Goal  7/29/2021 0222 by Monalisa Poncho  Outcome: Progressing Towards Goal     Problem: AMI: Day 4  Goal: Off Pathway (Use only if patient is Off Pathway)  7/29/2021 0223 by Monalisa Poncho  Outcome: Progressing Towards Goal  7/29/2021 0222 by Monalisa Poncho  Outcome: Progressing Towards Goal  7/29/2021 0222 by Monalisa Poncho  Outcome: Progressing Towards Goal  Goal: Activity/Safety  7/29/2021 0223 by Monalisa Poncho  Outcome: Progressing Towards Goal  7/29/2021 0222 by Monalisa Poncho  Outcome: Progressing Towards Goal  7/29/2021 0222 by Monalisa [place]  Outcome: Progressing Towards Goal  Goal: Diagnostic Test/Procedures  7/29/2021 0223 by Monalisa Poncho  Outcome: Progressing Towards Goal  7/29/2021 0222 by Monalisa Poncho  Outcome: Progressing Towards Goal  7/29/2021 0222 by Monalisa Poncho  Outcome: Progressing Towards Goal  Goal: Nutrition/Diet  7/29/2021 0223 by Monalisa Poncho  Outcome: Progressing Towards Goal  7/29/2021 0222 by Monalisa Poncho  Outcome: Progressing Towards Goal  7/29/2021 0222 by Monalisa Poncho  Outcome: Progressing Towards Goal  Goal: Discharge Planning  7/29/2021 0223 by Monalisa Poncho  Outcome: Progressing Towards Goal  7/29/2021 0222 by Monalisa Poncho  Outcome: Progressing Towards Goal  7/29/2021 0222 by Monalisa Poncho  Outcome: Progressing Towards Goal  Goal: Medications  7/29/2021 0223 by Monalisa Poncho  Outcome: Progressing Towards Goal  7/29/2021 0222 by Monalisa Poncho  Outcome: Progressing Towards Goal  7/29/2021 0222 by Monalisa Poncho  Outcome: Progressing Towards Goal  Goal: Respiratory  7/29/2021 0223 by Monalisa Poncho  Outcome: Progressing Towards Goal  7/29/2021 0222 by Micheline Sernale R  Outcome: Progressing Towards Goal  7/29/2021 0222 by Holly Larios  Outcome: Progressing Towards Goal  Goal: Treatments/Interventions/Procedures  7/29/2021 0223 by Holly Larios  Outcome: Progressing Towards Goal  7/29/2021 0222 by Holly Larios  Outcome: Progressing Towards Goal  7/29/2021 0222 by Holly Larios  Outcome: Progressing Towards Goal  Goal: Psychosocial  7/29/2021 0223 by Holly Larios  Outcome: Progressing Towards Goal  7/29/2021 0222 by Frelamonte Larios  Outcome: Progressing Towards Goal  7/29/2021 0222 by Micheline Sernale R  Outcome: Progressing Towards Goal  Goal: *Optimal pain control at patient's stated goal  7/29/2021 0223 by Holly Larios  Outcome: Progressing Towards Goal  7/29/2021 0222 by Holly Larios  Outcome: Progressing Towards Goal  7/29/2021 0222 by Holly Larios  Outcome: Progressing Towards Goal  Goal: *Hemodynamically stable  7/29/2021 0223 by Holly Larios  Outcome: Progressing Towards Goal  7/29/2021 0222 by Frelamonte Larios  Outcome: Progressing Towards Goal  7/29/2021 0222 by Freica Ric  Outcome: Progressing Towards Goal  Goal: *Demonstrates progressive activity  7/29/2021 0223 by Holly Larios  Outcome: Progressing Towards Goal  7/29/2021 0222 by Frelamonte Larios  Outcome: Progressing Towards Goal  7/29/2021 0222 by Freica Ric  Outcome: Progressing Towards Goal  Goal: *Tolerating diet  7/29/2021 0223 by Holly Larios  Outcome: Progressing Towards Goal  7/29/2021 0222 by Frelamonte Larios  Outcome: Progressing Towards Goal  7/29/2021 0222 by Micheline Shawumble R  Outcome: Progressing Towards Goal     Problem: AMI: Day 5  Goal: Off Pathway (Use only if patient is Off Pathway)  7/29/2021 0223 by Holly Larios  Outcome: Progressing Towards Goal  7/29/2021 0222 by Holly Larios  Outcome: Progressing Towards Goal  7/29/2021 0222 by Holly Larios  Outcome: Progressing Towards Goal  Goal: Activity/Safety  7/29/2021 3181 by Alex Deer  Outcome: Progressing Towards Goal  7/29/2021 0222 by Alex Baltimore  Outcome: Progressing Towards Goal  7/29/2021 0222 by Alex Baltimore  Outcome: Progressing Towards Goal  Goal: Diagnostic Test/Procedures  7/29/2021 0223 by Alex Baltimore  Outcome: Progressing Towards Goal  7/29/2021 0222 by Alex Baltimore  Outcome: Progressing Towards Goal  7/29/2021 0222 by Alex Baltimore  Outcome: Progressing Towards Goal  Goal: Nutrition/Diet  7/29/2021 0223 by Alex Baltimore  Outcome: Progressing Towards Goal  7/29/2021 0222 by Alex Baltimore  Outcome: Progressing Towards Goal  7/29/2021 0222 by Alex Baltimore  Outcome: Progressing Towards Goal  Goal: Discharge Planning  7/29/2021 0223 by Alex Baltimore  Outcome: Progressing Towards Goal  7/29/2021 0222 by Alex Baltimore  Outcome: Progressing Towards Goal  7/29/2021 0222 by Alex Baltimore  Outcome: Progressing Towards Goal  Goal: Medications  7/29/2021 0223 by Alex Baltimore  Outcome: Progressing Towards Goal  7/29/2021 0222 by Alex Baltimore  Outcome: Progressing Towards Goal  7/29/2021 0222 by Alex Baltimore  Outcome: Progressing Towards Goal  Goal: Treatments/Interventions/Procedures  7/29/2021 0223 by Alex Baltimore  Outcome: Progressing Towards Goal  7/29/2021 0222 by Alex Baltimore  Outcome: Progressing Towards Goal  7/29/2021 0222 by Alex Baltimore  Outcome: Progressing Towards Goal  Goal: Psychosocial  7/29/2021 0223 by Alex Baltimore  Outcome: Progressing Towards Goal  7/29/2021 0222 by Alex Baltimore  Outcome: Progressing Towards Goal  7/29/2021 0222 by Cricket HERRERA  Outcome: Progressing Towards Goal     Problem: AMI: Discharge Outcomes  Goal: *Demonstrates ability to perform prescribed activity without shortness of breath or discomfort  7/29/2021 0223 by Cricket HERRERA  Outcome: Progressing Towards Goal  7/29/2021 0222 by Alex Baltimore  Outcome: Progressing Towards Goal  7/29/2021 0222 by Alex Baltimore  Outcome: Progressing Towards Goal  Goal: *Verbalizes understanding and describes prescribed diet  7/29/2021 0223 by Kobi HERRERA  Outcome: Progressing Towards Goal  7/29/2021 0222 by Martha Regan  Outcome: Progressing Towards Goal  7/29/2021 0222 by Martha Regan  Outcome: Progressing Towards Goal  Goal: *Describes follow-up/return visits to physicians  7/29/2021 0223 by Martha Regan  Outcome: Progressing Towards Goal  7/29/2021 0222 by Martha Regan  Outcome: Progressing Towards Goal  7/29/2021 0222 by Martha Regan  Outcome: Progressing Towards Goal  Goal: *Describes home care/support arrangements established based on need  7/29/2021 0223 by Martha Regan  Outcome: Progressing Towards Goal  7/29/2021 0222 by Martha eRgan  Outcome: Progressing Towards Goal  7/29/2021 0222 by Martha Regan  Outcome: Progressing Towards Goal  Goal: *Anxiety reduced or absent  7/29/2021 0223 by Martha Regan  Outcome: Progressing Towards Goal  7/29/2021 0222 by Martha Regan  Outcome: Progressing Towards Goal  7/29/2021 0222 by Martha Regan  Outcome: Progressing Towards Goal  Goal: *Understands and describes signs and symptoms to report to providers(Stroke Metric)  7/29/2021 0223 by Martha Regan  Outcome: Progressing Towards Goal  7/29/2021 0222 by Martha Regan  Outcome: Progressing Towards Goal  7/29/2021 0222 by Martha Regan  Outcome: Progressing Towards Goal  Goal: *Verbalizes name, dosage, time, side effects, and number of days to continue medications  7/29/2021 0223 by Kobi HERRERA  Outcome: Progressing Towards Goal  7/29/2021 0222 by Martha Regan  Outcome: Progressing Towards Goal  7/29/2021 0222 by Martha Regan  Outcome: Progressing Towards Goal     Problem: Non-Violent Restraints  Goal: Removal from restraints as soon as assessed to be safe  Outcome: Progressing Towards Goal  Goal: No harm/injury to patient while restraints in use  Outcome: Progressing Towards Goal  Goal: Patient's dignity will be maintained  Outcome: Progressing Towards Goal  Goal: Patient Interventions  Outcome: Progressing Towards Goal     Problem: Ventilator Management  Goal: *Adequate oxygenation and ventilation  Outcome: Progressing Towards Goal  Goal: *Patient maintains clear airway/free of aspiration  Outcome: Progressing Towards Goal  Goal: *Absence of infection signs and symptoms  Outcome: Progressing Towards Goal  Goal: *Normal spontaneous ventilation  Outcome: Progressing Towards Goal     Problem: Patient Education: Go to Patient Education Activity  Goal: Patient/Family Education  Outcome: Progressing Towards Goal     Problem: Pain  Goal: *Control of Pain  Outcome: Progressing Towards Goal  Goal: *PALLIATIVE CARE:  Alleviation of Pain  Outcome: Progressing Towards Goal     Problem: Patient Education: Go to Patient Education Activity  Goal: Patient/Family Education  Outcome: Progressing Towards Goal     Problem: Infection - Risk of, Central Venous Catheter-Associated Bloodstream Infection  Goal: *Absence of infection signs and symptoms  Outcome: Progressing Towards Goal     Problem: Patient Education: Go to Patient Education Activity  Goal: Patient/Family Education  Outcome: Progressing Towards Goal     Problem: Infection - Risk of, Urinary Catheter-Associated Urinary Tract Infection  Goal: *Absence of infection signs and symptoms  Outcome: Progressing Towards Goal     Problem: Patient Education: Go to Patient Education Activity  Goal: Patient/Family Education  Outcome: Progressing Towards Goal     Problem: Infection - Risk of, Ventilator-Associated Pneumonia  Goal: *Absence of infection signs and symptoms  Outcome: Progressing Towards Goal     Problem: Patient Education: Go to Patient Education Activity  Goal: Patient/Family Education  Outcome: Progressing Towards Goal     Problem: Patient Education: Go to Patient Education Activity  Goal: Patient/Family Education  Outcome: Progressing Towards Goal     Problem: TIA/CVA Stroke: 0-24 hours  Goal: Off Pathway (Use only if patient is Off Pathway)  Outcome: Progressing Towards Goal  Goal: Activity/Safety  Outcome: Progressing Towards Goal  Goal: Consults, if ordered  Outcome: Progressing Towards Goal  Goal: Diagnostic Test/Procedures  Outcome: Progressing Towards Goal  Goal: Nutrition/Diet  Outcome: Progressing Towards Goal  Goal: Discharge Planning  Outcome: Progressing Towards Goal  Goal: Medications  Outcome: Progressing Towards Goal  Goal: Respiratory  Outcome: Progressing Towards Goal  Goal: Treatments/Interventions/Procedures  Outcome: Progressing Towards Goal  Goal: Minimize risk of bleeding post-thrombolytic infusion  Outcome: Progressing Towards Goal  Goal: Monitor for complications post-thrombolytic infusion  Outcome: Progressing Towards Goal  Goal: Psychosocial  Outcome: Progressing Towards Goal  Goal: *Hemodynamically stable  Outcome: Progressing Towards Goal  Goal: *Neurologically stable  Description: Absence of additional neurological deficits    Outcome: Progressing Towards Goal  Goal: *Verbalizes anxiety and depression are reduced or absent  Outcome: Progressing Towards Goal  Goal: *Absence of Signs of Aspiration on Current Diet  Outcome: Progressing Towards Goal  Goal: *Absence of deep venous thrombosis signs and symptoms(Stroke Metric)  Outcome: Progressing Towards Goal  Goal: *Ability to perform ADLs and demonstrates progressive mobility and function  Outcome: Progressing Towards Goal  Goal: *Stroke education started(Stroke Metric)  Outcome: Progressing Towards Goal  Goal: *Dysphagia screen performed(Stroke Metric)  Outcome: Progressing Towards Goal  Goal: *Rehab consulted(Stroke Metric)  Outcome: Progressing Towards Goal     Problem: TIA/CVA Stroke: Day 2 Until Discharge  Goal: Off Pathway (Use only if patient is Off Pathway)  Outcome: Progressing Towards Goal  Goal: Activity/Safety  Outcome: Progressing Towards Goal  Goal: Diagnostic Test/Procedures  Outcome: Progressing Towards Goal  Goal: Nutrition/Diet  Outcome: Progressing Towards Goal  Goal: Discharge Planning  Outcome: Progressing Towards Goal  Goal: Medications  Outcome: Progressing Towards Goal  Goal: Respiratory  Outcome: Progressing Towards Goal  Goal: Treatments/Interventions/Procedures  Outcome: Progressing Towards Goal  Goal: Psychosocial  Outcome: Progressing Towards Goal  Goal: *Verbalizes anxiety and depression are reduced or absent  Outcome: Progressing Towards Goal  Goal: *Absence of aspiration  Outcome: Progressing Towards Goal  Goal: *Absence of deep venous thrombosis signs and symptoms(Stroke Metric)  Outcome: Progressing Towards Goal  Goal: *Optimal pain control at patient's stated goal  Outcome: Progressing Towards Goal  Goal: *Tolerating diet  Outcome: Progressing Towards Goal  Goal: *Ability to perform ADLs and demonstrates progressive mobility and function  Outcome: Progressing Towards Goal  Goal: *Stroke education continued(Stroke Metric)  Outcome: Progressing Towards Goal     Problem: Ischemic Stroke: Discharge Outcomes  Goal: *Verbalizes anxiety and depression are reduced or absent  Outcome: Progressing Towards Goal  Goal: *Verbalize understanding of risk factor modification(Stroke Metric)  Outcome: Progressing Towards Goal  Goal: *Hemodynamically stable  Outcome: Progressing Towards Goal  Goal: *Absence of aspiration pneumonia  Outcome: Progressing Towards Goal  Goal: *Aware of needed dietary changes  Outcome: Progressing Towards Goal  Goal: *Verbalize understanding of prescribed medications including anti-coagulants, anti-lipid, and/or anti-platelets(Stroke Metric)  Outcome: Progressing Towards Goal  Goal: *Tolerating diet  Outcome: Progressing Towards Goal  Goal: *Aware of follow-up diagnostics related to anticoagulants  Outcome: Progressing Towards Goal  Goal: *Ability to perform ADLs and demonstrates progressive mobility and function  Outcome: Progressing Towards Goal  Goal: *Absence of DVT(Stroke Metric)  Outcome: Progressing Towards Goal  Goal: *Absence of aspiration  Outcome: Progressing Towards Goal  Goal: *Optimal pain control at patient's stated goal  Outcome: Progressing Towards Goal  Goal: *Home safety concerns addressed  Outcome: Progressing Towards Goal  Goal: *Describes available resources and support systems  Outcome: Progressing Towards Goal  Goal: *Verbalizes understanding of activation of EMS(911) for stroke symptoms(Stroke Metric)  Outcome: Progressing Towards Goal  Goal: *Understands and describes signs and symptoms to report to providers(Stroke Metric)  Outcome: Progressing Towards Goal  Goal: *Neurolgocially stable (absence of additional neurological deficits)  Outcome: Progressing Towards Goal  Goal: *Verbalizes importance of follow-up with primary care physician(Stroke Metric)  Outcome: Progressing Towards Goal  Goal: *Smoking cessation discussed,if applicable(Stroke Metric)  Outcome: Progressing Towards Goal  Goal: *Depression screening completed(Stroke Metric)  Outcome: Progressing Towards Goal
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
## 2021-07-29 NOTE — PROGRESS NOTES
Physical Therapy  7/29/2021    Chart reviewed, patient received sedated and on vent. Per ABCDEF protocol, will work with patient when PEEP is 10.0 or less, FIO2 60% or less (currently 100%, 5.0), and patient is following basic commands. Will follow patient peripherally. Recommend nursing to complete with patient, as able and per protocol, in order to promote cardiopulmonary systems, maintain strength, endurance and independence:   -bed in chair position or maximize full reverse Trendelenburg position to facilitate upright activity with foot board and non-skid footwear on 3x/day ~30-60 mins each   -ROM during bathing B UEs and LEs  -positioning to prevent contractures and edema. RASS -1/0/+1 (during SAT)  Active ROM   Sitting (bed in chair position)   Standing (reverse Trendelenburg)   ADLs   RASS -3/2  Passive ROM   Sit (bed in chair position)   RASS -5/-4  Passive ROM     Thank you for your assistance.      Kina Hannon, PT, DPT

## 2021-07-29 NOTE — PROGRESS NOTES
1930: Bedside and Verbal shift change report given to Siobhan (oncoming nurse) by Baldev Tabor (offgoing nurse). Report included the following information SBAR, Kardex, Procedure Summary, Intake/Output, Recent Results, Med Rec Status and Cardiac Rhythm SR pacs pvs.   2000: CVP 5 unit of blood hanging, Albumin given. No suction alarms @ P6.   2200: Blood completed CVP 10.  0000: HGB 6.8. One unit of blood ordered and given. 6893-2332: Patient went into Afib 130-140's. B/P systolic 53'G. Patient increased work of breathing with abdominal muscle use. Suction alarms on impella decreased P 5. Dr. Alvino Lees at bedside Amio bolus and drip started. ABG down PH 7.2 PO2 50. Fio2 turned up 100% Versed 2mg push and versed drip started. MERLE 50mg given. Able to turn up P 6.   0615: Map 40-50's. Suction alarm P level dropped to 5. Patient again increased work of breathing. Versed gtt increased to 10. Merle 30mg given. Levophed increased to 30 and epi 8. Albumin given. Unable to turn P level up. Oxygen sats 88%. Dr. Alvino Lees  Attempted to turn up PEEP sats and B/P dropped. Put patient back to original settings. 0730: Bedside and Verbal shift change report given to Flores Bartholomew (oncoming nurse) by Marv Yang (offgoing nurse). Report included the following information SBAR, Kardex, Intake/Output, MAR, Recent Results, Med Rec Status and Cardiac Rhythm Afib.

## 2021-07-29 NOTE — PROGRESS NOTES
Critical Care Note     ACTIVE MEDICAL PROBLEMS    Cardiogenic shock. 2.  Acute ST elevated myocardial infarction. 3.  Acute respiratory failure. IMPRESSION     Remains on high dose pressor support  Impella at P5  In atrial fibrillation   Sedated but more responsive      Patient Vitals for the past 4 hrs:   Temp Pulse Resp BP SpO2   07/29/21 1500 (!) 105.1 °F (40.6 °C) (!) 102 30  97 %   07/29/21 1400  (!) 114 24  (!) 85 %   07/29/21 1300  (!) 110 26  (!) 75 %   07/29/21 1239 (!) 103.6 °F (39.8 °C)       07/29/21 1200 (!) 102 °F (38.9 °C) (!) 105 30 107/69 90 %         Intake/Output Summary (Last 24 hours) at 7/29/2021 1554  Last data filed at 7/29/2021 1400  Gross per 24 hour   Intake 5368.97 ml   Output 1550 ml   Net 3818.97 ml     Hemodynamics:   CO: CO (l/min): 6 l/min   CI: CI (l/min/m2): 3.1 l/min/m2   CVP: CVP (mmHg): 12 mmHg (07/29/21 1500)   SVR: SVR (dyne*sec)/cm5: 812 (dyne*sec)/cm5 (07/29/21 1058)   PAP Systolic: PAP Systolic: 63 (81/43/17 8481)   PAP Diastolic: PAP Diastolic: 37 (71/95/24 2128)   PVR:     SV02:     SCV02: SCVO2 (%): 60 % (07/29/21 1500)    Vent settings     Ventilator Volumes  Vt Set (ml): 400 ml (07/29/21 0356)  Vt Exhaled (Machine Breath) (ml): 617 ml (07/29/21 0838)  Ve Observed (l/min): 13.2 l/min (07/29/21 2374)          Chest xrays reviewed         Labs reviewed for last 24 hours and trends noted in evaluation         Plan   Continue current support  Cardioversion as need to support rhythm    I personally spent 45 minutes of critical care time. This is time spent directly involved in patient care in the critical care unit as well as the coordination of care and discussions,. This does not include any procedural time which has been billed separately. During this entire length of time I was immediately available to the patient.  The reason for providing this level of medical care for this critically ill patient was due a critical illness that impaired one or more vital organ systems such that there was a high probability of imminent or life threatening deterioration in the patients condition.  This care involved high complexity decision making to assess, manipulate, and support vital system functions, to treat this degree of vital organ system failure and to prevent further life threatening deterioration of the patients condition    Medical decision making- High complexity   Risk of morbidity and mortality - high

## 2021-07-29 NOTE — PROGRESS NOTES
Pharmacist Note - Vancomycin Dosing    Consult provided for this 57 y.o. female for indication of sepsis. Antibiotic regimen(s): vancomycin + Zosyn  Patient on vancomycin PTA? NO     Recent Labs     21  1114 21  0510 21  0507 21  2352 21  1559 21  1007   WBC 12.2*  --  16.6* 15.8*   < > 15.7*   CREA  --  0.45*  --  0.49*  --  0.52*   BUN  --  13  --  13  --  15    < > = values in this interval not displayed. Frequency of BMP: daily through   Height: 172.7 cm  Weight: 85.2 kg  Est CrCl: 95 ml/min; UO: 0.7 ml/kg/hr  Temp (24hrs), Av.2 °F (38.4 °C), Min:100.3 °F (37.9 °C), Max:103.6 °F (39.8 °C)    Cultures:   blood - NGTD   respiratory - normal heather, final    blood - in process       MRSA Swab ordered (if applicable)? YES    The plan below is expected to result in a target range of AUC/ALVARO 400-600    Therapy will be initiated with a loading dose of 2250 mg IV x 1 to be followed by a maintenance dose of 750 mg IV every 8 hours. Pharmacy to follow patient daily and order levels / make dose adjustments as appropriate. *Vancomycin has been dosed used Bayesian kinetics software to target an AUC/ALVARO of 400-600, which provides adequate exposure for an assumed infection due to MRSA with an ALVARO of 1 or less while reducing the risk of nephrotoxicity as seen with traditional trough based dosing goals.

## 2021-07-29 NOTE — PROGRESS NOTES
[facility] ICU Progress Note    Admit Date: 2021  POD:  2 Day Post-Op    Procedure:  Procedure(s):  Placement of Right Axillary Impella 5.5,  Removal of Left Femoral Impella CP, Left Groin Exploration with Common Femoral Patch Angioplasty, Left Femoral Arteriogram and Thrombectomy,  AXEL by Dr. Mindi Chatman        Subjective:   Pt seen with Dr. Almaz Peters. Pt remains intubated/sedated. Vent FiO2 100% due to hypoxia. Went into Afib early this morning. MAPs decreased and PAPs increased. Received amio bolus and drip started at 1. Follows commands when sedation weaned. On vaso 0.04 units, epi 8 mcg, levo 30 mcg. Currently in [address] [place] [address]  120s. Impella at P5, D5 w/ bicarb as purge. Objective:   Vitals:  Blood pressure (!) 88/69, pulse (!) 121, temperature (!) 101.2 °F (38.4 °C), resp. rate 17, height 5' 8\" (1.727 m), weight 187 lb 13.3 oz (85.2 kg), SpO2 (!) 89 %.   Temp (24hrs), Av.9 °F (38.3 °C), Min:100 °F (37.8 °C), Max:101.4 °F (38.6 °C)    Hemodynamics:   CO: CO (l/min): 9 l/min   CI: CI (l/min/m2): 4.5 l/min/m2   CVP: CVP (mmHg): 10 mmHg (21)   SVR: SVR (dyne*sec)/cm5: 593 (dyne*sec)/cm5 (21 8437)   PAP Systolic: PAP Systolic: 61 (77/50/79 0143)   PAP Diastolic: PAP Diastolic: 32 (24 6696)   PVR:     SV02:     SCV02: SCVO2 (%): 66 % (21)    EKG/Rhythm:  ST 120s    Ventilator:  Ventilator Volumes  Vt Set (ml): 400 ml (21 0356)  Vt Exhaled (Machine Breath) (ml): 435 ml (21 0356)  Ve Observed (l/min): 7.95 l/min (21 0356)    Oxygen Therapy:  Oxygen Therapy  O2 Sat (%): (!) 89 % (21 0700)  Pulse via Oximetry: 108 beats per minute (21 1208)  O2 Device: Endotracheal tube (21 0400)  Skin Assessment: Clean, dry, & intact (21 1600)  Skin Protection for O2 Device: Yes (21 1600)  Orientation: Middle (21)  Location: Cheek (21 1600)  Interventions: Mouth Care (21 1600)  FIO2 (%): 100 % (21 0400)  ETCO2 (mmHg): 21 mmHg (07/26/21 2342)    CXR:   CXR Results  (Last 48 hours)               07/29/21 0504  XR CHEST PORT Final result    Impression:  No significant change. Narrative:  Clinical history: postop heart   INDICATION:   postop heart   COMPARISON: 7/20/2021       FINDINGS:   AP portable upright view of the chest demonstrates a stable enlarged   cardiopericardial silhouette. Cardiac assist device is stable. Pulmonary catheter   is stable. Mediastinal pleural drains are stable. Bibasilar atelectasis and   right-sided effusion unchanged. .There is no focal consolidation. .There is no   pneumothorax. . Patient is on a cardiac monitor. 07/28/21 0508  XR CHEST PORT Final result    Impression:  1. No significant change in the appearance of the chest or support hardware. 2. Interval placement of a gastric tube. Narrative:  INDICATION: . postop heart   COMPARISON: Previous chest xray, yesterday. LIMITATIONS: Portable technique. [place] FINDINGS: Single frontal view of the chest.    .   Lines/tubes/surgical: No significant change in the appearance of the ET tube,   right IJ approach [place] catheter and Impella. Interval placement of a   gastric tube which traverses the expected course of the esophagus, extends into   the left upper quadrant and terminates below the image. Heart/mediastinum: Unremarkable. Lungs/pleura: Hazy opacity over the right midlung and right base. Opacified left   base. Obscured left hemidiaphragm and partially obscured right hemidiaphragm. No   visible pneumothorax. Additional Comments: Surgical clips and cutaneous staples overlying the right   axilla. [facility] 07/27/21 1152  XR CHEST PORT Final result    Impression:  Satisfactory IMPELLA placement. Narrative:  EXAM: Portable CXR. 1136 hours. COMPARISON: 0500 hours.          INDICATION: post-Impella       FINDINGS:   Claudetta Rumps has been placed via the right subclavian vein and appears satisfactorily positioned, replacing prior device placed from the femoral access. ET tube   remains satisfactory. [place] catheter tip is in the main pulmonary artery. There is no pneumothorax. There remains bibasilar pulmonary haziness,   nonspecific. Admission Weight: Last Weight   Weight: 176 lb 5.9 oz (80 kg) Weight: 187 lb 13.3 oz (85.2 kg)     Intake / Output / Drain:  Current Shift: No intake/output data recorded. Last 24 hrs.:     Intake/Output Summary (Last 24 hours) at 2021 0746  Last data filed at 2021 0700  Gross per 24 hour   Intake 4392.3 ml   Output 1600 ml   Net 2792.3 ml       EXAM:  General:  No distress      Lungs:   Clear to auscultation bilaterally. Incision:  Axillary, groin sites C/D/I   Heart:  Regular rate and rhythm, S1, S2 normal, no murmur, click, rub or gallop. Abdomen:   Soft, non-tender. Bowel sounds hypoactive. No masses,  No organomegaly. Extremities:  No edema. No pulses below the knee bilat, feet cool bilat   Neurologic:  Sedated, unable to assess - pupils unequal      Labs:   Recent Labs     21  0510 21  0507 21  2352 21  2352 21  2345 21  1559   WBC  --  16.6*   < > 15.8*  --    < >   HGB  --  7.4*   < > 6.8*  --    < >   HCT  --  22.1*   < > 19.8*  --    < >   PLT  --  58*   < > 65*  --    < >   *  --    < > 130*  --   --    K 4.1  --    < > 4.2  --   --    BUN 13  --    < > 13  --   --    CREA 0.45*  --    < > 0.49*  --   --    *  --    < > 205*  --   --    GLUCPOC  --   --   --   --  246*  --    INR  --   --   --  1.1  --   --     < > = values in this interval not displayed. Assessment:     Active Problems:    Respiratory failure requiring intubation ([address] (2021)         Plan/Recommendations/Medical Decision Makin. Cardiogenic shock, STEMI: s/p OSCAR to LAD, upgrade to Impella 5.5 insertion. Cont D5 w/ bicarb as purge. Cont at P8. Trend labs - lactate 1.0, pBNP trending down.  ASA on hold d/t plts, on brilinta, lipitor per cards. Cont current gtts for now. On broad spectrum abx - if dc'd will need abx coverage for Impella    2. Afib/Aflutter s/p cardioversion vs ST: In ST now. On amio 1.     3. L lower ext ischemia s/p L fem thrombectomy, endarterectomy: vascular surgery following, aware of decreased pulses. Monitor for bleeding    4. Code S: neuro following, was too unstable for CT. Monitor. Following commands. 5. Acute hypoxic resp failure, hx of COPD: vent management per intensivist. Cont scheduled nebs    6. Acute bleeding, anemia: no further signs of active bleeding. Hgb 7.4. Monitor     7. Thrombocytopenia: plts down to 58K, trend. Holding ASA. Could be d/t consumption from Impella    8. DM: A1c 6.1%, on Lantus 10 units, SSI     9. HTN: currently hypotensive on multiple gtts    10. HLD: on lipitor    11. Leukocytosis, fever: on doxy, Zosyn. Blood, sputum cx NGTD. UA w/ only +1 bacteria, no culture sent     12. Nutrition/GI: On TF/protonix. Dispo: remains critically ill. Cont Impella today - no plans to wean yet. Further orders per primary teams. If continues to be unstable may perform limited TTE to assess Impella positioning.      Signed By: BECCA Ennis

## 2021-07-29 NOTE — PROGRESS NOTES
[facility]    ICU TEAM H&P    Name: Radha Lees   : 1960   MRN: [id number]   Date: 2021      Subjective:   Progress Note: 2021      Reason for ICU Admission:  Respiratory failure    HPI: This is a 54-year-old female with history of COPD, diabetes, high cholesterol, hypertension, significant smoking history presented to the  [facility] emergency department with nonproductive cough and shortness for breath for 2 days. She does not use oxygen at home. She been using her inhaler without significant relief. Last time she was on steroids was 3 years ago in association with pneumonia. No fever. She is fully Covid vaccinated. After arrival to [facility] ED she continued to have increasing shortness of breath and dropping oxygen saturations and the decision was made to intubate her. She was noted to have eleveated troponin but no changes on EKG per MD report. She was transferred to [facility]. Upon arrival to [facility] she was noted to have ST elevation on EKG. CODE STEMI was initiated. Interval events:     a.m.-patient taken to cath lab for OSCAR to LAD with post-PCI LOI flow 3. LVEDP was noted to be 40mmHg and a left femoral Impella was placed. On arrival in CCU Impella flows were noted to have decreased from 3.8L/min to 2.2 L/min. Bedside echo was performed and the Impella withdrawn 3.5cm  under direct echo visualization with a resulting increase in flow to 3.5L/min. An expanding hematoma at the left groin site was also noted on arrival in the CCU. The limb was cool and mottled with no doppler signal in the left DP, PT or popliteal. CT Surgery was consulted for placement of an axillary Impella both to increase support and to allow Vascular Surgery to address the threatened left lower extremity.       PM-now status post placement of axillary Impella 5.5, Removal of left femoral Impella CP, left groin exploration with, common femoral endarterectomy and patch angioplasty, left femoral thrombectomy and left iliac arteriogram, received 5 PRBCs and 2 of FFP in the OR, went into a flutter RVR in the OR-cardioverted x2 but did not respond-responded somewhat to an amnio bolus, came back on 3 pressors    7/28 - unequal pupils ON - code stroke called, now seems non focal, still intubated on 3 pressors with impella in situ    7/29 - went back in RVR on assoc with HD instability, spiking high grade fevers, high C.I, getting more difficult to oxygenate and ventilate      Assessment:     ICU Problems:    1. STEMI  2. Acute/Chronic Hypoxic Respiratory Failure  3. Hyponatremia  4. Hypochloremia  5. Elevated BNP  6. COPD  7. Hypertension  8. Hypercholesterolemia      ICU Comprehensive Plan of Care:     Neuro-analgesia/sedation with propofol and opioids as needed    Cardiovascular-continue hemodynamic monitoring, on aspirin, Brilinta, Lipitor, trend troponins, Impella in situ - currently @ P8, map goal greater than 65, high C.I, low SVR - aung added with good response and now coming off other pressors, heart rate goal less than 110- cont amio, keep magnesium above 2 and potassium above 4, serial vascular checks, CKs WNL, monitor JEROME output follow-up cardiology, cardiac surgery and vascular surgery recommendations    Pulmonary-continue lung protective mechanical ventilation, sat goal > 90%, difficult to oxygenate and ventilate now, high R sided pressures - started on Jcarlos with a good response, ?  PE - too unstable for transport at this time and empiric AC unsafe at this time, history of COPD- cont DESMOND/inhaled steroid therapy    GI-resume feeds if pressor requirements keep coming down, PPI GI prophylaxis    Renal-monitor urine output, correct electrolyte derangements as needed    Hematology-EBL in OR 1.5 L-status post 2 FFP, 1 plt and 5 of PRBCs 7/27, serial labs and transfuse for hemoglobin less than 7, platelet count less than 50, INR greater than 1.6 or fibrinogen less than 100    ID-spiking high grade fevers, more culture data sent, vanc added, cont Zosyn/doxycycline, f/u micro studies studies    Endocrinology-keep glucose between 100 & 180    Prognosis very guarded at this time    Objective:   Vital Signs:  Visit Vitals  /69 (BP 1 Location: Left arm, BP Patient Position: At rest)   Pulse 97   Temp (!) 104.8 °F (40.4 °C)   Resp 30   Ht 5' 8\" (1.727 m)   Wt 85.2 kg (187 lb 13.3 oz)   SpO2 97%   BMI 28.56 kg/m²      O2 Device: Endotracheal tube, Ventilator Temp (24hrs), Av.5 °F (38.6 °C), Min:100.3 °F (37.9 °C), Max:105.1 °F (40.6 °C)    CVP (mmHg): 12 mmHg (21 1600)      Intake/Output:     Intake/Output Summary (Last 24 hours) at 2021 1615  Last data filed at 2021 1500  Gross per 24 hour   Intake 5444.24 ml   Output 1450 ml   Net 3994.24 ml       Physical Exam:  General-intubated, sedated  Neuro-pupils reactive, following commands, seems non focal  Cardiac-RRR, Impella in situ  Lungs-clear anteriorly  Abdomen-soft, nontender, nondistended  Extremities-cool-left worse than right, no DPs, JEROME in left groin with sanguinous drainage    LABS AND  DATA: Personally reviewed  Recent Labs     21  1114 21  0507   WBC 12.2* 16.6*   HGB 7.0* 7.4*   HCT 20.8* 22.1*   PLT 57* 58*     Recent Labs     21  1114 21  0510 21  2352 21  1007 21  1007   NA  --  128* 130*   < > 130*   K  --  4.1 4.2   < > 4.2   CL  --  99 99   < > 99   CO2  --  24 26   < > 24   BUN  --  13 13   < > 15   CREA  --  0.45* 0.49*   < > 0.52*   GLU  --  233* 205*   < > 210*   CA  --  6.9* 7.3*   < > 7.4*   MG 1.8  --  1.7   < > 2.1   PHOS 2.6  --   --   --  2.8    < > = values in this interval not displayed.      Recent Labs     21  0510 21  235   AP 41* 37*   TP 4.4* 4.5*   ALB 2.6* 2.7*   GLOB 1.8* 1.8*     Recent Labs     21  2352 21  2347   INR 1.1 1.1   PTP 11.8* 11.6*   APTT 25.8 25.4      Recent Labs     21  1121 21  0945   PHI 7.28* 7.22*   PCO2I 47.0* 56.3*   PO2I 54* 62*   FIO2I 100 100     Recent Labs     07/29/21  1114 07/29/21  0507 07/28/21  2352 07/28/21  2352 07/28/21  1559 07/28/21  1559    170   < > 166   < > 206*   TROIQ  --   --   --  11.00*  --  11.30*    < > = values in this interval not displayed. Hemodynamics:   PAP: PAP Systolic: 63 (34/44/41 2979) CO: CO (l/min): 5.2 l/min (07/29/21 1600)   [place]:   CI: CI (l/min/m2): 2.7 l/min/m2 (07/29/21 1600)   CVP:  CVP (mmHg): 12 mmHg (07/29/21 1600) SVR:       PVR:       Ventilator Settings:  Mode Rate Tidal Volume Pressure FiO2 PEEP   Assist control   400 ml  12 cm H2O 100 % 5 cm H20     Peak airway pressure: 27 cm H2O    Minute ventilation: 13.2 l/min        MEDS: Reviewed    Chest X-Ray:  CXR Results  (Last 48 hours)               07/29/21 0504  XR CHEST PORT Final result    Impression:  No significant change. Narrative:  Clinical history: postop heart   INDICATION:   postop heart   COMPARISON: 7/20/2021       FINDINGS:   AP portable upright view of the chest demonstrates a stable enlarged   cardiopericardial silhouette. Cardiac assist device is stable. Pulmonary catheter   is stable. Mediastinal pleural drains are stable. Bibasilar atelectasis and   right-sided effusion unchanged. .There is no focal consolidation. .There is no   pneumothorax. . Patient is on a cardiac monitor. 07/28/21 0508  XR CHEST PORT Final result    Impression:  1. No significant change in the appearance of the chest or support hardware. 2. Interval placement of a gastric tube. Narrative:  INDICATION: . postop heart   COMPARISON: Previous chest xray, yesterday. LIMITATIONS: Portable technique. [facility] FINDINGS: Single frontal view of the chest.    .   Lines/tubes/surgical: No significant change in the appearance of the ET tube,   right IJ approach [place] catheter and Impella.  Interval placement of a   gastric tube which traverses the expected course of the esophagus, extends into   the left upper quadrant and terminates below the image. Heart/mediastinum: Unremarkable. Lungs/pleura: Hazy opacity over the right midlung and right base. Opacified left   base. Obscured left hemidiaphragm and partially obscured right hemidiaphragm. No   visible pneumothorax. Additional Comments: Surgical clips and cutaneous staples overlying the right   axilla. .             Imaging reviewed      NOTE OF PERSONAL INVOLVEMENT IN CARE   This patient has a high probability of imminent, clinically significant deterioration, which requires the highest level of preparedness to intervene urgently. I participated in the decision-making and personally managed or directed the management of the following life and organ supporting interventions that required my frequent assessment to treat or prevent imminent deterioration. I personally spent 80 minutes of critical care time. This does not include any procedural time.       Signed By: Jeri Alaniz MD     July 29, 2021
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
## 2021-07-30 NOTE — PROGRESS NOTES
0730 Bedside shift change report given to Carrie Coleman (oncoming nurse) by Chandra Mora (offgoing nurse). Report included the following information SBAR, Kardex, Procedure Summary, Intake/Output, MAR, Recent Results and Cardiac Rhythm NSR w/ freq PACs + PVCs. 0800 Pt appears comfortable, dual neuro exam unchanged from night shift assessment. Weaning off Chirag. Plan to transfuse platelets once available, replete Ca+, insertion of femoral arterial line, and echo. 2907 Dr. Mirian Tomlinson placing R groin arterial line    1030 One unit platelets transfusing    1115 Echo at bedside. Dr. Mando HUDSON advancing impella to 47.5 cm    1300 First dose diuretic given    1500 One unit PRBCs transfusing    1600 Successfully weaning Levo, Chirag, and Amio. Groin art line bleeding intermittently throughout the shift, surgicel applied, hemostasis achieved. Family at bedside. 1800 ABG complete. MD aware of results, per MD decrease fiO2 to 80% and keep nitric at 40ppm. Will correct pH according to CMP results    **Family available to meet with [facility] NP August 2 @ 5330**    1930 Bedside shift change report given to Chandra Mora (oncoming nurse) by Carrie Coleman (offgoing nurse). Report included the following information SBAR, Kardex, Procedure Summary, Intake/Output, MAR, Recent Results and Cardiac Rhythm NSR w freq PACs + PVCs.

## 2021-07-30 NOTE — PROGRESS NOTES
Physical Therapy  7/30/2021    Chart reviewed. Patient with critical platelets, MD attempting femoral art line, and FiO2 100%, PEEP 8 on ventilator. Per ABCDEF protocol, will work with patient when PEEP is 10.0 or less, FIO2 60% or less, and patient is following basic commands. Will follow patient peripherally. Recommend nursing to complete with patient, as able and per protocol, in order to promote cardiopulmonary systems, maintain strength, endurance and independence:   -bed in chair position or maximize full reverse Trendelenburg position to facilitate upright activity with foot board and non-skid footwear on 3x/day ~30-60 mins each   -ROM during bathing B UEs and LEs  -positioning to prevent contractures and edema. RASS -1/0/+1 (during SAT)  Active ROM   Sitting (bed in chair position)   Standing (reverse Trendelenburg)   ADLs   RASS -3/2  Passive ROM   Sit (bed in chair position)   RASS -5/-4  Passive ROM     Thank you for your assistance.        Kina Miles, PT, DPT

## 2021-07-30 NOTE — PROGRESS NOTES
Returned call to [facility], relayed to Justyna forms were received. Placed on MD desk for review.    [facility]    ICU TEAM H&P    Name: Karma Becker   : 1960   MRN: [id number]   Date: 2021      Subjective:   Progress Note: 2021      Reason for ICU Admission:  Respiratory failure    HPI: This is a 40-year-old female with history of COPD, diabetes, high cholesterol, hypertension, significant smoking history presented to the  [place] emergency department with nonproductive cough and shortness for breath for 2 days. She does not use oxygen at home. She been using her inhaler without significant relief. Last time she was on steroids was 3 years ago in association with pneumonia. No fever. She is fully Covid vaccinated. After arrival to [facility] ED she continued to have increasing shortness of breath and dropping oxygen saturations and the decision was made to intubate her. She was noted to have eleveated troponin but no changes on EKG per MD report. She was transferred to [facility]. Upon arrival to [facility] she was noted to have ST elevation on EKG. CODE STEMI was initiated. Interval events:     a.m.-patient taken to cath lab for OSCAR to LAD with post-PCI LOI flow 3. LVEDP was noted to be 40mmHg and a left femoral Impella was placed. On arrival in CCU Impella flows were noted to have decreased from 3.8L/min to 2.2 L/min. Bedside echo was performed and the Impella withdrawn 3.5cm  under direct echo visualization with a resulting increase in flow to 3.5L/min. An expanding hematoma at the left groin site was also noted on arrival in the CCU. The limb was cool and mottled with no doppler signal in the left DP, PT or popliteal. CT Surgery was consulted for placement of an axillary Impella both to increase support and to allow Vascular Surgery to address the threatened left lower extremity.       PM-now status post placement of axillary Impella 5.5, Removal of left femoral Impella CP, left groin exploration with, common femoral endarterectomy and patch angioplasty, left femoral thrombectomy and left iliac arteriogram, received 5 PRBCs and 2 of FFP in the OR, went into a flutter RVR in the OR-cardioverted x2 but did not respond-responded somewhat to an amnio bolus, came back on 3 pressors    7/28 - unequal pupils ON - code stroke called, now seems non focal, still intubated on 3 pressors with impella in situ    7/29 - went back in RVR on assoc with HD instability, spiking high grade fevers, high C.I, getting more difficult to oxygenate and ventilate    7/30 - fever better, slowly coming down on pressors    Assessment:     ICU Problems:    1. STEMI  2. Acute/Chronic Hypoxic Respiratory Failure  3. Hyponatremia  4. Hypochloremia  5. Elevated BNP  6. COPD  7. Hypertension  8. Hypercholesterolemia      ICU Comprehensive Plan of Care:     Neuro-analgesia/sedation with propofol, versed and opioids as needed    Cardiovascular-continue hemodynamic monitoring, on aspirin, Brilinta, Lipitor, trend troponins, Impella in situ - currently @ P5, map goal greater than 65, acceptable C.I, on levo/vaso/aung, heart rate goal less than 110- cont amio, keep magnesium above 2 and potassium above 4, serial vascular checks, CKs WNL, follow-up cardiology, cardiac surgery and vascular surgery recommendations    Pulmonary-continue lung protective mechanical ventilation, sat goal > 90%, difficult to oxygenate and ventilate, high R sided pressures - responded to Jcarlos, ?  PE - too unstable for transport at this time and empiric AC unsafe at this time, history of COPD- cont DESMOND/inhaled steroid therapy    GI-resume feeds if pressor requirements keep coming down, PPI GI prophylaxis    Renal-monitor urine output, correct electrolyte derangements as needed, will diurese today    Hematology-EBL in OR 1.5 L-status post 2 FFP, 1 plt and 5 of PRBCs 7/27, serial labs and transfuse for hemoglobin less than 7, platelet count less than 50, INR greater than 1.6 or fibrinogen less than 100    ID- defervesced, WBC going up, on Vanc/Zosyn/doxycycline, f/u micro studies studies - NGTD    Endocrinology-keep glucose between 100 & 180    Prognosis very guarded at this time    Objective:   Vital Signs:  Visit Vitals  /63 (BP 1 Location: Right leg)   Pulse 74   Temp 99.3 °F (37.4 °C)   Resp 28   Ht 5' 8\" (1.727 m)   Wt 92.1 kg (203 lb)   SpO2 95%   BMI 30.87 kg/m²      O2 Device: Ventilator Temp (24hrs), Av.8 °F (38.8 °C), Min:99.3 °F (37.4 °C), Max:105.4 °F (40.8 °C)    CVP (mmHg): 117 mmHg (21 1600)      Intake/Output:     Intake/Output Summary (Last 24 hours) at 2021 1653  Last data filed at 2021 1400  Gross per 24 hour   Intake 6265.47 ml   Output 1295 ml   Net 4970.47 ml       Physical Exam:  General-intubated, sedated  Neuro-pupils reactive, withdraws in all 4  Cardiac-sometime irregular and tachy, Impella in situ  Lungs-clear anteriorly  Abdomen-soft, nontender, nondistended  Extremities- pop and PT dopplerable on R, only Pop dopplerable on L    LABS AND  DATA: Personally reviewed  Recent Labs     21  0548   WBC 17.8* 14.8*   HGB 6.8* 7.1*   HCT 20.3* 21.1*   PLT 60* 39*     Recent Labs     21  1200 21  0641 21  0548 21  0548   *  --   --  126*   K 4.0 4.3   < > 4.4     --   --  99   CO2 20*  --   --  21   BUN 14  --   --  14   CREA 0.59  --   --  0.70   *  --   --  250*   CA 7.0*  --   --  6.7*   MG 1.9 2.0   < > 2.0   PHOS 2.8  --   --  2.0*    < > = values in this interval not displayed.      Recent Labs     21  1200 21  0548   AP 37* 35*   TP 4.3* 4.4*   ALB 2.3* 2.4*   GLOB 2.0 2.0     Recent Labs     21  1200 21  0548   INR 1.3* 1.3*   PTP 13.6* 13.4*   APTT 27.9 30.4      Recent Labs     21  1121 21  0945   PHI 7.28* 7.22*   PCO2I 47.0* 56.3*   PO2I 54* 62*   FIO2I 100 100     Recent Labs     21  1200 21  0548 21  0507 21  2352 21  1559 21  1559   CPK 97 134   < > 166   < > 206*   TROIQ  --   --   --  11.00*  --  11.30*    < > = values in this interval not displayed. Hemodynamics:   PAP: PAP Systolic: 46 (55/29/36 5201) CO: CO (l/min): 5.8 l/min (07/30/21 1200)   Wedge:   CI: CI (l/min/m2): 6 l/min/m2 (07/30/21 1200)   CVP:  CVP (mmHg): 117 mmHg (07/30/21 1600) SVR:       PVR:       Ventilator Settings:  Mode Rate Tidal Volume Pressure FiO2 PEEP   Pressure control   400 ml  12 cm H2O 100 % 8 cm H20     Peak airway pressure: 34 cm H2O    Minute ventilation: 13.6 l/min        MEDS: Reviewed    Chest X-Ray:  CXR Results  (Last 48 hours)               07/30/21 0614  XR CHEST PORT Final result    Impression:  No interval change. Narrative:  Clinical history: postop heart   INDICATION:   postop heart   COMPARISON: 7/29/2021       FINDINGS:   AP portable upright view of the chest demonstrates a stable prominent   cardiopericardial silhouette. Layering pleural effusion on the right is not   changed. Left basilar atelectasis unchanged. .Assist device ET tube and NG tube   are stable. Pulmonary artery catheter stable. There is no pneumothorax. . Patient   is on a cardiac monitor. 07/29/21 0504  XR CHEST PORT Final result    Impression:  No significant change. Narrative:  Clinical history: postop heart   INDICATION:   postop heart   COMPARISON: 7/20/2021       FINDINGS:   AP portable upright view of the chest demonstrates a stable enlarged   cardiopericardial silhouette. Cardiac assist device is stable. Pulmonary catheter   is stable. Mediastinal pleural drains are stable. Bibasilar atelectasis and   right-sided effusion unchanged. .There is no focal consolidation. .There is no   pneumothorax. . Patient is on a cardiac monitor.                   Imaging reviewed      NOTE OF PERSONAL INVOLVEMENT IN CARE   This patient has a high probability of imminent, clinically significant deterioration, which requires the highest level of preparedness to intervene urgently. I participated in the decision-making and personally managed or directed the management of the following life and organ supporting interventions that required my frequent assessment to treat or prevent imminent deterioration. I personally spent 80 minutes of critical care time. This does not include any procedural time.       Signed By: Lisa Farias MD     July 30, 2021
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
## 2021-07-30 NOTE — PROCEDURES
Indication - HD monitoring  Diagnosis - cardiogenic shock    A time-out was completed verifying correct patient, procedure, site, positioning, and special equipment if applicable. The patients right groin was prepped and draped in sterile fashion. 1% Lidocaine was used to anesthetize the area. An 18G Arrow arterial line was introduced into the radial artery with ultrasound guidance. The catheter was threaded over the guide wire and the needle was removed with appropriate pulsatile blood return. The catheter was then sutured in place to the skin and a sterile dressing applied.

## 2021-07-30 NOTE — PROGRESS NOTES
[facility] ICU Progress Note    Admit Date: 2021  POD:  3 Day Post-Op    Procedure:  Procedure(s):  Placement of Right Axillary Impella 5.5,  Removal of Left Femoral Impella CP, Left Groin Exploration with Common Femoral Patch Angioplasty, Left Femoral Arteriogram and Thrombectomy,  AXEL by Dr. Temi Franklin        Subjective:   Pt seen with Dr. Kip Mullen. Pt remains intubated/sedated. Vent FiO2 100%, Jcarlos 40 ppm. Follows commands when sedation weaned. On amio 1, vaso 0.04 units, levo 30 mcg, aung 150, fentanyl, versed. Tmax 105.4F. Impella at P5, D5 w/ bicarb as purge. NSR 80s     Objective:   Vitals:  Blood pressure (!) 101/57, pulse 88, temperature 100.4 °F (38 °C), resp. rate 30, height 5' 8\" (1.727 m), weight 203 lb 11.3 oz (92.4 kg), SpO2 98 %.   Temp (24hrs), Av.2 °F (39.6 °C), Min:100.4 °F (38 °C), Max:105.4 °F (40.8 °C)    Hemodynamics:   CO: CO (l/min): 4.7 l/min   CI: CI (l/min/m2): 2.4 l/min/m2   CVP: CVP (mmHg): 7 mmHg (21)   SVR: SVR (dyne*sec)/cm5: 1294 (dyne*sec)/cm5 (21 2789)   PAP Systolic: PAP Systolic: 46 (92/34/01 6799)   PAP Diastolic: PAP Diastolic: 27 (94/70/04 3137)   PVR:     SV02:     SCV02: SCVO2 (%): 49 % (21 07)    EKG/Rhythm:  NSR 80s    Ventilator:  Ventilator Volumes  Vt Set (ml): 400 ml (21 0356)  Vt Exhaled (Machine Breath) (ml): 556 ml (21)  Ve Observed (l/min): 16.7 l/min (2114)    Oxygen Therapy:  Oxygen Therapy  O2 Sat (%): 98 % (21)  Pulse via Oximetry: 87 beats per minute (21)  O2 Device: Ventilator (21)  Skin Assessment: Clean, dry, & intact (21)  Skin Protection for O2 Device: Yes (21)  Orientation: Bilateral;Anterior (21)  Location: Cheek;Lip (21)  Interventions: Mouth Care;Reposition Device (21 06)  FIO2 (%): 100 % (21)  ETCO2 (mmHg): 21 mmHg (21 2245)    CXR:   CXR Results  (Last 48 hours)               21  XR CHEST PORT Final result    Impression:  No interval change. Narrative:  Clinical history: postop heart   INDICATION:   postop heart   COMPARISON: 7/29/2021       FINDINGS:   AP portable upright view of the chest demonstrates a stable prominent   cardiopericardial silhouette. Layering pleural effusion on the right is not   changed. Left basilar atelectasis unchanged. .Assist device ET tube and NG tube   are stable. Pulmonary artery catheter stable. There is no pneumothorax. . Patient   is on a cardiac monitor. 07/29/21 0504  XR CHEST PORT Final result    Impression:  No significant change. Narrative:  Clinical history: postop heart   INDICATION:   postop heart   COMPARISON: 7/20/2021       FINDINGS:   AP portable upright view of the chest demonstrates a stable enlarged   cardiopericardial silhouette. Cardiac assist device is stable. Pulmonary catheter   is stable. Mediastinal pleural drains are stable. Bibasilar atelectasis and   right-sided effusion unchanged. .There is no focal consolidation. .There is no   pneumothorax. . Patient is on a cardiac monitor. Admission Weight: Last Weight   Weight: 176 lb 5.9 oz (80 kg) Weight: 203 lb 11.3 oz (92.4 kg)     Intake / Output / Drain:  Current Shift: No intake/output data recorded. Last 24 hrs.:     Intake/Output Summary (Last 24 hours) at 7/30/2021 0834  Last data filed at 7/30/2021 0700  Gross per 24 hour   Intake 7353.92 ml   Output 1585 ml   Net 5768.92 ml       EXAM:  General:  No distress      Lungs:   Clear to auscultation bilaterally. Incision:  Axillary, groin sites C/D/I   Heart:  Regular rate and rhythm, S1, S2 normal, no murmur, click, rub or gallop. Abdomen:   Soft, non-tender. Bowel sounds hypoactive. No masses,  No organomegaly. Extremities:  No edema.  No pulses below the knee bilat, feet cool bilat   Neurologic:  Sedated     Labs:   Recent Labs     07/30/21  0641 07/30/21  0548 07/30/21  0548 21  0544 21  2340   WBC  --   --  14.8*  --    < >   HGB  --   --  7.1*  --    < >   HCT  --   --  21.1*  --    < >   PLT  --   --  39*  --    < >   NA  --   --  126*  --    < >   K 4.3   < > 4.4  --    < >   BUN  --   --  14  --    < >   CREA  --   --  0.70  --    < >   GLU  --   --  250*  --    < >   GLUCPOC  --   --   --  306*  --    INR  --   --  1.3*  --    < >    < > = values in this interval not displayed. Assessment:     Active Problems:    Respiratory failure requiring intubation ([facility] [address] (2021)         Plan/Recommendations/Medical Decision Makin. Cardiogenic shock, STEMI: s/p OSCAR to LAD, upgrade to Impella 5.5 insertion. Cont D5 w/ bicarb as purge. Cont at P5. Trend labs - lactate increasing, pBNP trending down. ASA on hold d/t plts, on brilinta, lipitor per cards. Cont current gtts for now. On broad spectrum abx - if dc'd will need abx coverage for Impella    2. Afib/Aflutter s/p cardioversion vs ST: In ST now. On amio 1.     3. L lower ext ischemia s/p L fem thrombectomy, endarterectomy: vascular surgery following, aware of decreased pulses. Monitor for bleeding    4. Code S: neuro following, was too unstable for CT. Monitor. Following commands. 5. Acute hypoxic resp failure, hx of COPD: vent management per intensivist. Cont scheduled nebs    6. Acute bleeding, anemia: no further signs of active bleeding. Hgb 7.1. Monitor     7. Thrombocytopenia: plts down to 39K, trend. Holding ASA. Could be d/t consumption from Impella. Transfuse platelets today. 8. DM: A1c 6.1%, on Lantus 10 units, SSI     9. HTN: currently hypotensive on multiple gtts    10. HLD: on lipitor    11. Leukocytosis, fever: on doxy, Zosyn. Blood, sputum cx NGTD. UA w/ only +1 bacteria, no culture sent     12. Nutrition/GI: On TF/protonix. Dispo: remains critically ill. Cont Impella today - no plans to wean yet. Further orders per primary teams. Will assess positioning on TTE today.      Signed By: Antelmo [facility]

## 2021-07-30 NOTE — PROGRESS NOTES
During rounds, found Nitric reading 20ppm while set at 40ppm, silent alarm saying dose not delivered. Replaced pieces, did calibration, no change. Nitric machine reading contact  if suggestions did not fix problem. Changed Nitric machine with another RT at bedside giving manual breaths via Nitric flowmeter set at 40ppm. Pt tolerated well.
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
## 2021-07-30 NOTE — PROGRESS NOTES
Comprehensive Nutrition Assessment    Type and Reason for Visit: Initial, Consult    Nutrition Recommendations/Plan:      Start trophic tube feeding: Vital AF 1.2 @ 30 ml/hr with 30 ml water flush q 8 hr     If tolerated x 24 hr and is hemodynamically stable-recommend advancement to goal of 65 ml/hr with 30 ml water flush q 8 hr (once sodium WNL-increase flush to 90 ml q 4 hr)     Nutrition Assessment:    Pt admitted d/t Respiratory failure requiring intubation. PMHx: COPD, DM 2, HTN. Intubated @ [place] [facility]. STEMI-PCI to LAD/Imprella placement 7/26 c/b groin hematoma. 7/27 Femoral Impella removed-placed axillary/(L) femoral thrombectomy. Cardiogenic shock-requiring 4 pressors initially. Now on 3 pressors-weaning down Levophed today. Negative MST on admission indicating no malnutrition risk PTA. Inadequate nutrient intake since intubation d/t hemodynamic instability. Trophic tube feeding ordered 7/28 but held yesterday d/t escalating pressor requirements. Plan to resume trophic tube feeding today; if well tolerated and pt hemodynamically stable recommend advancement to goal. Would not recommend Glucerna 1.5 at this time until pt on less pressor support. Noted pt with good BG control PTA. Recommended adjusting to a lower CHO formula than Osmolite 1.2. Vital AF 1.2 @ 65 ml/hr with 30 ml water flush q 8 hr will provide 1430 ml, 1715 calories, 107 gm protein, 158 gm CHO and 1250 ml free water (tube feeding/flush) per day to meet estimated needs. Once sodium WNL recommend increasing flush to 90 ml q 4 hr    Last BM was PTA; bowel regimen ordered. 12 kg weight gain since admission. Pt is edematous and +15 liters since admission. Sodium trending down. Phosphorus replacement ordered. BG up into the 300's today (without enteral nutrition)-d/t steroid. [facility] following-will need increased coverage. Malnutrition Assessment:  Malnutrition Status:   Moderate malnutrition    Context:  Acute illness     Findings of the 6 clinical characteristics of malnutrition:   Energy Intake:  7 - 50% or less of est energy requirements for 5 or more days  Weight Loss:  No significant weight loss     Body Fat Loss:  No significant body fat loss,     Muscle Mass Loss:  No significant muscle mass loss,    Fluid Accumulation:  1 - Mild, Generalized, Extremities   Strength:  Not performed     Nutritionally Significant Medications:   Levophed, Vasopressin, Chirag, Lipitor, hydrocortisone, Lantus, Humalog, magnesium oxide, Protonix, Miralax, Pericolace, Sodium phosphate, calcium gluconate    Estimated Daily Nutrient Needs:  Energy (kcal): 1915 ([place]); Weight Used for Energy Requirements: Admission (80 kg)  Protein (g):  (1.2-1.4g/kg); Weight Used for Protein Requirements: Admission (80 kg)  Fluid (ml/day):  ; Method Used for Fluid Requirements: 1 ml/kcal    Nutrition Related Findings:       BM: PTA  Edema: 1+ generalized, LUE, 1+ pitting BLE, 2+ RUE  Wounds:  None       Current Nutrition Therapies:   Diet:NPO  Tube Feeding: Osmolite 1.2 @ 20 ml/hr with 30 ml water flush q 8 hr  Additional Caloric Sources:  None      Anthropometric Measures:  · Height:  5' 8\" (172.7 cm)  · Current Body Wt:  92.4 kg (203 lb 11.3 oz)   · Admission Body Wt:  176 lb 5.9 oz      · Ideal Body Wt:  140:  145.5 %     · BMI Categories:  Overweight (BMI 25.0-29. 9)     Wt Readings from Last 10 Encounters:   07/30/21 92.1 kg (203 lb)   07/16/20 74.8 kg (165 lb)   12/23/19 77.1 kg (170 lb)   05/30/19 83 kg (183 lb)   02/26/19 80.1 kg (176 lb 9.6 oz)   02/25/19 77.3 kg (170 lb 6.4 oz)   02/15/19 76.8 kg (169 lb 6.4 oz)   10/25/18 82.1 kg (181 lb)   06/14/18 84.6 kg (186 lb 6.4 oz)   05/10/18 84.1 kg (185 lb 6.4 oz)       Nutrition Diagnosis:   · Inadequate oral intake related to cardiac dysfunction, impaired respiratory function as evidenced by NPO or clear liquid status due to medical condition, intubation    ·   related to   as evidenced by        Nutrition Interventions:   Food and/or Nutrient Delivery: Start tube feeding  Nutrition Education and Counseling: No recommendations at this time  Coordination of Nutrition Care: Continue to monitor while inpatient, Interdisciplinary rounds    Goals: Tolerate EN at goal in next 2-3 days. Nutrition Monitoring and Evaluation:   Behavioral-Environmental Outcomes: None identified  Food/Nutrient Intake Outcomes: Enteral nutrition intake/tolerance  Physical Signs/Symptoms Outcomes: Biochemical data, Hemodynamic status, Fluid status or edema, GI status, Weight    Discharge Planning:     Too soon to determine     Andrews Mcintyre RD CNSC  Contact: [employer]

## 2021-07-30 NOTE — PROGRESS NOTES
1930: Bedside and Verbal shift change report given to Esvin Simon RN (oncoming nurse) by Zoe Doyle RN (offgoing nurse). Report included the following information SBAR, Kardex, ED Summary, OR Summary, Procedure Summary, Intake/Output, MAR, Recent Results, Med Rec Status, Cardiac Rhythm NSR/PAC/PVC, Alarm Parameters  and Dual Neuro Assessment. Drips:                                      Vent:                           Impella:   KVO @ 5 + 20   PC 28                          P-5  Amio @ 0.5   Vt 200-500  D5/Bicarb PurgeFluid               Fentanyl @ 200                      PEEP 8                         Versed @ 10   FiO2 80%        Levo @ 6   Nitric @ 40ppm  Chirag @ 90  Vaso @ 0.04     2000: Resumed pt care. K+ 3.2, Phos 2.2, replaced per protocol     2100: Trickle feeds started per order. RUE with tears, L groin with bullae, wound care consult placed     0000: Incontinence care provided for large, dark, liquid BM. Occult stool +, MD notified. RUE extremely swollen/weeping, PIV removed. Q6 labs drawn and sent     0015: UOP only 15cc this hour, CI 1.9, CO 3.6. MD @ bedside, US done, impella placement ok, LV function worse. Impella increased to P-6     0045: Phos 2.4, orders for neutra-phos. Critical PLT 46    0200: Incontinence care provided for large, loose BM     0230: Impella cassette & purge bag changed     0345: MD @ bedside, Impella decreased to P-5    0500: ABG:  pO2 51.7; pCO2 26.2; pH 7.422;  HCO3 26.2, %O2 Sat 87.7, iCal 1.03. FiO2 increased to 100%. 0600: Q6 labs drawn and sent    0615: tHb 7.7; FO2Hb 64, SWAN recalibrated     0700: K+ 3.9, Mag 1.9, Phos 2.0, replaced per protocol     0730: Bedside and Verbal shift change report given to Zoe Doyle RN (oncoming nurse) by Esvin Simon RN (offgoing nurse).  Report included the following information SBAR, Kardex, ED Summary, OR Summary, Procedure Summary, Intake/Output, MAR, Recent Results, Med Rec Status, Cardiac Rhythm NSR;PVC;PAC, Alarm Parameters  and Dual Neuro Assessment.      Drips: KVO @ 5 + 20, Amio @ 0.5, Fentanyl @ 200, Versed @ 10, Levo @ 6, Chirag @ 75, Vaso @ 0.04

## 2021-07-30 NOTE — PROGRESS NOTES
Occupational Therapy. 7/30/2021      Chart reviewed. Patient with critical platelets, MD attempting femoral art line, and FiO2 100%, PEEP 8 on ventilator. Per ABCDEF protocol, will work with patient when PEEP is 10.0 or less, FIO2 60% or less, and patient is following basic commands. Will follow patient peripherally.      Recommend nursing to complete with patient, as able and per protocol, in order to promote cardiopulmonary systems, maintain strength, endurance and independence:   -bed in chair position or maximize full reverse Trendelenburg position to facilitate upright activity with foot board and non-skid footwear on 3x/day ~30-60 mins each   -ROM during bathing B UEs and LEs  -positioning to prevent contractures and edema.   RASS -1/0/+1 (during SAT) · Active ROM  · Sitting (bed in chair position)  · Standing (reverse Trendelenburg)  · ADLs   RASS -3/2 · Passive ROM  · Sit (bed in chair position)   RASS -5/-4 · Passive ROM      Thank you for your assistance.

## 2021-07-30 NOTE — PROGRESS NOTES
Critical Care Note     ACTIVE MEDICAL PROBLEMS    Cardiogenic shock. 2.  Acute ST elevated myocardial infarction. 3.  Acute respiratory failure. IMPRESSION     Remains critically ill on high dose pressors and impella at P5  Impella in good position no suction alarms   Needing femoral art line  Urine output adequate   Awakens and follows commands     Patient Vitals for the past 4 hrs:   Temp Pulse Resp BP SpO2   07/30/21 0900  91  98/64 97 %   07/30/21 0800 100 °F (37.8 °C) 89 30 91/72    07/30/21 0714  88 30  98 %   07/30/21 0700  85 30 (!) 101/57 97 %   07/30/21 0600  91 30 (!) 107/56 97 %         Intake/Output Summary (Last 24 hours) at 7/30/2021 0916  Last data filed at 7/30/2021 0700  Gross per 24 hour   Intake 7133.59 ml   Output 1485 ml   Net 5648.59 ml     Hemodynamics:   CO: CO (l/min): 5.8 l/min   CI: CI (l/min/m2): 3 l/min/m2   CVP: CVP (mmHg): 9 mmHg (07/30/21 0900)   SVR: SVR (dyne*sec)/cm5: 841 (dyne*sec)/cm5 (07/30/21 9586)   PAP Systolic: PAP Systolic: 50 (19/91/42 2798)   PAP Diastolic: PAP Diastolic: 26 (10/26/92 4323)   PVR:     SV02:     SCV02: SCVO2 (%): 52 % (07/30/21 0900)    Vent settings     Ventilator Volumes  Vt Set (ml): 400 ml (07/29/21 0356)  Vt Exhaled (Machine Breath) (ml): 556 ml (07/30/21 0714)  Ve Observed (l/min): 16.7 l/min (07/30/21 0714)          Chest xrays reviewed         Labs reviewed for last 24 hours and trends noted in evaluation         Plan   For TTE today to confirm positioning   Continue pressors at current levels       I personally spent 45 minutes of critical care time. This is time spent directly involved in patient care in the critical care unit as well as the coordination of care and discussions,. This does not include any procedural time which has been billed separately. During this entire length of time I was immediately available to the patient.  The reason for providing this level of medical care for this critically ill patient was due a critical illness that impaired one or more vital organ systems such that there was a high probability of imminent or life threatening deterioration in the patients condition.  This care involved high complexity decision making to assess, manipulate, and support vital system functions, to treat this degree of vital organ system failure and to prevent further life threatening deterioration of the patients condition    Medical decision making- High complexity   Risk of morbidity and mortality - high

## 2021-07-30 NOTE — PROGRESS NOTES
Problem: Falls - Risk of  Goal: *Absence of Falls  Description: Document [place] Fall Risk and appropriate interventions in the flowsheet. Outcome: Progressing Towards Goal  Note: Fall Risk Interventions:       Mentation Interventions: Adequate sleep, hydration, pain control, Door open when patient unattended, More frequent rounding, Update white board, Toileting rounds    Medication Interventions: Evaluate medications/consider consulting pharmacy    Elimination Interventions: Toileting schedule/hourly rounds              Problem: Patient Education: Go to Patient Education Activity  Goal: Patient/Family Education  Outcome: Progressing Towards Goal     Problem: Pressure Injury - Risk of  Goal: *Prevention of pressure injury  Description: Document Dario Scale and appropriate interventions in the flowsheet. Outcome: Progressing Towards Goal  Note: Pressure Injury Interventions:  Sensory Interventions: Assess changes in LOC, Assess need for specialty bed, Avoid rigorous massage over bony prominences, Check visual cues for pain, Keep linens dry and wrinkle-free, Minimize linen layers, Pressure redistribution bed/mattress (bed type), Turn and reposition approx. every two hours (pillows and wedges if needed)    Moisture Interventions: Apply protective barrier, creams and emollients, Assess need for specialty bed, Absorbent underpads, Check for incontinence Q2 hours and as needed, Internal/External urinary devices, Maintain skin hydration (lotion/cream), Minimize layers, Moisture barrier    Activity Interventions: Pressure redistribution bed/mattress(bed type)    Mobility Interventions: Assess need for specialty bed, Pressure redistribution bed/mattress (bed type), Turn and reposition approx.  every two hours(pillow and wedges)    Nutrition Interventions: Document food/fluid/supplement intake, Discuss nutritional consult with provider, Offer support with meals,snacks and hydration    Friction and Shear Interventions: Apply protective barrier, creams and emollients, Lift sheet, Minimize layers, Transferring/repositioning devices                Problem: Patient Education: Go to Patient Education Activity  Goal: Patient/Family Education  Outcome: Progressing Towards Goal     Problem: Patient Education: Go to Patient Education Activity  Goal: Patient/Family Education  Outcome: Progressing Towards Goal     Problem: Cath Lab Procedures: Discharge Outcomes  Goal: *Hemodynamically stable  Outcome: Progressing Towards Goal  Goal: *Optimal pain control at patient's stated goal  Outcome: Progressing Towards Goal  Goal: *Pulses palpable, skin color within defined limits, skin temperature warm  Outcome: Progressing Towards Goal  Goal: *Lungs clear or at baseline  Outcome: Progressing Towards Goal  Goal: *Demonstrates ability to perform prescribed activity without shortness of breath or discomfort  Outcome: Progressing Towards Goal  Goal: *Verbalizes home exercise program, activity guidelines, cardiac precautions  Outcome: Progressing Towards Goal  Goal: *Verbalizes understanding and describes prescribed diet  Outcome: Progressing Towards Goal  Goal: *Verbalizes understanding and describes medication purposes and frequencies  Outcome: Progressing Towards Goal  Goal: *Identifies cardiac risk factors  Outcome: Progressing Towards Goal  Goal: *No signs and symptoms of infection or wound complications  Outcome: Progressing Towards Goal  Goal: *Anxiety reduced or absent  Outcome: Progressing Towards Goal  Goal: *Verbalizes and demonstrates incision care  Outcome: Progressing Towards Goal  Goal: *Understands and describes signs and symptoms to report to providers(Stroke Metric)  Outcome: Progressing Towards Goal  Goal: *Describes follow-up/return visits to physicians  Outcome: Progressing Towards Goal  Goal: *Describes available resources and support systems  Outcome: Progressing Towards Goal  Goal: *Influenza immunization  Outcome: Progressing Towards Goal  Goal: *Pneumococcal immunization  Outcome: Progressing Towards Goal     Problem: Patient Education: Go to Patient Education Activity  Goal: Patient/Family Education  Outcome: Progressing Towards Goal     Problem: AMI: Day 4  Goal: Off Pathway (Use only if patient is Off Pathway)  Outcome: Progressing Towards Goal  Goal: Activity/Safety  Outcome: Progressing Towards Goal  Goal: Diagnostic Test/Procedures  Outcome: Progressing Towards Goal  Goal: Nutrition/Diet  Outcome: Progressing Towards Goal  Goal: Discharge Planning  Outcome: Progressing Towards Goal  Goal: Medications  Outcome: Progressing Towards Goal  Goal: Respiratory  Outcome: Progressing Towards Goal  Goal: Treatments/Interventions/Procedures  Outcome: Progressing Towards Goal  Goal: Psychosocial  Outcome: Progressing Towards Goal  Goal: *Optimal pain control at patient's stated goal  Outcome: Progressing Towards Goal  Goal: *Hemodynamically stable  Outcome: Progressing Towards Goal  Goal: *Demonstrates progressive activity  Outcome: Progressing Towards Goal  Goal: *Tolerating diet  Outcome: Progressing Towards Goal     Problem: AMI: Day 5  Goal: Off Pathway (Use only if patient is Off Pathway)  Outcome: Progressing Towards Goal  Goal: Activity/Safety  Outcome: Progressing Towards Goal  Goal: Diagnostic Test/Procedures  Outcome: Progressing Towards Goal  Goal: Nutrition/Diet  Outcome: Progressing Towards Goal  Goal: Discharge Planning  Outcome: Progressing Towards Goal  Goal: Medications  Outcome: Progressing Towards Goal  Goal: Treatments/Interventions/Procedures  Outcome: Progressing Towards Goal  Goal: Psychosocial  Outcome: Progressing Towards Goal     Problem: AMI: Discharge Outcomes  Goal: *Demonstrates ability to perform prescribed activity without shortness of breath or discomfort  Outcome: Progressing Towards Goal  Goal: *Verbalizes understanding and describes prescribed diet  Outcome: Progressing Towards Goal  Goal: *Describes follow-up/return visits to physicians  Outcome: Progressing Towards Goal  Goal: *Describes home care/support arrangements established based on need  Outcome: Progressing Towards Goal  Goal: *Anxiety reduced or absent  Outcome: Progressing Towards Goal  Goal: *Understands and describes signs and symptoms to report to providers(Stroke Metric)  Outcome: Progressing Towards Goal  Goal: *Verbalizes name, dosage, time, side effects, and number of days to continue medications  Outcome: Progressing Towards Goal     Problem: Ventilator Management  Goal: *Adequate oxygenation and ventilation  Outcome: Progressing Towards Goal  Goal: *Patient maintains clear airway/free of aspiration  Outcome: Progressing Towards Goal  Goal: *Absence of infection signs and symptoms  Outcome: Progressing Towards Goal  Goal: *Normal spontaneous ventilation  Outcome: Progressing Towards Goal     Problem: Patient Education: Go to Patient Education Activity  Goal: Patient/Family Education  Outcome: Progressing Towards Goal     Problem: Pain  Goal: *Control of Pain  Outcome: Progressing Towards Goal  Goal: *PALLIATIVE CARE:  Alleviation of Pain  Outcome: Progressing Towards Goal     Problem: Patient Education: Go to Patient Education Activity  Goal: Patient/Family Education  Outcome: Progressing Towards Goal     Problem: Infection - Risk of, Central Venous Catheter-Associated Bloodstream Infection  Goal: *Absence of infection signs and symptoms  Outcome: Progressing Towards Goal     Problem: Patient Education: Go to Patient Education Activity  Goal: Patient/Family Education  Outcome: Progressing Towards Goal     Problem: Infection - Risk of, Urinary Catheter-Associated Urinary Tract Infection  Goal: *Absence of infection signs and symptoms  Outcome: Progressing Towards Goal     Problem: Patient Education: Go to Patient Education Activity  Goal: Patient/Family Education  Outcome: Progressing Towards Goal     Problem: Infection - Risk of, Ventilator-Associated Pneumonia  Goal: *Absence of infection signs and symptoms  Outcome: Progressing Towards Goal     Problem: Patient Education: Go to Patient Education Activity  Goal: Patient/Family Education  Outcome: Progressing Towards Goal     Problem: Patient Education: Go to Patient Education Activity  Goal: Patient/Family Education  Outcome: Progressing Towards Goal     Problem: TIA/CVA Stroke: Day 2 Until Discharge  Goal: Treatments/Interventions/Procedures  Outcome: Progressing Towards Goal  Goal: Psychosocial  Outcome: Progressing Towards Goal  Goal: *Verbalizes anxiety and depression are reduced or absent  Outcome: Progressing Towards Goal  Goal: *Absence of aspiration  Outcome: Progressing Towards Goal  Goal: *Absence of deep venous thrombosis signs and symptoms(Stroke Metric)  Outcome: Progressing Towards Goal  Goal: *Optimal pain control at patient's stated goal  Outcome: Progressing Towards Goal  Goal: *Tolerating diet  Outcome: Progressing Towards Goal  Goal: *Ability to perform ADLs and demonstrates progressive mobility and function  Outcome: Progressing Towards Goal  Goal: *Stroke education continued(Stroke Metric)  Outcome: Progressing Towards Goal     Problem: Ischemic Stroke: Discharge Outcomes  Goal: *Verbalizes anxiety and depression are reduced or absent  Outcome: Progressing Towards Goal  Goal: *Verbalize understanding of risk factor modification(Stroke Metric)  Outcome: Progressing Towards Goal  Goal: *Hemodynamically stable  Outcome: Progressing Towards Goal  Goal: *Absence of aspiration pneumonia  Outcome: Progressing Towards Goal  Goal: *Aware of needed dietary changes  Outcome: Progressing Towards Goal  Goal: *Verbalize understanding of prescribed medications including anti-coagulants, anti-lipid, and/or anti-platelets(Stroke Metric)  Outcome: Progressing Towards Goal  Goal: *Tolerating diet  Outcome: Progressing Towards Goal  Goal: *Aware of follow-up diagnostics related to anticoagulants  Outcome: Progressing Towards Goal  Goal: *Ability to perform ADLs and demonstrates progressive mobility and function  Outcome: Progressing Towards Goal  Goal: *Absence of DVT(Stroke Metric)  Outcome: Progressing Towards Goal  Goal: *Absence of aspiration  Outcome: Progressing Towards Goal  Goal: *Optimal pain control at patient's stated goal  Outcome: Progressing Towards Goal  Goal: *Home safety concerns addressed  Outcome: Progressing Towards Goal  Goal: *Describes available resources and support systems  Outcome: Progressing Towards Goal  Goal: *Verbalizes understanding of activation of EMS(911) for stroke symptoms(Stroke Metric)  Outcome: Progressing Towards Goal  Goal: *Understands and describes signs and symptoms to report to providers(Stroke Metric)  Outcome: Progressing Towards Goal  Goal: *Neurolgocially stable (absence of additional neurological deficits)  Outcome: Progressing Towards Goal  Goal: *Verbalizes importance of follow-up with primary care physician(Stroke Metric)  Outcome: Progressing Towards Goal  Goal: *Smoking cessation discussed,if applicable(Stroke Metric)  Outcome: Progressing Towards Goal  Goal: *Depression screening completed(Stroke Metric)  Outcome: Progressing Towards Goal     Problem: Tobacco Use  Goal: *Tobacco use abstinence  Outcome: Progressing Towards Goal  Goal: *Knowledge of tobacco use cessation methods  Outcome: Progressing Towards Goal     Problem: Patient Education: Go to Patient Education Activity  Goal: Patient/Family Education  Outcome: Progressing Towards Goal     Problem: Hypotension  Goal: *Blood pressure within specified parameters  Outcome: Progressing Towards Goal  Goal: *Fluid volume balance  Outcome: Progressing Towards Goal  Goal: *Labs within defined limits  Outcome: Progressing Towards Goal     Problem: Patient Education: Go to Patient Education Activity  Goal: Patient/Family Education  Outcome: Progressing Towards Goal     Problem:  Body Temperature -  Risk of, Imbalanced  Goal: *Absence of heat stress or hyperthermia signs and symptoms  Outcome: Progressing Towards Goal  Goal: *Absence of cold stress or hypothermia signs and symptoms  Outcome: Progressing Towards Goal     Problem: Patient Education: Go to Patient Education Activity  Goal: Patient/Family Education  Outcome: Progressing Towards Goal     Problem: Diabetes Self-Management  Goal: *Disease process and treatment process  Description: Define diabetes and identify own type of diabetes; list 3 options for treating diabetes. Outcome: Progressing Towards Goal  Goal: *Incorporating nutritional management into lifestyle  Description: Describe effect of type, amount and timing of food on blood glucose; list 3 methods for planning meals. Outcome: Progressing Towards Goal  Goal: *Incorporating physical activity into lifestyle  Description: State effect of exercise on blood glucose levels. Outcome: Progressing Towards Goal  Goal: *Developing strategies to promote health/change behavior  Description: Define the ABC's of diabetes; identify appropriate screenings, schedule and personal plan for screenings. Outcome: Progressing Towards Goal  Goal: *Using medications safely  Description: State effect of diabetes medications on diabetes; name diabetes medication taking, action and side effects. Outcome: Progressing Towards Goal  Goal: *Monitoring blood glucose, interpreting and using results  Description: Identify recommended blood glucose targets  and personal targets. Outcome: Progressing Towards Goal  Goal: *Prevention, detection, treatment of acute complications  Description: List symptoms of hyper- and hypoglycemia; describe how to treat low blood sugar and actions for lowering  high blood glucose level.   Outcome: Progressing Towards Goal  Goal: *Prevention, detection and treatment of chronic complications  Description: Define the natural course of diabetes and describe the relationship of blood glucose levels to long term complications of diabetes. Outcome: Progressing Towards Goal  Goal: *Developing strategies to address psychosocial issues  Description: Describe feelings about living with diabetes; identify support needed and support network  Outcome: Progressing Towards Goal  Goal: *Insulin pump training  Outcome: Progressing Towards Goal  Goal: *Sick day guidelines  Outcome: Progressing Towards Goal  Goal: *Patient Specific Goal (EDIT GOAL, INSERT TEXT)  Outcome: Progressing Towards Goal     Problem: Patient Education: Go to Patient Education Activity  Goal: Patient/Family Education  Outcome: Progressing Towards Goal     Problem: Pain  Goal: *Control of Pain  Outcome: Progressing Towards Goal  Goal: *PALLIATIVE CARE:  Alleviation of Pain  Outcome: Progressing Towards Goal     Problem: Patient Education: Go to Patient Education Activity  Goal: Patient/Family Education  Outcome: Progressing Towards Goal     Problem: Pressure Injury - Risk of  Goal: *Prevention of pressure injury  Description: Document Dario Scale and appropriate interventions in the flowsheet. Outcome: Progressing Towards Goal  Note: Pressure Injury Interventions:  Sensory Interventions: Assess changes in LOC, Assess need for specialty bed, Avoid rigorous massage over bony prominences, Check visual cues for pain, Keep linens dry and wrinkle-free, Minimize linen layers, Pressure redistribution bed/mattress (bed type), Turn and reposition approx.  every two hours (pillows and wedges if needed)    Moisture Interventions: Apply protective barrier, creams and emollients, Assess need for specialty bed, Absorbent underpads, Check for incontinence Q2 hours and as needed, Internal/External urinary devices, Maintain skin hydration (lotion/cream), Minimize layers, Moisture barrier    Activity Interventions: Pressure redistribution bed/mattress(bed type)    Mobility Interventions: Assess need for specialty bed, Pressure redistribution bed/mattress (bed type), Turn and reposition approx.  every two hours(pillow and wedges)    Nutrition Interventions: Document food/fluid/supplement intake, Discuss nutritional consult with provider, Offer support with meals,snacks and hydration    Friction and Shear Interventions: Apply protective barrier, creams and emollients, Lift sheet, Minimize layers, Transferring/repositioning devices                Problem: Patient Education: Go to Patient Education Activity  Goal: Patient/Family Education  Outcome: Progressing Towards Goal     Problem: Chronic Obstructive Pulmonary Disease (COPD)  Goal: *Oxygen saturation during activity within specified parameters  Outcome: Progressing Towards Goal  Goal: *Able to remain out of bed as prescribed  Outcome: Progressing Towards Goal  Goal: *Absence of hypoxia  Outcome: Progressing Towards Goal  Goal: *Optimize nutritional status  Outcome: Progressing Towards Goal     Problem: Patient Education: Go to Patient Education Activity  Goal: Patient/Family Education  Outcome: Progressing Towards Goal

## 2021-07-30 NOTE — DIABETES MGMT
[address] [facility]    CLINICAL NURSE SPECIALIST CONSULT   FOLLOW UP  NOTE    Initial Presentation   Ross Sigala is a 57 y.o. female admitted from [place] urgent care after having SOB for 2 days. While at [facility], patient respiratory status worsened significantly and patient required intubation. She was then transferred to [facility] and noted to have ST elevation on EKG as well as elevated troponin. After emergent PCI and L groin impella placement, patient discovered to have L groin hematoma with L leg ishcemia. Vascular surgery performed L leg thrombectomy today and impella taken out of that leg, Impella placed in axillary. Patient remains intubated on vasopressors- cardiogenic shock. Initial troponin 6.62/7.77/39.50    HX:   Past Medical History:   Diagnosis Date    Colon polyps     COPD (chronic obstructive pulmonary disease) ([facility] [address] 11/28/2012    Diabetes (HCC)     GERD (gastroesophageal reflux disease)     HX OTHER MEDICAL     left rib fracture w/punctured kidney    Hypercholesterolemia     hypertriglyceridemia    Hypertension     Menopause     Pilonidal cyst         INITIAL DX: Respiratory failure, STEMI, cardiogenic shock    Treatment plan     TX: Followed by cardiology, cardiac surgery, vascular surgery, impella    Hospital course   Clinical progress has been complicated by cardiogenic shock, need for vascular surgery d/t hematoma. 7/29: started on steroid therapy today; follows commands when sedation weaned; Impella; remains on vasoactive infusions. TF on hold due to increase pressor requirements;  started on steroid regimen. 7/30: palliative on board for [address] decisions, TF resumed today, trickle rate. Diabetes    Patient has known Type 2 diabetes, treated with Metformin PTA. Admission  and A1c 6.1% indicate acceptable diabetes control. Previous A1Cs in past years have been 7% or lower starting in 2014.     Ambulatory blood glucose management provided by primary care provider- Niurka Zarco MD recent visit [id number]/72021  Consulted by Provider for advanced diabetes nursing assessment and care, specifically related to   [] Transitioning off Walter Julio   [x] Inpatient management strategy  [] Home management assessment  [] Survival skill education    Diabetes-related medical history  Acute complications  Stress due to cardiogenic shock, respiratory failure  Neurological complications  None noted  Microvascular disease  None noted  Macrovascular disease  CAD and Myocardial infarction  Other associated conditions     COPD on steroids at this time; HTN    Diabetes medication history  Drug class Currently in use Discontinued Never used   Biguanide Metformin 500 mg daily     DDP-4 inhibitor       Sulfonylurea      Thiazolidinedione      GLP-1 RA      SGLT-2 inhibitors      Basal insulin      Bolus insulin      Fixed Dose  Combinations        Subjective   Deferred for now since patient intubated in CCU    Patient reports the following home diabetes self-care practices: Deferred   Eating pattern  Physical activity pattern  Monitoring pattern  Taking medications pattern  [x] Consistent administration  [x] Affordable      Objective   Physical exam  General Patient intubated in CCU  Neuro  sedated  Vital Signs   Visit Vitals  BP (!) 107/59 (BP 1 Location: Right leg)   Pulse 88   Temp 99.6 °F (37.6 °C)   Resp 30   Ht 5' 8\" (1.727 m)   Wt 92.1 kg (203 lb)   SpO2 95%   BMI 30.87 kg/m²     Skin  Warm and dry. Heart   Regular rate and rhythm.  No murmurs, rubs or gallops  Lungs  Clear to auscultation without rales or rhonchi  Extremities No foot wounds    Diabetic foot exam:    deferred    Laboratory  BMP:   Lab Results   Component Value Date/Time     (L) 07/30/2021 12:00 PM    K 4.0 07/30/2021 12:00 PM     07/30/2021 12:00 PM    CO2 20 (L) 07/30/2021 12:00 PM    AGAP 7 07/30/2021 12:00 PM     (H) 07/30/2021 12:00 PM    BUN 14 07/30/2021 12:00 PM    CREA 0.59 07/30/2021 12:00 PM    GFRAA >60 07/30/2021 12:00 PM    GFRNA >60 07/30/2021 12:00 PM      Factors impacting BG management  Factor Dose Comments   Nutrition:   Vital AF 1.2 @30cc/hr- advance up to 65cc/hr   80g-173g carbs/ feeding Insulin requirements: 1:15  8-10units basal insulin   Drugs:  Vasopressor load    Steroids       Levophed, Vasopressin, Neosynephrine  Hydrocortisone 100mg q8h   20 mg Hydrocortisone: add 0.05 units/kg Lantus to total daily insulin dose  40 mg Hydrocortisone: add 0.1 units/kg Lantus to total daily insulin dose  50 mg Hydrocortisone: add 0.15 units/kg Lantus to total daily insulin dose  100 mg Hydrocortisone: add 0.3 units/kg Lantus to total daily insulin dose     Pain Postop pain: on fentanyl infusion    Infection Not at this time      Blood glucose pattern        Assessment and Plan   Nursing Diagnosis Risk for unstable blood glucose pattern   Nursing Intervention Domain 5256 Decision-making Support   Nursing Interventions Examined current inpatient diabetes control   Explored factors facilitating and impeding inpatient management  Identified self-management practices impeding diabetes control  Explored corrective strategies with patient and responsible inpatient provider   Informed patient of rational for insulin strategy while hospitalized       Evaluation   This 57year old female, with Type 2 diabetes, did achieve diabetes control prior to admission, as evidenced by admission BG of 146 and A1c of 6.1%. Currently admitted with STEMI s/p respiratory failure. Patient is critically ill intubated and sedated requiring vasopressor therapy and impella support. Patient was placed on glucostabilizer for short period of time but then BG dropped to 50's and this was stopped. Patient is critically ill and will need BG management.         During this hospitalization, the patient has achieved inpatient blood glucose target of 100-180mg/dl most of the time Several factors have played a role in blood glucose management including:  [x] Critical nature of illness state  [x]  Changing nutritive sources & needs   [x] Compromised insulin absorption or delivery  [x] Glucocorticoid use  []  Kidney dysfunction  []  Liver disease    The Subcutaneous Insulin Order set (6628) has not been in use. Hence, the next step in optimizing blood glucose control would be    [] Implement the Subcutaneous Insulin order set  [x]  Optimize basal insulin dosing   []  Add mealtime insulin    TF now restarted at trickle rate up to 65cc/hr  BG trending up throughout the day-  Remains on steroid therapy  Noted appropriate adjustment in basal ordered -Will likely need additional adjustment over weekend once TF get to goal.    Recommendations   1. IF BG trends consistently >200, consider recs below  [] Use of Subcutaneous Insulin Order set (8028)  Insulin Dosing Specific recommendation   ADJUSTED   Basal                                      (Based on weight, BMI & GFR) 25 units daily   To cover for steroid, would require 0.3u/kg dosing=27units basal  To cover for TF would require additional 8-10units basal  TDD (total daily dose) of basal insulin recommendation=34 units Lantus daily. CONTINUE Corrective                       (Useful in adjusting insulin dosing) [x] Normal sensitivity      2. IF TF stopped reduce basal dose appropriately-     Billing Code(s)   80869    Before making these care recommendations, I personally reviewed the hospitalization record, including notes, laboratory & diagnostic data and current medications, and examined the patient at the bedside (circumstances permitting) before making care recommendations.      Total minutes: [address] [facility]  Diabetes Clinical Nurse Specialist  [facility]  Access via Seen

## 2021-07-31 NOTE — PROGRESS NOTES
Philip Langley     TTE echo shows impella malpositioned     Under echo guidance the impella was advanced to appropriate position into the LV   Secured in place   No suction alarms and good wave forms

## 2021-07-31 NOTE — PROGRESS NOTES
1930: Bedside and Verbal shift change report given to Sterling Dodson RN (oncoming nurse) by Navi Beltran, RN (offgoing nurse). Report included the following information SBAR, Kardex, ED Summary, OR Summary, Procedure Summary, Intake/Output, MAR, Recent Results, Med Rec Status, Cardiac Rhythm ST/PAC's/PVC's, Alarm Parameters  and Dual Neuro Assessment. Drips:   Vent:    Impella:   KVO @ 5 + 20  PC 28   P-5  Amio @ 0.5  PEEP 8  D5/Bicarb Purge Fluid  Fent @ 200   FiO2 100%  47.5cm  Versed @ 8  Vt 200-500    Chirag @ 170  Nitric @ 40ppm    2000: Resumed pt care. Drips verified. MAP 60-62, Chirag increased, MD @ bedside, orders to restart Levo @ 2.     0000: Q6 labs drawn and sent     0130: K+ 3.6, Mag 1.9, replaced per protocol. Orders to add daily effer-k. MD notified of increasing WBC (21.4), temp 100.4    0200: MAP 55-60, MD @ bedside, orders to max Chirag. L groin dressing saturated w/ sanguinous fluid, dressing change performed & quick clot applied. 0215: Orders for scopolamine patch d/t persistent drooling     0500: ABG: pO2 71.3; pCO2 30.8; pH 7.421; HCO3 20.1; 94.7% O2 Sat, iCal 1.10. No changes made    0515: tHb 9.2; FO2Hb 61, SWAN recalibrated     0700: Dr. Anastacio Hugo @ bedside, orders to d/c amio gtt & scopolamine patch, Q6 labs changed to daily. 0730: Bedside and Verbal shift change report given to Navi Beltran, RN (oncoming nurse) by Sterling Dodson, DEDRICK (offgoing nurse). Report included the following information SBAR, Kardex, ED Summary, OR Summary, Procedure Summary, Intake/Output, MAR, Recent Results, Med Rec Status, Cardiac Rhythm NSR/PVC/PAC, Alarm Parameters  and Dual Neuro Assessment.

## 2021-07-31 NOTE — PROGRESS NOTES
[facility]    ICU TEAM H&P    Name: Sorin Colunga   : 1960   MRN: [id number]   Date: 2021      Subjective:   Progress Note: 2021      Reason for ICU Admission:  Respiratory failure    HPI: This is a 79-year-old female with history of COPD, diabetes, high cholesterol, hypertension, significant smoking history presented to the  [place] emergency department with nonproductive cough and shortness for breath for 2 days. She does not use oxygen at home. She been using her inhaler without significant relief. Last time she was on steroids was 3 years ago in association with pneumonia. No fever. She is fully Covid vaccinated. After arrival to [facility] ED she continued to have increasing shortness of breath and dropping oxygen saturations and the decision was made to intubate her. She was noted to have eleveated troponin but no changes on EKG per MD report. She was transferred to [facility]. Upon arrival to [facility] she was noted to have ST elevation on EKG. CODE STEMI was initiated. Interval events:     a.m.-patient taken to cath lab for OSCAR to LAD with post-PCI LOI flow 3. LVEDP was noted to be 40mmHg and a left femoral Impella was placed. On arrival in CCU Impella flows were noted to have decreased from 3.8L/min to 2.2 L/min. Bedside echo was performed and the Impella withdrawn 3.5cm  under direct echo visualization with a resulting increase in flow to 3.5L/min. An expanding hematoma at the left groin site was also noted on arrival in the CCU. The limb was cool and mottled with no doppler signal in the left DP, PT or popliteal. CT Surgery was consulted for placement of an axillary Impella both to increase support and to allow Vascular Surgery to address the threatened left lower extremity.       PM-now status post placement of axillary Impella 5.5, Removal of left femoral Impella CP, left groin exploration with, common femoral endarterectomy and patch angioplasty, left femoral thrombectomy and left iliac arteriogram, received 5 PRBCs and 2 of FFP in the OR, went into a flutter RVR in the OR-cardioverted x2 but did not respond-responded somewhat to an amnio bolus, came back on 3 pressors    7/28 - unequal pupils ON - code stroke called, now seems non focal, still intubated on 3 pressors with impella in situ    7/29 - went back in RVR on assoc with HD instability, spiking high grade fevers, high C.I, getting more difficult to oxygenate and ventilate    7/30 - fever better, slowly coming down on pressors    7/31 - Impella at P5; NE/Vaso/Phenyl; Steroids; Fent/Versed; Amio; 1 unit PRBC    Assessment:     ICU Problems:    1. Cardiogenic Shock  2. Impella MCS Support  3. STEMI  4. Acute/Chronic Hypoxic Respiratory Failure  5. Hyponatremia  6. Hypochloremia  7. Acute Systolic Heart Failure  8. Volume Overload  9. COPD  10. Hypertension  11. Hypercholesterolemia      ICU Comprehensive Plan of Care:     Neuro - Fentanyl & Versed, SAT as able    Cardiovascular - aspirin, Brilinta, Lipitor, trend troponins, Impella in situ - currently @ P5, map goal greater than 65, acceptable C.I, on levo/vaso/chirag, Wean Levo, then Vaso, then Chirag (give HR elevation) heart rate goal less than 110 - cont amio, keep magnesium above 2 and potassium above 4, serial vascular checks, CKs WNL, follow-up cardiology, cardiac surgery and vascular surgery recommendations    Pulmonary - continue lung protective mechanical ventilation, sat goal > 90%, difficult to oxygenate and ventilate, high R sided pressures - responded to Jcarlos, ?  PE - too unstable for transport at this time and empiric AC unsafe at this time, history of COPD- cont DESMOND/inhaled steroid therapy    GI - TF's,  PPI GI prophylaxis    Renal - monitor urine output, correct electrolyte derangements as needed, Lasix    Hematology-EBL in OR 1.5 L-status post 2 FFP, 1 plt and 5 of PRBCs 7/27, serial labs and transfuse for hemoglobin less than 7, platelet count less than 50, INR greater than 1.6 or fibrinogen less than 100; transfuse 1 unirt PRBC    ID- defervesced, WBC going up, on Vanc/Zosyn/doxycycline, f/u micro studies studies - NGTD - Trend Procal/Lactate; stop vanco on     Endocrinology-keep glucose between 100 & 180    Prognosis very guarded at this time    Objective:   Vital Signs:  Visit Vitals  /66   Pulse 91   Temp 98.5 °F (36.9 °C)   Resp 26   Ht 5' 8\" (1.727 m)   Wt 99.4 kg (219 lb 2.2 oz)   SpO2 95%   BMI 33.32 kg/m²      O2 Device: Ventilator, Endotracheal tube Temp (24hrs), Av.8 °F (37.1 °C), Min:98 °F (36.7 °C), Max:99.6 °F (37.6 °C)    CVP (mmHg): 7 mmHg (21 1100)      Intake/Output:     Intake/Output Summary (Last 24 hours) at 2021 1140  Last data filed at 2021 1115  Gross per 24 hour   Intake 5961.89 ml   Output 2520 ml   Net 3441.89 ml       Physical Exam:  General-intubated, sedated  Neuro-pupils reactive, withdraws in all 4  Cardiac-sometime irregular and tachy, Impella in situ  Lungs-clear anteriorly  Abdomen-soft, nontender, nondistended  Extremities- pop and PT dopplerable on R, only Pop dopplerable on L    LABS AND  DATA: Personally reviewed  Recent Labs     21  0553 21  2354   WBC 16.0* 13.8*   HGB 7.4* 7.3*   HCT 21.9* 21.8*   PLT 50* 46*     Recent Labs     21  0553 21  2354   * 131*   K 3.9 4.1    104   CO2 20* 21   BUN 15 14   CREA 0.53* 0.54*   * 136*   CA 7.2* 6.9*   MG 1.9 2.1   PHOS 2.0* 2.4*     Recent Labs     21  0553 21  2354   AP 36* 35*   TP 4.3* 4.2*   ALB 2.2* 2.2*   GLOB 2.1 2.0     Recent Labs     21  0553 21  2354   INR 1.3* 1.3*   PTP 13.7* 13.7*   APTT 25.8 26.9      Recent Labs     21  0503 21  1121   PHI 7.42 7.28*   PCO2I 26.2* 47.0*   PO2I 52* 54*   FIO2I 80 100     Recent Labs     21  0553 21  2354 21  0507 21  2352 21  1559 21  1559    97   < > 166   < > 206*   TROIQ  --   -- --  11.00*  --  11.30*    < > = values in this interval not displayed. Hemodynamics:   PAP: PAP Systolic: 54 (35/27/78 7795) CO: CO (l/min): 4.9 l/min (07/31/21 1000)   Wedge:   CI: CI (l/min/m2): 2.5 l/min/m2 (07/31/21 1000)   CVP:  CVP (mmHg): 7 mmHg (07/31/21 1100) SVR:       PVR:       Ventilator Settings:  Mode Rate Tidal Volume Pressure FiO2 PEEP   Assist control   400 ml  12 cm H2O 100 % 8 cm H20     Peak airway pressure: 32 cm H2O    Minute ventilation: 14.7 l/min        MEDS: Reviewed    Chest X-Ray:  CXR Results  (Last 48 hours)               07/31/21 0421  XR CHEST PORT Final result    Impression:  No significant interval change                Narrative:  Clinical history: Impella placement   INDICATION:   Impella placement   COMPARISON: 7/30/2021       FINDINGS:   AP portable upright view of the chest demonstrates a stable enlarged   cardiopericardial silhouette. Small right-sided effusion is not changed. Cardiac   assist device and pulmonary catheter are stable. ET tube and NG tube are   stable. .There is no pneumothorax. . Patient is on a cardiac monitor. 07/30/21 0614  XR CHEST PORT Final result    Impression:  No interval change. Narrative:  Clinical history: postop heart   INDICATION:   postop heart   COMPARISON: 7/29/2021       FINDINGS:   AP portable upright view of the chest demonstrates a stable prominent   cardiopericardial silhouette. Layering pleural effusion on the right is not   changed. Left basilar atelectasis unchanged. .Assist device ET tube and NG tube   are stable. Pulmonary artery catheter stable. There is no pneumothorax. . Patient   is on a cardiac monitor. Imaging reviewed      NOTE OF PERSONAL INVOLVEMENT IN CARE   This patient has a high probability of imminent, clinically significant deterioration, which requires the highest level of preparedness to intervene urgently.  I participated in the decision-making and personally managed or directed the management of the following life and organ supporting interventions that required my frequent assessment to treat or prevent imminent deterioration. I personally spent 135 minutes of critical care time. This does not include any procedural time.       Signed By: Mike Henriquez DO     July 31, 2021

## 2021-07-31 NOTE — PROGRESS NOTES
Pharmacist Note - Vancomycin Dosing  Therapy day 3  Indication: Sepsis  Current regimen: 750 mg Q8    Recent Labs     07/31/21  0553 07/30/21  2354 07/30/21  1800   WBC 16.0* 13.8* 13.3*   CREA 0.53* 0.54* 0.62   BUN 15 14 14       A random vancomycin level of 21.7 mcg/mL was obtained and from this level, the patient's AUC24 is calculated to be 489 with the current regimen. Goal target range AUC/ALVARO 400-600      Plan: Continue current regimen. Pharmacy will continue to monitor this patient daily for changes in clinical status and renal function. *Random vancomycin levels are used to calculate AUC/ALVARO, this level should not be interpreted as a trough. Vancomycin has been dosed using Bayesian kinetics software to target an AUC24:ALVARO of 400-600, which provides adequate exposure for as assumed infection due to MRSA with an ALVARO of 1 or less while reducing the risk of nephrotoxicity as seen with traditional trough based dosing goals.

## 2021-07-31 NOTE — PROGRESS NOTES
Spiritual Care Assessment/Progress Note  [facility]      NAME: Karma Becker      MRN: [id number]  AGE: 57 y.o. SEX: female  Denominational Affiliation: Non Moravian   Language: English     7/31/2021     Total Time (in minutes): 12     Spiritual Assessment begun in [facility] CORON[facility] CARE through conversation with:         []Patient        [] Family    [] Friend(s)        Reason for Consult: Palliative Care, Initial/Spiritual Assessment     Spiritual beliefs: (Please include comment if needed)     [] Identifies with a laura tradition:         [] Supported by a laura community:            [] Claims no spiritual orientation:           [] Seeking spiritual identity:                [] Adheres to an individual form of spirituality:           [x] Not able to assess:                           Identified resources for coping:      [] Prayer                               [] Music                  [] Guided Imagery     [] Family/friends                 [] Pet visits     [] Devotional reading                         [x] Unknown     [] Other:                                        Interventions offered during this visit: (See comments for more details)                Plan of Care:     [] Support spiritual and/or cultural needs    [] Support AMD and/or advance care planning process      [] Support grieving process   [] Coordinate Rites and/or Rituals    [] Coordination with community clergy   [] No spiritual needs identified at this time   [] Detailed Plan of Care below (See Comments)  [] Make referral to Music Therapy  [] Make referral to Pet Therapy     [] Make referral to Addiction services  [] Make referral to [facility]  [] Make referral to Spiritual Care Partner  [] No future visits requested        [x] Follow up upon further referrals     Comments:  visit for initial spiritual assessment, palliative care consult. Patient resting in bed, intubated and sedated. Appears comfortable.   Respiratory and patient's nurse at bedside. Spoke to nurse concerning patient. Patient's brother, Robson Rowell, had visited yesterday and was expected to be here later today. No spiritual needs identified at this time. Please contact spiritual care for further referral or consult. Rev.  Dana Marshall MDiv, [facility], [facility]   paging service: [phone])

## 2021-07-31 NOTE — PROGRESS NOTES
0730 Bedside shift change report received. 0815 Transfusing unit of blood    1030 Levophed off. Per Dr. Gem Boswell wean Vasopressin next. 1530 Repleting K+ per protocol    1600 Patient lavaged. Large green mucous plugs removed    1745 Vasopressin off. L groin dressing changed. Shift Summary: Vaso + Levo off, Chirag up to 170. Tube feeds increased to 30 cc hour.  Patient tolerating diuresis well with no suction alarms on impella or drop in CI    1930 Bedside shift report given to [facility]

## 2021-07-31 NOTE — PROGRESS NOTES
Problem: Falls - Risk of  Goal: *Absence of Falls  Description: Document Cande Schroeder Fall Risk and appropriate interventions in the flowsheet. Outcome: Progressing Towards Goal  Note: Fall Risk Interventions:       Mentation Interventions: Evaluate medications/consider consulting pharmacy    Medication Interventions: Evaluate medications/consider consulting pharmacy    Elimination Interventions: Toileting schedule/hourly rounds              Problem: Patient Education: Go to Patient Education Activity  Goal: Patient/Family Education  Outcome: Progressing Towards Goal     Problem: Pressure Injury - Risk of  Goal: *Prevention of pressure injury  Description: Document Dario Scale and appropriate interventions in the flowsheet. Outcome: Progressing Towards Goal  Note: Pressure Injury Interventions:  Sensory Interventions: Assess changes in LOC, Assess need for specialty bed, Check visual cues for pain, Float heels, Keep linens dry and wrinkle-free, Minimize linen layers, Monitor skin under medical devices, Pressure redistribution bed/mattress (bed type), Turn and reposition approx. every two hours (pillows and wedges if needed)    Moisture Interventions: Absorbent underpads, Apply protective barrier, creams and emollients, Assess need for specialty bed, Check for incontinence Q2 hours and as needed, Internal/External urinary devices, Maintain skin hydration (lotion/cream), Minimize layers, Moisture barrier    Activity Interventions: Pressure redistribution bed/mattress(bed type)    Mobility Interventions: Assess need for specialty bed, Float heels, Pressure redistribution bed/mattress (bed type), Turn and reposition approx.  every two hours(pillow and wedges)    Nutrition Interventions: Document food/fluid/supplement intake, Discuss nutritional consult with provider, Offer support with meals,snacks and hydration    Friction and Shear Interventions: Apply protective barrier, creams and emollients, Lift sheet, Minimize layers, Transferring/repositioning devices, Foam dressings/transparent film/skin sealants                Problem: Patient Education: Go to Patient Education Activity  Goal: Patient/Family Education  Outcome: Progressing Towards Goal     Problem: Patient Education: Go to Patient Education Activity  Goal: Patient/Family Education  Outcome: Progressing Towards Goal     Problem: Cath Lab Procedures: Discharge Outcomes  Goal: *Hemodynamically stable  Outcome: Progressing Towards Goal  Goal: *Optimal pain control at patient's stated goal  Outcome: Progressing Towards Goal  Goal: *Pulses palpable, skin color within defined limits, skin temperature warm  Outcome: Progressing Towards Goal  Goal: *Lungs clear or at baseline  Outcome: Progressing Towards Goal  Goal: *Demonstrates ability to perform prescribed activity without shortness of breath or discomfort  Outcome: Progressing Towards Goal  Goal: *Verbalizes home exercise program, activity guidelines, cardiac precautions  Outcome: Progressing Towards Goal  Goal: *Verbalizes understanding and describes prescribed diet  Outcome: Progressing Towards Goal  Goal: *Verbalizes understanding and describes medication purposes and frequencies  Outcome: Progressing Towards Goal  Goal: *Identifies cardiac risk factors  Outcome: Progressing Towards Goal  Goal: *No signs and symptoms of infection or wound complications  Outcome: Progressing Towards Goal  Goal: *Anxiety reduced or absent  Outcome: Progressing Towards Goal  Goal: *Verbalizes and demonstrates incision care  Outcome: Progressing Towards Goal  Goal: *Understands and describes signs and symptoms to report to providers(Stroke Metric)  Outcome: Progressing Towards Goal  Goal: *Describes follow-up/return visits to physicians  Outcome: Progressing Towards Goal  Goal: *Describes available resources and support systems  Outcome: Progressing Towards Goal  Goal: *Influenza immunization  Outcome: Progressing Towards Goal  Goal: *Pneumococcal immunization  Outcome: Progressing Towards Goal     Problem: Patient Education: Go to Patient Education Activity  Goal: Patient/Family Education  Outcome: Progressing Towards Goal     Problem: AMI: Day 5  Goal: Off Pathway (Use only if patient is Off Pathway)  Outcome: Progressing Towards Goal  Goal: Activity/Safety  Outcome: Progressing Towards Goal  Goal: Diagnostic Test/Procedures  Outcome: Progressing Towards Goal  Goal: Nutrition/Diet  Outcome: Progressing Towards Goal  Goal: Discharge Planning  Outcome: Progressing Towards Goal  Goal: Medications  Outcome: Progressing Towards Goal  Goal: Treatments/Interventions/Procedures  Outcome: Progressing Towards Goal  Goal: Psychosocial  Outcome: Progressing Towards Goal     Problem: AMI: Discharge Outcomes  Goal: *Demonstrates ability to perform prescribed activity without shortness of breath or discomfort  Outcome: Progressing Towards Goal  Goal: *Verbalizes understanding and describes prescribed diet  Outcome: Progressing Towards Goal  Goal: *Describes follow-up/return visits to physicians  Outcome: Progressing Towards Goal  Goal: *Describes home care/support arrangements established based on need  Outcome: Progressing Towards Goal  Goal: *Anxiety reduced or absent  Outcome: Progressing Towards Goal  Goal: *Understands and describes signs and symptoms to report to providers(Stroke Metric)  Outcome: Progressing Towards Goal  Goal: *Verbalizes name, dosage, time, side effects, and number of days to continue medications  Outcome: Progressing Towards Goal     Problem: Ventilator Management  Goal: *Adequate oxygenation and ventilation  Outcome: Progressing Towards Goal  Goal: *Patient maintains clear airway/free of aspiration  Outcome: Progressing Towards Goal  Goal: *Absence of infection signs and symptoms  Outcome: Progressing Towards Goal  Goal: *Normal spontaneous ventilation  Outcome: Progressing Towards Goal     Problem: Patient Education: Go to Patient Education Activity  Goal: Patient/Family Education  Outcome: Progressing Towards Goal     Problem: Pain  Goal: *Control of Pain  Outcome: Progressing Towards Goal  Goal: *PALLIATIVE CARE:  Alleviation of Pain  Outcome: Progressing Towards Goal     Problem: Patient Education: Go to Patient Education Activity  Goal: Patient/Family Education  Outcome: Progressing Towards Goal     Problem: Infection - Risk of, Central Venous Catheter-Associated Bloodstream Infection  Goal: *Absence of infection signs and symptoms  Outcome: Progressing Towards Goal     Problem: Patient Education: Go to Patient Education Activity  Goal: Patient/Family Education  Outcome: Progressing Towards Goal     Problem: Infection - Risk of, Urinary Catheter-Associated Urinary Tract Infection  Goal: *Absence of infection signs and symptoms  Outcome: Progressing Towards Goal     Problem: Patient Education: Go to Patient Education Activity  Goal: Patient/Family Education  Outcome: Progressing Towards Goal     Problem: Infection - Risk of, Ventilator-Associated Pneumonia  Goal: *Absence of infection signs and symptoms  Outcome: Progressing Towards Goal     Problem: Patient Education: Go to Patient Education Activity  Goal: Patient/Family Education  Outcome: Progressing Towards Goal     Problem: Patient Education: Go to Patient Education Activity  Goal: Patient/Family Education  Outcome: Progressing Towards Goal     Problem: TIA/CVA Stroke: Day 2 Until Discharge  Goal: Treatments/Interventions/Procedures  Outcome: Progressing Towards Goal  Goal: Psychosocial  Outcome: Progressing Towards Goal  Goal: *Verbalizes anxiety and depression are reduced or absent  Outcome: Progressing Towards Goal  Goal: *Absence of aspiration  Outcome: Progressing Towards Goal  Goal: *Absence of deep venous thrombosis signs and symptoms(Stroke Metric)  Outcome: Progressing Towards Goal  Goal: *Optimal pain control at patient's stated goal  Outcome: Progressing Towards Goal  Goal: *Tolerating diet  Outcome: Progressing Towards Goal  Goal: *Ability to perform ADLs and demonstrates progressive mobility and function  Outcome: Progressing Towards Goal  Goal: *Stroke education continued(Stroke Metric)  Outcome: Progressing Towards Goal     Problem: Ischemic Stroke: Discharge Outcomes  Goal: *Verbalizes anxiety and depression are reduced or absent  Outcome: Progressing Towards Goal  Goal: *Verbalize understanding of risk factor modification(Stroke Metric)  Outcome: Progressing Towards Goal  Goal: *Hemodynamically stable  Outcome: Progressing Towards Goal  Goal: *Absence of aspiration pneumonia  Outcome: Progressing Towards Goal  Goal: *Aware of needed dietary changes  Outcome: Progressing Towards Goal  Goal: *Verbalize understanding of prescribed medications including anti-coagulants, anti-lipid, and/or anti-platelets(Stroke Metric)  Outcome: Progressing Towards Goal  Goal: *Tolerating diet  Outcome: Progressing Towards Goal  Goal: *Aware of follow-up diagnostics related to anticoagulants  Outcome: Progressing Towards Goal  Goal: *Ability to perform ADLs and demonstrates progressive mobility and function  Outcome: Progressing Towards Goal  Goal: *Absence of DVT(Stroke Metric)  Outcome: Progressing Towards Goal  Goal: *Absence of aspiration  Outcome: Progressing Towards Goal  Goal: *Optimal pain control at patient's stated goal  Outcome: Progressing Towards Goal  Goal: *Home safety concerns addressed  Outcome: Progressing Towards Goal  Goal: *Describes available resources and support systems  Outcome: Progressing Towards Goal  Goal: *Verbalizes understanding of activation of EMS(911) for stroke symptoms(Stroke Metric)  Outcome: Progressing Towards Goal  Goal: *Understands and describes signs and symptoms to report to providers(Stroke Metric)  Outcome: Progressing Towards Goal  Goal: *Neurolgocially stable (absence of additional neurological deficits)  Outcome: Progressing Towards Goal  Goal: *Verbalizes importance of follow-up with primary care physician(Stroke Metric)  Outcome: Progressing Towards Goal  Goal: *Smoking cessation discussed,if applicable(Stroke Metric)  Outcome: Progressing Towards Goal  Goal: *Depression screening completed(Stroke Metric)  Outcome: Progressing Towards Goal     Problem: Tobacco Use  Goal: *Tobacco use abstinence  Outcome: Progressing Towards Goal  Goal: *Knowledge of tobacco use cessation methods  Outcome: Progressing Towards Goal     Problem: Patient Education: Go to Patient Education Activity  Goal: Patient/Family Education  Outcome: Progressing Towards Goal     Problem: Hypotension  Goal: *Blood pressure within specified parameters  Outcome: Progressing Towards Goal  Goal: *Fluid volume balance  Outcome: Progressing Towards Goal  Goal: *Labs within defined limits  Outcome: Progressing Towards Goal     Problem: Patient Education: Go to Patient Education Activity  Goal: Patient/Family Education  Outcome: Progressing Towards Goal     Problem: Body Temperature -  Risk of, Imbalanced  Goal: *Absence of heat stress or hyperthermia signs and symptoms  Outcome: Progressing Towards Goal  Goal: *Absence of cold stress or hypothermia signs and symptoms  Outcome: Progressing Towards Goal     Problem: Patient Education: Go to Patient Education Activity  Goal: Patient/Family Education  Outcome: Progressing Towards Goal     Problem: Diabetes Self-Management  Goal: *Disease process and treatment process  Description: Define diabetes and identify own type of diabetes; list 3 options for treating diabetes. Outcome: Progressing Towards Goal  Goal: *Incorporating nutritional management into lifestyle  Description: Describe effect of type, amount and timing of food on blood glucose; list 3 methods for planning meals.   Outcome: Progressing Towards Goal  Goal: *Incorporating physical activity into lifestyle  Description: State effect of exercise on blood glucose levels. Outcome: Progressing Towards Goal  Goal: *Developing strategies to promote health/change behavior  Description: Define the ABC's of diabetes; identify appropriate screenings, schedule and personal plan for screenings. Outcome: Progressing Towards Goal  Goal: *Using medications safely  Description: State effect of diabetes medications on diabetes; name diabetes medication taking, action and side effects. Outcome: Progressing Towards Goal  Goal: *Monitoring blood glucose, interpreting and using results  Description: Identify recommended blood glucose targets  and personal targets. Outcome: Progressing Towards Goal  Goal: *Prevention, detection, treatment of acute complications  Description: List symptoms of hyper- and hypoglycemia; describe how to treat low blood sugar and actions for lowering  high blood glucose level. Outcome: Progressing Towards Goal  Goal: *Prevention, detection and treatment of chronic complications  Description: Define the natural course of diabetes and describe the relationship of blood glucose levels to long term complications of diabetes.   Outcome: Progressing Towards Goal  Goal: *Developing strategies to address psychosocial issues  Description: Describe feelings about living with diabetes; identify support needed and support network  Outcome: Progressing Towards Goal  Goal: *Insulin pump training  Outcome: Progressing Towards Goal  Goal: *Sick day guidelines  Outcome: Progressing Towards Goal  Goal: *Patient Specific Goal (EDIT GOAL, INSERT TEXT)  Outcome: Progressing Towards Goal     Problem: Patient Education: Go to Patient Education Activity  Goal: Patient/Family Education  Outcome: Progressing Towards Goal     Problem: Pain  Goal: *Control of Pain  Outcome: Progressing Towards Goal  Goal: *PALLIATIVE CARE:  Alleviation of Pain  Outcome: Progressing Towards Goal     Problem: Patient Education: Go to Patient Education Activity  Goal: Patient/Family Education  Outcome: Progressing Towards Goal     Problem: Pressure Injury - Risk of  Goal: *Prevention of pressure injury  Description: Document Dario Scale and appropriate interventions in the flowsheet. Outcome: Progressing Towards Goal  Note: Pressure Injury Interventions:  Sensory Interventions: Assess changes in LOC, Assess need for specialty bed, Check visual cues for pain, Float heels, Keep linens dry and wrinkle-free, Minimize linen layers, Monitor skin under medical devices, Pressure redistribution bed/mattress (bed type), Turn and reposition approx. every two hours (pillows and wedges if needed)    Moisture Interventions: Absorbent underpads, Apply protective barrier, creams and emollients, Assess need for specialty bed, Check for incontinence Q2 hours and as needed, Internal/External urinary devices, Maintain skin hydration (lotion/cream), Minimize layers, Moisture barrier    Activity Interventions: Pressure redistribution bed/mattress(bed type)    Mobility Interventions: Assess need for specialty bed, Float heels, Pressure redistribution bed/mattress (bed type), Turn and reposition approx.  every two hours(pillow and wedges)    Nutrition Interventions: Document food/fluid/supplement intake, Discuss nutritional consult with provider, Offer support with meals,snacks and hydration    Friction and Shear Interventions: Apply protective barrier, creams and emollients, Lift sheet, Minimize layers, Transferring/repositioning devices, Foam dressings/transparent film/skin sealants                Problem: Patient Education: Go to Patient Education Activity  Goal: Patient/Family Education  Outcome: Progressing Towards Goal     Problem: Chronic Obstructive Pulmonary Disease (COPD)  Goal: *Oxygen saturation during activity within specified parameters  Outcome: Progressing Towards Goal  Goal: *Able to remain out of bed as prescribed  Outcome: Progressing Towards Goal  Goal: *Absence of hypoxia  Outcome: Progressing Towards Goal  Goal: *Optimize nutritional status  Outcome: Progressing Towards Goal     Problem: Patient Education: Go to Patient Education Activity  Goal: Patient/Family Education  Outcome: Progressing Towards Goal     Problem: Patient Education: Go to Patient Education Activity  Goal: Patient/Family Education  Outcome: Progressing Towards Goal     Problem: Afib: Discharge Outcomes (not in CAT)  Goal: *Hemodynamically stable  Outcome: Progressing Towards Goal  Goal: *Stable cardiac rhythm  Outcome: Progressing Towards Goal  Goal: *Lungs clear or at baseline  Outcome: Progressing Towards Goal  Goal: *Optimal pain control at patient's stated goal  Outcome: Progressing Towards Goal  Goal: *Identifies cardiac risk factors  Outcome: Progressing Towards Goal  Goal: *Verbalizes home exercise program, activity guidelines, cardiac precautions  Outcome: Progressing Towards Goal  Goal: *Verbalizes understanding and describes prescribed diet  Outcome: Progressing Towards Goal  Goal: *Verbalizes understanding and describes medication purposes and frequencies  Outcome: Progressing Towards Goal  Goal: *Anxiety reduced or absent  Outcome: Progressing Towards Goal  Goal: *Understands and describes signs and symptoms to report to providers(Stroke Metric)  Outcome: Progressing Towards Goal  Goal: *Describes follow-up/return visits to physicians  Outcome: Progressing Towards Goal  Goal: *Describes available resources and support systems  Outcome: Progressing Towards Goal  Goal: *Influenza immunization  Outcome: Progressing Towards Goal  Goal: *Pneumococcal immunization  Outcome: Progressing Towards Goal  Goal: *Describes smoking cessation resources  Outcome: Progressing Towards Goal     Problem: Nutrition Deficit  Goal: *Optimize nutritional status  Outcome: Progressing Towards Goal

## 2021-07-31 NOTE — PROGRESS NOTES
Critical Care Note     ACTIVE MEDICAL PROBLEMS    Cardiogenic shock. 2.  Acute ST elevated myocardial infarction. 3.  Acute respiratory failure. IMPRESSION     Remains critically ill with little progress in weaning of LVAD or pressor support  Urine output declining   Echo reviewed demonstrates minimal recovery of LV function        Patient Vitals for the past 4 hrs:   Temp Pulse Resp BP SpO2   07/31/21 0700  96 28 114/70 95 %   07/31/21 0600  (!) 107 28 116/66 94 %   07/31/21 0500  91 28 116/69 92 %   07/31/21 0445  (!) 105 26  92 %   07/31/21 0400 98 °F (36.7 °C) 91 28 108/62 92 %         Intake/Output Summary (Last 24 hours) at 7/31/2021 0727  Last data filed at 7/31/2021 0700  Gross per 24 hour   Intake 5511.79 ml   Output 1890 ml   Net 3621.79 ml     Hemodynamics:   CO: CO (l/min): 4.7 l/min   CI: CI (l/min/m2): 2.4 l/min/m2   CVP: CVP (mmHg): 12 mmHg (07/31/21 0700)   SVR: SVR (dyne*sec)/cm5: 1218 (dyne*sec)/cm5 (07/31/21 2261)   PAP Systolic: PAP Systolic: 53 (27/29/02 1531)   PAP Diastolic: PAP Diastolic: 33 (38/81/78 4388)   PVR:     SV02:     SCV02: SCVO2 (%): 66 % (07/31/21 0700)    Vent settings     Ventilator Volumes  Vt Set (ml): 400 ml (07/29/21 0356)  Vt Exhaled (Machine Breath) (ml): 570 ml (07/31/21 0445)  Ve Observed (l/min): 14.8 l/min (07/31/21 0445)          Chest xrays reviewed         Labs reviewed for last 24 hours and trends noted in evaluation         Plan       I personally spent  35 minutes of critical care time. This is time spent directly involved in patient care in the critical care unit as well as the coordination of care and discussions,. This does not include any procedural time which has been billed separately. During this entire length of time I was immediately available to the patient.  The reason for providing this level of medical care for this critically ill patient was due a critical illness that impaired one or more vital organ systems such that there was a high probability of imminent or life threatening deterioration in the patients condition.  This care involved high complexity decision making to assess, manipulate, and support vital system functions, to treat this degree of vital organ system failure and to prevent further life threatening deterioration of the patients condition    Medical decision making- High complexity   Risk of morbidity and mortality - high

## 2021-08-01 NOTE — PROGRESS NOTES
Critical Care Note     ACTIVE MEDICAL PROBLEMS    Cardiogenic shock. 2.  Acute ST elevated myocardial infarction. 3.  Acute respiratory failure.       IMPRESSION     Remains critically ill and pressor dependent  Impella @ P5  Distal extremities blue and mal perfused  CK increasing   Good R radial pulse by doppler      Patient Vitals for the past 4 hrs:   Pulse Resp BP SpO2   08/01/21 0700 78 23 (!) 101/56 98 %   08/01/21 0600 77 26 (!) 101/57 97 %   08/01/21 0500 85 26 (!) 101/58 97 %   08/01/21 0445 92 26  95 %         Intake/Output Summary (Last 24 hours) at 8/1/2021 0802  Last data filed at 8/1/2021 0700  Gross per 24 hour   Intake 4619.07 ml   Output 4050 ml   Net 569.07 ml     Hemodynamics:   CO: CO (l/min): 5.9 l/min   CI: CI (l/min/m2): 3 l/min/m2   CVP: CVP (mmHg): 11 mmHg (08/01/21 0700)   SVR: SVR (dyne*sec)/cm5: 843 (dyne*sec)/cm5 (08/01/21 2957)   PAP Systolic: PAP Systolic: 51 (47/99/85 5176)   PAP Diastolic: PAP Diastolic: 30 (83/47/36 5289)   PVR:     SV02:     SCV02: SCVO2 (%): 66 % (08/01/21 0700)    Vent settings     Ventilator Volumes  Vt Set (ml): 400 ml (07/29/21 0356)  Vt Exhaled (Machine Breath) (ml): 661 ml (08/01/21 0447)  Ve Observed (l/min): 17.2 l/min (08/01/21 0447)          Chest xrays reviewed         Labs reviewed for last 24 hours and trends noted in evaluation         Plan   Continue current support  Palliative Care meeting in am  Echo in am     I personally spent 30 minutes of critical care time. This is time spent directly involved in patient care in the critical care unit as well as the coordination of care and discussions,. This does not include any procedural time which has been billed separately. During this entire length of time I was immediately available to the patient.  The reason for providing this level of medical care for this critically ill patient was due a critical illness that impaired one or more vital organ systems such that there was a high probability of imminent or life threatening deterioration in the patients condition.  This care involved high complexity decision making to assess, manipulate, and support vital system functions, to treat this degree of vital organ system failure and to prevent further life threatening deterioration of the patients condition    Medical decision making- High complexity   Risk of morbidity and mortality - high

## 2021-08-01 NOTE — PROGRESS NOTES
responded to pt death. Family and friend were at bedside.  provided pastoral listening, support and prayer. Let them know of next steps. Family chose [facility] in [place]. Let them know of  availability. Please contact [phone] [facility] for further support.      [address] Melva Puri, MACE   [phone])

## 2021-08-01 NOTE — PROGRESS NOTES
0730 Shift report received from [facility] Decreasing versed. Hands and feet continue to be mottled, purple, and cold. 1000 Nitric down to 20 ppm per MD by RT.    1330 Family at bedside. POA stating patient wouldn't want this, expressing concern regarding future quality of life. Indicating they would like to withdraw care.  and intensivist alerted. 1550 Patient extubated, drips stopped, impella detached. Family at bedside. 1603 Patient asystole on monitor. , intensivist, and [facility] made aware. Family expresses patient wished to be cremated. [place] office called. 1700 Multiple attempts to alert [facility] regarding patient passing. Unable to speak with coordinator.

## 2021-08-01 NOTE — PROGRESS NOTES
ICU Progress Brief Update    Spoke with Brother, Sister-In-Law & Best-Friend at bedside. Want to make patient DNR. Pastoral care consulted. [facility] made aware. Soon will proceed with withdrawal of care.     Ramses Middleton DO   Staff Intensivist/Anesthesiologist  Critical Care Medicine

## 2021-08-01 NOTE — PROGRESS NOTES
Problem: Falls - Risk of  Goal: *Absence of Falls  Description: Document Carolann Pillow Fall Risk and appropriate interventions in the flowsheet. Outcome: Progressing Towards Goal  Note: Fall Risk Interventions:       Mentation Interventions: Adequate sleep, hydration, pain control, Door open when patient unattended, Evaluate medications/consider consulting pharmacy, Toileting rounds, Update white board, More frequent rounding    Medication Interventions: Evaluate medications/consider consulting pharmacy    Elimination Interventions: Toileting schedule/hourly rounds              Problem: Patient Education: Go to Patient Education Activity  Goal: Patient/Family Education  Outcome: Progressing Towards Goal     Problem: Pressure Injury - Risk of  Goal: *Prevention of pressure injury  Description: Document Dario Scale and appropriate interventions in the flowsheet. Outcome: Progressing Towards Goal  Note: Pressure Injury Interventions:  Sensory Interventions: Assess changes in LOC, Assess need for specialty bed, Float heels, Keep linens dry and wrinkle-free, Minimize linen layers, Monitor skin under medical devices, Pressure redistribution bed/mattress (bed type), Turn and reposition approx. every two hours (pillows and wedges if needed)    Moisture Interventions: Absorbent underpads, Apply protective barrier, creams and emollients, Assess need for specialty bed, Check for incontinence Q2 hours and as needed, Internal/External urinary devices, Maintain skin hydration (lotion/cream), Minimize layers, Moisture barrier    Activity Interventions: Pressure redistribution bed/mattress(bed type)    Mobility Interventions: Pressure redistribution bed/mattress (bed type), Float heels, Assess need for specialty bed, Turn and reposition approx.  every two hours(pillow and wedges)    Nutrition Interventions: Document food/fluid/supplement intake, Discuss nutritional consult with provider    Friction and Shear Interventions: Apply protective barrier, creams and emollients, Foam dressings/transparent film/skin sealants, Lift sheet, Minimize layers, Transferring/repositioning devices                Problem: Patient Education: Go to Patient Education Activity  Goal: Patient/Family Education  Outcome: Progressing Towards Goal     Problem: Patient Education: Go to Patient Education Activity  Goal: Patient/Family Education  Outcome: Progressing Towards Goal     Problem: Cath Lab Procedures: Discharge Outcomes  Goal: *Hemodynamically stable  Outcome: Progressing Towards Goal  Goal: *Optimal pain control at patient's stated goal  Outcome: Progressing Towards Goal  Goal: *Pulses palpable, skin color within defined limits, skin temperature warm  Outcome: Progressing Towards Goal  Goal: *Lungs clear or at baseline  Outcome: Progressing Towards Goal  Goal: *Demonstrates ability to perform prescribed activity without shortness of breath or discomfort  Outcome: Progressing Towards Goal  Goal: *Verbalizes home exercise program, activity guidelines, cardiac precautions  Outcome: Progressing Towards Goal  Goal: *Verbalizes understanding and describes prescribed diet  Outcome: Progressing Towards Goal  Goal: *Verbalizes understanding and describes medication purposes and frequencies  Outcome: Progressing Towards Goal  Goal: *Identifies cardiac risk factors  Outcome: Progressing Towards Goal  Goal: *No signs and symptoms of infection or wound complications  Outcome: Progressing Towards Goal  Goal: *Anxiety reduced or absent  Outcome: Progressing Towards Goal  Goal: *Verbalizes and demonstrates incision care  Outcome: Progressing Towards Goal  Goal: *Understands and describes signs and symptoms to report to providers(Stroke Metric)  Outcome: Progressing Towards Goal  Goal: *Describes follow-up/return visits to physicians  Outcome: Progressing Towards Goal  Goal: *Describes available resources and support systems  Outcome: Progressing Towards Goal  Goal: *Influenza immunization  Outcome: Progressing Towards Goal  Goal: *Pneumococcal immunization  Outcome: Progressing Towards Goal     Problem: Patient Education: Go to Patient Education Activity  Goal: Patient/Family Education  Outcome: Progressing Towards Goal     Problem: AMI: Day 5  Goal: Off Pathway (Use only if patient is Off Pathway)  Outcome: Progressing Towards Goal  Goal: Activity/Safety  Outcome: Progressing Towards Goal  Goal: Diagnostic Test/Procedures  Outcome: Progressing Towards Goal  Goal: Nutrition/Diet  Outcome: Progressing Towards Goal  Goal: Discharge Planning  Outcome: Progressing Towards Goal  Goal: Medications  Outcome: Progressing Towards Goal  Goal: Treatments/Interventions/Procedures  Outcome: Progressing Towards Goal  Goal: Psychosocial  Outcome: Progressing Towards Goal     Problem: AMI: Discharge Outcomes  Goal: *Demonstrates ability to perform prescribed activity without shortness of breath or discomfort  Outcome: Progressing Towards Goal  Goal: *Verbalizes understanding and describes prescribed diet  Outcome: Progressing Towards Goal  Goal: *Describes follow-up/return visits to physicians  Outcome: Progressing Towards Goal  Goal: *Describes home care/support arrangements established based on need  Outcome: Progressing Towards Goal  Goal: *Anxiety reduced or absent  Outcome: Progressing Towards Goal  Goal: *Understands and describes signs and symptoms to report to providers(Stroke Metric)  Outcome: Progressing Towards Goal  Goal: *Verbalizes name, dosage, time, side effects, and number of days to continue medications  Outcome: Progressing Towards Goal     Problem: Ventilator Management  Goal: *Adequate oxygenation and ventilation  Outcome: Progressing Towards Goal  Goal: *Patient maintains clear airway/free of aspiration  Outcome: Progressing Towards Goal  Goal: *Absence of infection signs and symptoms  Outcome: Progressing Towards Goal  Goal: *Normal spontaneous ventilation  Outcome: Progressing Towards Goal     Problem: Patient Education: Go to Patient Education Activity  Goal: Patient/Family Education  Outcome: Progressing Towards Goal     Problem: Pain  Goal: *Control of Pain  Outcome: Progressing Towards Goal  Goal: *PALLIATIVE CARE:  Alleviation of Pain  Outcome: Progressing Towards Goal     Problem: Patient Education: Go to Patient Education Activity  Goal: Patient/Family Education  Outcome: Progressing Towards Goal     Problem: Infection - Risk of, Central Venous Catheter-Associated Bloodstream Infection  Goal: *Absence of infection signs and symptoms  Outcome: Progressing Towards Goal     Problem: Patient Education: Go to Patient Education Activity  Goal: Patient/Family Education  Outcome: Progressing Towards Goal     Problem: Infection - Risk of, Urinary Catheter-Associated Urinary Tract Infection  Goal: *Absence of infection signs and symptoms  Outcome: Progressing Towards Goal     Problem: Patient Education: Go to Patient Education Activity  Goal: Patient/Family Education  Outcome: Progressing Towards Goal     Problem: Infection - Risk of, Ventilator-Associated Pneumonia  Goal: *Absence of infection signs and symptoms  Outcome: Progressing Towards Goal     Problem: Patient Education: Go to Patient Education Activity  Goal: Patient/Family Education  Outcome: Progressing Towards Goal     Problem: Patient Education: Go to Patient Education Activity  Goal: Patient/Family Education  Outcome: Progressing Towards Goal     Problem: TIA/CVA Stroke: Day 2 Until Discharge  Goal: Treatments/Interventions/Procedures  Outcome: Progressing Towards Goal  Goal: Psychosocial  Outcome: Progressing Towards Goal  Goal: *Verbalizes anxiety and depression are reduced or absent  Outcome: Progressing Towards Goal  Goal: *Absence of aspiration  Outcome: Progressing Towards Goal  Goal: *Absence of deep venous thrombosis signs and symptoms(Stroke Metric)  Outcome: Progressing Towards Goal  Goal: *Optimal pain control at patient's stated goal  Outcome: Progressing Towards Goal  Goal: *Tolerating diet  Outcome: Progressing Towards Goal  Goal: *Ability to perform ADLs and demonstrates progressive mobility and function  Outcome: Progressing Towards Goal  Goal: *Stroke education continued(Stroke Metric)  Outcome: Progressing Towards Goal     Problem: Ischemic Stroke: Discharge Outcomes  Goal: *Verbalizes anxiety and depression are reduced or absent  Outcome: Progressing Towards Goal  Goal: *Verbalize understanding of risk factor modification(Stroke Metric)  Outcome: Progressing Towards Goal  Goal: *Hemodynamically stable  Outcome: Progressing Towards Goal  Goal: *Absence of aspiration pneumonia  Outcome: Progressing Towards Goal  Goal: *Aware of needed dietary changes  Outcome: Progressing Towards Goal  Goal: *Verbalize understanding of prescribed medications including anti-coagulants, anti-lipid, and/or anti-platelets(Stroke Metric)  Outcome: Progressing Towards Goal  Goal: *Tolerating diet  Outcome: Progressing Towards Goal  Goal: *Aware of follow-up diagnostics related to anticoagulants  Outcome: Progressing Towards Goal  Goal: *Ability to perform ADLs and demonstrates progressive mobility and function  Outcome: Progressing Towards Goal  Goal: *Absence of DVT(Stroke Metric)  Outcome: Progressing Towards Goal  Goal: *Absence of aspiration  Outcome: Progressing Towards Goal  Goal: *Optimal pain control at patient's stated goal  Outcome: Progressing Towards Goal  Goal: *Home safety concerns addressed  Outcome: Progressing Towards Goal  Goal: *Describes available resources and support systems  Outcome: Progressing Towards Goal  Goal: *Verbalizes understanding of activation of EMS(911) for stroke symptoms(Stroke Metric)  Outcome: Progressing Towards Goal  Goal: *Understands and describes signs and symptoms to report to providers(Stroke Metric)  Outcome: Progressing Towards Goal  Goal: *Neurolgocially stable (absence of additional neurological deficits)  Outcome: Progressing Towards Goal  Goal: *Verbalizes importance of follow-up with primary care physician(Stroke Metric)  Outcome: Progressing Towards Goal  Goal: *Smoking cessation discussed,if applicable(Stroke Metric)  Outcome: Progressing Towards Goal  Goal: *Depression screening completed(Stroke Metric)  Outcome: Progressing Towards Goal     Problem: Tobacco Use  Goal: *Tobacco use abstinence  Outcome: Progressing Towards Goal  Goal: *Knowledge of tobacco use cessation methods  Outcome: Progressing Towards Goal     Problem: Patient Education: Go to Patient Education Activity  Goal: Patient/Family Education  Outcome: Progressing Towards Goal     Problem: Hypotension  Goal: *Blood pressure within specified parameters  Outcome: Progressing Towards Goal  Goal: *Fluid volume balance  Outcome: Progressing Towards Goal  Goal: *Labs within defined limits  Outcome: Progressing Towards Goal     Problem: Patient Education: Go to Patient Education Activity  Goal: Patient/Family Education  Outcome: Progressing Towards Goal     Problem: Body Temperature -  Risk of, Imbalanced  Goal: *Absence of heat stress or hyperthermia signs and symptoms  Outcome: Progressing Towards Goal  Goal: *Absence of cold stress or hypothermia signs and symptoms  Outcome: Progressing Towards Goal     Problem: Patient Education: Go to Patient Education Activity  Goal: Patient/Family Education  Outcome: Progressing Towards Goal     Problem: Diabetes Self-Management  Goal: *Disease process and treatment process  Description: Define diabetes and identify own type of diabetes; list 3 options for treating diabetes. Outcome: Progressing Towards Goal  Goal: *Incorporating nutritional management into lifestyle  Description: Describe effect of type, amount and timing of food on blood glucose; list 3 methods for planning meals.   Outcome: Progressing Towards Goal  Goal: *Incorporating physical activity into lifestyle  Description: State effect of exercise on blood glucose levels. Outcome: Progressing Towards Goal  Goal: *Developing strategies to promote health/change behavior  Description: Define the ABC's of diabetes; identify appropriate screenings, schedule and personal plan for screenings. Outcome: Progressing Towards Goal  Goal: *Using medications safely  Description: State effect of diabetes medications on diabetes; name diabetes medication taking, action and side effects. Outcome: Progressing Towards Goal  Goal: *Monitoring blood glucose, interpreting and using results  Description: Identify recommended blood glucose targets  and personal targets. Outcome: Progressing Towards Goal  Goal: *Prevention, detection, treatment of acute complications  Description: List symptoms of hyper- and hypoglycemia; describe how to treat low blood sugar and actions for lowering  high blood glucose level. Outcome: Progressing Towards Goal  Goal: *Prevention, detection and treatment of chronic complications  Description: Define the natural course of diabetes and describe the relationship of blood glucose levels to long term complications of diabetes.   Outcome: Progressing Towards Goal  Goal: *Developing strategies to address psychosocial issues  Description: Describe feelings about living with diabetes; identify support needed and support network  Outcome: Progressing Towards Goal  Goal: *Insulin pump training  Outcome: Progressing Towards Goal  Goal: *Sick day guidelines  Outcome: Progressing Towards Goal  Goal: *Patient Specific Goal (EDIT GOAL, INSERT TEXT)  Outcome: Progressing Towards Goal     Problem: Patient Education: Go to Patient Education Activity  Goal: Patient/Family Education  Outcome: Progressing Towards Goal     Problem: Pain  Goal: *Control of Pain  Outcome: Progressing Towards Goal  Goal: *PALLIATIVE CARE:  Alleviation of Pain  Outcome: Progressing Towards Goal     Problem: Patient Education: Go to Patient Education Activity  Goal: Patient/Family Education  Outcome: Progressing Towards Goal     Problem: Pressure Injury - Risk of  Goal: *Prevention of pressure injury  Description: Document Dario Scale and appropriate interventions in the flowsheet. Outcome: Progressing Towards Goal  Note: Pressure Injury Interventions:  Sensory Interventions: Assess changes in LOC, Assess need for specialty bed, Float heels, Keep linens dry and wrinkle-free, Minimize linen layers, Monitor skin under medical devices, Pressure redistribution bed/mattress (bed type), Turn and reposition approx. every two hours (pillows and wedges if needed)    Moisture Interventions: Absorbent underpads, Apply protective barrier, creams and emollients, Assess need for specialty bed, Check for incontinence Q2 hours and as needed, Internal/External urinary devices, Maintain skin hydration (lotion/cream), Minimize layers, Moisture barrier    Activity Interventions: Pressure redistribution bed/mattress(bed type)    Mobility Interventions: Pressure redistribution bed/mattress (bed type), Float heels, Assess need for specialty bed, Turn and reposition approx.  every two hours(pillow and wedges)    Nutrition Interventions: Document food/fluid/supplement intake, Discuss nutritional consult with provider    Friction and Shear Interventions: Apply protective barrier, creams and emollients, Foam dressings/transparent film/skin sealants, Lift sheet, Minimize layers, Transferring/repositioning devices                Problem: Patient Education: Go to Patient Education Activity  Goal: Patient/Family Education  Outcome: Progressing Towards Goal     Problem: Chronic Obstructive Pulmonary Disease (COPD)  Goal: *Oxygen saturation during activity within specified parameters  Outcome: Progressing Towards Goal  Goal: *Able to remain out of bed as prescribed  Outcome: Progressing Towards Goal  Goal: *Absence of hypoxia  Outcome: Progressing Towards Goal  Goal: *Optimize nutritional status  Outcome: Progressing Towards Goal     Problem: Patient Education: Go to Patient Education Activity  Goal: Patient/Family Education  Outcome: Progressing Towards Goal     Problem: Patient Education: Go to Patient Education Activity  Goal: Patient/Family Education  Outcome: Progressing Towards Goal     Problem: Afib: Discharge Outcomes (not in CAT)  Goal: *Hemodynamically stable  Outcome: Progressing Towards Goal  Goal: *Stable cardiac rhythm  Outcome: Progressing Towards Goal  Goal: *Lungs clear or at baseline  Outcome: Progressing Towards Goal  Goal: *Optimal pain control at patient's stated goal  Outcome: Progressing Towards Goal  Goal: *Identifies cardiac risk factors  Outcome: Progressing Towards Goal  Goal: *Verbalizes home exercise program, activity guidelines, cardiac precautions  Outcome: Progressing Towards Goal  Goal: *Verbalizes understanding and describes prescribed diet  Outcome: Progressing Towards Goal  Goal: *Verbalizes understanding and describes medication purposes and frequencies  Outcome: Progressing Towards Goal  Goal: *Anxiety reduced or absent  Outcome: Progressing Towards Goal  Goal: *Understands and describes signs and symptoms to report to providers(Stroke Metric)  Outcome: Progressing Towards Goal  Goal: *Describes follow-up/return visits to physicians  Outcome: Progressing Towards Goal  Goal: *Describes available resources and support systems  Outcome: Progressing Towards Goal  Goal: *Influenza immunization  Outcome: Progressing Towards Goal  Goal: *Pneumococcal immunization  Outcome: Progressing Towards Goal  Goal: *Describes smoking cessation resources  Outcome: Progressing Towards Goal     Problem: Nutrition Deficit  Goal: *Optimize nutritional status  Outcome: Progressing Towards Goal     Problem: Airway Clearance - Ineffective  Goal: *Patent airway  Outcome: Progressing Towards Goal  Goal: *Absence of airway secretions  Outcome: Progressing Towards Goal  Goal: *Able to cough effectively  Outcome: Progressing Towards Goal     Problem: Patient Education: Go to Patient Education Activity  Goal: Patient/Family Education  Outcome: Progressing Towards Goal     Problem: General Wound Care  Goal: *Non-infected wound: Improvement of existing wound, absence of infection, and maintenance of skin integrity  Outcome: Progressing Towards Goal  Goal: *Infected Wound: Prevention of further infection and promotion of healing  Description: Infection control procedures (eg: clean dressings, clean gloves, hand washing, precautions to isolate wound from contamination, sterile instruments used for wound debridement) should be implemented.   Outcome: Progressing Towards Goal  Goal: Interventions  Outcome: Progressing Towards Goal     Problem: Patient Education: Go to Patient Education Activity  Goal: Patient/Family Education  Outcome: Progressing Towards Goal

## 2021-08-01 NOTE — DISCHARGE SUMMARY
[facility]                                                                                         Discharge Summary     Patient: Gerardo Lozoya       MRN: [id number]       YOB: 1960       Age: 57 y.o. Date of admission:  7/26/2021    Date of discharge:  8/1/2021    Primary care provider:  Maria C Escalona MD     Admitting provider:  Aaron Weinberg MD    Discharging provider(s): Ramses Middleton DO - Staff [address]     Consultations  · IP CONSULT TO VASCULAR SURGERY  · IP CONSULT TO CARDIAC SURGERY  · IP CONSULT TO NEUROLOGY  · IP CONSULT TO PALLIATIVE CARE - PROVIDER    Procedures  · Intubation  · Central Venous Access  · Arterial Line Access  · Impella Insertion Left Groin  · Impella Insertion Right Axillary Artery     Discharge destination: [address] [place] [address]. Admission diagnosis  · Respiratory failure requiring intubation ([facility] [J96.90]    Please refer to the admission history and physical for details on the presenting problem. Final discharge diagnoses and brief hospital course    NSTEMI  Cardiogenic Shock  Acute Hypoxic Respiratory Failure  Limb Ischemia    This is a 57-year-old female with history of COPD, diabetes, high cholesterol, hypertension, significant smoking history presented to the  [place] emergency department with nonproductive cough and shortness for breath for 2 days. Lavelle Loredo does not use oxygen at home. Lavelle Loredo been using her inhaler without significant relief.  Last time she was on steroids was 3 years ago in association with pneumonia.  No fever.  She is fully Covid vaccinated. After arrival to [facility] ED she continued to have increasing shortness of breath and dropping oxygen saturations and the decision was made to intubate her. She was noted to have eleveated troponin but no changes on EKG per MD report. She was transferred to [facility]. Upon arrival to [facility] she was noted to have ST elevation on EKG. CODE STEMI was initiated. Impella initially in groin, but developed hematoma and ischemic limb. This was repaired by vascular surgery and the impella was moved to her axillary artery. Continued with cardiogenic shock, respiratory failure and despite maximal efforts, patient did not make much recovery. Family decided to place patient as a DNR and transition to comfort care. Their wishes were honored. Physical examination at discharge  Visit Vitals  BP (!) 105/59 (BP 1 Location: Right leg, BP Patient Position: At rest)   Pulse 93   Temp (!) 101.4 °F (38.6 °C)   Resp 26   Ht 5' 8\" (1.727 m)   Wt 96.2 kg (212 lb 1.3 oz)   SpO2 96%   BMI 32.25 kg/m²          Recent Labs     08/01/21 0546 07/31/21 2353 07/31/21  1731   WBC 20.9* 21.4* 17.4*   HGB 8.2* 8.3* 8.3*   HCT 24.2* 24.5* 24.0*   PLT 58* 60* 53*     Recent Labs     08/01/21 0546 07/31/21 2353 07/31/21  1731   * 136 133*   K 4.1 3.6 3.6    107 105   CO2 20* 22 20*   BUN 18 18 16   CREA 0.68 0.69 0.63   * 170* 161*   CA 7.3* 7.3* 7.4*   MG 2.2 1.9 1.7   PHOS 2.8 2.9 3.3     Recent Labs     08/01/21 0546 07/31/21 2353 07/31/21  1731   AP 51 52 44*   TP 4.5* 4.6* 4.4*   ALB 2.2* 2.4* 2.2*   GLOB 2.3 2.2 2.2     Recent Labs     08/01/21 0546 07/31/21 2353 07/31/21  1731   INR 1.3* 1.4* 1.4*   PTP 13.3* 14.0* 14.1*   APTT 25.4 25.5 26.2      No results for input(s): FE, TIBC, PSAT, FERR in the last 72 hours.    Recent Labs     07/30/21  1805 07/30/21  1032   PH 7.22* 7.34*   PCO2 43 35   PO2 135* 98     Recent Labs     08/01/21  0546 07/31/21  2353 07/31/21  1731   CPK 2,777* 2,389* 1,293*     No components found for: [place]    Pertinent imaging studies:      ---------------------------------    Chronic Diagnoses:    Problem List as of 8/1/2021 Date Reviewed: 7/7/2021        Codes Class Noted - Resolved    Respiratory failure requiring intubation ([facility] ICD-10-CM: J96.90  ICD-9-CM: 518.81  7/26/2021 - Present        Atrial flutter, unspecified type ([facility] ICD-10-CM: I48.92  ICD-9-CM: 427.32  11/27/2019 - Present        COPD with acute exacerbation ([facility] ICD-10-CM: J44.1  ICD-9-CM: 491.21  2/10/2019 - Present        Controlled type 2 diabetes mellitus without complication, without long-term current use of insulin ([facility] ICD-10-CM: E11.9  ICD-9-CM: 250.00  6/12/2018 - Present        Polyp of colon ICD-10-CM: K63.5  ICD-9-CM: 211.3  5/10/2018 - Present        COPD (chronic obstructive pulmonary disease) ([facility] ICD-10-CM: J44.9  ICD-9-CM: 496  11/28/2012 - Present        Impaired fasting glucose ICD-10-CM: R73.01  ICD-9-CM: 790.21  4/25/2012 - Present        Vitamin D deficiency ICD-10-CM: E55.9  ICD-9-CM: 268.9  4/25/2012 - Present        HTN (hypertension) ICD-10-CM: I10  ICD-9-CM: 401.9  4/19/2012 - Present        Hyperlipidemia ICD-10-CM: E78.5  ICD-9-CM: 272.4  4/19/2012 - Present        Depressed ICD-10-CM: F32.9  ICD-9-CM: 280  4/19/2012 - Present        Asthma ICD-10-CM: J45.909  ICD-9-CM: 493.90  4/19/2012 - Present        Allergic rhinitis ICD-10-CM: J30.9  ICD-9-CM: 477.9  4/19/2012 - Present              Time spent on discharge related activities today greater than 30 minutes.       Signed:      Colleen Rose DO   Staff Intensivist  [facility]    8/1/2021   4:27 PM   Attending        Cc: Megan Vann MD

## 2021-08-01 NOTE — DEATH NOTE
Death Declaration         [facility]    Pt Name  Linsey Flaherty date:  7/26/2021   Date and time of death:  8/1/2021  @ 46   Room Number  4218/01    Medical Record Number  [id number] @ [facility], [place]   Age  57 y.o. Date of Birth 1960   PCP Yuan Ash MD   Attending physician Darek Martell DO      Code Status  DNR    Patient seen and examined     Mental status   Unresponsive    Pupils Dilated and Fixed    Respiration Nil    Pulse  Absent     Heart Sounds  Absent    Rhythm  Flat line   Family  Notified by Nursing staff    Chaplan Service  Notified by Nursing staff     Death certificate and discharge summary completion remain  Dr. Darek Martell DO's responsibility.                                  8/1/2021

## 2021-08-01 NOTE — PROGRESS NOTES
[facility]    ICU TEAM H&P    Name: Mery Pham   : 1960   MRN: [id number]   Date: 2021      Subjective:   Progress Note: 2021      Reason for ICU Admission:  Respiratory failure    HPI: This is a 70-year-old female with history of COPD, diabetes, high cholesterol, hypertension, significant smoking history presented to the  [place] emergency department with nonproductive cough and shortness for breath for 2 days. She does not use oxygen at home. She been using her inhaler without significant relief. Last time she was on steroids was 3 years ago in association with pneumonia. No fever. She is fully Covid vaccinated. After arrival to [facility] ED she continued to have increasing shortness of breath and dropping oxygen saturations and the decision was made to intubate her. She was noted to have eleveated troponin but no changes on EKG per MD report. She was transferred to [facility]. Upon arrival to [facility] she was noted to have ST elevation on EKG. CODE STEMI was initiated. Interval events:     a.m.-patient taken to cath lab for OSCAR to LAD with post-PCI LOI flow 3. LVEDP was noted to be 40mmHg and a left femoral Impella was placed. On arrival in CCU Impella flows were noted to have decreased from 3.8L/min to 2.2 L/min. Bedside echo was performed and the Impella withdrawn 3.5cm  under direct echo visualization with a resulting increase in flow to 3.5L/min. An expanding hematoma at the left groin site was also noted on arrival in the CCU. The limb was cool and mottled with no doppler signal in the left DP, PT or popliteal. CT Surgery was consulted for placement of an axillary Impella both to increase support and to allow Vascular Surgery to address the threatened left lower extremity.       PM-now status post placement of axillary Impella 5.5, Removal of left femoral Impella CP, left groin exploration with, common femoral endarterectomy and patch angioplasty, left femoral thrombectomy and left iliac arteriogram, received 5 PRBCs and 2 of FFP in the OR, went into a flutter RVR in the OR-cardioverted x2 but did not respond-responded somewhat to an amnio bolus, came back on 3 pressors    7/28 - unequal pupils ON - code stroke called, now seems non focal, still intubated on 3 pressors with impella in situ    7/29 - went back in RVR on assoc with HD instability, spiking high grade fevers, high C.I, getting more difficult to oxygenate and ventilate    7/30 - fever better, slowly coming down on pressors    7/31 - Impella at P5; NE/Vaso/Phenyl; Steroids; Fent/Versed; Amio; 1 unit PRBC    8/1 -- Impella at P5; Levo/Phenyl; mottled hands/feet    Assessment:     ICU Problems:    1. Cardiogenic Shock  2. Impella MCS Support  3. STEMI  4. Acute/Chronic Hypoxic Respiratory Failure  5. Hyponatremia  6. Hypochloremia  7. Acute Systolic Heart Failure  8. Volume Overload  9. COPD  10. Hypertension  11. Hypercholesterolemia      ICU Comprehensive Plan of Care:     Neuro - Fentanyl & Versed, SAT as able    Cardiovascular - Aspirin, Brilinta, Lipitor, trend troponins, Impella in situ - currently @ P5, map goal greater than 65, acceptable C.I, Wean Levo, then Chirag (give HR elevation) heart rate goal less than 110 - cont amio, keep magnesium above 2 and potassium above 4, serial vascular checks. Midodrine. Pulmonary - continue lung protective mechanical ventilation, sat goal > 90%, difficult to oxygenate and ventilate, high R sided pressures - responded to Jcarlos, ?  PE - too unstable for transport at this time and empiric AC unsafe at this time, history of COPD- cont DESMOND/inhaled steroid therapy; wean Jcarlos    GI - TF's,  PPI GI prophylaxis    Renal - monitor urine output, correct electrolyte derangements as needed, Lasix    Hematology-EBL in OR 1.5 L-status post 2 FFP, 1 plt and 5 of PRBCs 7/27, serial labs and transfuse for hemoglobin less than 7, platelet count less than 50, INR greater than 1.6 or fibrinogen less than 100    ID- defervesced, WBC going up, on Vanc/Zosyn/doxycycline, f/u micro studies studies - NGTD - Trend Procal/Lactate; stop vanco on     Endocrinology - keep glucose between 100 & 180    Prognosis very guarded at this time    Family meeting for  sheculded    Objective:   Vital Signs:  Visit Vitals  BP (!) 99/56   Pulse 79   Temp (!) 100.9 °F (38.3 °C)   Resp 26   Ht 5' 8\" (1.727 m)   Wt 96.2 kg (212 lb 1.3 oz)   SpO2 95%   BMI 32.25 kg/m²      O2 Device: Ventilator Temp (24hrs), Av.8 °F (37.7 °C), Min:98.6 °F (37 °C), Max:100.9 °F (38.3 °C)    CVP (mmHg): 10 mmHg (21 1100)      Intake/Output:     Intake/Output Summary (Last 24 hours) at 2021 1124  Last data filed at 2021 1100  Gross per 24 hour   Intake 4078.86 ml   Output 4350 ml   Net -271.14 ml       Physical Exam:  General-intubated, sedated  Neuro-pupils reactive, withdraws in all 4  Cardiac-sometime irregular and tachy, Impella in situ  Lungs-clear anteriorly  Abdomen-soft, nontender, nondistended  Extremities- pop and PT dopplerable on R, only Pop dopplerable on L; hands and feet mottled/cool    LABS AND  DATA: Personally reviewed  Recent Labs     21  0546 21  2353   WBC 20.9* 21.4*   HGB 8.2* 8.3*   HCT 24.2* 24.5*   PLT 58* 60*     Recent Labs     21  0546 21  2353   * 136   K 4.1 3.6    107   CO2 20* 22   BUN 18 18   CREA 0.68 0.69   * 170*   CA 7.3* 7.3*   MG 2.2 1.9   PHOS 2.8 2.9     Recent Labs     21  0546 21  2353   AP 51 52   TP 4.5* 4.6*   ALB 2.2* 2.4*   GLOB 2.3 2.2     Recent Labs     21  0546 21  2353   INR 1.3* 1.4*   PTP 13.3* 14.0*   APTT 25.4 25.5      Recent Labs     21  0453 21  1207   PHI 7.42 7.36   PCO2I 30.8* 35.6   PO2I 71* 61*   FIO2I 100 100     Recent Labs     21  0546 21  2353   CPK 2,777* 2,389*       Hemodynamics:   PAP: PAP Systolic: 52 ( 6923) CO: CO (l/min): 6.2 l/min (21 1100)   Wedge:   CI: CI (l/min/m2): 3.2 l/min/m2 (08/01/21 1100)   CVP:  CVP (mmHg): 10 mmHg (08/01/21 1100) SVR:       PVR:       Ventilator Settings:  Mode Rate Tidal Volume Pressure FiO2 PEEP   Pressure control   0 ml  0 cm H2O 100 % 9 cm H20     Peak airway pressure: 34 cm H2O    Minute ventilation: 18.2 l/min        MEDS: Reviewed    Chest X-Ray:  CXR Results  (Last 48 hours)               08/01/21 0432  XR CHEST PORT Final result    Impression:      Appropriate support tube and catheter placement. No change since the previous   study. Narrative:  EXAM:  XR CHEST PORT       INDICATION:  Impella placement       COMPARISON:  7/31/2021       FINDINGS:       A portable AP radiograph of the chest was obtained at 4:17 hours. The patellar   device is unchanged in position. The tip of the Terry-[place] catheter overlies the   main pulmonary artery. The ET tube is in satisfactory position. The distal   portion of the enteric tube which can be seen overlies the gastroesophageal   junction. There has been no significant change in the bibasilar densities, consistent   with layering pleural fluid on the right and probable pleural fluid and   atelectasis on the left. The upper lobes are clear. The cardiac silhouette is   normal in size. The bones and soft tissues are unremarkable. There has been no   change since the prior study. 07/31/21 0421  XR CHEST PORT Final result    Impression:  No significant interval change                Narrative:  Clinical history: Impella placement   INDICATION:   Impella placement   COMPARISON: 7/30/2021       FINDINGS:   AP portable upright view of the chest demonstrates a stable enlarged   cardiopericardial silhouette. Small right-sided effusion is not changed. Cardiac   assist device and pulmonary catheter are stable. ET tube and NG tube are   stable. .There is no pneumothorax. . Patient is on a cardiac monitor.                   Imaging reviewed      NOTE OF PERSONAL INVOLVEMENT IN CARE   This patient has a high probability of imminent, clinically significant deterioration, which requires the highest level of preparedness to intervene urgently. I participated in the decision-making and personally managed or directed the management of the following life and organ supporting interventions that required my frequent assessment to treat or prevent imminent deterioration. I personally spent 125 minutes of critical care time. This does not include any procedural time.       Signed By: Marci Lambert DO     August 1, 2021

## 2021-08-01 NOTE — PROGRESS NOTES
Spiritual Care Assessment/Progress Note  [facility]      NAME: Patricia Cruz      MRN: [id number]  AGE: 57 y.o. SEX: female  Oriental orthodox Affiliation: Non Worship   Language: English     8/1/2021     Total Time (in minutes): 62     Spiritual Assessment begun in [facility] CORONARY CARE through conversation with:         []Patient        [x] Family    [x] Friend(s)        Reason for Consult: Family care     Spiritual beliefs: (Please include comment if needed)     [x] Identifies with a laura tradition:         [] Supported by a laura community:            [] Claims no spiritual orientation:           [] Seeking spiritual identity:                [] Adheres to an individual form of spirituality:           [] Not able to assess:                           Identified resources for coping:      [x] Prayer                               [] Music                  [] Guided Imagery     [x] Family/friends                 [] Pet visits     [] Devotional reading                         [] Unknown     [] Other:                                              Interventions offered during this visit: (See comments for more details)          Family/Friend(s):  Affirmation of emotions/emotional suffering, Affirmation of laura, Catharsis/review of pertinent events in supportive environment, Normalization of emotional/spiritual concerns, Prayer (actual), Prayer (assurance of)     Plan of Care:     [] Support spiritual and/or cultural needs    [] Support AMD and/or advance care planning process      [] Support grieving process   [] Coordinate Rites and/or Rituals    [] Coordination with community clergy   [] No spiritual needs identified at this time   [] Detailed Plan of Care below (See Comments)  [] Make referral to Music Therapy  [] Make referral to Pet Therapy     [] Make referral to Addiction services  [] Make referral to [facility]  [] Make referral to Spiritual Care Partner  [] No future visits requested        [x] Follow up upon further referrals     Comments:  responded to staff request for family support. Pt's brother, sister-in-law and best friend are at bedside. Family and friends shared that they feel very sure that pt would not want to be intubated. Family shared that they would appreciate some words/prayer. Let them know of  availability.  provided pastoral listening, support and prayer.  talked with pt's RN. Please contact [phone] [address] [facility] for further support.      [address] Melva Puri, MACE   [phone])

## 2021-08-02 LAB
FLUID CULTURE, SPNG2: NORMAL
L PNEUMO1 AG UR QL IA: POSITIVE
ORGANISM ID, SPNG3: NORMAL
PLEASE NOTE, SPNG4: NORMAL
S PNEUM AG SPEC QL LA: NEGATIVE
SPECIMEN SOURCE: ABNORMAL
SPECIMEN SOURCE: NORMAL
SPECIMEN, SPNG1: NORMAL

## 2021-08-03 LAB
BACTERIA SPEC CULT: NORMAL
SERVICE CMNT-IMP: NORMAL

## 2021-08-04 NOTE — ADT AUTH CERT NOTES
Utilization Reviews       Cardiovascular Surgery or Procedure GRG - Care Day 5 (7/31/2021) by Susanna Sr RN       Review Entered Review Status   8/3/2021 16:53 Completed      Criteria Review      Care Day: 5 Care Date: 7/31/2021 Level of Care: ICU    Guideline Day 2    Clinical Status    ( ) * No ICU or intermediate care needs    8/3/2021 16:53:22 EDT by Joanna Winkler      critical ill  on vent    Interventions    (X) Inpatient interventions continue    8/3/2021 16:53:22 EDT by Joanna Winkler      see below    (X) Vascular and neurologic checks as appropriate    * Milestone   Additional Notes   7/31   inpt ICU   Critical care hospitalist    7/31 - Impella at P5; NE/Vaso/Phenyl; Steroids; Fent/Versed; Amio; 1 unit PRBC       Assessment:       ICU Problems:       1. Cardiogenic Shock   2. Impella MCS Support   3. STEMI   4. Acute/Chronic Hypoxic Respiratory Failure   5. Hyponatremia   6. Hypochloremia   7. Acute Systolic Heart Failure   8. Volume Overload   9. COPD   10. Hypertension   11. Hypercholesterolemia           ICU Comprehensive Plan of Care:       Neuro - Fentanyl & Versed, SAT as able       Cardiovascular - aspirin, Brilinta, Lipitor, trend troponins, Impella in situ - currently @ P5, map goal greater than 65, acceptable C.I, on levo/vaso/chirag, Wean Levo, then Vaso, then Chirag (give HR elevation) heart rate goal less than 110 - cont amio, keep magnesium above 2 and potassium above 4, serial vascular checks, CKs WNL, follow-up cardiology, cardiac surgery and vascular surgery recommendations       Pulmonary - continue lung protective mechanical ventilation, sat goal > 90%, difficult to oxygenate and ventilate, high R sided pressures - responded to Jcarlos, ?  PE - too unstable for transport at this time and empiric AC unsafe at this time, history of COPD- cont DESMOND/inhaled steroid therapy       GI - TF's,  PPI GI prophylaxis       Renal - monitor urine output, correct electrolyte derangements as needed, Lasix     Hematology-EBL in OR 1.5 L-status post 2 FFP, 1 plt and 5 of PRBCs , serial labs and transfuse for hemoglobin less than 7, platelet count less than 50, INR greater than 1.6 or fibrinogen less than 100; transfuse 1 unirt PRBC       ID- defervesced, WBC going up, on Vanc/Zosyn/doxycycline, f/u micro studies studies - NGTD - Trend Procal/Lactate; stop vanco on        Endocrinology-keep glucose between 100 & 180       Prognosis very guarded at this time       Objective:   Vital Signs:   Visit Vitals   /66   Pulse 91   Temp 98.5 °F (36.9 °C)   Resp 26   Ht 5' 8\" (1.727 m)   Wt 99.4 kg (219 lb 2.2 oz)   SpO2 95%   BMI 33.32 kg/m²      O2 Device: Ventilator, Endotracheal tube Temp (24hrs), Av.8 °F (37.1 °C), Min:98 °F (36.7 °C), Max:99.6 °F (37.6 °C)       CVP (mmHg): 7 mmHg (21 1100)         Intake/Output:        Intake/Output Summary (Last 24 hours) at 2021 1140   Last data filed at 2021 1115   Gross per 24 hour   Intake 5961.89 ml   Output 2520 ml   Net 3441.89 ml           Physical Exam:   General-intubated, sedated   Neuro-pupils reactive, withdraws in all 4   Cardiac-sometime irregular and tachy, Impella in situ   Lungs-clear anteriorly   Abdomen-soft, nontender, nondistended   Extremities- pop and PT dopplerable on R, only Pop dopplerable on L       LABS AND  DATA: Personally reviewed   Recent Labs     21   0553 21   2354   WBC 16.0* 13.8*   HGB 7.4* 7.3*   HCT 21.9* 21.8*   PLT 50* 46*       Recent Labs     21   0553 21   2354   * 131*   K 3.9 4.1    104   CO2 20* 21   BUN 15 14   CREA 0.53* 0.54*   * 136*   CA 7.2* 6.9*   MG 1.9 2.1   PHOS 2.0* 2.4*       Recent Labs     21   0553 21   2354   AP 36* 35*   TP 4.3* 4.2*   ALB 2.2* 2.2*   GLOB 2.1 2.0       Recent Labs     21   0553 21   2354   INR 1.3* 1.3*   PTP 13.7* 13.7*   APTT 25.8 26.9       Recent Labs     21   0503 21   1121   PHI 7.42 7.28* PCO2I 26.2* 47.0*   PO2I 52* 54*   FIO2I 80 100       Recent Labs     07/31/21   0553 07/30/21   2354 07/29/21   0507 07/28/21   2352 07/28/21   1559 07/28/21   1559    97  < > 166  < > 206*   TROIQ -- -- -- 11.00* -- 11.30*    < > = values in this interval not displayed. Card surgery note   Critical Care Note        ACTIVE MEDICAL PROBLEMS       Cardiogenic shock. 2.  Acute ST elevated myocardial infarction.    3.  Acute respiratory failure.       IMPRESSION        Remains critically ill with little progress in weaning of LVAD or pressor support   Urine output declining    Echo reviewed demonstrates minimal recovery of LV function            Patient Vitals for the past 4 hrs:     Temp Pulse Resp BP SpO2   07/31/21 0700 - 96 28 114/70 95 %   07/31/21 0600 - (!) 107 28 116/66 94 %   07/31/21 0500 - 91 28 116/69 92 %   07/31/21 0445 - (!) 105 26 - 92 %   07/31/21 0400 98 °F (36.7 °C) 91 28 108/62 92 %            Intake/Output Summary (Last 24 hours) at 7/31/2021 0727   Last data filed at 7/31/2021 0700   Gross per 24 hour   Intake 5511.79 ml   Output 1890 ml   Net 3621.79 ml       Hemodynamics:               CO: CO (l/min): 4.7 l/min               CI: CI (l/min/m2): 2.4 l/min/m2               CVP: CVP (mmHg): 12 mmHg (07/31/21 0700)               SVR: SVR (dyne*sec)/cm5: 1218 (dyne*sec)/cm5 (07/31/21 0400)               PAP Systolic: PAP Systolic: 53 (23/68/44 6212)               PAP Diastolic: PAP Diastolic: 33 (58/81/73 6077)               PVR:                 SV02:                 SCV02: SCVO2 (%): 66 % (07/31/21 0700)       Vent settings        Ventilator Volumes   Vt Set (ml): 400 ml (07/29/21 0356)   Vt Exhaled (Machine Breath) (ml): 570 ml (07/31/21 0445)   Ve Observed (l/min): 14.8 l/min (07/31/21 2745)                   Chest xrays reviewed                Labs reviewed for last 24 hours and trends noted in evaluation                Plan            I personally spent  35 minutes of critical care time.  This is time spent directly involved in patient care in the critical care unit as well as the coordination of care and discussions,.  This does not include any procedural time which has been billed separately. During this entire length of time I was immediately available to the patient. The reason for providing this level of medical care for this critically ill patient was due a critical illness that impaired one or more vital organ systems such that there was a high probability of imminent or life threatening deterioration in the patients condition. This care involved high complexity decision making to assess, manipulate, and support vital system functions, to treat this degree of vital organ system failure and to prevent further life threatening deterioration of the patient's condition       Medical decision making- High complexity    Risk of morbidity and mortality - high         Cardiovascular Surgery or Procedure GRG - Care Day 4 (7/30/2021) by Yolanda Todd RN       Review Entered Review Status   8/3/2021 16:49 Completed      Criteria Review      Care Day: 4 Care Date: 7/30/2021 Level of Care: ICU    Guideline Day 2    Clinical Status    ( ) * No ICU or intermediate care needs    8/3/2021 16:49:01 EDT by Brenton prescott multiple meds    Interventions    (X) Inpatient interventions continue    8/3/2021 16:49:01 EDT by Cynthia Wilcox see below    (X) Vascular and neurologic checks as appropriate    * Milestone   Additional Notes   7/30   icu  inpt        Admit Date: 7/26/2021   POD:  3 Day Post-Op       Procedure:  Procedure(s):   Placement of Right Axillary Impella 5.5,  Removal of Left Femoral Impella CP, Left Groin Exploration with Common Femoral Patch Angioplasty, Left Femoral Arteriogram and Thrombectomy,  AXEL by Dr. Rubi Cruz           Subjective:   Pt seen with Dr. Nelia Christiansen. Pt remains intubated/sedated. Vent FiO2 100%, Jcarlos 40 ppm. Follows commands when sedation weaned.  On amio 1, vaso 0.04 units, levo 30 mcg, aung 150, fentanyl, versed. Tmax 105.4F. Impella at P5, D5 w/ bicarb as purge. NSR 80s        Objective:   Vitals:   Blood pressure (!) 101/57, pulse 88, temperature 100.4 °F (38 °C), resp. rate 30, height 5' 8\" (1.727 m), weight 203 lb 11.3 oz (92.4 kg), SpO2 98 %. Temp (24hrs), Av.2 °F (39.6 °C), Min:100.4 °F (38 °C), Max:105.4 °F (40.8 °C)       Hemodynamics:               CO: CO (l/min): 4.7 l/min               CI: CI (l/min/m2): 2.4 l/min/m2               CVP: CVP (mmHg): 7 mmHg (21)               SVR: SVR (dyne*sec)/cm5: 1294 (dyne*sec)/cm5 (21)               PAP Systolic: PAP Systolic: 46 (80/96/34 8511)               PAP Diastolic: PAP Diastolic: 27 (46/42/93 9128)               PVR:                 SV02:                 SCV02: SCVO2 (%): 49 % (21)       EKG/Rhythm:  NSR 80s       Ventilator:   Ventilator Volumes   Vt Set (ml): 400 ml (21 0356)   Vt Exhaled (Machine Breath) (ml): 556 ml (21)   Ve Observed (l/min): 16.7 l/min (21)       Oxygen Therapy:   Oxygen Therapy   O2 Sat (%): 98 % (21)   Pulse via Oximetry: 87 beats per minute (21)   O2 Device: Ventilator (21)   Skin Assessment: Clean, dry, & intact (21)   Skin Protection for O2 Device: Yes (21)   Orientation: Bilateral;Anterior (21)   Location: Cheek;Lip (21)   Interventions: Mouth Care;Reposition Device (07/30/21 0600)   FIO2 (%): 100 % (21 0714)   ETCO2 (mmHg): 21 mmHg (21 2245)    Assessment:       Active Problems:     Respiratory failure requiring intubation ([facility] [address] (2021)                Plan/Recommendations/Medical Decision Makin. Cardiogenic shock, STEMI: s/p OSCAR to LAD, upgrade to Impella 5.5 insertion. Cont D5 w/ bicarb as purge. Cont at P5. Trend labs - lactate increasing, pBNP trending down. ASA on hold d/t plts, on brilinta, lipitor per cards.  Cont current gtts for now. On broad spectrum abx - if dc'd will need abx coverage for Impella       2. Afib/Aflutter s/p cardioversion vs ST: In ST now. On amio 1.        3. L lower ext ischemia s/p L fem thrombectomy, endarterectomy: vascular surgery following, aware of decreased pulses. Monitor for bleeding       4. Code S: neuro following, was too unstable for CT. Monitor. Following commands.        5. Acute hypoxic resp failure, hx of COPD: vent management per intensivist. Cont scheduled nebs       6. Acute bleeding, anemia: no further signs of active bleeding. Hgb 7.1. Monitor        7. Thrombocytopenia: plts down to 39K, trend. Holding ASA. Could be d/t consumption from Impella. Transfuse platelets today.       8. DM: A1c 6.1%, on Lantus 10 units, SSI        9. HTN: currently hypotensive on multiple gtts       10. HLD: on lipitor       11. Leukocytosis, fever: on doxy, Zosyn. Blood, sputum cx NGTD. UA w/ only +1 bacteria, no culture sent        12. Nutrition/GI: On TF/protonix. Critical care hospitalist        Interval events:       7/27 a.m.-patient taken to cath lab for OSCAR to LAD with post-PCI LOI flow 3. LVEDP was noted to be 40mmHg and a left femoral Impella was placed. On arrival in CCU Impella flows were noted to have decreased from 3.8L/min to 2.2 L/min. Bedside echo was performed and the Impella withdrawn 3.5cm  under direct echo visualization with a resulting increase in flow to 3.5L/min. An expanding hematoma at the left groin site was also noted on arrival in the CCU.  The limb was cool and mottled with no doppler signal in the left DP, PT or popliteal. CT Surgery was consulted for placement of an axillary Impella both to increase support and to allow Vascular Surgery to address the threatened left lower extremity.        7/27 PM-now status post placement of axillary Impella 5.5, Removal of left femoral Impella CP, left groin exploration with, common femoral endarterectomy and patch angioplasty, left femoral thrombectomy and left iliac arteriogram, received 5 PRBCs and 2 of FFP in the OR, went into a flutter RVR in the OR-cardioverted x2 but did not respond-responded somewhat to an amnio bolus, came back on 3 pressors       7/28 - unequal pupils ON - code stroke called, now seems non focal, still intubated on 3 pressors with impella in situ       7/29 - went back in RVR on assoc with HD instability, spiking high grade fevers, high C.I, getting more difficult to oxygenate and ventilate       7/30 - fever better, slowly coming down on pressors       Assessment:       ICU Problems:       1. STEMI   2. Acute/Chronic Hypoxic Respiratory Failure   3. Hyponatremia   4. Hypochloremia   5. Elevated BNP   6. COPD   7. Hypertension   8. Hypercholesterolemia           ICU Comprehensive Plan of Care:       Neuro-analgesia/sedation with propofol, versed and opioids as needed       Cardiovascular-continue hemodynamic monitoring, on aspirin, Brilinta, Lipitor, trend troponins, Impella in situ - currently @ P5, map goal greater than 65, acceptable C.I, on levo/vaso/aung, heart rate goal less than 110- cont amio, keep magnesium above 2 and potassium above 4, serial vascular checks, CKs WNL, follow-up cardiology, cardiac surgery and vascular surgery recommendations       Pulmonary-continue lung protective mechanical ventilation, sat goal > 90%, difficult to oxygenate and ventilate, high R sided pressures - responded to Jcarlos, ?  PE - too unstable for transport at this time and empiric AC unsafe at this time, history of COPD- cont DESMOND/inhaled steroid therapy       GI-resume feeds if pressor requirements keep coming down, PPI GI prophylaxis       Renal-monitor urine output, correct electrolyte derangements as needed, will diurese today       Hematology-EBL in OR 1.5 L-status post 2 FFP, 1 plt and 5 of PRBCs 7/27, serial labs and transfuse for hemoglobin less than 7, platelet count less than 50, INR greater than 1.6 or fibrinogen less than 100       ID- defervesced, WBC going up, on Vanc/Zosyn/doxycycline, f/u micro studies studies - NGTD       Endocrinology-keep glucose between 100 & 180       Prognosis very guarded at this time       Objective:   Vital Signs:   Visit Vitals   /63 (BP 1 Location: Right leg)   Pulse 74   Temp 99.3 °F (37.4 °C)   Resp 28   Ht 5' 8\" (1.727 m)   Wt 92.1 kg (203 lb)   SpO2 95%   BMI 30.87 kg/m²      O2 Device: Ventilator Temp (24hrs), Av.8 °F (38.8 °C), Min:99.3 °F (37.4 °C), Max:105.4 °F (40.8 °C)       CVP (mmHg): 117 mmHg (21 1600)         Intake/Output:        Intake/Output Summary (Last 24 hours) at 2021 1653   Last data filed at 2021 1400   Gross per 24 hour   Intake 6265.47 ml   Output 1295 ml   Net 4970.47 ml           Physical Exam:   General-intubated, sedated   Neuro-pupils reactive, withdraws in all 4   Cardiac-sometime irregular and tachy, Impella in situ   Lungs-clear anteriorly   Abdomen-soft, nontender, nondistended   Extremities- pop and PT dopplerable on R, only Pop dopplerable on L       LABS AND  DATA: Personally reviewed   Recent Labs     21   1200 21   0548   WBC 17.8* 14.8*   HGB 6.8* 7.1*   HCT 20.3* 21.1*   PLT 60* 39*       Recent Labs     21   1200 21   0641 21   0548 21   0548   * -- -- 126*   K 4.0 4.3  < > 4.4    -- -- 99   CO2 20* -- -- 21   BUN 14 -- -- 14   CREA 0.59 -- -- 0.70   * -- -- 250*   CA 7.0* -- -- 6.7*   MG 1.9 2.0  < > 2.0   PHOS 2.8 -- -- 2.0*    < > = values in this interval not displayed.       Recent Labs     21   1200 21   0548   AP 37* 35*   TP 4.3* 4.4*   ALB 2.3* 2.4*   GLOB 2.0 2.0       Recent Labs     21   1200 21   0548   INR 1.3* 1.3*   PTP 13.6* 13.4*   APTT 27.9 30.4       Recent Labs     21   1121 21   0945   PHI 7.28* 7.22*   PCO2I 47.0* 56.3*   PO2I 54* 62*   FIO2I 100 100       Recent Labs     21   1200 21   0548 21 0507 07/28/21   2352 07/28/21   1559 07/28/21   1559   CPK 97 134  < > 166  < > 206*   TROIQ -- -- -- 11.00* -- 11.30*    < > = values in this interval not displayed.           Hemodynamics:    PAP: PAP Systolic: 46 (02/06/11 4185) CO: CO (l/min): 5.8 l/min (07/30/21 1200)   Wedge: CI: CI (l/min/m2): 6 l/min/m2 (07/30/21 1200)   CVP: CVP (mmHg): 117 mmHg (07/30/21 1600) SVR:      PVR:        Ventilator Settings:   Mode Rate Tidal Volume Pressure FiO2 PEEP   Pressure control 400 ml 12 cm H2O 100 % 8 cm H20       Peak airway pressure: 34 cm H2O    Minute ventilation: 13.6 l/min            MEDS: Reviewed       Chest X-Ray:   CXR Results  (Last 48 hours)          07/30/21 0614 XR CHEST PORT Final result     Impression:    No interval change.                 Narrative:    Clinical history: postop heart   INDICATION:   postop heart   COMPARISON: 7/29/2021       FINDINGS:   AP portable upright view of the chest demonstrates a stable prominent   cardiopericardial silhouette. Layering pleural effusion on the right is not   changed. Left basilar atelectasis unchanged. .Assist device ET tube and NG tube   are stable. Pulmonary artery catheter stable. There is no pneumothorax. . Patient   is on a cardiac monitor.             07/29/21 0504 XR CHEST PORT Final result     Impression:    No significant change.                 Narrative:    Clinical history: postop heart   INDICATION:   postop heart   COMPARISON: 7/20/2021       FINDINGS:   AP portable upright view of the chest demonstrates a stable enlarged   cardiopericardial silhouette. Cardiac assist device is stable. Pulmonary catheter   is stable. Mediastinal pleural drains are stable. Bibasilar atelectasis and   right-sided effusion unchanged. .There is no focal consolidation. .There is no   pneumothorax. . Patient is on a cardiac monitor.                             Cardiovascular Surgery or Procedure GR - Care Day 3 (7/29/2021) by Hayden Vazquez RN       Review Entered Review Status   8/3/2021 16:43 Completed      Criteria Review      Care Day: 3 Care Date: 2021 Level of Care: ICU    Guideline Day 2    Clinical Status    ( ) * No ICU or intermediate care needs    8/3/2021 16:43:22 EDT by Millicent Pickens remains on vent in icu    Interventions    (X) Inpatient interventions continue    8/3/2021 16:43:22 EDT by Abril prescott  multiple gtts    (X) Vascular and neurologic checks as appropriate    * Milestone   Additional Notes     icu inpt   [facility] ICU Progress Note       Admit Date: 2021   POD:  2 Day Post-Op       Procedure:  Procedure(s):   Placement of Right Axillary Impella 5.5,  Removal of Left Femoral Impella CP, Left Groin Exploration with Common Femoral Patch Angioplasty, Left Femoral Arteriogram and Thrombectomy,  AXEL by Dr. Ted Vasquez           Subjective:   Pt seen with Dr. Jose G Hills. Pt remains intubated/sedated. Vent FiO2 100% due to hypoxia. Went into Afib early this morning. MAPs decreased and PAPs increased. Received amio bolus and drip started at 1. Follows commands when sedation weaned. On vaso 0.04 units, epi 8 mcg, levo 30 mcg. Currently in ST 120s.       Impella at P5, D5 w/ bicarb as purge.         Objective:   Vitals:   Blood pressure (!) 88/69, pulse (!) 121, temperature (!) 101.2 °F (38.4 °C), resp. rate 17, height 5' 8\" (1.727 m), weight 187 lb 13.3 oz (85.2 kg), SpO2 (!) 89 %.    Temp (24hrs), Av.9 °F (38.3 °C), Min:100 °F (37.8 °C), Max:101.4 °F (38.6 °C)       Hemodynamics:               CO: CO (l/min): 9 l/min               CI: CI (l/min/m2): 4.5 l/min/m2               CVP: CVP (mmHg): 10 mmHg (21 0700)               SVR: SVR (dyne*sec)/cm5: 593 (dyne*sec)/cm5 (21 0400)               PAP Systolic: PAP Systolic: 61 (59/45/20 7175)               PAP Diastolic: PAP Diastolic: 32 (14/56 5742)               PVR:                 SV02:                 SCV02: SCVO2 (%): 66 % (21 0700)       EKG/Rhythm:  ST 120s     Ventilator:   Ventilator Volumes   Vt Set (ml): 400 ml (21 0356)   Vt Exhaled (Machine Breath) (ml): 435 ml (21 0356)   Ve Observed (l/min): 7.95 l/min (21 0356)       Oxygen Therapy:   Oxygen Therapy   O2 Sat (%): (!) 89 % (21 0700)   Pulse via Oximetry: 108 beats per minute (21 1208)   O2 Device: Endotracheal tube (21 0400)   Skin Assessment: Clean, dry, & intact (21 1600)   Skin Protection for O2 Device: Yes (21 1600)   Orientation: Middle (21)   Location: Cheek (21)   Interventions: Mouth Care (21)   FIO2 (%): 100 % (21 0400)   ETCO2 (mmHg): 21 mmHg (21 2245)    Assessment:       Active Problems:     Respiratory failure requiring intubation ([facility] [address] (2021)                Plan/Recommendations/Medical Decision Makin. Cardiogenic shock, STEMI: s/p OSCAR to LAD, upgrade to Impella 5.5 insertion. Cont D5 w/ bicarb as purge. Cont at P8. Trend labs - lactate 1.0, pBNP trending down. ASA on hold d/t plts, on brilinta, lipitor per cards. Cont current gtts for now. On broad spectrum abx - if dc'd will need abx coverage for Impella       2. Afib/Aflutter s/p cardioversion vs ST: In ST now. On amio 1.        3. L lower ext ischemia s/p L fem thrombectomy, endarterectomy: vascular surgery following, aware of decreased pulses. Monitor for bleeding       4. Code S: neuro following, was too unstable for CT. Monitor. Following commands.        5. Acute hypoxic resp failure, hx of COPD: vent management per intensivist. Cont scheduled nebs       6. Acute bleeding, anemia: no further signs of active bleeding. Hgb 7.4. Monitor        7. Thrombocytopenia: plts down to 58K, trend. Holding ASA. Could be d/t consumption from Impella       8. DM: A1c 6.1%, on Lantus 10 units, SSI        9. HTN: currently hypotensive on multiple gtts       10. HLD: on lipitor       11. Leukocytosis, fever: on doxy, Zosyn. Blood, sputum cx NGTD.  UA w/ only +1 bacteria, no culture sent        12. Nutrition/GI: On TF/protonix.        Dispo: remains critically ill. Cont Impella today - no plans to wean yet. Further orders per primary teams. If continues to be unstable may perform limited TTE to assess Impella positioning.           Neuro consult    ASSESSMENT AND PLAN: This is a 25-year-old female admitted with acute on chronic respiratory failure, STEMI, occluded LAD, atrial flutter, cardiogenic shock, LV of 20, now on Impella, noted to have asymmetric pupils within 1 mm of each other last night and no cough or gag while sedated on propofol.  Sedation has been lightened, she is now alert, following commands.  No obvious focal deficits.  Her pupils are asymmetric with the right 2 mm and the left 1 mm, which is within physiological norm.  She has a cough now, suspect that changes seen were related to sedation.  Will follow her exam over time to determine if she needs any neuroimaging. Critical care note   Interval events:       7/27 a.m.-patient taken to cath lab for OSCAR to LAD with post-PCI LOI flow 3. LVEDP was noted to be 40mmHg and a left femoral Impella was placed. On arrival in CCU Impella flows were noted to have decreased from 3.8L/min to 2.2 L/min. Bedside echo was performed and the Impella withdrawn 3.5cm  under direct echo visualization with a resulting increase in flow to 3.5L/min. An expanding hematoma at the left groin site was also noted on arrival in the CCU.  The limb was cool and mottled with no doppler signal in the left DP, PT or popliteal. CT Surgery was consulted for placement of an axillary Impella both to increase support and to allow Vascular Surgery to address the threatened left lower extremity.        7/27 PM-now status post placement of axillary Impella 5.5, Removal of left femoral Impella CP, left groin exploration with, common femoral endarterectomy and patch angioplasty, left femoral thrombectomy and left iliac arteriogram, received 5 PRBCs and 2 of FFP in the OR, went into a flutter RVR in the OR-cardioverted x2 but did not respond-responded somewhat to an amnio bolus, came back on 3 pressors       7/28 - unequal pupils ON - code stroke called, now seems non focal, still intubated on 3 pressors with impella in situ       7/29 - went back in RVR on assoc with HD instability, spiking high grade fevers, high C.I, getting more difficult to oxygenate and ventilate           Assessment:       ICU Problems:       1. STEMI   2. Acute/Chronic Hypoxic Respiratory Failure   3. Hyponatremia   4. Hypochloremia   5. Elevated BNP   6. COPD   7. Hypertension   8. Hypercholesterolemia           ICU Comprehensive Plan of Care:       Neuro-analgesia/sedation with propofol and opioids as needed       Cardiovascular-continue hemodynamic monitoring, on aspirin, Brilinta, Lipitor, trend troponins, Impella in situ - currently @ P8, map goal greater than 65, high C.I, low SVR - aung added with good response and now coming off other pressors, heart rate goal less than 110- cont amio, keep magnesium above 2 and potassium above 4, serial vascular checks, CKs WNL, monitor JEROME output follow-up cardiology, cardiac surgery and vascular surgery recommendations       Pulmonary-continue lung protective mechanical ventilation, sat goal > 90%, difficult to oxygenate and ventilate now, high R sided pressures - started on Jcarlos with a good response, ?  PE - too unstable for transport at this time and empiric AC unsafe at this time, history of COPD- cont DESMOND/inhaled steroid therapy       GI-resume feeds if pressor requirements keep coming down, PPI GI prophylaxis       Renal-monitor urine output, correct electrolyte derangements as needed       Hematology-EBL in OR 1.5 L-status post 2 FFP, 1 plt and 5 of PRBCs 7/27, serial labs and transfuse for hemoglobin less than 7, platelet count less than 50, INR greater than 1.6 or fibrinogen less than 100       ID-spiking high grade fevers, more culture data sent, vanc added, cont Zosyn/doxycycline, f/u micro studies studies       Endocrinology-keep glucose between 100 & 180       Prognosis very guarded at this time       Objective:   Vital Signs:   Visit Vitals   /69 (BP 1 Location: Left arm, BP Patient Position: At rest)   Pulse 97   Temp (!) 104.8 °F (40.4 °C)   Resp 30   Ht 5' 8\" (1.727 m)   Wt 85.2 kg (187 lb 13.3 oz)   SpO2 97%   BMI 28.56 kg/m²      O2 Device: Endotracheal tube, Ventilator Temp (24hrs), Av.5 °F (38.6 °C), Min:100.3 °F (37.9 °C), Max:105.1 °F (40.6 °C)       CVP (mmHg): 12 mmHg (21 1600)         Intake/Output:        Intake/Output Summary (Last 24 hours) at 2021 1615   Last data filed at 2021 1500   Gross per 24 hour   Intake 5444.24 ml   Output 1450 ml   Net 3994.24 ml           Physical Exam:   General-intubated, sedated   Neuro-pupils reactive, following commands, seems non focal   Cardiac-RRR, Impella in situ   Lungs-clear anteriorly   Abdomen-soft, nontender, nondistended   Extremities-cool-left worse than right, no DPs, JEROME in left groin with sanguinous drainage       LABS AND  DATA: Personally reviewed   Recent Labs     21   1114 21   0507   WBC 12.2* 16.6*   HGB 7.0* 7.4*   HCT 20.8* 22.1*   PLT 57* 58*       Recent Labs     21   1114 21   0510 21   2352 21   1007 21   1007   NA -- 128* 130*  < > 130*   K -- 4.1 4.2  < > 4.2   CL -- 99 99  < > 99   CO2 -- 24 26  < > 24   BUN -- 13 13  < > 15   CREA -- 0.45* 0.49*  < > 0.52*   GLU -- 233* 205*  < > 210*   CA -- 6.9* 7.3*  < > 7.4*   MG 1.8 -- 1.7  < > 2.1   PHOS 2.6 -- -- -- 2.8    < > = values in this interval not displayed.         Cardiovascular Surgery or Procedure [facility] - Care Day 2 (2021) by Eitan Loo, DEDRICK       Review Entered Review Status   8/3/2021 16:33 Completed      Criteria Review      Care Day: 2 Care Date: 2021 Level of Care: ICU    Guideline Day 2    Clinical Status    ( ) * No ICU or intermediate care needs    8/3/2021 16:33:47 EDT by Dashawn Anderson remains in ICU    Interventions    (X) Inpatient interventions continue    8/3/2021 16:33:47 EDT by Asael Lindsay remains intubated/sedated. Code S called overnight for unequal pupils, no cough/gag reflex. On vaso 0.04 units, epi 3 mcg, levo 9 mcg. T max 101F, FiO2 60%    (X) Vascular and neurologic checks as appropriate    * Milestone   Additional Notes     icu        [facility] ICU Progress Note       Admit Date: 2021   POD:  1 Day Post-Op       Procedure:  Procedure(s):   Placement of Right Axillary Impella 5.5,  Removal of Left Femoral Impella CP, Left Groin Exploration with Common Femoral Patch Angioplasty, Left Femoral Arteriogram and Thrombectomy,  AXEL by Dr. Rebekah Goldstein           Subjective:   Pt seen with Dr. Karena Liang. Pt remains intubated/sedated. Code S called overnight for unequal pupils, no cough/gag reflex. On vaso 0.04 units, epi 3 mcg, levo 9 mcg. T max 101F, FiO2 60%       Impella at P8, D5 w/ bicarb as purge         Objective:   Vitals:   Blood pressure 111/73, pulse 92, temperature 100 °F (37.8 °C), resp. rate 20, height 5' 8\" (1.727 m), weight 182 lb 1.6 oz (82.6 kg), SpO2 99 %.    Temp (24hrs), Av.6 °F (37.6 °C), Min:96.8 °F (36 °C), Max:101 °F (38.3 °C)       Hemodynamics:               CO: CO (l/min): 5.6 l/min               CI: CI (l/min/m2): 2.9 l/min/m2               CVP: CVP (mmHg): 5 mmHg (21)               SVR: SVR (dyne*sec)/cm5: 786 (dyne*sec)/cm5 (21 08)               PAP Systolic: PAP Systolic: 42 (56/80/21 5821)               PAP Diastolic: PAP Diastolic: 21 (32/93/57 5785)               PVR:                 SV02:                 SCV02: SCVO2 (%): 76 % (07/28/21 0900)       EKG/Rhythm:  A-fib 80-90's        JEROME Output: +60 ml       Ventilator:   Ventilator Volumes   Vt Set (ml): 40 ml (21)   Vt Exhaled (Machine Breath) (ml): 610 ml (21)   Ve Observed (l/min): 11.5 l/min (21 0339)       Oxygen Therapy:   Oxygen Therapy   O2 Sat (%): 99 % (21 09)   Pulse via Oximetry: 116 beats per minute (21 1645)   O2 Device: Endotracheal tube;Ventilator (21 08)   Skin Assessment: Clean, dry, & intact (21 08)   Skin Protection for O2 Device: Yes (21)   Orientation: Middle (21)   Location: Cheek (21)   FIO2 (%): 60 % (21)   Assessment:       Active Problems:     Respiratory failure requiring intubation ([facility] ([place] [address] (2021)                Plan/Recommendations/Medical Decision Makin. Cardiogenic shock, STEMI: s/p OSCAR to LAD, upgrade to Impella 5.5 insertion. Cont D5 w/ bicarb as purge d/t bleeding. Cont at P8. Trend labs - lactate 1.0, pBNP trending down. ASA on hold d/t plts, on brilinta, lipitor per cards. Cont current gtts for now. On broad spectrum abx - if dc'd will need abx coverage for Impella        2. Aflutter s/p cardioversion: now in a-fib, on PO amio       3. L lower ext ischemia s/p L fem thrombectomy, endarterectomy: vascular surgery following, aware of decreased pulses. Orders to remove L groin JEROME drain today. Monitor for bleeding       4. Code S: neuro following, was too unstable for CT. Monitor       5. Acute hypoxic resp failure, hx of COPD: vent management per intensivist. Cont scheduled nebs       6. Acute bleeding, anemia: no further signs of active bleeding. Hgb 7.8, . Monitor        7. Thrombocytopenia: plts down to 92K, trend. Holding ASA. Could be d/t consumption from Impella         8. DM: A1c 6.1%, on Lantus 10 units, SSI        9. HTN: currently hypotensive on multiple gtts       10. HLD: on lipitor       11. Leukocytosis, fever: on doxy, Zosyn. Blood, sputum cx NGTD. UA w/ only +1 bacteria, no culture sent        12. Nutrition: starting TF's today       Dispo: remains critically ill. Cont Impella today - no plans to wean yet.  Further orders per primary teams     Critical care hospitalist   Interval events:       7/27 a.m.-patient taken to cath lab for OSCAR to LAD with post-PCI LOI flow 3. LVEDP was noted to be 40mmHg and a left femoral Impella was placed. On arrival in CCU Impella flows were noted to have decreased from 3.8L/min to 2.2 L/min. Bedside echo was performed and the Impella withdrawn 3.5cm  under direct echo visualization with a resulting increase in flow to 3.5L/min. An expanding hematoma at the left groin site was also noted on arrival in the CCU. The limb was cool and mottled with no doppler signal in the left DP, PT or popliteal. CT Surgery was consulted for placement of an axillary Impella both to increase support and to allow Vascular Surgery to address the threatened left lower extremity.        7/27 PM-now status post placement of axillary Impella 5.5, Removal of left femoral Impella CP, left groin exploration with, common femoral endarterectomy and patch angioplasty, left femoral thrombectomy and left iliac arteriogram, received 5 PRBCs and 2 of FFP in the OR, went into a flutter RVR in the OR-cardioverted x2 but did not respond-responded somewhat to an amnio bolus, came back on 3 pressors       7/28 - unequal pupils ON - code stroke called, now seems non focal, still intubated on 3 pressors with impella in situ           Assessment:       ICU Problems:       1. STEMI   2. Acute/Chronic Hypoxic Respiratory Failure   3. Hyponatremia   4. Hypochloremia   5. Elevated BNP   6. COPD   7. Hypertension   8.  Hypercholesterolemia           ICU Comprehensive Plan of Care:       Neuro-analgesia/sedation with Precedex, propofol and opioids as needed       Cardiovascular-continue hemodynamic monitoring, on aspirin, Brilinta, Lipitor, trend troponins, Impella in situ - currently @ P8, map goal greater than 65, on norepinephrine, epinephrine and vasopressin-wean as tolerated, heart rate goal less than 110-if necessary will resume amiodarone therapy, keep magnesium above 2 and potassium above 4, serial vascular checks, CKs WNL, monitor JEROME output follow-up cardiology, cardiac surgery and vascular surgery recommendations       Pulmonary-continue lung protective mechanical ventilation, seems to have some chronic pulmonary hypertension, history of COPD- cont DESMOND/inhaled steroid therapy, will wean when appropriate       GI-start trickle feeds, PPI GI prophylaxis       Renal-monitor urine output, correct electrolyte derangements as needed       Hematology-EBL in OR 1.5 L-status post 2 FFP, 1 plt and 5 of PRBCs , serial labs and transfuse for hemoglobin less than 7, platelet count less than 50, INR greater than 1.6 or fibrinogen less than 100       ID-still infiltrates on chest x-ray - erring on the side of caution and treating with antibiotics for possible aspiration and community-acquired pneumonia-Zosyn/doxycycline, f/u respiratory cultures and studies       Endocrinology-keep glucose between 100 & 180       Prognosis very guarded at this time       Objective:   Vital Signs:   Visit Vitals   /64 (BP 1 Location: Left arm, BP Patient Position: At rest)   Pulse 84   Temp 100.2 °F (37.9 °C)   Resp 23   Ht 5' 8\" (1.727 m)   Wt 82.6 kg (182 lb 1.6 oz)   SpO2 99%   BMI 27.69 kg/m²      O2 Device: Endotracheal tube Temp (24hrs), Av °F (37.8 °C), Min:97.9 °F (36.6 °C), Max:101 °F (38.3 °C)       CVP (mmHg): 7 mmHg (21 1300)         Intake/Output:        Intake/Output Summary (Last 24 hours) at 2021 1342   Last data filed at 2021 1300   Gross per 24 hour   Intake 4298.44 ml   Output 2235 ml   Net 2063.44 ml           Physical Exam:   General-intubated, sedated   Neuro-pupils reactive, following commands, seems non focal   Cardiac-RRR, Impella in situ   Lungs-clear anteriorly   Abdomen-soft, nontender, nondistended   Extremities-cool-left worse than right, no DPs, JEROME in left groin with sanguinous drainage           Meds  NS at 25   precedex titrated gtt   dobutrex titrated gtt   vibramycin 100mg iv q12   adrenalin titrated gtt   fentanyl titrated gtt   mag sulfate 2g iv q1   levophed iv titrated gtt   aung titrated gtt   zosyn 3.375g iv q8       diprivan titrated gtt    vasostrict titrated gtt

## 2022-02-05 NOTE — Clinical Note
[facility] INFECTIOUS DISEASE PROGRESS NOTE       SUBJECTIVE  Remains sedated and intubated 35/5  Remains on Levophed 5 mcg.  Off vasopressin  Two large loose bowel movements this a.m.  No additional new events per RN    MEDICATIONS  Current Facility-Administered Medications   Medication Dose Route Frequency Provider Last Rate Last Admin   • potassium CHLORIDE (KLOR-CON) packet 60 mEq  60 mEq Per NG tube Daily with breakfast Tanisha Sonianesan   60 mEq at 02/05/22 0632   • insulin glargine (LANTUS) injection 5 Units  5 Units Subcutaneous 2 times per day Taisha Whatley MD       • ivermectin (STROMECTOL) tablet 15,000 mcg  200 mcg/kg Oral Daily Lima Barrientos MD   15,000 mcg at 02/04/22 1208   • albendazole (ALBENZA) tablet 400 mg  400 mg Oral BID WC Lima Barrientos MD   400 mg at 02/04/22 1646   • sodium chloride 0.9% infusion   Intravenous Continuous PRN Taisha Whatley MD       • lidocaine 1 % injection 100 mg  10 mL Injection Once Taisha Whatley MD       • dexMEDETomidine (PRECEDEX) 400 mcg/100 mL in sodium chloride 0.9 % infusion  0-1.5 mcg/kg/hr (Dosing Weight) Intravenous Continuous Sufyan Abdulmujeeb, DO 5.6 mL/hr at 02/05/22 0859 0.3 mcg/kg/hr at 02/05/22 0859   • [Held by provider] heparin (porcine) injection 5,000 Units  5,000 Units Subcutaneous 3 times per day Taisha Whatley MD   5,000 Units at 02/03/22 0609   • MIDAZolam (VERSED) injection 4 mg  4 mg Intravenous Once Hilda Davenport MD       • lidocaine 1 % injection 200 mg  20 mL Other Once Hilda Davenport MD       • sodium chloride 0.9% infusion   Intravenous Continuous PRN Tanisha Sonianesan       • sodium chloride 0.9% infusion   Intravenous Continuous PRN Tanisha Sonianesan       • sodium chloride (NORMAL SALINE) 0.9 % bolus 500 mL  500 mL Intravenous PRN Tanisha Sonianesan       • NORepinephrine (LEVOPHED) 8 mg/250 mL in sodium chloride 0.9 % infusion  0-100 mcg/min Intravenous Continuous Sufyan Abdulmujeeb, DO 9.38 mL/hr at 02/05/22 0920 5 mcg/min at 02/05/22 0920   •  History and physical documented and up to date, allergies reviewed, lab results reviewed and procedural consent signed. sodium chloride 0.9% infusion   Intravenous Continuous PRN Sarah Chandra, DO       • chlorhexidine gluconate (PERIDEX) 0.12 % solution 15 mL  15 mL Swish & Spit 2 times per day Kvng Carrington, DO   15 mL at 02/04/22 2147   • [Held by provider] lactulose (CHRONULAC) 10 GM/15ML solution 10 g  10 g Oral Daily Brandie E Gabe   10 g at 02/04/22 0857   • hydrALAZINE (APRESOLINE) injection 10 mg  10 mg Intravenous Q6H PRN Gene Urbina DO   10 mg at 01/27/22 0018   • folic acid (FOLATE) tablet 1 mg  1 mg Oral Daily Brandie E Gabe   1 mg at 02/04/22 0857   • metoPROLOL (LOPRESSOR) injection 5 mg  5 mg Intravenous Q6H PRN Sadi Coates DO   5 mg at 01/27/22 0416   • insulin lispro (ADMELOG,HumaLOG) - Correction Dose   Subcutaneous TID WC Tanisha Barbour   3 Units at 02/04/22 0900   • bisacodyl (DULCOLAX) suppository 10 mg  10 mg Rectal Daily PRN Augustin Ferro, DO       • polyethylene glycol (MIRALAX) packet 17 g  17 g Oral BID Nevaeh P Vipin   17 g at 02/04/22 0856   • docusate sodium-sennosides (SENOKOT S) 50-8.6 MG 1 tablet  1 tablet Oral BID PRN Augustin Ferro DO   1 tablet at 01/30/22 1545   • dextrose 50 % injection 25 g  25 g Intravenous PRN Tanisha Barbour       • dextrose 50 % injection 12.5 g  12.5 g Intravenous PRN Tanisha Barbour       • glucagon (GLUCAGEN) injection 1 mg  1 mg Intramuscular PRN Tanisha Barbour       • dextrose (GLUTOSE) 40 % gel 15 g  15 g Oral PRN Tanisha Barbour       • dextrose (GLUTOSE) 40 % gel 30 g  30 g Oral PRN Tanisha Barbour       • albuterol inhaler 2 puff  2 puff Inhalation Q4H Resp PRN Taisha Whatley MD   2 puff at 01/24/22 0917   • CARBOXYMethylcellulose (REFRESH PLUS) 0.5 % ophthalmic solution 1 drop  1 drop Both Eyes TID Taisha Whatley MD   1 drop at 02/04/22 0950   • lidocaine (LIDOCARE) 4 % patch 2 patch  2 patch Transdermal Daily Tobin Latif MD   2 patch at 02/04/22 0857   • cyclobenzaprine (FLEXERIL) tablet 5 mg  5 mg Oral Daily PRN Liberty Beverly MD        • levothyroxine (SYNTHROID, LEVOTHROID) tablet 75 mcg  75 mcg Oral QAM AC Liberty Beverly MD   75 mcg at 02/05/22 0605   • pantoprazole (PROTONIX) EC tablet 40 mg  40 mg Oral Daily Liberty Beverly MD   40 mg at 02/04/22 0857   • pravastatin (PRAVACHOL) tablet 20 mg  20 mg Oral QHS Liberty Beverly MD   20 mg at 02/04/22 2146   • sodium chloride 0.9 % flush bag 25 mL  25 mL Intravenous PRN Liberty Beverly MD       • sodium chloride (PF) 0.9 % injection 2 mL  2 mL Intracatheter 2 times per day Liberty Beverly MD   2 mL at 02/04/22 2204   • acetaminophen (TYLENOL) tablet 650 mg  650 mg Oral Q4H PRN Liberty Beverly MD   650 mg at 01/24/22 0235   • traMADol (ULTRAM) tablet 50 mg  50 mg Oral Q4H PRN Liberty Beverly MD   50 mg at 01/29/22 0125       Allergy:  ALLERGIES:   Allergen Reactions   • Bactrim Ds Other (See Comments)     (confirmed by H&P by ) SULFA   • Gemfibrozil NAUSEA       PHYSICAL EXAM  Temp:  [95.5 °F (35.3 °C)-97.6 °F (36.4 °C)] 95.5 °F (35.3 °C)  Heart Rate:  [] 85  Resp:  [12-30] 18  Arterial Line BP: ()/(39-64) 88/54  FiO2 (%):  [34 %-37 %] 34 %    Physical Exam  Vitals and nursing note reviewed.   Constitutional:       Comments: intubated   Cardiovascular:      Rate and Rhythm: Normal rate and regular rhythm.   Pulmonary:      Effort: No respiratory distress.      Breath sounds: No wheezing.      Comments: Coarse breath sounds  Abdominal:      General: Bowel sounds are normal. There is no distension.      Palpations: Abdomen is soft.      Tenderness: There is no abdominal tenderness. There is no guarding.   Musculoskeletal:      Right lower leg: No edema.      Left lower leg: No edema.   Skin:     General: Skin is warm and dry.          LABORATORY  Recent Results (from the past 24 hour(s))   GLUCOSE, BEDSIDE - POINT OF CARE    Collection Time: 02/04/22 12:06 PM   Result Value Ref Range    GLUCOSE, BEDSIDE - POINT OF CARE 121 (H) 70 - 99 mg/dL    GLUCOSE, BEDSIDE - POINT OF CARE    Collection Time: 02/04/22  4:43 PM   Result Value Ref Range    GLUCOSE, BEDSIDE - POINT OF CARE 113 (H) 70 - 99 mg/dL   GLUCOSE, BEDSIDE - POINT OF CARE    Collection Time: 02/05/22 12:01 AM   Result Value Ref Range    GLUCOSE, BEDSIDE - POINT OF CARE 177 (H) 70 - 99 mg/dL   Blood Gas, Arterial    Collection Time: 02/05/22 12:22 AM   Result Value Ref Range    pH, Arterial 7.32 (L) 7.35 - 7.45 Units    pCO2, Arterial 58 (H) 35 - 48 mm Hg    pO2, Arterial 104 83 - 108 mm Hg    HCO3, Arterial 29 (H) 22 - 28 mmol/L    Base Excess/ Deficit, Arterial 3 -2 - 3 mmol/L    O2 Saturation, Arterial 97 95 - 99 %    Oxyhemoglobin, Arterial 95 94 - 98 %    Hemoglobin, Blood Gas 10.4 (L) 13.0 - 17.0 g/dL   Comprehensive Metabolic Panel    Collection Time: 02/05/22  3:55 AM   Result Value Ref Range    Fasting Status      Sodium 152 (H) 135 - 145 mmol/L    Potassium 3.6 3.4 - 5.1 mmol/L    Chloride 118 (H) 98 - 107 mmol/L    Carbon Dioxide 32 21 - 32 mmol/L    Anion Gap 6 (L) 10 - 20 mmol/L    Glucose 182 (H) 70 - 99 mg/dL    BUN 54 (H) 6 - 20 mg/dL    Creatinine 0.72 0.67 - 1.17 mg/dL    Glomerular Filtration Rate 89 >=60    BUN/ Creatinine Ratio 75 (H) 7 - 25    Calcium 8.0 (L) 8.4 - 10.2 mg/dL    Bilirubin, Total 1.9 (H) 0.2 - 1.0 mg/dL    GOT/AST 32 <=37 Units/L    GPT/ALT 26 <64 Units/L    Alkaline Phosphatase 390 (H) 45 - 117 Units/L    Albumin 1.7 (L) 3.6 - 5.1 g/dL    Protein, Total 5.7 (L) 6.4 - 8.2 g/dL    Globulin 4.0 2.0 - 4.0 g/dL    A/G Ratio 0.4 (L) 1.0 - 2.4   Phosphorus    Collection Time: 02/05/22  3:55 AM   Result Value Ref Range    Phosphorus 4.3 2.4 - 4.7 mg/dL   Magnesium    Collection Time: 02/05/22  3:55 AM   Result Value Ref Range    Magnesium 2.7 (H) 1.7 - 2.4 mg/dL   CBC with Automated Differential (performable only)    Collection Time: 02/05/22  3:55 AM   Result Value Ref Range    WBC 1.7 (L) 4.2 - 11.0 K/mcL    RBC 2.21 (L) 4.50 - 5.90 mil/mcL    HGB 7.1 (L) 13.0 -  17.0 g/dL    HCT 23.2 (L) 39.0 - 51.0 %    .0 (H) 78.0 - 100.0 fl    MCH 32.1 26.0 - 34.0 pg    MCHC 30.6 (L) 32.0 - 36.5 g/dL    RDW-CV 19.8 (H) 11.0 - 15.0 %    RDW-SD 74.5 (H) 39.0 - 50.0 fL     (L) 140 - 450 K/mcL    NRBC 0 <=0 /100 WBC   Manual Differential    Collection Time: 02/05/22  3:55 AM   Result Value Ref Range    Neutrophil, Percent 75 %    Lymphocytes, Percent 16 %    Mono, Percent 5 %    Eosinophils, Percent 0 %    Basophils, Percent 0 %    Bands, Percent 3 0 - 10 %    Reactive Lymphocytes, Percent 1 0 - 5 %    Absolute Neutrophil 1.3 (L) 1.8 - 7.7 K/mcL    Absolute Lymphocytes 0.3 (L) 1.0 - 4.0 K/mcL    Absolute Monocytes 0.1 (L) 0.3 - 0.9 K/mcL    Absolute Eosinophils 0.0 0.0 - 0.5 K/mcL    Absolute Basophils 0.0 0.0 - 0.3 K/mcL    WBC Morphology Normal Normal    Ovalocytes Few     Platelet Morphology Normal Normal    Polychromasia Few    GLUCOSE, BEDSIDE - POINT OF CARE    Collection Time: 02/05/22  6:28 AM   Result Value Ref Range    GLUCOSE, BEDSIDE - POINT OF CARE 169 (H) 70 - 99 mg/dL       Microbiology Results  (Last 10 results in the past 7 days)    Specimen   Gram Smear   Culture Result   Status       02/03/22  1610         No organisms seen.  [P]            No polymorphonuclear cells seen.  [P]           02/03/22  1609         Present Polymorphonuclear cells.  Comment: Edited result: Previously reported as No organisms seen. on 2/3/2022 at 2155 CST.  [P]            No epithelial cells seen.  Comment: Edited result: Previously reported as No polymorphonuclear cells seen. on 2/3/2022 at 2155 CST.  [P]            No organisms seen.  Comment: No bacteria seen, presence of likely nematode larvae.  Please refer to Ova and Parasite test for further information.   [P]            Slide prepared by [employer].  [P]            Preliminary Report to be Reviewed by [facility].  [P]            Gram stain confirmed by [facility].  [P]                 [P] - Preliminary Result               IMAGING  MRI BRAIN WO CONTRAST   Final Result       Moderate cerebral atrophy.        No evidence of an acute cerebral vascular accident.         Electronically Signed by: LINH LYON M.D.    Signed on: 2/5/2022 5:16 AM          CT HEAD WO CONTRAST   Final Result          1.  No definite radiographic evidence of acute intracranial abnormality.   2.  Extensive intracranial atherosclerosis and mild intracranial small   vessel ischemic disease.   3.  Mild left maxillary sinusitis.  Left sphenoid sinus opacification.      Electronically Signed by: SCOTT BRADFORD M.D.    Signed on: 2/4/2022 11:49 AM          XR CHEST PA OR AP 1 VIEW   Final Result   FINDINGS/IMPRESSION: Right IJ line to atriocaval junction.  Endotracheal   tube 3.6 cm proximal to jackelyn.  Nasoenteric feeding tube terminates within   the stomach.  Cardiac size within normal limits.  Improved retrocardiac   aeration.  Persistent lingular alveolar lobar consolidation.  Persistent   interstitial and alveolar airspace opacities predominating within the right   lung apex.  Stable small pleural effusion.  No definite pneumothorax.    Nonspecific nonobstructive bowel gas pattern.  Incompletely visualized   lumbar fusion hardware.             Electronically Signed by: SCOTT BRADFORD M.D.    Signed on: 2/4/2022 11:35 AM          XR CHEST PA OR AP 1 VIEW   Final Result       Bilateral lung infiltrates, stable and unchanged.         Electronically Signed by: NANDO VIDAL M.D.    Signed on: 2/3/2022 4:37 PM          US GUIDED THORACENTESIS   Final Result   Impression: Successful bilateral thoracentesis with removal of 0.4L on the   left and 0.2L on the right. The fluid was not loculated and was completely   drained on each side. Fluid from each side was sent separately for   analysis.       Electronically Signed by: NILE KOHLER MD    Signed on: 2/4/2022 10:02 AM          CT HEAD WO CONTRAST   Final Result      No significant interval change.       Electronically Signed by: JAMES RAMIREZ M.D.    Signed on: 2/3/2022 3:01 AM          XR CHEST PA OR AP 1 VIEW   Final Result      Right-sided IJ catheter is positioned at the cavoatrial junction.      New area of left midlung airspace disease.      Electronically Signed by: JAMES RAMIREZ M.D.    Signed on: 2/2/2022 1:03 AM          XR CHEST PA OR AP 1 VIEW   Final Result   FINDINGS/IMPRESSION: Endotracheal tube at the thoracic inlet.  Orogastric   tube within the stomach.  Incompletely characterized lumbar fusion.    Cardiac size grossly within normal limits.  Dense retrocardiac   consolidation is stable.  Improved aeration of the lingula and bilateral   lung bases with improved bilateral interstitial and alveolar airspace   opacities.  No definite pneumothorax.  Diffuse osseous demineralization.    Nonspecific nonobstructive bowel gas pattern.             Electronically Signed by: SCOTT BRADFORD M.D.    Signed on: 2/1/2022 7:21 PM          XR CHEST PA OR AP 1 VIEW   Final Result   1. Persistent bilateral airspace and interstitial opacities with bilateral   pleural effusions.      Electronically Signed by: SANTANA RUSHING M.D.    Signed on: 2/1/2022 9:29 AM          XR CHEST PA OR AP 1 VIEW   Final Result       Worsening mixed interstitial and airspace opacities on the RIGHT.         Electronically Signed by: LINH LYON M.D.    Signed on: 2/1/2022 12:10 AM          CT CHEST ABDOMEN PELVIS WO CONTRAST   Final Result      1. Extensive bilateral airspace opacification, not significantly changed.      2.  Small bilateral pleural effusions, right slightly smaller and left   unchanged.      3. No obvious new acute abnormality in the abdomen and pelvis on non   contrast exam.      Electronically Signed by: REGIS BROWN M.D.    Signed on: 1/29/2022 3:11 PM          US LIVER   Final Result      Negative abdominal ultrasound      Electronically Signed by: REGIS BROWN M.D.    Signed on: 1/29/2022  11:47 AM          CT HEAD WO CONTRAST   Final Result   1.  No significant intracranial abnormalities.   2.  Interval development of bilateral mastoiditis and pansinusitis.      Electronically Signed by: DAVID GREEN M.D.    Signed on: 1/28/2022 4:30 PM          US VASC EXTREMITY LOWER VENOUS DUPLEX   Final Result      Technically a difficult and suboptimal study.   No definite evidence for acute DVT in the evaluated right popliteal vein.      Electronically Signed by: EDGARD CAMPBELL M.D.    Signed on: 2/1/2022 9:16 AM          XR CHEST PA OR AP 1 VIEW   Final Result   FINDINGS/IMPRESSION: Mild cardiomegaly.  Stable dense retrocardiac   consolidation.  Stable geographic alveolar lobar consolidation within the   right midlung, right lung base, lingula and left lung base.  Stable small   left pleural effusion.  Interval resolution right apical pneumothorax.  No   left apical pneumothorax.  Nonspecific nodular bowel gas pattern.  Diffuse   osseous demineralization.  Stable diffuse interstitial and alveolar   airspace opacity within the right lung apex.                Electronically Signed by: SCOTT BRADFORD M.D.    Signed on: 1/28/2022 11:45 AM          XR CHEST PA OR AP 1 VIEW   Final Result   1.  Small right apical pneumothorax is slightly larger.   2.  Stable pulmonary opacities and small pleural effusions      Electronically Signed by: KELSEY VALDIVIA M.D.    Signed on: 1/27/2022 6:12 PM          XR CHEST PA OR AP 1 VIEW   Final Result       Small pneumothorax status post right thoracentesis.         Electronically Signed by: PHILIP DYER M.D.    Signed on: 1/27/2022 1:56 PM          US GUIDED THORACENTESIS   Final Result   Impression: Successful right thoracentesis with removal of 550 mL.      Electronically Signed by: NANDO DIAZ MD    Signed on: 1/27/2022 3:31 PM          CTA CHEST PULMONARY EMBOLISM W CONTRAST   Final Result   1.   Limited exam.   2.   No definitive acute pulmonary embolism.   3.    Moderate bilateral pleural effusions with likely a combination of   atelectasis or infection.      Electronically Signed by: AMANDA FINNEGAN MD    Signed on: 1/26/2022 1:09 PM          XR CHEST PA AND LATERAL 2 VIEWS   Final Result   FINDINGS/IMPRESSION: Pulmonary hypoinflation.  Progressive extensive   interstitial and alveolar airspace opacity within the entire right lung.    Dense retrocardiac consolidation.  Small bilateral pleural effusions.  No   definite pneumothorax.  Nonspecific nonobstructive bowel gas pattern.                Electronically Signed by: SCOTT BRADFORD M.D.    Signed on: 1/26/2022 12:27 PM          US LIVER   Final Result   1.  No focal liver lesion.     2.  Cholecystectomy.  No definite biliary dilatation   3.  Small right pleural effusion      Electronically Signed by: KELSEY VALDIVIA M.D.    Signed on: 1/24/2022 8:28 AM          XR CHEST PA OR AP 1 VIEW   Final Result   Opacities in haziness in both lungs more on the right are again   noted and unchanged representing pleural effusions with   atelectasis/infiltrates.  The apices are spared.  Cardiomegaly again noted.    There are no interval changes.      Electronically Signed by: DAVID GREEN M.D.    Signed on: 1/22/2022 7:32 AM          US VASC EXTREMITY LOWER VENOUS DUPLEX   Final Result      No evidence of acute DVT in the bilateral common femoral veins, proximal   profunda femoris, femoral veins in the bilateral thighs and the popliteal   veins.      Electronically Signed by: EDGARD CAMPBELL M.D.    Signed on: 1/23/2022 8:12 AM          XR CHEST PA OR AP 1 VIEW   Final Result       Findings of moderate CHF with interval worsening.         Electronically Signed by: NANDO VIDAL M.D.    Signed on: 1/21/2022 7:51 AM          XR CHEST PA OR AP 1 VIEW   Final Result       Stable exam.         Electronically Signed by: CYNTHIA KNIGHT M.D.    Signed on: 1/19/2022 7:48 PM          XR CHEST PA OR AP 1 VIEW   Final Result       Bilateral lower lung field opacities, greater on the right concerning for   pneumonia.      Electronically Signed by: JAMES RAMIREZ M.D.    Signed on: 1/19/2022 4:10 AM                IMPRESSION:  -Septic shock, improving; secondary to below    -Acute hypoxic resp failure. Intubated 2/1      -Pneumococcal sepsis secondary to pneumonia     -Strongyloides hyperinfection   -1/27 R thora: exudative, lymphocytic predominance; cx neg             -CXR 2/2 with new L infiltrate             -unable to place chest tube due to location of fluid   -s/p b/l thora with IR 2/3: exudative, lymphocytic predominance; multiple larvae identified    -patient from [facility]; visited [facility] 2-3 years ago and stayed for 2 months; no other international travel/stay; used to work prepping salads, now retired      -COVID-19 pneumonia              -vaccinated x 3              -completed remdesivir 1/23             -completed dexamethasone 1/29     -Elevated d-dimer, improving: CTA chest 1/26 neg for PE.     -CLL, on ibrutinib PTA (now on hold)     -Pancytopenia, worsening.  Hematology following.  Zosyn discontinued today    PLAN  -Follow-up cultures, additional ova and parasite studies as well as Strongyloides antibody and GI PCR   -Discontinue steroids, given above  -Discontinue Zosyn, given above.  Low threshold for restarting gram-negative coverage if any change in clinical condition  -Continue ivermectin and albendazole for now with anticipation of ivermectin monotherapy pending clinical improvement  -Hematology following  -Monitor CBC  -Discussed with Dr. Bernal and ICU team  -Continue supportive care in ICU  -Will follow      Gabi Silverman MD  [facility]  T [phone]  F [phone]  2/5/2022 10:43 AM

## 2024-07-30 NOTE — ED NOTES
Pt intubated as ordered with meds, cxr completed, briscoe cath placed, urine sent, OG placed just prior to s/p intubation CXR. Second IV placed just prior to intubation with second pair of blood cultures sent to lab. Post intubation venous blood gas completed. Rapid COVID swab collected and sent to lab. no

## (undated) DEVICE — PLEDGET SURG W3/16XL0.25IN THK1.65MM PTFE OVL FELT FOR THE

## (undated) DEVICE — COVER LT HNDL PLAS RIG 1 PER PK

## (undated) DEVICE — FOGARTY ARTERIAL EMBOLECTOMY CATHETER 4F 80CM: Brand: FOGARTY

## (undated) DEVICE — AGENT HEMSTAT W4XL4IN OXIDIZED REGENERATED CELOS ABSRB SFT

## (undated) DEVICE — BAG RED 3PLY 2MIL 30X40 IN

## (undated) DEVICE — CATH BLLN DIL 3.0 X12MM RX -- EUPHORA

## (undated) DEVICE — TUBING, SUCTION, 1/4" X 10', STRAIGHT: Brand: MEDLINE

## (undated) DEVICE — CATH BLLN DIL 2.5 X12MM RX -- EUPHORA

## (undated) DEVICE — SEALANT TISS 4 CC FIBRIN VISTASEAL

## (undated) DEVICE — ANGIOGRAPHIC CATHETER: Brand: IMPULSE™

## (undated) DEVICE — PINNACLE INTRODUCER SHEATH: Brand: PINNACLE

## (undated) DEVICE — RESERVOIR,SUCTION,100CC,SILICONE: Brand: MEDLINE

## (undated) DEVICE — SOLUTION IRRIG 1000ML STRL H2O USP PLAS POUR BTL

## (undated) DEVICE — BASIN ST MAJOR-NO CAUTERY: Brand: MEDLINE INDUSTRIES, INC.

## (undated) DEVICE — SUTURE PERMAHAND SZ 2-0 L18IN NONABSORBABLE BLK L26MM SH C012D

## (undated) DEVICE — SPONGE GZ W4XL4IN COT 12 PLY TYP VII WVN C FLD DSGN

## (undated) DEVICE — COVER,TABLE,HEAVY DUTY,60"X90",STRL: Brand: MEDLINE

## (undated) DEVICE — GLOVE SURG SZ 8 CRM LTX FREE POLYISOPRENE POLYMER BEAD ANTI

## (undated) DEVICE — SNAP KOVER: Brand: UNBRANDED

## (undated) DEVICE — GLOVE ORANGE PI 7 1/2   MSG9075

## (undated) DEVICE — Device: Brand: FIELDER XT

## (undated) DEVICE — SOLUTION IRRIG 1000ML 0.9% SOD CHL USP POUR PLAS BTL

## (undated) DEVICE — MICROPUNCTURE INTRODUCER SET SILHOUETTE TRANSITIONLESS WITH STAINLESS STEEL WIRE GUIDE: Brand: MICROPUNCTURE

## (undated) DEVICE — GUIDEWIRE VASC L260CM DIA0.035IN L7CM DIA3MM J TIP PTFE S

## (undated) DEVICE — SYR LR LCK 1ML GRAD NSAF 30ML --

## (undated) DEVICE — SYSTEM TISS GLUE 5ML CONTAIN SYR PLUNG STD SYR TIP 12MM 5PKS/EA

## (undated) DEVICE — SYR 10ML LUER LOK 1/5ML GRAD --

## (undated) DEVICE — KIT HND CTRL 3 W STPCOCK ROT END 54IN PREM HI PRSS TBNG AT

## (undated) DEVICE — SUTURE VCRL SZ 0 L18IN ABSRB VLT L40MM CT 1/2 CIR J752D

## (undated) DEVICE — REM POLYHESIVE ADULT PATIENT RETURN ELECTRODE: Brand: VALLEYLAB

## (undated) DEVICE — PACK PROCEDURE SURG HRT CATH

## (undated) DEVICE — HI-TORQUE VERSACORE MODIFIED J GUIDE WIRE SYSTEM 145 CM: Brand: HI-TORQUE VERSACORE

## (undated) DEVICE — KIT MFLD ISOLATN NACL CNTRST PRT TBNG SPIK W/ PRSS TRNSDUC

## (undated) DEVICE — SPECIAL PROCEDURE DRAPE 32" X 34": Brand: SPECIAL PROCEDURE DRAPE

## (undated) DEVICE — CATHETER DIAG AD 4FR L100CM STD NYL JUDKINS R 4 TRULUMEN

## (undated) DEVICE — DRAPE,REIN 53X77,STERILE: Brand: MEDLINE

## (undated) DEVICE — PROVE COVER: Brand: UNBRANDED

## (undated) DEVICE — DRESSING SIL W4XL5IN ANTIBACT GELLING FBR CYTOFORM

## (undated) DEVICE — Device: Brand: EAGLE EYE PLATINUM RX DIGITAL IVUS CATHETER

## (undated) DEVICE — LOOP,VESSEL,MAXI,BLUE,2/PK,STERILE: Brand: MEDLINE

## (undated) DEVICE — CATH ANGI BLLN DIL 3.5X15MM -- NC EUPHORA

## (undated) DEVICE — CATHETER ANGIO 4FR L100CM S STL NYL JL3.5 3 SEG BRAID SFT

## (undated) DEVICE — Device

## (undated) DEVICE — DRAPE FLD WRM W44XL66IN C6L FOR INTRATEMP SYS THERMABASIN

## (undated) DEVICE — SUT SLK 2 60IN TIE MP BLK --

## (undated) DEVICE — FOGARTY ARTERIAL EMBOLECTOMY CATHETER 3F 80CM: Brand: FOGARTY

## (undated) DEVICE — SUT PROL 6-0 18IN BV1 DA BLU --

## (undated) DEVICE — SPONGE LAP 18X18IN STRL -- 5/PK

## (undated) DEVICE — Device: Brand: ASAHI SION

## (undated) DEVICE — DRAIN SURG W10XL20CM SIL SMOOTH FLAT 3/4 PERF DBL WRP

## (undated) DEVICE — CLIP LIG M BLU TI HRT SHP WIRE HORZ 600 PER BX

## (undated) DEVICE — SUTURE PROL SZ 5-0 L30IN NONABSORBABLE BLU L13MM RB-2 1/2 8710H

## (undated) DEVICE — PREP SKN CHLRAPRP APL 26ML STR --

## (undated) DEVICE — SOL INJ SOD CL 0.9% 500ML BG --

## (undated) DEVICE — TR BAND RADIAL ARTERY COMPRESSION DEVICE: Brand: TR BAND

## (undated) DEVICE — ELECTROSURGICAL DEVICE HOLSTER;FOR USE WITH MAXIMUM PEAK VOLTAGE OF 4000 V: Brand: FORCE TRIVERSE

## (undated) DEVICE — FINECROSS MG CORONARY MICRO-GUIDE CATHETER: Brand: FINECROSS

## (undated) DEVICE — INTENDED TO STANDARDIZE OR CAMERAS TO ALLOW FOR THE USE OF THE OR CAMERA COVER: Brand: ASPEN® O.R. CAMERA COVER

## (undated) DEVICE — RUNTHROUGH NS EXTRA FLOPPY PTCA GUIDEWIRE: Brand: RUNTHROUGH

## (undated) DEVICE — PUMP HEART IMPELLA CP03 --

## (undated) DEVICE — COPILOT BLEEDBACK CONTROL VALVE: Brand: COPILOT

## (undated) DEVICE — SYR 3ML LL TIP 1/10ML GRAD --

## (undated) DEVICE — SUT PROL 7-0 24IN BV1 DA BLU --

## (undated) DEVICE — GOWN,SIRUS,NONRNF,SETINSLV,XL,20/CS: Brand: MEDLINE

## (undated) DEVICE — CUSTOM KT PTCA INFL DEV K05 00052M

## (undated) DEVICE — KIT MED IMAG CNTRST AGNT W/ IOPAMIDOL REUSE

## (undated) DEVICE — ANGIOGRAPHY KIT

## (undated) DEVICE — SPLINT WR POS F/ARTERIAL ACC -- BX/10

## (undated) DEVICE — CATH GUID COR EB40 6FR 100CM -- LAUNCHER

## (undated) DEVICE — CLIP INT SM WIDE RED TI TRNSVRS GRV CHEVRON SHP W PRECIS

## (undated) DEVICE — SYR 50ML LR LCK 1ML GRAD NSAF --

## (undated) DEVICE — 1000ML,PRESSURE INFUSER W/STOPCOCK: Brand: MEDLINE

## (undated) DEVICE — CV INCISE SHEET: Brand: CONVERTORS

## (undated) DEVICE — TIDISHIELD TRANSPORT, CONTAINMENT COVER FOR BACK TABLE 4'6" (1.37M) TO 8' (2.43M) IN LENGTH: Brand: TIDISHIELD